# Patient Record
Sex: FEMALE | Race: WHITE | NOT HISPANIC OR LATINO | Employment: OTHER | URBAN - METROPOLITAN AREA
[De-identification: names, ages, dates, MRNs, and addresses within clinical notes are randomized per-mention and may not be internally consistent; named-entity substitution may affect disease eponyms.]

---

## 2021-04-09 ENCOUNTER — HOSPITAL ENCOUNTER (INPATIENT)
Facility: HOSPITAL | Age: 76
LOS: 5 days | Discharge: HOME HEALTH | DRG: 521 | End: 2021-04-14
Attending: EMERGENCY MEDICINE | Admitting: FAMILY MEDICINE
Payer: MEDICARE

## 2021-04-09 ENCOUNTER — ANESTHESIA EVENT (OUTPATIENT)
Dept: PERIOP | Facility: HOSPITAL | Age: 76
DRG: 521 | End: 2021-04-09
Payer: MEDICARE

## 2021-04-09 ENCOUNTER — APPOINTMENT (EMERGENCY)
Dept: RADIOLOGY | Facility: HOSPITAL | Age: 76
DRG: 521 | End: 2021-04-09
Payer: MEDICARE

## 2021-04-09 ENCOUNTER — ANESTHESIA (OUTPATIENT)
Dept: PERIOP | Facility: HOSPITAL | Age: 76
DRG: 521 | End: 2021-04-09
Payer: MEDICARE

## 2021-04-09 ENCOUNTER — APPOINTMENT (OUTPATIENT)
Dept: RADIOLOGY | Facility: HOSPITAL | Age: 76
DRG: 521 | End: 2021-04-09
Payer: MEDICARE

## 2021-04-09 DIAGNOSIS — U07.1 COVID-19: ICD-10-CM

## 2021-04-09 DIAGNOSIS — S51.019A SKIN TEAR OF ELBOW WITHOUT COMPLICATION: ICD-10-CM

## 2021-04-09 DIAGNOSIS — Z72.0 TOBACCO ABUSE: ICD-10-CM

## 2021-04-09 DIAGNOSIS — U07.1 COVID-19 VIRUS INFECTION: ICD-10-CM

## 2021-04-09 DIAGNOSIS — S72.009A FEMORAL NECK FRACTURE (HCC): Primary | ICD-10-CM
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
## 2021-04-09 PROBLEM — S72.002A CLOSED DISPLACED FRACTURE OF LEFT FEMORAL NECK (HCC): Status: ACTIVE | Noted: 2021-04-09

## 2021-04-09 PROBLEM — R26.2 AMBULATORY DYSFUNCTION: Status: ACTIVE | Noted: 2021-04-09

## 2021-04-09 PROBLEM — E87.1 HYPONATREMIA: Status: ACTIVE | Noted: 2021-04-09

## 2021-04-09 LAB
ABO GROUP BLD: NORMAL
ABO GROUP BLD: NORMAL
ANION GAP SERPL CALCULATED.3IONS-SCNC: 6 MMOL/L (ref 4–13)
APTT PPP: 32 SECONDS (ref 23–37)
ATRIAL RATE: 67 BPM
BASOPHILS # BLD AUTO: 0.02 THOUSANDS/ΜL (ref 0–0.1)
BASOPHILS NFR BLD AUTO: 1 % (ref 0–1)
BLD GP AB SCN SERPL QL: NEGATIVE
BUN SERPL-MCNC: 16 MG/DL (ref 5–25)
CALCIUM SERPL-MCNC: 8.2 MG/DL (ref 8.3–10.1)
CHLORIDE SERPL-SCNC: 98 MMOL/L (ref 100–108)
CO2 SERPL-SCNC: 29 MMOL/L (ref 21–32)
CREAT SERPL-MCNC: 0.85 MG/DL (ref 0.6–1.3)
EOSINOPHIL # BLD AUTO: 0.01 THOUSAND/ΜL (ref 0–0.61)
EOSINOPHIL NFR BLD AUTO: 0 % (ref 0–6)
ERYTHROCYTE [DISTWIDTH] IN BLOOD BY AUTOMATED COUNT: 12.7 % (ref 11.6–15.1)
FLUAV RNA RESP QL NAA+PROBE: NEGATIVE
FLUBV RNA RESP QL NAA+PROBE: NEGATIVE
GFR SERPL CREATININE-BSD FRML MDRD: 67 ML/MIN/1.73SQ M
GLUCOSE SERPL-MCNC: 108 MG/DL (ref 65–140)
HCT VFR BLD AUTO: 38.9 % (ref 34.8–46.1)
HGB BLD-MCNC: 12.8 G/DL (ref 11.5–15.4)
IMM GRANULOCYTES # BLD AUTO: 0.04 THOUSAND/UL (ref 0–0.2)
IMM GRANULOCYTES NFR BLD AUTO: 1 % (ref 0–2)
INR PPP: 1.1 (ref 0.84–1.19)
LYMPHOCYTES # BLD AUTO: 0.7 THOUSANDS/ΜL (ref 0.6–4.47)
LYMPHOCYTES NFR BLD AUTO: 16 % (ref 14–44)
MCH RBC QN AUTO: 34.3 PG (ref 26.8–34.3)
MCHC RBC AUTO-ENTMCNC: 32.9 G/DL (ref 31.4–37.4)
MCV RBC AUTO: 104 FL (ref 82–98)
MONOCYTES # BLD AUTO: 0.53 THOUSAND/ΜL (ref 0.17–1.22)
MONOCYTES NFR BLD AUTO: 12 % (ref 4–12)
NEUTROPHILS # BLD AUTO: 2.98 THOUSANDS/ΜL (ref 1.85–7.62)
NEUTS SEG NFR BLD AUTO: 70 % (ref 43–75)
NRBC BLD AUTO-RTO: 0 /100 WBCS
P AXIS: 79 DEGREES
PLATELET # BLD AUTO: 204 THOUSANDS/UL (ref 149–390)
PMV BLD AUTO: 8.9 FL (ref 8.9–12.7)
POTASSIUM SERPL-SCNC: 3.7 MMOL/L (ref 3.5–5.3)
PR INTERVAL: 132 MS
PROTHROMBIN TIME: 14.1 SECONDS (ref 11.6–14.5)
QRS AXIS: 75 DEGREES
QRSD INTERVAL: 76 MS
QT INTERVAL: 432 MS
QTC INTERVAL: 456 MS
RBC # BLD AUTO: 3.73 MILLION/UL (ref 3.81–5.12)
RH BLD: POSITIVE
RH BLD: POSITIVE
RSV RNA RESP QL NAA+PROBE: NEGATIVE
SARS-COV-2 RNA RESP QL NAA+PROBE: POSITIVE
SODIUM SERPL-SCNC: 133 MMOL/L (ref 136–145)
SPECIMEN EXPIRATION DATE: NORMAL
T WAVE AXIS: 80 DEGREES
TROPONIN I SERPL-MCNC: <0.02 NG/ML
VENTRICULAR RATE: 67 BPM
WBC # BLD AUTO: 4.28 THOUSAND/UL (ref 4.31–10.16)

## 2021-04-09 PROCEDURE — 99223 1ST HOSP IP/OBS HIGH 75: CPT | Performed by: FAMILY MEDICINE

## 2021-04-09 PROCEDURE — 85025 COMPLETE CBC W/AUTO DIFF WBC: CPT | Performed by: PHYSICIAN ASSISTANT

## 2021-04-09 PROCEDURE — 86901 BLOOD TYPING SEROLOGIC RH(D): CPT | Performed by: PHYSICIAN ASSISTANT

## 2021-04-09 PROCEDURE — 99285 EMERGENCY DEPT VISIT HI MDM: CPT | Performed by: PHYSICIAN ASSISTANT

## 2021-04-09 PROCEDURE — 85610 PROTHROMBIN TIME: CPT | Performed by: PHYSICIAN ASSISTANT

## 2021-04-09 PROCEDURE — 72125 CT NECK SPINE W/O DYE: CPT

## 2021-04-09 PROCEDURE — 96374 THER/PROPH/DIAG INJ IV PUSH: CPT

## 2021-04-09 PROCEDURE — C1776 JOINT DEVICE (IMPLANTABLE): HCPCS | Performed by: ORTHOPAEDIC SURGERY

## 2021-04-09 PROCEDURE — 73502 X-RAY EXAM HIP UNI 2-3 VIEWS: CPT

## 2021-04-09 PROCEDURE — 86900 BLOOD TYPING SEROLOGIC ABO: CPT | Performed by: PHYSICIAN ASSISTANT

## 2021-04-09 PROCEDURE — 36415 COLL VENOUS BLD VENIPUNCTURE: CPT | Performed by: PHYSICIAN ASSISTANT

## 2021-04-09 PROCEDURE — 86850 RBC ANTIBODY SCREEN: CPT | Performed by: PHYSICIAN ASSISTANT

## 2021-04-09 PROCEDURE — 1124F ACP DISCUSS-NO DSCNMKR DOCD: CPT | Performed by: INTERNAL MEDICINE

## 2021-04-09 PROCEDURE — 85730 THROMBOPLASTIN TIME PARTIAL: CPT | Performed by: PHYSICIAN ASSISTANT

## 2021-04-09 PROCEDURE — 0241U HB NFCT DS VIR RESP RNA 4 TRGT: CPT | Performed by: PHYSICIAN ASSISTANT

## 2021-04-09 PROCEDURE — 99285 EMERGENCY DEPT VISIT HI MDM: CPT

## 2021-04-09 PROCEDURE — 99024 POSTOP FOLLOW-UP VISIT: CPT | Performed by: ORTHOPAEDIC SURGERY

## 2021-04-09 PROCEDURE — 27130 TOTAL HIP ARTHROPLASTY: CPT | Performed by: ORTHOPAEDIC SURGERY

## 2021-04-09 PROCEDURE — 90715 TDAP VACCINE 7 YRS/> IM: CPT | Performed by: PHYSICIAN ASSISTANT

## 2021-04-09 PROCEDURE — 70450 CT HEAD/BRAIN W/O DYE: CPT

## 2021-04-09 PROCEDURE — 73501 X-RAY EXAM HIP UNI 1 VIEW: CPT

## 2021-04-09 PROCEDURE — 27130 TOTAL HIP ARTHROPLASTY: CPT | Performed by: PHYSICIAN ASSISTANT

## 2021-04-09 PROCEDURE — 71045 X-RAY EXAM CHEST 1 VIEW: CPT

## 2021-04-09 PROCEDURE — 99223 1ST HOSP IP/OBS HIGH 75: CPT | Performed by: ORTHOPAEDIC SURGERY

## 2021-04-09 PROCEDURE — 84484 ASSAY OF TROPONIN QUANT: CPT | Performed by: PHYSICIAN ASSISTANT

## 2021-04-09 PROCEDURE — 93005 ELECTROCARDIOGRAM TRACING: CPT

## 2021-04-09 PROCEDURE — G9197 ORDER FOR CEPH: HCPCS | Performed by: ORTHOPAEDIC SURGERY

## 2021-04-09 PROCEDURE — 0SR902A REPLACEMENT OF RIGHT HIP JOINT WITH METAL ON POLYETHYLENE SYNTHETIC SUBSTITUTE, UNCEMENTED, OPEN APPROACH: ICD-10-PCS | Performed by: FAMILY MEDICINE

## 2021-04-09 PROCEDURE — 93010 ELECTROCARDIOGRAM REPORT: CPT | Performed by: INTERNAL MEDICINE

## 2021-04-09 PROCEDURE — 73080 X-RAY EXAM OF ELBOW: CPT

## 2021-04-09 PROCEDURE — 80048 BASIC METABOLIC PNL TOTAL CA: CPT | Performed by: PHYSICIAN ASSISTANT

## 2021-04-09 PROCEDURE — 90471 IMMUNIZATION ADMIN: CPT

## 2021-04-09 DEVICE — PINNACLE HIP SOLUTIONS ALTRX POLYETHYLENE ACETABULAR LINER NEUTRAL 32MM ID 50MM OD
Type: IMPLANTABLE DEVICE | Site: HIP | Status: FUNCTIONAL
Brand: PINNACLE ALTRX

## 2021-04-09 DEVICE — ACTIS DUOFIX HIP PROSTHESIS (FEMORAL STEM 12/14 TAPER CEMENTLESS SIZE 5 STD COLLAR)  CE
Type: IMPLANTABLE DEVICE | Site: HIP | Status: FUNCTIONAL
Brand: ACTIS

## 2021-04-09 DEVICE — APEX HOLE ELIMINATOR - PS
Type: IMPLANTABLE DEVICE | Site: HIP | Status: FUNCTIONAL
Brand: APEX

## 2021-04-09 DEVICE — ARTICUL/EZE FEMORAL HEAD DIAMETER 32MM +5 12/14 TAPER
Type: IMPLANTABLE DEVICE | Site: HIP | Status: FUNCTIONAL
Brand: ARTICUL/EZE

## 2021-04-09 DEVICE — PINNACLE POROCOAT ACETABULAR SHELL SECTOR II 50MM OD
Type: IMPLANTABLE DEVICE | Site: HIP | Status: FUNCTIONAL
Brand: PINNACLE POROCOAT

## 2021-04-09 RX ORDER — NICOTINE 21 MG/24HR
1 PATCH, TRANSDERMAL 24 HOURS TRANSDERMAL DAILY
Status: DISCONTINUED | OUTPATIENT
Start: 2021-04-10 | End: 2021-04-14 | Stop reason: HOSPADM

## 2021-04-09 RX ORDER — ZINC SULFATE 50(220)MG
220 CAPSULE ORAL DAILY
Status: DISCONTINUED | OUTPATIENT
Start: 2021-04-10 | End: 2021-04-14 | Stop reason: HOSPADM

## 2021-04-09 RX ORDER — POLYETHYLENE GLYCOL 3350 17 G/17G
17 POWDER, FOR SOLUTION ORAL DAILY PRN
Status: DISCONTINUED | OUTPATIENT
Start: 2021-04-09 | End: 2021-04-14 | Stop reason: HOSPADM

## 2021-04-09 RX ORDER — GINSENG 100 MG
1 CAPSULE ORAL ONCE
Status: COMPLETED | OUTPATIENT
Start: 2021-04-09 | End: 2021-04-09

## 2021-04-09 RX ORDER — CEFAZOLIN SODIUM 2 G/50ML
2000 SOLUTION INTRAVENOUS ONCE
Status: DISCONTINUED | OUTPATIENT
Start: 2021-04-09 | End: 2021-04-09 | Stop reason: SDUPTHER

## 2021-04-09 RX ORDER — ONDANSETRON 2 MG/ML
INJECTION INTRAMUSCULAR; INTRAVENOUS AS NEEDED
Status: DISCONTINUED | OUTPATIENT
Start: 2021-04-09 | End: 2021-04-09

## 2021-04-09 RX ORDER — ONDANSETRON 2 MG/ML
4 INJECTION INTRAMUSCULAR; INTRAVENOUS EVERY 6 HOURS PRN
Status: DISCONTINUED | OUTPATIENT
Start: 2021-04-09 | End: 2021-04-14 | Stop reason: HOSPADM

## 2021-04-09 RX ORDER — GABAPENTIN 100 MG/1
200 CAPSULE ORAL 2 TIMES DAILY
Status: DISCONTINUED | OUTPATIENT
Start: 2021-04-09 | End: 2021-04-14 | Stop reason: HOSPADM

## 2021-04-09 RX ORDER — SODIUM CHLORIDE, SODIUM LACTATE, POTASSIUM CHLORIDE, CALCIUM CHLORIDE 600; 310; 30; 20 MG/100ML; MG/100ML; MG/100ML; MG/100ML
100 INJECTION, SOLUTION INTRAVENOUS CONTINUOUS
Status: DISCONTINUED | OUTPATIENT
Start: 2021-04-09 | End: 2021-04-09

## 2021-04-09 RX ORDER — EPHEDRINE SULFATE 50 MG/ML
INJECTION INTRAVENOUS AS NEEDED
Status: DISCONTINUED | OUTPATIENT
Start: 2021-04-09 | End: 2021-04-09

## 2021-04-09 RX ORDER — CEFAZOLIN SODIUM 2 G/50ML
2000 SOLUTION INTRAVENOUS ONCE
Status: COMPLETED | OUTPATIENT
Start: 2021-04-09 | End: 2021-04-09

## 2021-04-09 RX ORDER — PANTOPRAZOLE SODIUM 40 MG/1
40 TABLET, DELAYED RELEASE ORAL
Status: DISCONTINUED | OUTPATIENT
Start: 2021-04-10 | End: 2021-04-14 | Stop reason: HOSPADM

## 2021-04-09 RX ORDER — MAGNESIUM HYDROXIDE 1200 MG/15ML
LIQUID ORAL AS NEEDED
Status: DISCONTINUED | OUTPATIENT
Start: 2021-04-09 | End: 2021-04-09 | Stop reason: HOSPADM

## 2021-04-09 RX ORDER — OXYCODONE HYDROCHLORIDE 5 MG/1
2.5 TABLET ORAL EVERY 4 HOURS PRN
Status: DISCONTINUED | OUTPATIENT
Start: 2021-04-09 | End: 2021-04-14 | Stop reason: HOSPADM

## 2021-04-09 RX ORDER — MELATONIN
2000 DAILY
Status: DISCONTINUED | OUTPATIENT
Start: 2021-04-10 | End: 2021-04-14 | Stop reason: HOSPADM

## 2021-04-09 RX ORDER — GLYCOPYRROLATE 0.2 MG/ML
INJECTION INTRAMUSCULAR; INTRAVENOUS AS NEEDED
Status: DISCONTINUED | OUTPATIENT
Start: 2021-04-09 | End: 2021-04-09

## 2021-04-09 RX ORDER — MAGNESIUM HYDROXIDE/ALUMINUM HYDROXICE/SIMETHICONE 120; 1200; 1200 MG/30ML; MG/30ML; MG/30ML
30 SUSPENSION ORAL EVERY 6 HOURS PRN
Status: DISCONTINUED | OUTPATIENT
Start: 2021-04-09 | End: 2021-04-14 | Stop reason: HOSPADM

## 2021-04-09 RX ORDER — ROCURONIUM BROMIDE 10 MG/ML
INJECTION, SOLUTION INTRAVENOUS AS NEEDED
Status: DISCONTINUED | OUTPATIENT
Start: 2021-04-09 | End: 2021-04-09

## 2021-04-09 RX ORDER — SENNOSIDES 8.6 MG
1 TABLET ORAL DAILY
Status: DISCONTINUED | OUTPATIENT
Start: 2021-04-10 | End: 2021-04-12

## 2021-04-09 RX ORDER — FENTANYL CITRATE/PF 50 MCG/ML
50 SYRINGE (ML) INJECTION
Status: DISCONTINUED | OUTPATIENT
Start: 2021-04-09 | End: 2021-04-09

## 2021-04-09 RX ORDER — OXYCODONE HYDROCHLORIDE 5 MG/1
5 TABLET ORAL EVERY 4 HOURS PRN
Status: DISCONTINUED | OUTPATIENT
Start: 2021-04-09 | End: 2021-04-14 | Stop reason: HOSPADM

## 2021-04-09 RX ORDER — SODIUM CHLORIDE 9 MG/ML
75 INJECTION, SOLUTION INTRAVENOUS CONTINUOUS
Status: DISCONTINUED | OUTPATIENT
Start: 2021-04-09 | End: 2021-04-10

## 2021-04-09 RX ORDER — NEOSTIGMINE METHYLSULFATE 1 MG/ML
INJECTION INTRAVENOUS AS NEEDED
Status: DISCONTINUED | OUTPATIENT
Start: 2021-04-09 | End: 2021-04-09

## 2021-04-09 RX ORDER — CALCIUM CARBONATE 200(500)MG
1000 TABLET,CHEWABLE ORAL DAILY PRN
Status: DISCONTINUED | OUTPATIENT
Start: 2021-04-09 | End: 2021-04-14 | Stop reason: HOSPADM

## 2021-04-09 RX ORDER — DIPHENOXYLATE HYDROCHLORIDE AND ATROPINE SULFATE 2.5; .025 MG/1; MG/1
1 TABLET ORAL DAILY
COMMUNITY
End: 2021-04-14 | Stop reason: HOSPADM

## 2021-04-09 RX ORDER — ACETAMINOPHEN 325 MG/1
975 TABLET ORAL EVERY 8 HOURS SCHEDULED
Status: DISCONTINUED | OUTPATIENT
Start: 2021-04-09 | End: 2021-04-14 | Stop reason: HOSPADM

## 2021-04-09 RX ORDER — DEXAMETHASONE SODIUM PHOSPHATE 10 MG/ML
INJECTION, SOLUTION INTRAMUSCULAR; INTRAVENOUS AS NEEDED
Status: DISCONTINUED | OUTPATIENT
Start: 2021-04-09 | End: 2021-04-09

## 2021-04-09 RX ORDER — SODIUM CHLORIDE 9 MG/ML
INJECTION, SOLUTION INTRAVENOUS AS NEEDED
Status: DISCONTINUED | OUTPATIENT
Start: 2021-04-09 | End: 2021-04-09 | Stop reason: HOSPADM

## 2021-04-09 RX ORDER — MULTIVITAMIN/IRON/FOLIC ACID 18MG-0.4MG
1 TABLET ORAL DAILY
Status: DISCONTINUED | OUTPATIENT
Start: 2021-04-17 | End: 2021-04-14 | Stop reason: HOSPADM

## 2021-04-09 RX ORDER — FENTANYL CITRATE 50 UG/ML
INJECTION, SOLUTION INTRAMUSCULAR; INTRAVENOUS AS NEEDED
Status: DISCONTINUED | OUTPATIENT
Start: 2021-04-09 | End: 2021-04-09

## 2021-04-09 RX ORDER — LIDOCAINE HYDROCHLORIDE 10 MG/ML
INJECTION, SOLUTION EPIDURAL; INFILTRATION; INTRACAUDAL; PERINEURAL AS NEEDED
Status: DISCONTINUED | OUTPATIENT
Start: 2021-04-09 | End: 2021-04-09

## 2021-04-09 RX ORDER — HYDROMORPHONE HCL/PF 1 MG/ML
0.2 SYRINGE (ML) INJECTION EVERY 4 HOURS PRN
Status: DISCONTINUED | OUTPATIENT
Start: 2021-04-09 | End: 2021-04-14 | Stop reason: HOSPADM

## 2021-04-09 RX ORDER — ONDANSETRON 2 MG/ML
4 INJECTION INTRAMUSCULAR; INTRAVENOUS ONCE AS NEEDED
Status: DISCONTINUED | OUTPATIENT
Start: 2021-04-09 | End: 2021-04-12

## 2021-04-09 RX ORDER — CEFAZOLIN SODIUM 2 G/50ML
2000 SOLUTION INTRAVENOUS EVERY 8 HOURS
Status: COMPLETED | OUTPATIENT
Start: 2021-04-10 | End: 2021-04-10

## 2021-04-09 RX ORDER — SODIUM CHLORIDE, SODIUM LACTATE, POTASSIUM CHLORIDE, CALCIUM CHLORIDE 600; 310; 30; 20 MG/100ML; MG/100ML; MG/100ML; MG/100ML
INJECTION, SOLUTION INTRAVENOUS CONTINUOUS PRN
Status: DISCONTINUED | OUTPATIENT
Start: 2021-04-09 | End: 2021-04-09

## 2021-04-09 RX ORDER — ASCORBIC ACID 500 MG
1000 TABLET ORAL EVERY 12 HOURS SCHEDULED
Status: DISCONTINUED | OUTPATIENT
Start: 2021-04-09 | End: 2021-04-14 | Stop reason: HOSPADM

## 2021-04-09 RX ORDER — PROPOFOL 10 MG/ML
INJECTION, EMULSION INTRAVENOUS AS NEEDED
Status: DISCONTINUED | OUTPATIENT
Start: 2021-04-09 | End: 2021-04-09

## 2021-04-09 RX ADMIN — FENTANYL CITRATE 100 MCG: 50 INJECTION, SOLUTION INTRAMUSCULAR; INTRAVENOUS at 16:07

## 2021-04-09 RX ADMIN — EPHEDRINE SULFATE 5 MG: 50 INJECTION, SOLUTION INTRAVENOUS at 17:26

## 2021-04-09 RX ADMIN — LIDOCAINE HYDROCHLORIDE 50 MG: 10 INJECTION, SOLUTION EPIDURAL; INFILTRATION; INTRACAUDAL; PERINEURAL at 16:07

## 2021-04-09 RX ADMIN — FENTANYL CITRATE 50 MCG: 50 INJECTION, SOLUTION INTRAMUSCULAR; INTRAVENOUS at 17:14

## 2021-04-09 RX ADMIN — MORPHINE SULFATE 2 MG: 2 INJECTION, SOLUTION INTRAMUSCULAR; INTRAVENOUS at 12:47

## 2021-04-09 RX ADMIN — NEOSTIGMINE METHYLSULFATE 3 MG: 1 INJECTION INTRAVENOUS at 18:29

## 2021-04-09 RX ADMIN — PHENYLEPHRINE HYDROCHLORIDE 100 MCG: 10 INJECTION INTRAVENOUS at 17:00

## 2021-04-09 RX ADMIN — FENTANYL CITRATE 25 MCG: 50 INJECTION, SOLUTION INTRAMUSCULAR; INTRAVENOUS at 18:29

## 2021-04-09 RX ADMIN — TETANUS TOXOID, REDUCED DIPHTHERIA TOXOID AND ACELLULAR PERTUSSIS VACCINE, ADSORBED 0.5 ML: 5; 2.5; 8; 8; 2.5 SUSPENSION INTRAMUSCULAR at 11:45

## 2021-04-09 RX ADMIN — ROCURONIUM BROMIDE 50 MG: 10 INJECTION, SOLUTION INTRAVENOUS at 16:07

## 2021-04-09 RX ADMIN — GLYCOPYRROLATE 0.6 MG: 0.2 INJECTION, SOLUTION INTRAMUSCULAR; INTRAVENOUS at 18:29

## 2021-04-09 RX ADMIN — ACETAMINOPHEN 975 MG: 325 TABLET, FILM COATED ORAL at 21:18

## 2021-04-09 RX ADMIN — SODIUM CHLORIDE 75 ML/HR: 0.9 INJECTION, SOLUTION INTRAVENOUS at 20:08

## 2021-04-09 RX ADMIN — PROPOFOL 20 MG: 10 INJECTION, EMULSION INTRAVENOUS at 16:08

## 2021-04-09 RX ADMIN — SODIUM CHLORIDE, SODIUM LACTATE, POTASSIUM CHLORIDE, AND CALCIUM CHLORIDE 100 ML/HR: .6; .31; .03; .02 INJECTION, SOLUTION INTRAVENOUS at 19:30

## 2021-04-09 RX ADMIN — SODIUM CHLORIDE, SODIUM LACTATE, POTASSIUM CHLORIDE, AND CALCIUM CHLORIDE: .6; .31; .03; .02 INJECTION, SOLUTION INTRAVENOUS at 18:22

## 2021-04-09 RX ADMIN — MORPHINE SULFATE 2 MG: 2 INJECTION, SOLUTION INTRAMUSCULAR; INTRAVENOUS at 11:38

## 2021-04-09 RX ADMIN — CEFAZOLIN SODIUM 2000 MG: 2 SOLUTION INTRAVENOUS at 16:05

## 2021-04-09 RX ADMIN — PHENYLEPHRINE HYDROCHLORIDE 200 MCG: 10 INJECTION INTRAVENOUS at 16:15

## 2021-04-09 RX ADMIN — PROPOFOL 100 MG: 10 INJECTION, EMULSION INTRAVENOUS at 16:07

## 2021-04-09 RX ADMIN — ONDANSETRON 4 MG: 2 INJECTION INTRAMUSCULAR; INTRAVENOUS at 16:16

## 2021-04-09 RX ADMIN — OXYCODONE HYDROCHLORIDE AND ACETAMINOPHEN 1000 MG: 500 TABLET ORAL at 20:07

## 2021-04-09 RX ADMIN — SODIUM CHLORIDE, SODIUM LACTATE, POTASSIUM CHLORIDE, AND CALCIUM CHLORIDE: .6; .31; .03; .02 INJECTION, SOLUTION INTRAVENOUS at 16:03

## 2021-04-09 RX ADMIN — DEXAMETHASONE SODIUM PHOSPHATE 4 MG: 10 INJECTION, SOLUTION INTRAMUSCULAR; INTRAVENOUS at 16:16

## 2021-04-09 RX ADMIN — BACITRACIN 1 SMALL APPLICATION: 500 OINTMENT TOPICAL at 11:49

## 2021-04-09 RX ADMIN — PHENYLEPHRINE HYDROCHLORIDE 100 MCG: 10 INJECTION INTRAVENOUS at 16:12

## 2021-04-09 RX ADMIN — FENTANYL CITRATE 25 MCG: 50 INJECTION, SOLUTION INTRAMUSCULAR; INTRAVENOUS at 18:36

## 2021-04-09 RX ADMIN — GABAPENTIN 200 MG: 100 CAPSULE ORAL at 20:07

## 2021-04-09 NOTE — ANESTHESIA POSTPROCEDURE EVALUATION
Post-Op Assessment Note    CV Status:  Stable  Pain Score: 1    Pain management: adequate     Mental Status:  Alert and awake   Hydration Status:  Euvolemic and stable   PONV Controlled:  Controlled   Airway Patency:  Patent      Post Op Vitals Reviewed: Yes      Staff: Anesthesiologist         No complications documented      /86 (04/09/21 1910)    Temp 98 3 °F (36 8 °C) (04/09/21 1910)    Pulse 103 (04/09/21 1910)   Resp   12   SpO2 92 % (04/09/21 1910)

## 2021-04-09 NOTE — PROGRESS NOTES
Progress Note - Orthopedics   Phylicia Fay 67 y o  female MRN: [id number]  Unit/Bed#: OR [place] Encounter: [id number]    Assessment:  1) POD#0 s/p DA R PRISCILA  2) COVID postive    Plan:  Ancef 2g IV x 2 for 24 hours postop  DVT prophylaxis: Lovenox 30mg SQ Q12/SCD's/Ambulation- Lovenox dosing secondary to COVID positivity and per primary team    WBAT RLE  PT/OT- WBAT RLE  Analgesia PRN  Follow up AM labs  May discontinue Barrera when the patient is stable  Will defer this decision to the medical service  Medical management per primary team  Dressing- monitor for drainage  Discharge planning - disposition pending progress with PT postoperatively  Weight bearing: WBAT RLE    VTE Pharmacologic Prophylaxis: Enoxaparin (Lovenox)  VTE Mechanical Prophylaxis: sequential compression device    Subjective:  Patient seen examined postoperatively  Pain controlled  Events of surgery discussed with the patient the patient's family  Vitals: Blood pressure 137/83, pulse 73, temperature 98 3 °F (36 8 °C), temperature source Temporal, resp  rate 22, height 5' 1" (1 549 m), weight 45 4 kg (100 lb), SpO2 97 %  ,Body mass index is 18 89 kg/m²        Intake/Output Summary (Last 24 hours) at 4/9/2021 1837  Last data filed at 4/9/2021 4857  Gross per 24 hour   Intake 1000 ml   Output 580 ml   Net 420 ml       Invasive Devices     Peripheral Intravenous Line            Peripheral IV 04/09/21 Left Antecubital less than 1 day    Peripheral IV 04/09/21 Right Wrist less than 1 day          Drain            Urethral Catheter Latex 16 Fr  less than 1 day                Physical Exam: NAD  Ortho Exam: RLE: Dsg c/d/i, thigh soft, +LFCN SILT, +Fem nerve motor, +DF/PF/EHL, +L3-S1 SILT, DP2+, foot warm      Lab, Imaging and other studies:  Intraop fluoroscopy images:  Satisfactory placement and orientation of right total hip arthroplasty, no fracture appreciated

## 2021-04-09 NOTE — ED NOTES
Patient's O2 sat around 88-91 on RA during Morphine administration  Patient placed on 2 L O@ via NC and instructed to take deep breaths  O2 sat now 98%  Ramon SIDDIQUI made aware        Ifeoma Marcano, "[employer]  04/09/21 8528

## 2021-04-09 NOTE — ANESTHESIA PREPROCEDURE EVALUATION
Procedure:  ARTHROPLASTY HIP TOTAL ANTERIOR (Right Hip)    Relevant Problems   ANESTHESIA (within normal limits)      CARDIO (within normal limits)      ENDO (within normal limits)        Physical Exam    Airway    Mallampati score: I  TM Distance: >3 FB  Neck ROM: full     Dental   lower dentures and upper dentures,     Cardiovascular  Rhythm: regular, Rate: normal,     Pulmonary  Breath sounds clear to auscultation,     Other Findings        Anesthesia Plan  ASA Score- 3 Emergent    Anesthesia Type- general with ASA Monitors  Additional Monitors:   Airway Plan: ETT  Plan Factors-Exercise tolerance (METS): >4 METS  Chart reviewed  EKG reviewed  Existing labs reviewed  Patient summary reviewed  Patient is not a current smoker  Induction- intravenous  Postoperative Plan- Plan for postoperative opioid use  Informed Consent- Anesthetic plan and risks discussed with patient and daughter  I personally reviewed this patient with the CRNA  Discussed and agreed on the Anesthesia Plan with the CRNA  Karrie Nissen

## 2021-04-09 NOTE — CONSULTS
Consultation - Orthopedics   Mary Balderrama 67 y o  female MRN: [id number]  Unit/Bed#: ED 01 Encounter: [id number]      Assessment/Plan     Assessment:  Right hip pain - patient sustained a displaced right femoral neck fracture during her fall this morning  She is active at baseline and still working and is in good health overall despite smoking for over 50 years  Therefore, we have recommended that she undergo a direct anterior right total hip arthroplasty for fracture  She has not eaten or drink since 8-8:30 this morning  She will need clearance for the procedure from the internal medicine team   We will defer the need for cardiac clearance to them  We will plan to take her directly from the emergency department to the operating room once she is cleared and consent has been obtained for the procedure due to her COVID positive status  She should remain NPO and on strict bedrest with nonweightbearing on the right lower extremity  All preoperative labs have been completed  She and her daughter are in agreement with this plan    Plan:  - plan for direct anterior right total hip arthroplasty today with Dr Ramone Bryan  - NPO  - nonweightbearing right lower extremity  - medical clearance appreciated  - DVT prophylaxis with SCDs only  - orthopedics will continue to follow    History of Present Illness   Physician Requesting Consult: Jm Koehler DO  Reason for Consult / Principal Problem: Fall  HPI: Mary Balderrama is a 67y o  year old female who presents with right hip pain after a fall this morning  She was in her usual state of health when she fell getting in a car onto her right side  She was unable to ambulate after the fall and was brought to the emergency department where x-ray revealed a displaced right femoral neck fracture  She reports that she is normally independent with her activities of daily living and is still working  She denies any chronic medical issues or history of anesthesia    She has never had surgery before  She does not take any medications on a regular basis  She does have over a 50 year history of smoking and currently smokes half pack a day  She denies any paresthesias in the right lower extremity  She denies any chest pain, shortness of breath, dizziness, lightheadedness, nausea, or vomiting, or antecedent illness    Inpatient consult to Orthopedic Surgery  Consult performed by: Ann-Marie Rodriguez PA-C  Consult ordered by: Mackenzie Kirk PA-C          Review of Systems   Constitutional: Negative  HENT: Negative  Eyes: Negative  Respiratory: Negative  Cardiovascular: Negative  Gastrointestinal: Negative  Endocrine: Negative  Genitourinary: Negative  Musculoskeletal: Positive for arthralgias, joint swelling and myalgias  Skin: Negative  Allergic/Immunologic: Negative  Neurological: Negative  Hematological: Negative  Psychiatric/Behavioral: Negative  Historical Information   History reviewed  No pertinent past medical history  History reviewed  No pertinent surgical history  Social History   Social History     Substance and Sexual Activity   Alcohol Use Never    Frequency: Never     Social History     Substance and Sexual Activity   Drug Use Never     E-Cigarette/Vaping    E-Cigarette Use Never User      E-Cigarette/Vaping Substances     Social History     Tobacco Use   Smoking Status Current Every Day Smoker    Packs/day: 0 50    Years: 60 00    Pack years: 30 00   Smokeless Tobacco Never Used     Family History: History reviewed  No pertinent family history      Meds/Allergies   all current active meds have been reviewed, current meds:   Current Facility-Administered Medications   Medication Dose Route Frequency    bacitracin 50,000 Units in sodium chloride 0 9 % 1,000 mL irrigation bag   Irrigation Once    tranexamic Acid 1,000 mg in sodium chloride 0 9 % 50 mL IVPB   Intravenous Once    and PTA meds:   Prior to Admission Medications Prescriptions Last Dose Informant Patient Reported? Taking?   multivitamin (THERAGRAN) TABS  Self Yes Yes   Sig: Take 1 tablet by mouth daily      Facility-Administered Medications: None     No Known Allergies    Objective   Vitals: Blood pressure 137/83, pulse 73, temperature 98 3 °F (36 8 °C), temperature source Temporal, resp  rate 22, height 5' 1" (1 549 m), weight 45 4 kg (100 lb), SpO2 97 %  ,Body mass index is 18 89 kg/m²  No intake or output data in the 24 hours ending 04/09/21 1456  No intake/output data recorded  Invasive Devices     Peripheral Intravenous Line            Peripheral IV 04/09/21 Left Antecubital less than 1 day                Physical Exam  Vitals signs and nursing note reviewed  Constitutional:       Appearance: Normal appearance  HENT:      Head: Normocephalic and atraumatic  Right Ear: External ear normal       Left Ear: External ear normal       Nose: Nose normal       Mouth/Throat:      Mouth: Mucous membranes are moist    Eyes:      Extraocular Movements: Extraocular movements intact  Neck:      Musculoskeletal: Neck supple  Cardiovascular:      Rate and Rhythm: Normal rate and regular rhythm  Pulses: Normal pulses  Pulmonary:      Effort: Pulmonary effort is normal       Breath sounds: Normal breath sounds  Comments: On O2 via NC  Abdominal:      Palpations: Abdomen is soft  Musculoskeletal:         General: Deformity present  Comments: See ortho exam   Skin:     General: Skin is warm and dry  Capillary Refill: Capillary refill takes less than 2 seconds  Neurological:      Mental Status: She is alert and oriented to person, place, and time  Psychiatric:         Mood and Affect: Mood normal          Behavior: Behavior normal          Thought Content:  Thought content normal          Judgment: Judgment normal        Right Hip Exam     Tenderness   The patient is experiencing tenderness in the anterior and lateral     Other   Erythema: absent  Scars: absent  Sensation: normal  Pulse: present    Comments:  RLE shortened and externally rotated  Thigh and calf soft and nontender  Normal sensation L2-S1   Palpable pedal pulse  Strength and range of motion deferred  No abrasion, laceration, ulceration, erythema, ecchymosis, or other break in skin around the right hip  Abrasion around right elbow wrapped            Lab Results:   I have personally reviewed pertinent lab results  CBC:   Lab Results   Component Value Date    WBC 4 28 (L) 04/09/2021    HGB 12 8 04/09/2021    HCT 38 9 04/09/2021     (H) 04/09/2021     04/09/2021    MCH 34 3 04/09/2021    MCHC 32 9 04/09/2021    RDW 12 7 04/09/2021    MPV 8 9 04/09/2021    NRBC 0 04/09/2021     CMP:   Lab Results   Component Value Date    SODIUM 133 (L) 04/09/2021    CL 98 (L) 04/09/2021    CO2 29 04/09/2021    BUN 16 04/09/2021    CREATININE 0 85 04/09/2021    CALCIUM 8 2 (L) 04/09/2021    EGFR 67 04/09/2021     PT/INR:   Lab Results   Component Value Date    INR 1 10 04/09/2021     ESR: No results found for: ESR  CRP: No results found for: CRP     Imaging Studies: I have personally reviewed pertinent films in PACS   Xr Elbow 3+ Vw Right    Result Date: 4/9/2021  Narrative: RIGHT ELBOW INDICATION:   fall pain  Patient has confirmed Covid 19  COMPARISON:  None VIEWS:  XR ELBOW 3+ VW RIGHT Images: 4 FINDINGS: There is no acute fracture or dislocation  There is no joint effusion  No significant degenerative changes  No lytic or blastic osseous lesion  Soft tissues are unremarkable  Impression: No acute osseous abnormality  Workstation performed: [id number]     Xr Hip/pelv 2-3 Vws Right If Performed    Result Date: 4/9/2021  Narrative: RIGHT HIP INDICATION:   trauma pain deformity  Patient has confirmed Covid 19   COMPARISON:  None VIEWS:  XR HIP/PELV 2-3 VWS RIGHT W PELVIS IF PERFORMED Images: 3 FINDINGS: Right subcapital minimally displaced femoral neck fracture with secondary varus deformity  No significant hip degenerative changes  No lytic or blastic osseous lesion  There are atherosclerotic calcifications  Soft tissues are otherwise unremarkable  The visualized lumbar spine is unremarkable  Impression: Minimally displaced right subcapital femoral neck fracture with secondary varus deformity  Workstation performed: [id number]     Ct Head Without Contrast    Result Date: 4/9/2021  Narrative: CT BRAIN - WITHOUT CONTRAST INDICATION:   trauma  Patient has suspected COVID-19  COMPARISON:  None  TECHNIQUE:  CT examination of the brain was performed  In addition to axial images, sagittal and coronal 2D reformatted images were created and submitted for interpretation  Radiation dose length product (DLP) for this visit:  845 87 mGy-cm   This examination, like all CT scans performed in the [facility], was performed utilizing techniques to minimize radiation dose exposure, including the use of iterative  reconstruction and automated exposure control  IMAGE QUALITY:  Diagnostic  FINDINGS: PARENCHYMA: Decreased attenuation is noted in periventricular and subcortical white matter demonstrating an appearance that is statistically most likely to represent mild microangiopathic change  No CT signs of acute infarction  No intracranial mass, mass effect or midline shift  No acute parenchymal hemorrhage  VENTRICLES AND EXTRA-AXIAL SPACES:  Normal for the patient's age  VISUALIZED ORBITS AND PARANASAL SINUSES:  Unremarkable  CALVARIUM AND EXTRACRANIAL SOFT TISSUES:  Normal      Impression: No acute intracranial abnormality  Workstation performed: [id number]     Ct Spine Cervical Without Contrast    Result Date: 4/9/2021  Narrative: CT CERVICAL SPINE - WITHOUT CONTRAST INDICATION:   trauma  COMPARISON:  None  TECHNIQUE:  CT examination of the cervical spine was performed without intravenous contrast   Contiguous axial images were obtained    Sagittal and coronal reconstructions were performed  Radiation dose length product (DLP) for this visit:  210 32 mGy-cm   This examination, like all CT scans performed in the [facility], was performed utilizing techniques to minimize radiation dose exposure, including the use of iterative  reconstruction and automated exposure control  IMAGE QUALITY:  Diagnostic  FINDINGS: ALIGNMENT:  Grade 1 anterolisthesis at C4-5, likely chronic due to degenerative changes  VERTEBRAL BODIES:  No fracture  DEGENERATIVE CHANGES:  Moderate multilevel cervical degenerative changes are noted  No critical central canal stenosis  PREVERTEBRAL AND PARASPINAL SOFT TISSUES:  Unremarkable  THORACIC INLET:  Advanced pulmonary emphysema with mild biapical scarring  Impression: No cervical spine fracture or traumatic malalignment  Workstation performed: [id number]     Xr Chest 1 View    Result Date: 4/9/2021  Narrative: CHEST INDICATION:   pre op  Status post fall with right hip fracture  COMPARISON:  None EXAM PERFORMED/VIEWS:  XR CHEST 1 VIEW FINDINGS: Cardiomediastinal silhouette appears unremarkable  The lungs are clear  No pneumothorax or pleural effusion  Osseous structures appear within normal limits for patient age  Impression: No acute cardiopulmonary disease  Workstation performed: [id number]     Dr Juwan Meza and I reviewed the available pertinent images which demonstrate a displaced right femoral neck fracture  There is no fracture of the right elbow       EKG, Pathology, and Other Studies: I have personally reviewed pertinent films in PACS  VTE Prophylaxis: Sequential compression device Ann Cespedes)     Code Status: No Order  Advance Directive and Living Will:      Power of :    POLST:      Taurus Torres PA-C

## 2021-04-09 NOTE — H&P
Miguel Angel CRUZ/ Alfonso Garcia  [facility] 1945, 67 y o  female MRN: [id number]  Unit/Bed#: [facility] Encounter: [id number]  Primary Care Provider: No primary care provider on file  Date and time admitted to hospital: 4/9/2021 10:13 AM    * Closed displaced fracture of left femoral neck ([address] )  Assessment & Plan  Imaging reveals closed displaced left subcapital femoral neck fracture  S/p mechanical fall today while getting out of her car   Ortho to take to OR today for fixation   Medical clearance - EKG, CXR stable  Attending to advise  Pain control  PT OT post op  DVT ppx   Monitor H&H     COVID-19 virus infection  Assessment & Plan  Incidentally positive on screening in ER prior to OR   Patient is asymptomatic  O2 sats 89% in the ER on room air after dose of morphine in the setting of chronic tobacco abuse, will continue to monitor O2 requirements; otherwise will monitor off treatment   Multivitamin cocktail  Respiratory protocol  Check inflamamtory markers, daily cbc, cmp     Tobacco abuse  Assessment & Plan  Smoked 1/2ppd x 50 years (at least)   NRT   Encouraged cessation     VTE Prophylaxis: Enoxaparin (Lovenox)  / sequential compression device   Code Status: full code   POLST: POLST form is not discussed and not completed at this time  Discussion with family: dtr at bedside     Anticipated Length of Stay:  Patient will be admitted on an Inpatient basis with an anticipated length of stay of  > 2 midnights  Justification for Hospital Stay: hip fracture     Total Time for Visit, including Counseling / Coordination of Care: 45 minutes  Greater than 50% of this total time spent on direct patient counseling and coordination of care  Chief Complaint:   Right hip pain s/p fall today     History of Present Illness:    Leida Fields is a 67 y o  female with past medical history significant for tobacco abuse who presents with mechanical fall    Patient reported severe pain and right hip after falling while getting out of her car today  She reported dizziness prior to this and after the fall was unable to ambulate  She is quite independent at baseline, continues to work a full-time job and perform all of her own ADLs while caring for her   States she does get short of breath if she were to walk a city block however does not get short of breath while walking up steps  Denies chest pain  She denies chronic medical history but does admit to smoking half a pack a day for the last 50 or more years  She denies prior history of surgeries  No history of heart attack, stroke, DVT/PE  Currently pain in the right hip comes and goes, but she denies dizziness or lightheadedness  She was incidentally found to have COVID-19 in the ER  Could have had sick contacts at work but is unsure  Denies new cough, congestion, fevers, chills, poor appetite, generalized weakness or fatigue  She does however have a cough that daughter states has been ongoing for couple months  Patient was not yet vaccinated  Review of Systems:    Review of Systems   Constitutional: Negative for chills, fatigue and fever  HENT: Negative for congestion  Respiratory: Negative for cough and shortness of breath  Cardiovascular: Negative for chest pain  Gastrointestinal: Negative for abdominal pain  Genitourinary: Negative for dysuria  Musculoskeletal: Positive for arthralgias, back pain and gait problem  Skin: Positive for wound  Neurological: Positive for dizziness  Negative for speech difficulty and light-headedness  Psychiatric/Behavioral: Negative for confusion  Past Medical and Surgical History:     History reviewed  No pertinent past medical history  History reviewed  No pertinent surgical history  Meds/Allergies:    Prior to Admission medications    Medication Sig Start Date End Date Taking?  Authorizing Provider   multivitamin (THERAGRAN) TABS Take 1 tablet by mouth daily   Yes Historical Provider, MD     I have reviewed home medications with patient personally  Allergies: No Known Allergies    Social History:     Marital Status: Unknown   Occupation:    Patient Pre-hospital Living Situation: independent   Patient Pre-hospital Level of Mobility: none   Patient Pre-hospital Diet Restrictions: none   Substance Use History:   Social History     Substance and Sexual Activity   Alcohol Use Never    Frequency: Never     Social History     Tobacco Use   Smoking Status Current Every Day Smoker    Packs/day: 0 50    Years: 60 00    Pack years: 30 00   Smokeless Tobacco Never Used     Social History     Substance and Sexual Activity   Drug Use Never       Family History:    History reviewed  No pertinent family history  Physical Exam:     Vitals:   Blood Pressure: 137/83 (04/09/21 1432)  Pulse: 73 (04/09/21 1432)  Temperature: 98 3 °F (36 8 °C) (04/09/21 1211)  Temp Source: Temporal (04/09/21 1211)  Respirations: 22 (04/09/21 1432)  Height: 5' 1" (154 9 cm) (04/09/21 1018)  Weight - Scale: 45 4 kg (100 lb) (04/09/21 1018)  SpO2: 97 % (04/09/21 1432)    Physical Exam  Constitutional:       General: She is in acute distress  Appearance: Normal appearance  HENT:      Head: Normocephalic and atraumatic  Mouth/Throat:      Mouth: Mucous membranes are moist    Eyes:      Extraocular Movements: Extraocular movements intact  Neck:      Musculoskeletal: Normal range of motion and neck supple  Cardiovascular:      Rate and Rhythm: Normal rate and regular rhythm  Pulmonary:      Effort: Pulmonary effort is normal    Abdominal:      General: Abdomen is flat  Palpations: Abdomen is soft  Musculoskeletal:         General: Tenderness and deformity present  Comments: Right lower extremity shortened and externally rotated   Skin:     General: Skin is warm  Findings: Rash present        Comments: Right elbow skin tear   Neurological:      General: No focal deficit present  Mental Status: She is alert  Psychiatric:         Mood and Affect: Mood normal          Behavior: Behavior normal        Additional Data:     Lab Results: I have personally reviewed pertinent reports  Results from last 7 days   Lab Units 04/09/21  1137   WBC Thousand/uL 4 28*   HEMOGLOBIN g/dL 12 8   HEMATOCRIT % 38 9   PLATELETS Thousands/uL 204   NEUTROS PCT % 70   LYMPHS PCT % 16   MONOS PCT % 12   EOS PCT % 0     Results from last 7 days   Lab Units 04/09/21  1137   SODIUM mmol/L 133*   POTASSIUM mmol/L 3 7   CHLORIDE mmol/L 98*   CO2 mmol/L 29   BUN mg/dL 16   CREATININE mg/dL 0 85   ANION GAP mmol/L 6   CALCIUM mg/dL 8 2*   GLUCOSE RANDOM mg/dL 108     Results from last 7 days   Lab Units 04/09/21  1137   INR  1 10                   Imaging: I have personally reviewed pertinent reports  XR hip/pelv 2-3 vws right if performed   Final Result by Arpit Valentine MD (04/09 1324)      Minimally displaced right subcapital femoral neck fracture with secondary varus deformity  Workstation performed: [id number]         XR elbow 3+ vw RIGHT   Final Result by Arpit Valentine MD (04/09 1325)      No acute osseous abnormality  Workstation performed: [id number]         XR chest 1 view   Final Result by Jennifer Watt MD (04/09 1137)      No acute cardiopulmonary disease  Workstation performed: [id number]         CT head without contrast   Final Result by Kita Henley MD (04/09 1129)      No acute intracranial abnormality  Workstation performed: [id number]         CT spine cervical without contrast   Final Result by Kita Henley MD (04/09 1127)      No cervical spine fracture or traumatic malalignment  Workstation performed: [id number]         XR hip/pelv 2-3 vws right if performed    (Results Pending)       EKG, Pathology, and Other Studies Reviewed on Admission:   · EKG: NSR     Allscripts / Epic Records Reviewed:  Yes ** Please Note: This note has been constructed using a voice recognition system   **

## 2021-04-09 NOTE — ASSESSMENT & PLAN NOTE
Imaging reveals closed displaced left subcapital femoral neck fracture  S/p mechanical fall today while getting out of her car   Ortho to take to OR today for fixation   Medical clearance - EKG, CXR stable  Attending to advise     Pain control  PT OT post op  DVT ppx   Monitor H&H

## 2021-04-09 NOTE — PLAN OF CARE
Problem: PAIN - ADULT  Goal: Verbalizes/displays adequate comfort level or baseline comfort level  Description: Interventions:  - Encourage patient to monitor pain and request assistance  - Assess pain using appropriate pain scale  - Administer analgesics based on type and severity of pain and evaluate response  - Implement non-pharmacological measures as appropriate and evaluate response  - Consider cultural and social influences on pain and pain management  - Notify physician/advanced practitioner if interventions unsuccessful or patient reports new pain  Outcome: Progressing     Problem: INFECTION - ADULT  Goal: Absence or prevention of progression during hospitalization  Description: INTERVENTIONS:  - Assess and monitor for signs and symptoms of infection  - Monitor lab/diagnostic results  - Monitor all insertion sites, i e  indwelling lines, tubes, and drains  - Monitor endotracheal if appropriate and nasal secretions for changes in amount and color  - [place] appropriate cooling/warming therapies per order  - Administer medications as ordered  - Instruct and encourage patient and family to use good hand hygiene technique  - Identify and instruct in appropriate isolation precautions for identified infection/condition  Outcome: Progressing  Goal: Absence of fever/infection during neutropenic period  Description: INTERVENTIONS:  - Monitor WBC    Outcome: Progressing     Problem: SAFETY ADULT  Goal: Patient will remain free of falls  Description: INTERVENTIONS:  - Assess patient frequently for physical needs  -  Identify cognitive and physical deficits and behaviors that affect risk of falls    -  Lipan fall precautions as indicated by assessment   - Educate patient/family on patient safety including physical limitations  - Instruct patient to call for assistance with activity based on assessment  - Modify environment to reduce risk of injury  - Consider OT/PT consult to assist with strengthening/mobility  Outcome: Progressing  Goal: Maintain or return to baseline ADL function  Description: INTERVENTIONS:  -  Assess patient's ability to carry out ADLs; assess patient's baseline for ADL function and identify physical deficits which impact ability to perform ADLs (bathing, care of mouth/teeth, toileting, grooming, dressing, etc )  - Assess/evaluate cause of self-care deficits   - Assess range of motion  - Assess patient's mobility; develop plan if impaired  - Assess patient's need for assistive devices and provide as appropriate  - Encourage maximum independence but intervene and supervise when necessary  - Involve family in performance of ADLs  - Assess for home care needs following discharge   - Consider OT consult to assist with ADL evaluation and planning for discharge  - Provide patient education as appropriate  Outcome: Progressing  Goal: Maintain or return mobility status to optimal level  Description: INTERVENTIONS:  - Assess patient's baseline mobility status (ambulation, transfers, stairs, etc )    - Identify cognitive and physical deficits and behaviors that affect mobility  - Identify mobility aids required to assist with transfers and/or ambulation (gait belt, sit-to-stand, lift, walker, cane, etc )  - [place] fall precautions as indicated by assessment  - Record patient progress and toleration of activity level on Mobility SBAR; progress patient to next Phase/Stage  - Instruct patient to call for assistance with activity based on assessment  - Consider rehabilitation consult to assist with strengthening/weightbearing, etc   Outcome: Progressing     Problem: DISCHARGE PLANNING  Goal: Discharge to home or other facility with appropriate resources  Description: INTERVENTIONS:  - Identify barriers to discharge w/patient and caregiver  - Arrange for needed discharge resources and transportation as appropriate  - Identify discharge learning needs (meds, wound care, etc )  - Arrange for interpretive services to assist at discharge as needed  - Refer to Case Management Department for coordinating discharge planning if the patient needs post-hospital services based on physician/advanced practitioner order or complex needs related to functional status, cognitive ability, or social support system  Outcome: Progressing     Problem: Knowledge Deficit  Goal: Patient/family/caregiver demonstrates understanding of disease process, treatment plan, medications, and discharge instructions  Description: Complete learning assessment and assess knowledge base    Interventions:  - Provide teaching at level of understanding  - Provide teaching via preferred learning methods  Outcome: Progressing

## 2021-04-09 NOTE — OP NOTE
OPERATIVE REPORT  PATIENT NAME: Jossue Cuello    :  1945  MRN: [id number]  Pt Location: [facility] OR ROOM 03    SURGERY DATE: 2021    Surgeon(s) and Role:     * Dennis Interiano,  - Primary     * Luke Ryan PA-C - Assisting, no qualified resident was available an assistant was needed for patient positioning, prepping and draping, soft tissue retraction, protection of vital structures throughout the case, and to complete the case safely  Preop Diagnosis:  Closed displaced right femoral neck fracture    Post-Op Diagnosis Codes:     * closed displaced right femoral neck fracture    Procedure(s) (LRB):  ARTHROPLASTY HIP TOTAL ANTERIOR (Right)    Specimen(s):  * No specimens in log *    Estimated Blood Loss:   300 mL    Drains:  None  Urethral Catheter Latex 16 Fr  (Active)   Number of days: 0       Anesthesia Type:   General    Intravenous fluids:  1 L    Antibiotics:  Ancef 2 g    Urine output:  Barrera:  280 cc    Implants: Depuy [place] Sector 2 50mm acetabular shell, [place] Altrx 32mm/50mm polyethylene liner, apex hole eliminator, Depuy Actis size 5 standard offset, Articul/[place] metal +5 32mm head    Operative Indications:  Closed displaced right femoral neck fracture  Patient is a 50-year-old female that presents to emergency department after tripping and falling getting out of her car while going to work today  She fell onto her right hip which cause significant pain associated with inability to weightbear and walk after the fall  She was brought to the emergency department and was found to have a displaced subcapital right femoral neck fracture  She was admitted to the hospital for definitive care  She was found to be COVID positive, while being asymptomatic for this respiratory virus  Orthopedics was consulted and treatment options were discussed with the patient  After all treatment options were discussed patient was agreeable to pursue a right total hip arthroplasty    Per the patient and the patient's family at bedside the patient is rather active for her age doing a lot of work around the house, going for walks, and still being employed  There was and benefits were discussed  The patient does not have any significant medical history  Patient was cleared by the medical service for the intended procedure  Patient was deemed optimized for surgical intervention the form of a right total hip arthroplasty  This procedure was performed on 4/9/2021    Operative Findings:  Intraoperatively there was a displaced subcapital femoral neck fracture  There was diffuse grade 3 changes throughout the entire acetabulum  There was an area of focal grade 4 change superior medially in the acetabulum  Ultimately a uncemented acetabular component was implanted in approximately 45° of inclination and 25° of anteversion  This demonstrated excellent scratch fit  Polyethylene liner was locked in the place  An uncemented femoral stem was implanted in proximal femur in approximately 5 degrees of anteversion  At its final position had excellent stability rotationally and axially  The final construct demonstrated excellent stability to 50° of internal rotation, 110° of external rotation, and 50° of external rotation with the leg lowered to the floor  Patient tolerated procedure well  There were no complications  Complications:   None    Procedure and Technique:  The patient was identified and marked in the preoperative holding area, and the correct operative extremity and intended procedure was confirmed  The consents were visualized and confirmed for correct procedure and laterality  The patient was then taken back to the operating room where there were administered general anesthesia by the anesthesia staff  The patient was administered 2 g of Ancef intravenously by the anesthesia staff  The patient was then transferred to the [facility] table for the procedure   After the patient was appropriately positioned, a AP of the pelvis to confirm appropriate positioning  Fluoroscopy demonstrated the evidence of a displaced subcapital right femoral neck fracture  The right lower extremity was prepped and draped in a standard sterile fashion  After a timeout was performed and all members of the operating room team were in agreement of the correct patient, and correct intended procedure the bony landmarks were identified using marking pen  Tranexamic acid was not administered by anesthesia due to her COVID positivity  A modified Aguilar-Haro incision was delineated obliquely starting 2 cm distal and 2 cm laterally to the ASIS  Sharp dissection was carried down through the subcutaneous tissues to the investing fascia of the tensor fascia carmen muscle  Electrocautery was used to maintain hemostasis in the subcutaneous tissues  The investing fascia of the tensor fascia carmen was incised in line with the fibers in this compartment was entered bluntly and the muscle was retracted laterally  The perforating circumflex femoral vessels were carefully identified and cauterized using electrocautery and the [place]  Dissection was then carried down to the right hip capsule, and the pre-capsular fat was excised  A capsulotomy was carried out across the edge of the acetabulum superiorly down the femoral neck and into the intra-articular trochanteric line  When the capsulotomy was made there was expression of fracture hematoma and blood  Both sides of the capsulotomy were tagged with a Ethibond suture  Dissection was carried out medially around the medial calcar  It was also carried out laterally to expose the saddle  The femoral neck osteotomy carried out using an oscillating saw and finished with a osteotome  The level of the osteotomy was below the most distal extent of the subcapital femoral neck fracture  There was approximately 1 5 cm of calcar remaining above the lesser troch after the osteotomy was completed    The femoral head was extracted from the acetabulum using a corkscrew  The femoral head was sized  There were grade 3 changes diffusely in the acetabulum  There was a small area of grade 4 change in the superior medial acetabulum  The acetabulum was cleaned of debris and pulvinar, the NOAH was well-visualized, the remaining labrum was removed, and reaming began  The acetabulum was reamed starting with a size 47 reamer and carried up in 1-2 mm increments until a size 49 was reached  A size 49 acetabular trial was impacted into the acetabulum and demonstrated an appropriate fit  The appropriate abduction and anteversion of approximately 45° and 25° respectively was confirmed using fluoroscopy  The trial implant was removed, the acetabulum was touched with a size 50 Reamer, and a size 50 [place] Sector 2 cluster hole final acetabular component was impacted into place under direct visualization with fluoroscopy while utilizing the [employer] impactor  This demonstrated good scratch fit  The final 32 mm/50 mm Altrx highly cross-linked polyethylene insert was impacted into the acetabular component  The liner was locked in its position on visual and tactile inspection  Attention was then turned back to the proximal femur  The appropriate proximal femoral releases were utilized as needed to mobilize the femur, ensuring not to release the obturator externus  The soft tissue was removed from the region between the femoral neck and the greater trochanter a box osteotome was used to cut into the lateral aspect of the proximal femur  The small starter broach was then inserted into the proximal femur adding proximally 5° of anteversion while preparing the proximal femur  Broaching was carried up in sequence until a size 5 broach demonstrated excellent fit and rotational stability  Trialing was performed    A trial construct with a standard neck and a +5 32 mm femoral head showed excellent stability on 110° of external rotation, 50° of internal rotation, and 50° of external rotation with extension of the hip to the floor  Leg lengths were evaluated on fluoroscopy images and the leg lengths were equalized using the transparency overlay method  The trial components were removed from the proximal femur and the final Depuy Actis size 5 standard offset femoral component was inserted into the femur by hand and impacted into place  The trunnion was cleaned and dried and the final Articul/[place] metal +5 32 mm head was impacted upon the final femoral stem  Hip was reduced and taken through stability testing  The patient demonstrated excellent stability with 110° of external rotation, 50° of internal rotation, and 50° of external rotation and extension of the hip to the floor  There was no lateral subluxation of the hip on manual testing  Leg lengths were again evaluated using fluoroscopy and the leg lengths were equal   The wound was copiously irrigated with normal saline with bacitracin solution, the capsule was closed using a #2 Ethibond suture  The wound was again irrigated  The investing fascia of the tensor fascia carmen muscle was closed using a bidirectional barbed #2 barbed suture  The subcuticular tissues were reapproximated utilizing an undyed 2-0 Vicryl, the skin edges were reapproximated using a 3-0 bidirectional barbed Monocryl suture, and the skin edges sealed with Dermabond  After the skin adhesive had dried a silver impregnated Mepilex dressing was applied over the surgical incision  The patient was awakened from general anesthesia with sedation and transferred to PACU in stable condition       I was present for all critical portions of the procedure    Patient Disposition:  The patient was recovered in the operating room due to her COVID positive status and then transferred to the floor once recovery was completed      SIGNATURE: Apollo Mclaughlin DO  DATE: April 9, 2021  TIME: 6:22 PM

## 2021-04-09 NOTE — ED PROVIDER NOTES
History  Chief Complaint   Patient presents with    Fall     Pt reports getting out of car and reports walking to [place] when pt fell and now has hip pain to right hip  Right leg appears shortened and externallyn roatated upon arrival via ambulance  76-year-old female presents with right hip pain x1 hour  She got out of her car earlier today on the way to work, tripped and landed on her right hip  She has pain to her R outer hip with leg deformity  She is unable to ambulate or weightbear  She sustained a large skin tear over her right elbow, reports mild pain over elbow  No chest pain, dizziness, lightheadedness, shortness of breath before or after the fall  She denies hitting her head - no LOC, nausea or vomiting  No visual disturbances  No blood thinner use  She is right-hand dominant  No numbness or tingling  No back pain  No abdominal pain  She was brought in by EMS  Prior to Admission Medications   Prescriptions Last Dose Informant Patient Reported? Taking?   multivitamin (THERAGRAN) TABS  Self Yes Yes   Sig: Take 1 tablet by mouth daily      Facility-Administered Medications: None       History reviewed  No pertinent past medical history  History reviewed  No pertinent surgical history  History reviewed  No pertinent family history  I have reviewed and agree with the history as documented  E-Cigarette/Vaping    E-Cigarette Use Never User      E-Cigarette/Vaping Substances     Social History     Tobacco Use    Smoking status: Current Every Day Smoker     Packs/day: 0 50     Years: 60 00     Pack years: 30 00    Smokeless tobacco: Never Used   Substance Use Topics    Alcohol use: Never     Frequency: Never    Drug use: Never       Review of Systems   Constitutional: Negative for chills and fever  HENT: Negative for sneezing and sore throat  Respiratory: Negative for cough and shortness of breath  Cardiovascular: Negative for chest pain, palpitations and leg swelling  Gastrointestinal: Negative for abdominal pain, constipation, diarrhea, nausea and vomiting  Musculoskeletal: Positive for arthralgias, gait problem and myalgias  Negative for back pain and joint swelling  Skin: Negative for color change, pallor, rash and wound  Neurological: Negative for dizziness, syncope, weakness, light-headedness, numbness and headaches  All other systems reviewed and are negative  Physical Exam  Physical Exam  Vitals signs and nursing note reviewed  Constitutional:       General: She is not in acute distress  Appearance: Normal appearance  She is well-developed and normal weight  She is not diaphoretic  HENT:      Head: Normocephalic and atraumatic  Comments: No skull depression or bony instability  No raccoon or núñez sign     Nose: Nose normal    Eyes:      Extraocular Movements: Extraocular movements intact  Conjunctiva/sclera: Conjunctivae normal       Pupils: Pupils are equal, round, and reactive to light  Neck:      Musculoskeletal: Normal range of motion and neck supple  Cardiovascular:      Rate and Rhythm: Normal rate and regular rhythm  Pulses: Normal pulses  Heart sounds: Normal heart sounds  No murmur  No friction rub  No gallop  Pulmonary:      Effort: Pulmonary effort is normal  No respiratory distress  Breath sounds: Normal breath sounds  No stridor  No wheezing or rales  Comments: Sp02 is 96% indicating adequate oxygenation on room air  Abdominal:      General: Abdomen is flat  Bowel sounds are normal  There is no distension  Palpations: Abdomen is soft  Tenderness: There is no abdominal tenderness  There is no guarding or rebound  Comments: Abdomen soft, nontender, nondistended  No bruising or swelling   Musculoskeletal: Normal range of motion  Arms:         Legs:    Skin:     General: Skin is warm  Capillary Refill: Capillary refill takes less than 2 seconds        Coloration: Skin is not pale       Findings: No erythema or rash  Neurological:      General: No focal deficit present  Mental Status: She is alert and oriented to person, place, and time  Mental status is at baseline  GCS: GCS eye subscore is 4  GCS verbal subscore is 5  GCS motor subscore is 6  Cranial Nerves: Cranial nerves are intact  Sensory: Sensation is intact  Coordination: Coordination is intact  Comments: No signs of ataxia  Good finger to nose, heel to shin, rapid alternating movements           Vital Signs  ED Triage Vitals   Temperature Pulse Respirations Blood Pressure SpO2   04/09/21 1211 04/09/21 1018 04/09/21 1018 04/09/21 1018 04/09/21 1018   98 3 °F (36 8 °C) 67 18 131/70 96 %      Temp Source Heart Rate Source Patient Position - Orthostatic VS BP Location FiO2 (%)   04/09/21 1018 04/09/21 1018 04/09/21 1018 04/09/21 1018 --   Tympanic Monitor Lying Right arm       Pain Score       04/09/21 1018       7           Vitals:    04/09/21 1018 04/09/21 1215   BP: 131/70    Pulse: 67 72   Patient Position - Orthostatic VS: Lying          Visual Acuity      ED Medications  Medications   morphine injection 2 mg (has no administration in time range)   morphine injection 2 mg (2 mg Intravenous Given 4/9/21 1138)   tetanus-diphtheria-acellular pertussis (BOOSTRIX) IM injection 0 5 mL (0 5 mL Intramuscular Given 4/9/21 1145)   bacitracin topical ointment 1 small application (1 small application Topical Given 4/9/21 1149)       Diagnostic Studies  Results Reviewed     Procedure Component Value Units Date/Time    Troponin I [[id number]]  (Normal) Collected: 04/09/21 1137    Lab Status: Final result Specimen: Blood from Arm, Left Updated: 04/09/21 1216     Troponin I <0 02 ng/mL     Protime-INR [[id number]]  (Normal) Collected: 04/09/21 1137    Lab Status: Final result Specimen: Blood from Arm, Left Updated: 04/09/21 1211     Protime 14 1 seconds      INR 1 10    APTT [[id number]]  (Normal) Collected: 04/09/21 1137    Lab Status: Final result Specimen: Blood from Arm, Left Updated: 04/09/21 1211     PTT 32 seconds     Basic metabolic panel [[id number]]  (Abnormal) Collected: 04/09/21 1137    Lab Status: Final result Specimen: Blood from Arm, Left Updated: 04/09/21 1207     Sodium 133 mmol/L      Potassium 3 7 mmol/L      Chloride 98 mmol/L      CO2 29 mmol/L      ANION GAP 6 mmol/L      BUN 16 mg/dL      Creatinine 0 85 mg/dL      Glucose 108 mg/dL      Calcium 8 2 mg/dL      eGFR 67 ml/min/1 73sq m     Narrative:      [place] guidelines for Chronic Kidney Disease (CKD):     Stage 1 with normal or high GFR (GFR > 90 mL/min/1 73 square meters)    Stage 2 Mild CKD (GFR = 60-89 mL/min/1 73 square meters)    Stage 3A Moderate CKD (GFR = 45-59 mL/min/1 73 square meters)    Stage 3B Moderate CKD (GFR = 30-44 mL/min/1 73 square meters)    Stage 4 Severe CKD (GFR = 15-29 mL/min/1 73 square meters)    Stage 5 End Stage CKD (GFR <15 mL/min/1 73 square meters)  Note: GFR calculation is accurate only with a steady state creatinine    CBC and differential [[id number]]  (Abnormal) Collected: 04/09/21 1137    Lab Status: Final result Specimen: Blood from Arm, Left Updated: 04/09/21 1156     WBC 4 28 Thousand/uL      RBC 3 73 Million/uL      Hemoglobin 12 8 g/dL      Hematocrit 38 9 %       fL      MCH 34 3 pg      MCHC 32 9 g/dL      RDW 12 7 %      MPV 8 9 fL      Platelets 956 Thousands/uL      nRBC 0 /100 WBCs      Neutrophils Relative 70 %      Immat GRANS % 1 %      Lymphocytes Relative 16 %      Monocytes Relative 12 %      Eosinophils Relative 0 %      Basophils Relative 1 %      Neutrophils Absolute 2 98 Thousands/µL      Immature Grans Absolute 0 04 Thousand/uL      Lymphocytes Absolute 0 70 Thousands/µL      Monocytes Absolute 0 53 Thousand/µL      Eosinophils Absolute 0 01 Thousand/µL      Basophils Absolute 0 02 Thousands/µL     [facility]OVID[facility]/B[facility] [[id number]] Collected: 04/09/21 1137    Lab Status: In process Specimen: Nares from Nasopharyngeal Swab Updated: 04/09/21 1153    UA w Reflex to Microscopic w Reflex to Culture [[id number]]     Lab Status: No result Specimen: Urine                  XR chest 1 view   Final Result by Erin Brooke MD (04/09 1137)      No acute cardiopulmonary disease  Workstation performed: [id number]         CT head without contrast   Final Result by Alonso Urena MD (04/09 1129)      No acute intracranial abnormality  Workstation performed: [id number]         CT spine cervical without contrast   Final Result by Alonso Urena MD (04/09 1127)      No cervical spine fracture or traumatic malalignment  Workstation performed: [id number]         XR hip/pelv 2-3 vws right if performed    (Results Pending)   XR elbow 3+ vw RIGHT    (Results Pending)              Procedures  ECG 12 Lead Documentation Only    Date/Time: 4/9/2021 10:36 AM  Performed by: Juana Negrete PA-C  Authorized by: Juana Negrete PA-C     Indications / Diagnosis:  Pre op  Patient location:  ED  Interpretation:     Interpretation: normal    Rate:     ECG rate:  67    ECG rate assessment: normal    Rhythm:     Rhythm: sinus rhythm    Ectopy:     Ectopy: none    QRS:     QRS axis:  Normal    QRS intervals:  Normal  Conduction:     Conduction: normal    ST segments:     ST segments:  Normal  T waves:     T waves: normal               ED Course  ED Course as of Apr 09 1239   Fri Apr 09, 2021   1004 Patient last ate at 8:30am      1149 Discussed case with Dr Dawood Nunez who advised medical admit and NPO, possible OR today pending medical clearance                                SBIRT 22yo+      Most Recent Value   SBIRT (23 yo +)   In order to provide better care to our patients, we are screening all of our patients for alcohol and drug use  Would it be okay to ask you these screening questions?   No Filed at: 04/09/2021 9727 MDM  Number of Diagnoses or Management Options  Fall, initial encounter:   Femoral neck fracture [facility]):   Skin tear of elbow without complication:   Diagnosis management comments: Will admit for femoral neck fracture  Placed orthopedic consult       Amount and/or Complexity of Data Reviewed  Clinical lab tests: ordered and reviewed  Tests in the radiology section of CPT®: ordered and reviewed  Tests in the medicine section of CPT®: ordered and reviewed  Discussion of test results with the performing providers: yes  Review and summarize past medical records: yes  Discuss the patient with other providers: yes  Independent visualization of images, tracings, or specimens: yes        Disposition  Final diagnoses:   Femoral neck fracture ([facility] [address] )   Fall, initial encounter   Skin tear of elbow without complication     Time reflects when diagnosis was documented in both MDM as applicable and the Disposition within this note     Time User Action Codes Description Comment    4/9/2021 12:03 PM Snow Giorgio Add [S72 009A] Femoral neck fracture ([facility] [address] )     4/9/2021 12:03 PM Snow Giorgio Add Mal Si  DEER] Fall, initial encounter     4/9/2021 12:03 PM Snow Giorgio Add [S51 012A] ISTAP type 1 skin tear of left elbow     4/9/2021 12:03 PM Snow Giorgio Remove [S51 012A] ISTAP type 1 skin tear of left elbow     4/9/2021 12:03 PM Snow Giorgio Add [S51 019A] Skin tear of elbow without complication       ED Disposition     ED Disposition Condition Date/Time Comment    Admit Stable Fri Apr 9, 2021 12:12 PM Case was discussed with Dr Vandana Ladd and the patient's admission status was agreed to be Admission Status: observation status to the service of Dr Vandana Ladd   Follow-up Information    None         Patient's Medications   Discharge Prescriptions    No medications on file     No discharge procedures on file      PDMP Review     None          ED Provider  Electronically Signed by           Alexandria Dickens PA-C  04/09/21 6131

## 2021-04-10 PROBLEM — S72.001A CLOSED DISPLACED FRACTURE OF RIGHT FEMORAL NECK (HCC): Status: ACTIVE | Noted: 2021-04-09

## 2021-04-10 LAB
ANION GAP SERPL CALCULATED.3IONS-SCNC: 7 MMOL/L (ref 4–13)
BUN SERPL-MCNC: 15 MG/DL (ref 5–25)
CALCIUM SERPL-MCNC: 7.7 MG/DL (ref 8.3–10.1)
CHLORIDE SERPL-SCNC: 102 MMOL/L (ref 100–108)
CO2 SERPL-SCNC: 28 MMOL/L (ref 21–32)
CREAT SERPL-MCNC: 0.9 MG/DL (ref 0.6–1.3)
CRP SERPL QL: 12.8 MG/L
D DIMER PPP FEU-MCNC: 11.96 UG/ML FEU
ERYTHROCYTE [DISTWIDTH] IN BLOOD BY AUTOMATED COUNT: 12.7 % (ref 11.6–15.1)
FERRITIN SERPL-MCNC: 118 NG/ML (ref 8–388)
GFR SERPL CREATININE-BSD FRML MDRD: 62 ML/MIN/1.73SQ M
GLUCOSE SERPL-MCNC: 114 MG/DL (ref 65–140)
HCT VFR BLD AUTO: 33 % (ref 34.8–46.1)
HGB BLD-MCNC: 10.6 G/DL (ref 11.5–15.4)
MCH RBC QN AUTO: 34 PG (ref 26.8–34.3)
MCHC RBC AUTO-ENTMCNC: 32.1 G/DL (ref 31.4–37.4)
MCV RBC AUTO: 106 FL (ref 82–98)
PLATELET # BLD AUTO: 168 THOUSANDS/UL (ref 149–390)
PMV BLD AUTO: 9.3 FL (ref 8.9–12.7)
POTASSIUM SERPL-SCNC: 4.3 MMOL/L (ref 3.5–5.3)
PROCALCITONIN SERPL-MCNC: <0.05 NG/ML
RBC # BLD AUTO: 3.12 MILLION/UL (ref 3.81–5.12)
SODIUM SERPL-SCNC: 137 MMOL/L (ref 136–145)
WBC # BLD AUTO: 7.8 THOUSAND/UL (ref 4.31–10.16)

## 2021-04-10 PROCEDURE — 97163 PT EVAL HIGH COMPLEX 45 MIN: CPT

## 2021-04-10 PROCEDURE — 80048 BASIC METABOLIC PNL TOTAL CA: CPT | Performed by: PHYSICIAN ASSISTANT

## 2021-04-10 PROCEDURE — 84145 PROCALCITONIN (PCT): CPT | Performed by: PHYSICIAN ASSISTANT

## 2021-04-10 PROCEDURE — 85027 COMPLETE CBC AUTOMATED: CPT | Performed by: PHYSICIAN ASSISTANT

## 2021-04-10 PROCEDURE — 85379 FIBRIN DEGRADATION QUANT: CPT | Performed by: PHYSICIAN ASSISTANT

## 2021-04-10 PROCEDURE — 99024 POSTOP FOLLOW-UP VISIT: CPT | Performed by: PHYSICIAN ASSISTANT

## 2021-04-10 PROCEDURE — 97110 THERAPEUTIC EXERCISES: CPT

## 2021-04-10 PROCEDURE — 86140 C-REACTIVE PROTEIN: CPT | Performed by: PHYSICIAN ASSISTANT

## 2021-04-10 PROCEDURE — 82728 ASSAY OF FERRITIN: CPT | Performed by: FAMILY MEDICINE

## 2021-04-10 PROCEDURE — 99232 SBSQ HOSP IP/OBS MODERATE 35: CPT | Performed by: PHYSICIAN ASSISTANT

## 2021-04-10 RX ADMIN — OXYCODONE HYDROCHLORIDE AND ACETAMINOPHEN 1000 MG: 500 TABLET ORAL at 08:18

## 2021-04-10 RX ADMIN — SODIUM CHLORIDE 75 ML/HR: 0.9 INJECTION, SOLUTION INTRAVENOUS at 07:10

## 2021-04-10 RX ADMIN — SENNOSIDES 8.6 MG: 8.6 TABLET, FILM COATED ORAL at 08:18

## 2021-04-10 RX ADMIN — GABAPENTIN 200 MG: 100 CAPSULE ORAL at 21:30

## 2021-04-10 RX ADMIN — Medication 2000 UNITS: at 08:18

## 2021-04-10 RX ADMIN — PANTOPRAZOLE SODIUM 40 MG: 40 TABLET, DELAYED RELEASE ORAL at 05:27

## 2021-04-10 RX ADMIN — CEFAZOLIN SODIUM 2000 MG: 2 SOLUTION INTRAVENOUS at 08:18

## 2021-04-10 RX ADMIN — OXYCODONE 5 MG: 5 TABLET ORAL at 11:52

## 2021-04-10 RX ADMIN — ACETAMINOPHEN 975 MG: 325 TABLET, FILM COATED ORAL at 21:30

## 2021-04-10 RX ADMIN — ENOXAPARIN SODIUM 30 MG: 30 INJECTION SUBCUTANEOUS at 21:30

## 2021-04-10 RX ADMIN — CEFAZOLIN SODIUM 2000 MG: 2 SOLUTION INTRAVENOUS at 01:47

## 2021-04-10 RX ADMIN — OXYCODONE 5 MG: 5 TABLET ORAL at 07:20

## 2021-04-10 RX ADMIN — ACETAMINOPHEN 975 MG: 325 TABLET, FILM COATED ORAL at 14:24

## 2021-04-10 RX ADMIN — NICOTINE 1 PATCH: 14 PATCH, EXTENDED RELEASE TRANSDERMAL at 08:19

## 2021-04-10 RX ADMIN — ZINC SULFATE 220 MG (50 MG) CAPSULE 220 MG: CAPSULE at 08:18

## 2021-04-10 RX ADMIN — ENOXAPARIN SODIUM 30 MG: 30 INJECTION SUBCUTANEOUS at 08:18

## 2021-04-10 RX ADMIN — OXYCODONE HYDROCHLORIDE AND ACETAMINOPHEN 1000 MG: 500 TABLET ORAL at 21:30

## 2021-04-10 RX ADMIN — ACETAMINOPHEN 975 MG: 325 TABLET, FILM COATED ORAL at 05:27

## 2021-04-10 RX ADMIN — GABAPENTIN 200 MG: 100 CAPSULE ORAL at 08:18

## 2021-04-10 NOTE — PHYSICAL THERAPY NOTE
PT EVALUATION     04/10/21 1125   Note Type   Note type Evaluation   Pain Assessment   Pain Assessment Tool Aparicio-Baker FACES   Aparicio-Baker FACES Pain Rating 6  (Right hip area)   Home Living   Type of [address] Two level;Stairs to enter with rails  (1+2 stairs to enter)   Home Equipment   (None)   Additional Comments Patient independent with driving and working without assistive device prior to admission   Prior Function   Level of [place] Independent with ADLs and functional mobility   Lives With Spouse   ADL Assistance Independent   IADLs Independent   Falls in the last 6 months 1 to 4   Vocational Part time employment   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   Restrictions/Precautions   RLE [place] Bearing Per Order WBAT   Other Precautions Chair Alarm; Bed Alarm; Fall Risk;Pain  (Right anterior hip replacement)   General   Additional Pertinent History Chart reviewed, patient admitted with right femoral neck fracture    Now status post surgical correction on 04/09/2021 and ordered for therapy weight-bearing as tolerated on the right lower extremity   Family/Caregiver Present No   Cognition   Overall Cognitive Status WFL   Arousal/Participation Cooperative   Orientation Level Oriented X4   Following Commands Follows multistep commands with increased time or repetition   Comments Patient slow to respond at times and distracted with pain   RLE Assessment   RLE Assessment   (ROM WFL, strength 3-/5)   LLE Assessment   LLE Assessment   (ROm WFL, strength 3+/4-)   Coordination   Movements are Fluid and Coordinated 0   Bed Mobility   Supine to Sit 3  Moderate assistance   Additional items Assist x 1;Verbal cues;LE management   Transfers   Sit to Stand 3  Moderate assistance   Additional items Assist x 1;Verbal cues   Stand to Sit 3  Moderate assistance   Additional items Assist x 1;Verbal cues   Ambulation/Elevation   Gait Assistance   (Mod to max assist)   Additional items Assist x 1;Verbal cues; Tactile cues   Assistive Device Rolling walker   Distance 5 ft from bed to chair with wide stance and external rotation the right lower extremity  Patient required repetition of verbal cuing to attempt gait patterning correction   Balance   Static Sitting Fair   Dynamic Sitting Fair -   Static Standing Fair -   Dynamic Standing Poor +   Ambulatory Poor +   Activity Tolerance   Activity Tolerance Patient limited by fatigue;Patient limited by pain   Nurse Made Aware yes   Assessment   Prognosis Good   Problem List Decreased strength;Decreased range of motion;Decreased endurance; Impaired balance;Decreased mobility; Decreased coordination;Pain   Assessment Patient seen for Physical Therapy evaluation  Patient admitted with Closed displaced fracture of right femoral neck ([facility] [address] )  Comorbidities affecting patient's physical performance include: Tobacco abuse, hyponatremia, gait dysfunction, COVID-19  Personal factors affecting patient at time of initial evaluation include: lives in two story house, stairs to enter home, inability to ambulate household distances, inability to navigate community distances, inability to navigate level surfaces without external assistance, inability to perform dynamic tasks in community, limited home support, positive fall history, inability to perform current job functions, inability to perform caregiver support/tasks, inability to perform physical activity, inability to perform ADLS and inability to perform IADLS    Prior to admission, patient was independent with functional mobility without assistive device, independent with ADLS, independent with IADLS, living in a multi-level home, ambulating household distance, ambulating community distances, works part time and home with family assist   Please find objective findings from Physical Therapy assessment regarding body systems outlined above with impairments and limitations including weakness, decreased ROM, impaired balance, decreased endurance, impaired coordination, gait deviations, pain, decreased activity tolerance, decreased functional mobility tolerance, fall risk and SOB upon exertion  The Barthel Index was used as a functional outcome tool presenting with a score of 40 today indicating marked limitations of functional mobility and ADLS  Patient's clinical presentation is currently unstable/unpredictable as seen in patient's presentation of vital sign response, changing level of pain, increased fall risk, new onset of impairment of functional mobility, decreased endurance and new onset of weakness  Pt would benefit from continued Physical Therapy treatment to address deficits as defined above and maximize level of functional mobility  As demonstrated by objective findings, the assigned level of complexity for this evaluation is high  The patient's AM-[facility] Basic Mobility Inpatient Short Form Raw Score is 11, Standardized Score is 30 25  A standardized score less than 42 9 suggests the patient may benefit from discharge to post-acute rehabilitation services  Please also refer to the recommendation of the Physical Therapist for safe discharge planning  Goals   Patient Goals To get stronger and less pain   STG Expiration Date 04/17/21   Short Term Goal #1 Transfers and gait with roller walker with supervision of 1   Short Term Goal #2 Gait endurance to 80 ft, strength of right lower extremity 3 /3+ all to meet patient goal improved strength   LTG Expiration Date 04/24/21   Long Term Goal #1 Transfers and gait with roller walker independently   Long Term Goal #2 Gait endurance to functional household distances   Plan   Treatment/Interventions ADL retraining;Functional transfer training;LE strengthening/ROM; Elevations; Therapeutic exercise; Endurance training;Patient/family training;Bed mobility; Equipment eval/education;Gait training; Compensatory technique education   PT Frequency Once a day   Recommendation   PT Discharge Recommendation Post-Acute Rehabilitation Services   AM-PAC Basic Mobility Inpatient   Turning in Bed Without Bedrails 2   Lying on Back to Sitting on Edge of Flat Bed 2   Moving Bed to Chair 2   Standing Up From Chair 2   Walk in Room 2   Climb 3-5 Stairs 1   Basic Mobility Inpatient Raw Score 11   Basic Mobility Standardized Score 30 25   Barthel Index   Feeding 10   Bathing 0   Grooming Score 0   Dressing Score 5   Bladder Score 5   Bowels Score 10   Toilet Use Score 5   Transfers (Bed/Chair) Score 5   Mobility (Level Surface) Score 0   Stairs Score 0   Barthel Index Score 40   Licensure   [place] License Number  Cat Tate PT [id number]     PT TREATMENT  Time In:1110  Time Out:1125  Total Time: 15min      S:  Patient with 7/10 pain R hip area with transfers and gait  O:  -gait with roller walker with mod assist of 10 ft with chair follow up constant verbal cuing to narrow base of support and keep right lower extremity in neutral rotational position  -exercise completed bilateral lower extremity sitting on bed edge without lumbar support: LAQs, ankle pumps, hip flexion all 5 reps R LE with AAROM at times  A:  Patient will benefit from continued PT with progression as tolerated  P:  Short-term rehab    Gissell Cho, PT

## 2021-04-10 NOTE — PROGRESS NOTES
Callum 44  Progress Note Tank Baptiste 1945, 67 y o  female MRN: [id number]  Unit/Bed#: [address] [place] [address] Encounter: [id number]  Primary Care Provider: No primary care provider on file  Date and time admitted to hospital: 4/9/2021 10:13 AM    * Closed displaced fracture of right femoral neck 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, will continue to monitor O2 requirements; otherwise will monitor off treatment   Multivitamin cocktail  Respiratory protocol  Check inflamamtory markers, daily cbc, cmp     Tobacco abuse  Assessment & Plan  Smoked 1/2ppd x 50 years (at least)   NRT   Encouraged cessation     VTE Pharmacologic Prophylaxis:   Pharmacologic: Enoxaparin (Lovenox)  Mechanical VTE Prophylaxis in Place: Yes    Patient Centered Rounds: I have performed bedside rounds with nursing staff today  Discussions with Specialists or Other Care Team Provider: nursing     Education and Discussions with Family / Patient: patient, called dtr     Time Spent for Care: 30 minutes  More than 50% of total time spent on counseling and coordination of care as described above  Current Length of Stay: 1 day(s)     Current Patient Status: Inpatient   Certification Statement: The patient will continue to require additional inpatient hospital stay due to pending improvement in pain control, monitoring o2 status, pending pt ot evals     Discharge Plan: pending clinical course     Code Status: Level 1 - Full Code    Subjective:   Pt seen and examined at bedside  Denies cough or sob   Pain controlled  Able to to pull herself up in bed and eat breakfast      Objective:     Vitals:   Temp (24hrs), Av 3 °F (36 8 °C), Min:98 3 °F (36 8 °C), Max:98 3 °F (36 8 °C)    Temp:  [98 3 °F (36 8 °C)] 98 3 °F (36 8 °C)  HR:  [] 68  Resp:  [18-27] 20  BP: (101-146)/() 101/56  SpO2:  [90 %-100 %] 100 %  Body mass index is 18 65 kg/m²  Input and Output Summary (last 24 hours): Intake/Output Summary (Last 24 hours) at 4/10/2021 1148  Last data filed at 4/10/2021 0710  Gross per 24 hour   Intake 2200 ml   Output 580 ml   Net 1620 ml       Physical Exam:     Physical Exam  Constitutional:       Appearance: Normal appearance  HENT:      Head: Normocephalic and atraumatic  Mouth/Throat:      Mouth: Mucous membranes are moist    Eyes:      Extraocular Movements: Extraocular movements intact  Neck:      Musculoskeletal: Normal range of motion and neck supple  Cardiovascular:      Rate and Rhythm: Normal rate and regular rhythm  Pulmonary:      Effort: Pulmonary effort is normal       Breath sounds: Normal breath sounds  No wheezing or rhonchi  Abdominal:      General: Abdomen is flat  Palpations: Abdomen is soft  Musculoskeletal: Normal range of motion  General: No swelling  Skin:     General: Skin is warm and dry  Findings: Rash (telangectatic rash on b/l dorsum of feet ) present  Comments: Right hip dressing c/d/i   Neurological:      General: No focal deficit present  Mental Status: She is alert and oriented to person, place, and time     Psychiatric:         Mood and Affect: Mood normal        Additional Data:     Labs:    Results from last 7 days   Lab Units 04/10/21  0513 04/09/21  1137   WBC Thousand/uL 7 80 4 28*   HEMOGLOBIN g/dL 10 6* 12 8   HEMATOCRIT % 33 0* 38 9   PLATELETS Thousands/uL 168 204   NEUTROS PCT %  --  70   LYMPHS PCT %  --  16   MONOS PCT %  --  12   EOS PCT %  --  0     Results from last 7 days   Lab Units 04/10/21  0513 SODIUM mmol/L 137   POTASSIUM mmol/L 4 3   CHLORIDE mmol/L 102   CO2 mmol/L 28   BUN mg/dL 15   CREATININE mg/dL 0 90   ANION GAP mmol/L 7   CALCIUM mg/dL 7 7*   GLUCOSE RANDOM mg/dL 114     Results from last 7 days   Lab Units 04/09/21  1137   INR  1 10                       * I Have Reviewed All Lab Data Listed Above  * Additional Pertinent Lab Tests Reviewed:  Steven 65 Admission Reviewed    Imaging:    Imaging Reports Reviewed Today Include: all  Imaging Personally Reviewed by Myself Includes:  none    Recent Cultures (last 7 days):           Last 24 Hours Medication List:   Current Facility-Administered Medications   Medication Dose Route Frequency Provider Last Rate    acetaminophen  975 mg Oral Q8H [facility] Fadi Paolmino PA-C      aluminum-magnesium hydroxide-simethicone  30 mL Oral Q6H PRN Ryan De La Torre PA-C      ascorbic acid  1,000 mg Oral Q12H [facility] [place], [place]      calcium carbonate  1,000 mg Oral Daily PRN Ryan De La Torre PA-C      cholecalciferol  2,000 Units Oral Daily Ryan De La Torre PA-C      enoxaparin  30 mg Subcutaneous Q12H [facility] [place]      gabapentin  200 mg Oral BID Ryanliseth De La Torre [place]      HYDROmorphone  0 2 mg Intravenous Q4H PRN Ryan De La Torre PA-C      zinc sulfate  220 mg Oral Daily Ryan De La Torre PA-C      Followed by   Yves Pritchard ON 4/17/2021] multivitamin-minerals  1 tablet Oral Daily Ryan De La Torre PA-C      nicotine  1 patch Transdermal Daily Ryan De La Torre PA-C      ondansetron  4 mg Intravenous Once PRN Dana Torres MD      ondansetron  4 mg Intravenous Q6H PRN Ryan De La Torre PA-C      oxyCODONE  2 5 mg Oral Q4H PRN Ryan De La Torre PA-C      oxyCODONE  5 mg Oral Q4H PRN Ryan De La Torre PA-C      pantoprazole  40 mg Oral Early Morning Ryan De La Torre PA-C      polyethylene glycol  17 g Oral Daily PRN Ryan De La Torre PA-C      senna  1 tablet Oral Daily Ryan De La Torre PA-C          Today, Patient Was Seen By: Minerva Chacko PA-C    ** Please Note: Dictation voice to text software may have been used in the creation of this document   **

## 2021-04-10 NOTE — ASSESSMENT & PLAN NOTE
Imaging reveals closed displaced right subcapital femoral neck fracture  Presented after mechanical fall while getting out of her car   Appreciate ortho, POD#1 Left PRISCILA 4/9  Pain control  PT/OT today   DVT ppx   Monitor H&H

## 2021-04-10 NOTE — UTILIZATION REVIEW
Initial Clinical Review    Admission: Date/Time/Statement:   Admission Orders (From admission, onward)     Ordered        04/09/21 1612  Inpatient Admission  Once         04/09/21 1212  Place in Observation  Once,   Status:  Canceled                   Orders Placed This Encounter   Procedures    Inpatient Admission     Standing Status:   Standing     Number of Occurrences:   1     Order Specific Question:   Level of Care     Answer:   Med Surg [16]     Order Specific Question:   Estimated length of stay     Answer:   More than 2 Midnights     Order Specific Question:   Certification     Answer:   I certify that inpatient services are medically necessary for this patient for a duration of greater than two midnights  See H&P and MD Progress Notes for additional information about the patient's course of treatment  ED Arrival Information     Expected Arrival Acuity Means of Arrival Escorted By Service Admission Type    - 4[facility]2021 09:[facility]    Arrival Complaint    fall        Chief Complaint   Patient presents with    Fall     Pt reports getting out of car and reports walking to [place] when pt fell and now has hip pain to right hip  Right leg appears shortened and externallyn roatated upon arrival via ambulance  Assessment/Plan:   40-year-old female presents with right hip pain x1 hour  She got out of her car earlier today on the way to work, tripped and landed on her right hip  She has pain to her R outer hip with leg deformity  She is unable to ambulate or weightbear  She sustained a large skin tear over her right elbow, reports mild pain over elbow  No chest pain, dizziness, lightheadedness, shortness of breath before or after the fall  She denies hitting her head - no LOC, nausea or vomiting  No visual disturbances  No blood thinner use  She is right-hand dominant  No numbness or tingling  No back pain  No abdominal pain   She was brought in by EMS   1  Right femoral neck fracture - s/p mechanical fall  Plan is for patient to go to the OR today  EKG with no ischemic changes  No need for further cardiac testing  Patient is low risk for orthopedic procedure  Check lab work in a m  2  COVID-19 infection - incidentally found on screening  Check inflammatory markers    If Begins to require oxygen, will treat with remdesivir and Decadron    To OR for:  ARTHROPLASTY HIP TOTAL ANTERIOR (Right)  Implants: Depuy [place] Sector 2 50mm acetabular shell, [place] Altrx 32mm/50mm polyethylene liner, apex hole eliminator, Depuy Actis size 5 standard offset, Articul/[place] metal +5 32mm head      ED Triage Vitals   Temperature Pulse Respirations Blood Pressure SpO2   04/09/21 1211 04/09/21 1018 04/09/21 1018 04/09/21 1018 04/09/21 1018   98 3 °F (36 8 °C) 67 18 131/70 96 %      Temp Source Heart Rate Source Patient Position - Orthostatic VS BP Location FiO2 (%)   04/09/21 1018 04/09/21 1018 04/09/21 1018 04/09/21 1018 --   Tympanic Monitor Lying Right arm       Pain Score       04/09/21 1018       7          Wt Readings from Last 1 Encounters:   04/10/21 44 8 kg (98 lb 11 2 oz)     Additional Vital Signs:   Date/Time  Temp  Pulse  Resp  BP  MAP (mmHg)  SpO2  Calculated FIO2 (%) - Nasal Cannula  Nasal Cannula O2 Flow Rate (L/min)  O2 Device  Patient Position - Orthostatic VS   04/10/21 07:18:30  --  68  --  101/56  71  100 %  --  --  --  --   04/09/21 2358  --  61  20  114/70  85  98 %  --  --  --  --   04/09/21 2336  --  --  --  --  --  --  28  2 L/min  --  --   04/09/21 20:14:19  --  80  18  101/59  73  90 %  32  3 L/min  Nasal cannula  Lying   04/09/21 19:10:39  98 3 °F (36 8 °C)  103  18  143/86  105  92 %  24  1 L/min  Simple mask  Lying   04/09/21 1432  --  73  22  137/83  103  97 %  --  --  None (Room air)  Lying   04/09/21 1400  --  86  24Abnormal   --  --  97 %  24  1 L/min  Nasal cannula       Pertinent Labs/Diagnostic Test Results:   Results from last 7 days Lab Units 04/09/21  1137   SARS-COV-2  Positive*     Results from last 7 days   Lab Units 04/10/21  0513 04/09/21  1137   WBC Thousand/uL 7 80 4 28*   HEMOGLOBIN g/dL 10 6* 12 8   HEMATOCRIT % 33 0* 38 9   PLATELETS Thousands/uL 168 204   NEUTROS ABS Thousands/µL  --  2 98     Results from last 7 days   Lab Units 04/10/21  0513 04/09/21  1137   SODIUM mmol/L 137 133*   POTASSIUM mmol/L 4 3 3 7   CHLORIDE mmol/L 102 98*   CO2 mmol/L 28 29   ANION GAP mmol/L 7 6   BUN mg/dL 15 16   CREATININE mg/dL 0 90 0 85   EGFR ml/min/1 73sq m 62 67   CALCIUM mg/dL 7 7* 8 2*     Results from last 7 days   Lab Units 04/10/21  0513 04/09/21  1137   GLUCOSE RANDOM mg/dL 114 108     Results from last 7 days   Lab Units 04/09/21  1137   TROPONIN I ng/mL <0 02     Results from last 7 days   Lab Units 04/10/21  0513   D-DIMER QUANTITATIVE ug/ml FEU 11 96*     Results from last 7 days   Lab Units 04/09/21  1137   PROTIME seconds 14 1   INR  1 10   PTT seconds 32     Results from last 7 days   Lab Units 04/10/21  0513   CRP mg/L 12 8*     Results from last 7 days   Lab Units 04/09/21  1137   INFLUENZA A PCR  Negative   INFLUENZA B PCR  Negative   RSV PCR  Negative     4/9  Ct head=  No acute intracranial abnormality  Ct cervical spine=  No cervical spine fracture or traumatic malalignment  Cxr=  No acute cardiopulmonary disease  R elbow xray=  No acute osseous abnormality    R hip/pelvis xray=  Minimally displaced right subcapital femoral neck fracture with secondary varus deformity    ED Treatment:   Medication Administration from 04/09/2021 0958 to 04/09/2021 1609       Date/Time Order Dose Route Action     04/09/2021 1138 morphine injection 2 mg 2 mg Intravenous Given     04/09/2021 1145 tetanus-diphtheria-acellular pertussis (BOOSTRIX) IM injection 0 5 mL 0 5 mL Intramuscular Given     04/09/2021 1149 bacitracin topical ointment 1 small application 1 small application Topical Given     04/09/2021 1247 morphine injection 2 mg 2 mg Intravenous Given     04/09/2021 1605 ceFAZolin (ANCEF) IVPB (premix in dextrose) 2,000 mg 50 mL 2,000 mg Intravenous Given        Admitting Diagnosis: Femoral neck fracture ([facility] [address] ) [S72 009A]  Right hip pain [M25 551]  Fall, initial encounter [W19  XXXA]  Skin tear of elbow without complication [E16 729Z]  COVID-19 [U07 1]  Age/Sex: 67 y o  female  Admission Orders:  Pt/ot eval & tx  Consult ortho  Scd/foot pumps  O2 to keep sats>90%  Contact & airborne isolation  Cont pulse ox    Scheduled Medications:  acetaminophen, 975 mg, Oral, Q8H [address]  ascorbic acid, 1,000 mg, Oral, Q12H THERON  cholecalciferol, 2,000 Units, Oral, Daily  enoxaparin, 30 mg, Subcutaneous, Q12H THERON  gabapentin, 200 mg, Oral, BID  zinc sulfate, 220 mg, Oral, Daily    Followed by  Leland Preciado ON 4/17/2021] multivitamin-minerals, 1 tablet, Oral, Daily  nicotine, 1 patch, Transdermal, Daily  pantoprazole, 40 mg, Oral, Early Morning  senna, 1 tablet, Oral, Daily    Continuous IV Infusions:  sodium chloride, 75 mL/hr, Intravenous, Continuous    PRN Meds:  aluminum-magnesium hydroxide-simethicone, 30 mL, Oral, Q6H PRN  calcium carbonate, 1,000 mg, Oral, Daily PRN  HYDROmorphone, 0 2 mg, Intravenous, Q4H PRN  ondansetron, 4 mg, Intravenous, Once PRN  ondansetron, 4 mg, Intravenous, Q6H PRN  oxyCODONE, 2 5 mg, Oral, Q4H PRN  oxyCODONE, 5 mg, Oral, Q4H PRN  polyethylene glycol, 17 g, Oral, Daily PRN    Network Utilization Review Department  ATTENTION: Please call with any questions or concerns to [phone] and carefully listen to the prompts so that you are directed to the right person  All voicemails are confidential   Candia Siemens all requests for admission clinical reviews, approved or denied determinations and any other requests to dedicated fax number below belonging to the campus where the patient is receiving treatment   List of dedicated fax numbers for the Facilities:  FACILITY NAME [address] DENIALS (Administrative/Medical Necessity) [phone] [address] (Maternity/NICU/Pediatrics) [address] [facility],[address]Th Floor [facility] 40 [address] [facility] Leonora Toussaint [address] (  Keara Melgar "Angie" 103) [address] [place] [address] [address] [place] [address] [phone]

## 2021-04-10 NOTE — PROGRESS NOTES
Progress Note - Orthopedics   Christiano Conklin 67 y o  female MRN: [id number]  Unit/Bed#: [address] [place] [address] Encounter: [id number]    Assessment:  1) POD#1 s/p Direct Anterior Right PRISCILA for fracture  2) COVID postive - management per medical team    Plan:  Ancef 2g IV x 2 for 24 hours postop - completed  DVT prophylaxis: Lovenox 30mg SQ Q12/SCD's/Ambulation- Lovenox dosing secondary to COVID positivity and per primary team    PT/OT- WBAT RLE  Analgesia PRN  Follow up AM labs - H/H/BUN/Cr stable  May discontinue Barrera when the patient is stable  Will defer this decision to the medical service  Medical management per primary team  Dressing- monitor for drainage, Mepilex may remain in place 7 days  Discharge planning - disposition pending progress with PT postoperatively  Weight bearing: WBAT RLE    VTE Pharmacologic Prophylaxis: Enoxaparin (Lovenox)  VTE Mechanical Prophylaxis: sequential compression device    Subjective:  Patient seen and examined this afternoon  Pain controlled  No overnight events  Able to work with physical therapy  Denies chest pain, shortness of breath, dizziness, lightheadedness, or paresthesias in the right lower extremity    Vitals: Blood pressure 101/56, pulse 68, temperature 98 3 °F (36 8 °C), temperature source Oral, resp  rate 20, height 5' 1" (1 549 m), weight 44 8 kg (98 lb 11 2 oz), SpO2 100 %  ,Body mass index is 18 65 kg/m²        Intake/Output Summary (Last 24 hours) at 4/10/2021 1313  Last data filed at 4/10/2021 0710  Gross per 24 hour   Intake 2200 ml   Output 580 ml   Net 1620 ml       Invasive Devices     Peripheral Intravenous Line            Peripheral IV 04/09/21 Right Wrist less than 1 day                Physical Exam: NAD, A&Ox3, resting comfortably in bed  Ortho Exam: RLE:  Right anterior hip Dsg c/d/i, thigh soft, +LFCN SILT, +Fem nerve motor, +DF/PF/EHL, +L3-S1 SILT, DP2+, foot warm      Lab, Imaging and other studies:    Lab Results   Component Value Date    WBC 7 80 04/10/2021    HGB 10 6 (L) 04/10/2021    HCT 33 0 (L) 04/10/2021     (H) 04/10/2021     04/10/2021     Lab Results   Component Value Date    SODIUM 137 04/10/2021    K 4 3 04/10/2021     04/10/2021    CO2 28 04/10/2021    AGAP 7 04/10/2021    BUN 15 04/10/2021    CREATININE 0 90 04/10/2021    GLUC 114 04/10/2021    CALCIUM 7 7 (L) 04/10/2021    EGFR 62 04/10/2021     Keon Sanders PA-C

## 2021-04-10 NOTE — ASSESSMENT & PLAN NOTE
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

## 2021-04-10 NOTE — PLAN OF CARE
Problem: PAIN - ADULT  Goal: Verbalizes/displays adequate comfort level or baseline comfort level  Description: Interventions:  - Encourage patient to monitor pain and request assistance  - Assess pain using appropriate pain scale  - Administer analgesics based on type and severity of pain and evaluate response  - Implement non-pharmacological measures as appropriate and evaluate response  - Consider cultural and social influences on pain and pain management  - Notify physician/advanced practitioner if interventions unsuccessful or patient reports new pain  Outcome: Progressing     Problem: INFECTION - ADULT  Goal: Absence or prevention of progression during hospitalization  Description: INTERVENTIONS:  - Assess and monitor for signs and symptoms of infection  - Monitor lab/diagnostic results  - Monitor all insertion sites, i e  indwelling lines, tubes, and drains  - Monitor endotracheal if appropriate and nasal secretions for changes in amount and color  - [place] appropriate cooling/warming therapies per order  - Administer medications as ordered  - Instruct and encourage patient and family to use good hand hygiene technique  - Identify and instruct in appropriate isolation precautions for identified infection/condition  Outcome: Progressing  Goal: Absence of fever/infection during neutropenic period  Description: INTERVENTIONS:  - Monitor WBC    Outcome: Progressing     Problem: SAFETY ADULT  Goal: Patient will remain free of falls  Description: INTERVENTIONS:  - Assess patient frequently for physical needs  -  Identify cognitive and physical deficits and behaviors that affect risk of falls    -  [place] fall precautions as indicated by assessment   - Educate patient/family on patient safety including physical limitations  - Instruct patient to call for assistance with activity based on assessment  - Modify environment to reduce risk of injury  - Consider OT/PT consult to assist with strengthening/mobility  Outcome: Progressing  Goal: Maintain or return to baseline ADL function  Description: INTERVENTIONS:  -  Assess patient's ability to carry out ADLs; assess patient's baseline for ADL function and identify physical deficits which impact ability to perform ADLs (bathing, care of mouth/teeth, toileting, grooming, dressing, etc )  - Assess/evaluate cause of self-care deficits   - Assess range of motion  - Assess patient's mobility; develop plan if impaired  - Assess patient's need for assistive devices and provide as appropriate  - Encourage maximum independence but intervene and supervise when necessary  - Involve family in performance of ADLs  - Assess for home care needs following discharge   - Consider OT consult to assist with ADL evaluation and planning for discharge  - Provide patient education as appropriate  Outcome: Progressing  Goal: Maintain or return mobility status to optimal level  Description: INTERVENTIONS:  - Assess patient's baseline mobility status (ambulation, transfers, stairs, etc )    - Identify cognitive and physical deficits and behaviors that affect mobility  - Identify mobility aids required to assist with transfers and/or ambulation (gait belt, sit-to-stand, lift, walker, cane, etc )  - [place] fall precautions as indicated by assessment  - Record patient progress and toleration of activity level on Mobility SBAR; progress patient to next Phase/Stage  - Instruct patient to call for assistance with activity based on assessment  - Consider rehabilitation consult to assist with strengthening/weightbearing, etc   Outcome: Progressing     Problem: DISCHARGE PLANNING  Goal: Discharge to home or other facility with appropriate resources  Description: INTERVENTIONS:  - Identify barriers to discharge w/patient and caregiver  - Arrange for needed discharge resources and transportation as appropriate  - Identify discharge learning needs (meds, wound care, etc )  - Arrange for interpretive services to assist at discharge as needed  - Refer to Case Management Department for coordinating discharge planning if the patient needs post-hospital services based on physician/advanced practitioner order or complex needs related to functional status, cognitive ability, or social support system  Outcome: Progressing     Problem: Knowledge Deficit  Goal: Patient/family/caregiver demonstrates understanding of disease process, treatment plan, medications, and discharge instructions  Description: Complete learning assessment and assess knowledge base  Interventions:  - Provide teaching at level of understanding  - Provide teaching via preferred learning methods  Outcome: Progressing     Problem: Nutrition/Hydration-ADULT  Goal: Nutrient/Hydration intake appropriate for improving, restoring or maintaining nutritional needs  Description: Monitor and assess patient's nutrition/hydration status for malnutrition  Collaborate with interdisciplinary team and initiate plan and interventions as ordered  Monitor patient's weight and dietary intake as ordered or per policy  Utilize nutrition screening tool and intervene as necessary  Determine patient's food preferences and provide high-protein, high-caloric foods as appropriate       INTERVENTIONS:  - Monitor oral intake, urinary output, labs, and treatment plans  - Assess nutrition and hydration status and recommend course of action  - Evaluate amount of meals eaten  - Assist patient with eating if necessary   - Allow adequate time for meals  - Recommend/ encourage appropriate diets, oral nutritional supplements, and vitamin/mineral supplements  - Order, calculate, and assess calorie counts as needed  - Recommend, monitor, and adjust tube feedings and TPN/PPN based on assessed needs  - Assess need for intravenous fluids  - Provide specific nutrition/hydration education as appropriate  - Include patient/family/caregiver in decisions related to nutrition  Outcome: Progressing     Problem: Prexisting or High Potential for Compromised Skin Integrity  Goal: Skin integrity is maintained or improved  Description: INTERVENTIONS:  - Identify patients at risk for skin breakdown  - Assess and monitor skin integrity  - Assess and monitor nutrition and hydration status  - Monitor labs   - Assess for incontinence   - Turn and reposition patient  - Assist with mobility/ambulation  - Relieve pressure over bony prominences  - Avoid friction and shearing  - Provide appropriate hygiene as needed including keeping skin clean and dry  - Evaluate need for skin moisturizer/barrier cream  - Collaborate with interdisciplinary team   - Patient/family teaching  - Consider wound care consult   Outcome: Progressing     Problem: Potential for Falls  Goal: Patient will remain free of falls  Description: INTERVENTIONS:  - Assess patient frequently for physical needs  -  Identify cognitive and physical deficits and behaviors that affect risk of falls    -  [place] fall precautions as indicated by assessment   - Educate patient/family on patient safety including physical limitations  - Instruct patient to call for assistance with activity based on assessment  - Modify environment to reduce risk of injury  - Consider OT/PT consult to assist with strengthening/mobility  Outcome: Progressing

## 2021-04-11 PROBLEM — R00.0 TACHYCARDIA: Status: ACTIVE | Noted: 2021-04-11

## 2021-04-11 LAB
ANION GAP SERPL CALCULATED.3IONS-SCNC: 6 MMOL/L (ref 4–13)
BUN SERPL-MCNC: 13 MG/DL (ref 5–25)
CALCIUM SERPL-MCNC: 7.4 MG/DL (ref 8.3–10.1)
CHLORIDE SERPL-SCNC: 103 MMOL/L (ref 100–108)
CO2 SERPL-SCNC: 28 MMOL/L (ref 21–32)
CREAT SERPL-MCNC: 0.71 MG/DL (ref 0.6–1.3)
CRP SERPL QL: 64.2 MG/L
D DIMER PPP FEU-MCNC: 2.29 UG/ML FEU
ERYTHROCYTE [DISTWIDTH] IN BLOOD BY AUTOMATED COUNT: 13 % (ref 11.6–15.1)
GFR SERPL CREATININE-BSD FRML MDRD: 83 ML/MIN/1.73SQ M
GLUCOSE SERPL-MCNC: 105 MG/DL (ref 65–140)
HCT VFR BLD AUTO: 28.9 % (ref 34.8–46.1)
HGB BLD-MCNC: 9.2 G/DL (ref 11.5–15.4)
MCH RBC QN AUTO: 33.7 PG (ref 26.8–34.3)
MCHC RBC AUTO-ENTMCNC: 31.8 G/DL (ref 31.4–37.4)
MCV RBC AUTO: 106 FL (ref 82–98)
PLATELET # BLD AUTO: 127 THOUSANDS/UL (ref 149–390)
PMV BLD AUTO: 9.2 FL (ref 8.9–12.7)
POTASSIUM SERPL-SCNC: 3.8 MMOL/L (ref 3.5–5.3)
PROCALCITONIN SERPL-MCNC: 0.13 NG/ML
RBC # BLD AUTO: 2.73 MILLION/UL (ref 3.81–5.12)
SODIUM SERPL-SCNC: 137 MMOL/L (ref 136–145)
WBC # BLD AUTO: 7.47 THOUSAND/UL (ref 4.31–10.16)

## 2021-04-11 PROCEDURE — 99232 SBSQ HOSP IP/OBS MODERATE 35: CPT | Performed by: PHYSICIAN ASSISTANT

## 2021-04-11 PROCEDURE — 85379 FIBRIN DEGRADATION QUANT: CPT | Performed by: PHYSICIAN ASSISTANT

## 2021-04-11 PROCEDURE — 84145 PROCALCITONIN (PCT): CPT | Performed by: PHYSICIAN ASSISTANT

## 2021-04-11 PROCEDURE — 86140 C-REACTIVE PROTEIN: CPT | Performed by: PHYSICIAN ASSISTANT

## 2021-04-11 PROCEDURE — 97110 THERAPEUTIC EXERCISES: CPT

## 2021-04-11 PROCEDURE — 97167 OT EVAL HIGH COMPLEX 60 MIN: CPT

## 2021-04-11 PROCEDURE — 99024 POSTOP FOLLOW-UP VISIT: CPT | Performed by: ORTHOPAEDIC SURGERY

## 2021-04-11 PROCEDURE — 97116 GAIT TRAINING THERAPY: CPT

## 2021-04-11 PROCEDURE — 80048 BASIC METABOLIC PNL TOTAL CA: CPT | Performed by: PHYSICIAN ASSISTANT

## 2021-04-11 PROCEDURE — 93005 ELECTROCARDIOGRAM TRACING: CPT

## 2021-04-11 PROCEDURE — 85027 COMPLETE CBC AUTOMATED: CPT | Performed by: PHYSICIAN ASSISTANT

## 2021-04-11 RX ORDER — METHOCARBAMOL 500 MG/1
250 TABLET, FILM COATED ORAL EVERY 8 HOURS PRN
Status: DISCONTINUED | OUTPATIENT
Start: 2021-04-11 | End: 2021-04-14 | Stop reason: HOSPADM

## 2021-04-11 RX ADMIN — OXYCODONE HYDROCHLORIDE AND ACETAMINOPHEN 1000 MG: 500 TABLET ORAL at 09:52

## 2021-04-11 RX ADMIN — ACETAMINOPHEN 975 MG: 325 TABLET, FILM COATED ORAL at 22:29

## 2021-04-11 RX ADMIN — ENOXAPARIN SODIUM 30 MG: 30 INJECTION SUBCUTANEOUS at 09:52

## 2021-04-11 RX ADMIN — GABAPENTIN 200 MG: 100 CAPSULE ORAL at 09:52

## 2021-04-11 RX ADMIN — ACETAMINOPHEN 975 MG: 325 TABLET, FILM COATED ORAL at 14:03

## 2021-04-11 RX ADMIN — PANTOPRAZOLE SODIUM 40 MG: 40 TABLET, DELAYED RELEASE ORAL at 05:33

## 2021-04-11 RX ADMIN — OXYCODONE 5 MG: 5 TABLET ORAL at 10:54

## 2021-04-11 RX ADMIN — OXYCODONE HYDROCHLORIDE AND ACETAMINOPHEN 1000 MG: 500 TABLET ORAL at 22:28

## 2021-04-11 RX ADMIN — GABAPENTIN 200 MG: 100 CAPSULE ORAL at 22:29

## 2021-04-11 RX ADMIN — ENOXAPARIN SODIUM 30 MG: 30 INJECTION SUBCUTANEOUS at 22:28

## 2021-04-11 RX ADMIN — SENNOSIDES 8.6 MG: 8.6 TABLET, FILM COATED ORAL at 09:52

## 2021-04-11 RX ADMIN — ACETAMINOPHEN 975 MG: 325 TABLET, FILM COATED ORAL at 05:33

## 2021-04-11 RX ADMIN — NICOTINE 1 PATCH: 14 PATCH, EXTENDED RELEASE TRANSDERMAL at 09:52

## 2021-04-11 RX ADMIN — Medication 2000 UNITS: at 09:52

## 2021-04-11 RX ADMIN — ZINC SULFATE 220 MG (50 MG) CAPSULE 220 MG: CAPSULE at 09:52

## 2021-04-11 NOTE — PROGRESS NOTES
Progress Note - Orthopedics   Rodri Found 67 y o  female MRN: [id number]  Unit/Bed#: [address] [place] [address] Encounter: [id number]    Assessment:  1) POD#2 s/p Direct Anterior Right PRISCILA for fracture  2) COVID postive - management per medical team    Plan:  Perioperative antibiotics completed  DVT prophylaxis: Lovenox 30mg SQ Q12/SCD's/Ambulation- Lovenox dosing secondary to COVID positivity and per primary team    PT/OT- WBAT RLE  Analgesia PRN  Follow up AM labs - H/H/BUN/Cr stable; there is bruising, but no evidence of hematoma at the surgical site  Barrera DC - monitor for continued proper void  Medical management per primary team  Dressing- monitor for drainage, Mepilex may remain in place 7 days  Discharge planning - disposition pending progress with PT postoperatively and medical clearance; likely will need short-term rehab stay    Weight bearing: WBAT RLE    VTE Pharmacologic Prophylaxis: Enoxaparin (Lovenox)  VTE Mechanical Prophylaxis: sequential compression device    Subjective:  Patient seen and examined this afternoon  Pain controlled at rest but significant discomfort and right hip with moving  No overnight events  Denies chest pain, shortness of breath, dizziness, lightheadedness, or paresthesias in the right lower extremity    Vitals: Blood pressure 113/60, pulse (!) 111, temperature 97 7 °F (36 5 °C), resp  rate 16, height 5' 1" (1 549 m), weight 45 2 kg (99 lb 10 4 oz), SpO2 99 %  ,Body mass index is 18 83 kg/m²        Intake/Output Summary (Last 24 hours) at 4/11/2021 1147  Last data filed at 4/11/2021 0235  Gross per 24 hour   Intake --   Output 800 ml   Net -800 ml       Invasive Devices     Peripheral Intravenous Line            Peripheral IV 04/09/21 Right Wrist 1 day                Physical Exam: NAD, A&Ox3, resting comfortably in chair  Ortho Exam: RLE:  Right anterior hip Dsg c/d/i, thigh soft, +LFCN SILT, +Fem nerve motor, +DF/PF/EHL, +L3-S1 SILT, DP2+, foot warm      Lab, Imaging and other studies:    Lab Results   Component Value Date    WBC 7 47 04/11/2021    HGB 9 2 (L) 04/11/2021    HCT 28 9 (L) 04/11/2021     (H) 04/11/2021     (L) 04/11/2021     Lab Results   Component Value Date    SODIUM 137 04/11/2021    K 3 8 04/11/2021     04/11/2021    CO2 28 04/11/2021    AGAP 6 04/11/2021    BUN 13 04/11/2021    CREATININE 0 71 04/11/2021    GLUC 105 04/11/2021    CALCIUM 7 4 (L) 04/11/2021    EGFR 83 04/11/2021     Kelsi Singh PA-C

## 2021-04-11 NOTE — PROGRESS NOTES
Callum 44  Progress Note Judge Root 1945, 67 y o  female MRN: [id number]  Unit/Bed#: [address] [place] [address] Encounter: [id number]  Primary Care Provider: No primary care provider on file  Date and time admitted to hospital: 4/9/2021 10:13 AM    * Closed displaced fracture of right femoral neck [facility])  Assessment & Plan  Imaging reveals closed displaced right subcapital femoral neck fracture  Presented after mechanical fall while getting out of her car   Appreciate ortho, POD#2 Left PRISCILA 4/9  Pain control  PT/OT rec post acute rehab   DVT ppx   Monitor H&H, hgb 12 --> 9    COVID-19 virus infection  Assessment & Plan  Incidentally positive on screening in ER prior to OR   Patient is asymptomatic   Supplemental O2 for comfort; 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, will continue to monitor O2 requirements; otherwise will monitor off treatment   Multivitamin cocktail  Respiratory protocol  Check inflamamtory markers, daily cbc, cmp   Low grade fever noted     Tachycardia  Assessment & Plan  Tachycardic this AM with HR in low 100s   Patient asx; sounds irregularly irregular on cardiac auscultation   Check EKG   Consider cards consult and tele if shows A fib     Tobacco abuse  Assessment & Plan  Smoked 1/2ppd x 50 years (at least)   NRT   Encouraged cessation     VTE Pharmacologic Prophylaxis:   Pharmacologic: Enoxaparin (Lovenox)  Mechanical VTE Prophylaxis in Place: Yes    Patient Centered Rounds: I have performed bedside rounds with nursing staff today  Discussions with Specialists or Other Care Team Provider: nursing     Education and Discussions with Family / Patient: patient, called daughter Lisandra Cota     Time Spent for Care: 30 minutes  More than 50% of total time spent on counseling and coordination of care as described above      Current Length of Stay: 2 day(s)    Current Patient Status: Inpatient   Certification Statement: The patient will continue to require additional inpatient hospital stay due to pending improvement in pain, safe d/c planning to rehab, covid pos    Discharge Plan: pending clinical course     Code Status: Level 1 - Full Code    Subjective:   Pt seen and examined  Does not remember if she got out of bed at all yesterday  No sob, cp, palpitations, n/v/d, dizziness or LH  Objective:     Vitals:   Temp (24hrs), Av 1 °F (37 3 °C), Min:97 7 °F (36 5 °C), Max:100 1 °F (37 8 °C)    Temp:  [97 7 °F (36 5 °C)-100 1 °F (37 8 °C)] 97 7 °F (36 5 °C)  HR:  [] 111  Resp:  [16-19] 16  BP: (109-117)/(60-70) 113/60  SpO2:  [97 %-99 %] 99 %  Body mass index is 18 83 kg/m²  Input and Output Summary (last 24 hours): Intake/Output Summary (Last 24 hours) at 2021 1428  Last data filed at 2021 0235  Gross per 24 hour   Intake --   Output 800 ml   Net -800 ml       Physical Exam:     Physical Exam  Constitutional:       Appearance: Normal appearance  HENT:      Head: Normocephalic and atraumatic  Mouth/Throat:      Mouth: Mucous membranes are moist    Eyes:      Extraocular Movements: Extraocular movements intact  Neck:      Musculoskeletal: Normal range of motion and neck supple  Cardiovascular:      Rate and Rhythm: Tachycardia present  Rhythm irregular  Pulmonary:      Effort: Pulmonary effort is normal       Breath sounds: Normal breath sounds  Abdominal:      General: Abdomen is flat  Palpations: Abdomen is soft  Musculoskeletal: Normal range of motion  General: No swelling  Skin:     General: Skin is warm and dry  Neurological:      General: No focal deficit present  Mental Status: She is alert and oriented to person, place, and time        Comments: Forgetful at times but oriented x 3    Psychiatric:         Mood and Affect: Mood normal          Behavior: Behavior normal        Additional Data:     Labs:    Results from last 7 days   Lab Units 21  0528  21  1137   WBC Thousand/uL 7 47   < > 4 28* HEMOGLOBIN g/dL 9 2*   < > 12 8   HEMATOCRIT % 28 9*   < > 38 9   PLATELETS Thousands/uL 127*   < > 204   NEUTROS PCT %  --   --  70   LYMPHS PCT %  --   --  16   MONOS PCT %  --   --  12   EOS PCT %  --   --  0    < > = values in this interval not displayed  Results from last 7 days   Lab Units 04/11/21  0528   SODIUM mmol/L 137   POTASSIUM mmol/L 3 8   CHLORIDE mmol/L 103   CO2 mmol/L 28   BUN mg/dL 13   CREATININE mg/dL 0 71   ANION GAP mmol/L 6   CALCIUM mg/dL 7 4*   GLUCOSE RANDOM mg/dL 105     Results from last 7 days   Lab Units 04/09/21  1137   INR  1 10             Results from last 7 days   Lab Units 04/10/21  0513   PROCALCITONIN ng/ml <0 05     * I Have Reviewed All Lab Data Listed Above  * Additional Pertinent Lab Tests Reviewed:  Steven 65 Admission Reviewed    Imaging:    Imaging Reports Reviewed Today Include: all  Imaging Personally Reviewed by Myself Includes:  none    Recent Cultures (last 7 days):           Last 24 Hours Medication List:   Current Facility-Administered Medications   Medication Dose Route Frequency Provider Last Rate    acetaminophen  975 mg Oral Q8H [address] Fadi Palomino PA-C      aluminum-magnesium hydroxide-simethicone  30 mL Oral Q6H PRN Panchito Barrera PA-C      ascorbic acid  1,000 mg Oral Q12H [address] [place], [place]      calcium carbonate  1,000 mg Oral Daily PRN Panchito Barrera PA-C      cholecalciferol  2,000 Units Oral Daily Panchito Barrera PA-C      enoxaparin  30 mg Subcutaneous Q12H [address] [place], [place]      gabapentin  200 mg Oral BID [place], [place]      HYDROmorphone  0 2 mg Intravenous Q4H PRN Panchito Barrera PA-C      methocarbamol  250 mg Oral Q8H PRN Jasmin Lundberg PA-C      zinc sulfate  220 mg Oral Daily Panchito Barrera PA-C      Followed by   Samantha King ON 4/17/2021] multivitamin-minerals  1 tablet Oral Daily Panchito Barrera PA-C      nicotine  1 patch Transdermal Daily Panchito Barrera PA-C      ondansetron  4 mg Intravenous Once PRN Olamide Bentley MD      ondansetron  4 mg Intravenous Q6H PRN Luke Ryan PA-C      oxyCODONE  2 5 mg Oral Q4H PRN Luke SHANTHI Ryan      oxyCODONE  5 mg Oral Q4H PRN Luke SHANTHI Ryan      pantoprazole  40 mg Oral Early Morning Luke Ryan PA-C      polyethylene glycol  17 g Oral Daily PRN Luke Ryan PA-C      senna  1 tablet Oral Daily Luke Ryan PA-C       Today, Patient Was Seen By: Randa Salazar PA-C    ** Please Note: Dictation voice to text software may have been used in the creation of this document   **

## 2021-04-11 NOTE — ASSESSMENT & PLAN NOTE
Tachycardic this AM with HR in low 100s   Patient asx; sounds irregularly irregular on cardiac auscultation   Check EKG   Consider cards consult and tele if shows A fib

## 2021-04-11 NOTE — ASSESSMENT & PLAN NOTE
Imaging reveals closed displaced right subcapital femoral neck fracture  Presented after mechanical fall while getting out of her car   Appreciate ortho, POD#2 Left PRISCILA 4/9  Pain control  PT/OT rec post acute rehab   DVT ppx   Monitor H&H, hgb 12 --> 9

## 2021-04-11 NOTE — OCCUPATIONAL THERAPY NOTE
OT EVALUATION     04/11/21 1425   OT Last Visit   OT Visit Date 04/11/21   Note Type   Note type Evaluation   Restrictions/Precautions   RLE Weight Bearing Per Order WBAT   Other Precautions Pain; Fall Risk;O2;Contact/isolation; Airborne/isolation; Chair Alarm; Bed Alarm   Pain Assessment   Pain Assessment Tool 0-10   Pain Score 6   Pain Location/Orientation Orientation: Right;Location: Hip   Home Living   Type of [address] Two level;Stairs to enter with rails   Home Equipment   (none)   Additional Comments pt works and drives   Prior Function   Level of [place] Independent with ADLs and functional mobility   Lives With [place] Help From Family   ADL Assistance Independent   IADLs Independent   Falls in the last 6 months 1 to 4   Vocational Part time employment   Subjective   Subjective "I must have lost my balance "   ADL   Where Assessed Chair   Eating Assistance 5  Supervision/Setup   Grooming Assistance 5  Supervision/Setup   UB Dressing Assistance 4  Minimal Assistance   LB Dressing Assistance 2  Maximal Assistance   Toileting Assistance  4  Minimal Assistance   Bed Mobility   Rolling R 3  Moderate assistance   Supine to Sit 3  Moderate assistance   Sit to Supine 3  Moderate assistance   Transfers   Sit to Stand 3  Moderate assistance   Stand to Sit 3  Moderate assistance   Stand pivot 3  Moderate assistance   Toilet transfer 3  Moderate assistance   Additional items Commode   Functional Mobility   Functional Mobility 3  Moderate assistance   Additional Comments several steps in room   Additional items Rolling walker   Balance   Static Sitting Fair +   Dynamic Sitting Fair   Static Standing Fair   Dynamic Standing Fair -   Activity Tolerance   Activity Tolerance Patient limited by fatigue;Patient limited by pain   Nurse Made Aware Winifred Jansen; present and offering pain medication   RUE Assessment   RUE Assessment WFL   LUE Assessment   LUE Assessment WFL   Cognition   Overall Cognitive Status WFL   Arousal/Participation Alert; Cooperative   Attention Within functional limits   Orientation Level Oriented to person;Oriented to place;Oriented to situation   Memory Within functional limits   Following Commands Follows one step commands without difficulty   Assessment   Limitation Decreased ADL status; Decreased high-level ADLs; Decreased self-care trans;Decreased endurance   Prognosis Good   Assessment Patient evaluated by Occupational Therapy  Patient admitted with Closed displaced fracture of right femoral neck ([facility] [address] )  The patients occupational profile, medical and therapy history includes a expanded review of medical and/or therapy records and additional review of physical, cognitive, or psychosocial history related to current functional performance  Comorbidities affecting functional mobility and ADLS include: CZGYH-02 and falls, smoker  Prior to admission, patient was independent with functional mobility without assistive device, independent with ADLS, independent with IADLS, living with spouse in a 2 level home with 2 steps to enter, ambulating community distances and works part time  The evaluation identifies the following performance deficits: impaired balance, decreased endurance, increased fall risk, new onset of impairment of functional mobility, decreased ADLS, decreased IADLS, pain, decreased activity tolerance, SOB upon exertion and decreased strength, that result in activity limitations and/or participation restrictions  This evaluation requires clinical decision making of high complexity, because the patient presents with comorbidites that affect occupational performance and required significant modification of tasks or assistance with consideration of multiple treatment options  The Barthel Index was used as a functional outcome tool presenting with a score of 50, indicating marked limitations of functional mobility and ADLS    The patient's raw score on the AM-PAC Daily Activity inpatient short form is 16, standardized score is 35 96, less than 39 4  Patients at this level are likely to benefit from DC to post-acute rehabilitation services  Please refer to the recommendation of the Occupational Therapist for safe DC planning  Patient will benefit from skilled Occupational Therapy services to address above deficits and facilitate a safe return to prior level of function  Goals   Patient Goals "go home"   STG Time Frame   (1-7 days)   Short Term Goal #1 Goals established to promote patient goal of "go home":  Patient will increase standing tolerance to 5 minutes during ADL task to decrease assistance level and decrease fall risk; Patient will increase bed mobility to min assist in preparation for ADLS and transfers; Patient will increase functional mobility to and from bathroom with rolling walker with min assist to increase performance with ADLS and to use a toilet; Patient will tolerate 8 minutes of UE ROM/strengthening to increase general activity tolerance and performance in ADLS/IADLS; Patient will improve functional activity tolerance to 12 minutes of sustained functional tasks to increase participation in basic self-care and decrease assistance level;     LTG Time Frame   (8-14 days)   Long Term Goal #1 Patient will increase standing tolerance to 10 minutes during ADL task to decrease assistance level and decrease fall risk; Patient will increase bed mobility to supervision in preparation for ADLS and transfers; Patient will increase functional mobility to and from bathroom with rolling walker with supervision to increase performance with ADLS and to use a toilet; Patient will tolerate 12 minutes of UE ROM/strengthening to increase general activity tolerance and performance in ADLS/IADLS; Patient will improve functional activity tolerance to 16 minutes of sustained functional tasks to increase participation in basic self-care and decrease assistance level;     Functional Transfer Goals   Pt Will Perform All Functional Transfers   (STG: MIN assist LTG: supervision )   ADL Goals   Pt Will Perform UE Dressing   (STG: supervision LTG: independent)   Pt Will Perform LE Dressing   (STG: MOD assist LTG: MIN assist; trial LE AE)   Pt Will Perform Toileting   (STG: supervision LTG: independent)   Plan   Treatment Interventions Activityengagement; Energy conservation; Compensatory technique education;Equipment evaluation/education;Patient/family training;ADL retraining;Functional transfer training;UE strengthening/ROM; Endurance training   Goal Expiration Date 04/25/21   OT Frequency 3-5x/wk   Recommendation   OT Discharge Recommendation Post-Acute Rehabilitation Services   Equipment Recommended Bedside commode; Hip Kit ($);Shower/Tub chair with back ($)   AM-PAC Daily Activity Inpatient   Lower Body Dressing 2   Bathing 2   Toileting 3   Upper Body Dressing 3   Grooming 3   Eating 3   Daily Activity Raw Score 16   Daily Activity Standardized Score (Calc for Raw Score >=11) 35 96   AM-PAC Applied Cognition Inpatient   Following a Speech/Presentation 4   Understanding Ordinary Conversation 4   Taking Medications 4   Remembering Where Things Are Placed or Put Away 4   Remembering List of 4-5 Errands 4   Taking Care of Complicated Tasks 4   Applied Cognition Raw Score 24   Applied Cognition Standardized Score 62 21   Barthel Index   Feeding 10   Bathing 0   Grooming Score 5   Dressing Score 5   Bladder Score 10   Bowels Score 10   Toilet Use Score 5   Transfers (Bed/Chair) Score 5   Mobility (Level Surface) Score 0   Stairs Score 0   Barthel Index Score 48   Licensure   [place] License Number  [id number] [facility] # [id number]

## 2021-04-11 NOTE — PHYSICAL THERAPY NOTE
PT TREATMENT     04/11/21 1505   PT Last Visit   PT Visit Date 04/11/21   Note Type   Note Type Treatment   Pain Assessment   Pain Assessment Tool 0-10   Pain Score 6   Pain Location/Orientation Orientation: Right;Location: Hip   Restrictions/Precautions   RLE Weight Bearing Per Order WBAT   Other Precautions Contact/isolation; Airborne/isolation; Chair Alarm; Bed Alarm; Fall Risk;O2;Pain   General   Chart Reviewed Yes   Family/Caregiver Present No   Cognition   Overall Cognitive Status WFL   Arousal/Participation Cooperative   Attention Within functional limits   Following Commands Follows one step commands with increased time or repetition   Bed Mobility   Additional Comments presents in chair   Transfers   Sit to Stand 3  Moderate assistance   Additional items Assist x 1;Verbal cues   Stand to Sit 3  Moderate assistance   Additional items Assist x 1;Verbal cues;Armrests   Ambulation/Elevation   Gait pattern Decreased R stance; Short stride;Narrow RAJ   Gait Assistance 3  Moderate assist   Additional items Assist x 1;Verbal cues   Assistive Device Rolling walker   Distance 5 feet and 10 feet   Balance   Ambulatory Fair -   Activity Tolerance   Activity Tolerance Patient limited by fatigue;Patient limited by pain   Nurse Made Aware yes: Malinda   Exercises   Hip Flexion Sitting;10 reps;AAROM; Right;AROM; Left   Knee AROM Long Arc Quad Sitting;10 reps;Bilateral   Ankle Pumps Sitting;20 reps;Bilateral   Balance training  with RW for wgt shifting, mini marches using UEs to assist   Assessment   Prognosis Good   Problem List Decreased strength;Decreased endurance; Impaired balance;Decreased mobility; Impaired judgement;Decreased safety awareness;Decreased skin integrity;Pain   Assessment Pt making improvements with gait today  Able to walk a greater distance with use of UEs   Still needs cues to take wider steps , walks with very narrow RAJ    Will benefit from continued PT   Goals   Patient Goals get stronger, go home Plan   Treatment/Interventions Functional transfer training;LE strengthening/ROM; Therapeutic exercise; Endurance training;Patient/family training;Equipment eval/education; Bed mobility;Gait training;Spoke to nursing;OT   Progress Progressing toward goals   PT Frequency Once a day   Recommendation   PT Discharge Recommendation Post-Acute Rehabilitation Services   AM-PAC Basic Mobility Inpatient   Turning in Bed Without Bedrails 2   Lying on Back to Sitting on Edge of Flat Bed 2   Moving Bed to Chair 2   Standing Up From Chair 2   Walk in Room 3   Climb 3-5 Stairs 1   Basic Mobility Inpatient Raw Score 12   Basic Mobility Standardized Score 38 94   Licensure   [place] License Number  Leti Levy   [id number]

## 2021-04-11 NOTE — ASSESSMENT & PLAN NOTE
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

## 2021-04-12 ENCOUNTER — APPOINTMENT (INPATIENT)
Dept: RADIOLOGY | Facility: HOSPITAL | Age: 76
DRG: 521 | End: 2021-04-12
Payer: MEDICARE

## 2021-04-12 LAB
ANION GAP SERPL CALCULATED.3IONS-SCNC: 5 MMOL/L (ref 4–13)
ATRIAL RATE: 98 BPM
BUN SERPL-MCNC: 14 MG/DL (ref 5–25)
CALCIUM SERPL-MCNC: 7.6 MG/DL (ref 8.3–10.1)
CHLORIDE SERPL-SCNC: 103 MMOL/L (ref 100–108)
CK MB SERPL-MCNC: 1.8 NG/ML (ref 0–5)
CK MB SERPL-MCNC: <1 % (ref 0–2.5)
CK SERPL-CCNC: 497 U/L (ref 26–192)
CO2 SERPL-SCNC: 31 MMOL/L (ref 21–32)
CREAT SERPL-MCNC: 0.61 MG/DL (ref 0.6–1.3)
CRP SERPL QL: 106.6 MG/L
ERYTHROCYTE [DISTWIDTH] IN BLOOD BY AUTOMATED COUNT: 13 % (ref 11.6–15.1)
FERRITIN SERPL-MCNC: 138 NG/ML (ref 8–388)
FOLATE SERPL-MCNC: 18.1 NG/ML (ref 3.1–17.5)
GFR SERPL CREATININE-BSD FRML MDRD: 88 ML/MIN/1.73SQ M
GLUCOSE SERPL-MCNC: 99 MG/DL (ref 65–140)
HCT VFR BLD AUTO: 28.1 % (ref 34.8–46.1)
HGB BLD-MCNC: 9.1 G/DL (ref 11.5–15.4)
IRON SATN MFR SERPL: 5 %
IRON SERPL-MCNC: 11 UG/DL (ref 50–170)
MAGNESIUM SERPL-MCNC: 2.2 MG/DL (ref 1.6–2.6)
MCH RBC QN AUTO: 34.2 PG (ref 26.8–34.3)
MCHC RBC AUTO-ENTMCNC: 32.4 G/DL (ref 31.4–37.4)
MCV RBC AUTO: 106 FL (ref 82–98)
NT-PROBNP SERPL-MCNC: 381 PG/ML
P AXIS: 69 DEGREES
PLATELET # BLD AUTO: 137 THOUSANDS/UL (ref 149–390)
PMV BLD AUTO: 9.9 FL (ref 8.9–12.7)
POTASSIUM SERPL-SCNC: 4 MMOL/L (ref 3.5–5.3)
PR INTERVAL: 142 MS
QRS AXIS: 57 DEGREES
QRSD INTERVAL: 82 MS
QT INTERVAL: 340 MS
QTC INTERVAL: 434 MS
RBC # BLD AUTO: 2.66 MILLION/UL (ref 3.81–5.12)
SODIUM SERPL-SCNC: 139 MMOL/L (ref 136–145)
T WAVE AXIS: 75 DEGREES
TIBC SERPL-MCNC: 238 UG/DL (ref 250–450)
VENTRICULAR RATE: 98 BPM
VIT B12 SERPL-MCNC: 938 PG/ML (ref 100–900)
WBC # BLD AUTO: 6.5 THOUSAND/UL (ref 4.31–10.16)

## 2021-04-12 PROCEDURE — 82553 CREATINE MB FRACTION: CPT | Performed by: NURSE PRACTITIONER

## 2021-04-12 PROCEDURE — 82746 ASSAY OF FOLIC ACID SERUM: CPT | Performed by: PHYSICIAN ASSISTANT

## 2021-04-12 PROCEDURE — 85027 COMPLETE CBC AUTOMATED: CPT | Performed by: PHYSICIAN ASSISTANT

## 2021-04-12 PROCEDURE — 83550 IRON BINDING TEST: CPT | Performed by: PHYSICIAN ASSISTANT

## 2021-04-12 PROCEDURE — XW033E5 INTRODUCTION OF REMDESIVIR ANTI-INFECTIVE INTO PERIPHERAL VEIN, PERCUTANEOUS APPROACH, NEW TECHNOLOGY GROUP 5: ICD-10-PCS | Performed by: FAMILY MEDICINE

## 2021-04-12 PROCEDURE — 82607 VITAMIN B-12: CPT | Performed by: PHYSICIAN ASSISTANT

## 2021-04-12 PROCEDURE — 71045 X-RAY EXAM CHEST 1 VIEW: CPT

## 2021-04-12 PROCEDURE — 82728 ASSAY OF FERRITIN: CPT | Performed by: PHYSICIAN ASSISTANT

## 2021-04-12 PROCEDURE — 82550 ASSAY OF CK (CPK): CPT | Performed by: NURSE PRACTITIONER

## 2021-04-12 PROCEDURE — 83880 ASSAY OF NATRIURETIC PEPTIDE: CPT | Performed by: NURSE PRACTITIONER

## 2021-04-12 PROCEDURE — 86140 C-REACTIVE PROTEIN: CPT | Performed by: NURSE PRACTITIONER

## 2021-04-12 PROCEDURE — 93010 ELECTROCARDIOGRAM REPORT: CPT | Performed by: INTERNAL MEDICINE

## 2021-04-12 PROCEDURE — 99024 POSTOP FOLLOW-UP VISIT: CPT | Performed by: PHYSICIAN ASSISTANT

## 2021-04-12 PROCEDURE — 83540 ASSAY OF IRON: CPT | Performed by: PHYSICIAN ASSISTANT

## 2021-04-12 PROCEDURE — 97110 THERAPEUTIC EXERCISES: CPT

## 2021-04-12 PROCEDURE — 97116 GAIT TRAINING THERAPY: CPT

## 2021-04-12 PROCEDURE — 83735 ASSAY OF MAGNESIUM: CPT | Performed by: NURSE PRACTITIONER

## 2021-04-12 PROCEDURE — 99232 SBSQ HOSP IP/OBS MODERATE 35: CPT | Performed by: NURSE PRACTITIONER

## 2021-04-12 PROCEDURE — 80048 BASIC METABOLIC PNL TOTAL CA: CPT | Performed by: PHYSICIAN ASSISTANT

## 2021-04-12 RX ORDER — SENNOSIDES 8.6 MG
1 TABLET ORAL
Status: COMPLETED | OUTPATIENT
Start: 2021-04-12 | End: 2021-04-12

## 2021-04-12 RX ORDER — DOCUSATE SODIUM 100 MG/1
100 CAPSULE, LIQUID FILLED ORAL 2 TIMES DAILY
Status: DISCONTINUED | OUTPATIENT
Start: 2021-04-12 | End: 2021-04-14 | Stop reason: HOSPADM

## 2021-04-12 RX ORDER — SENNOSIDES 8.6 MG
2 TABLET ORAL
Status: DISCONTINUED | OUTPATIENT
Start: 2021-04-12 | End: 2021-04-12 | Stop reason: SDUPTHER

## 2021-04-12 RX ORDER — DEXAMETHASONE SODIUM PHOSPHATE 4 MG/ML
6 INJECTION, SOLUTION INTRA-ARTICULAR; INTRALESIONAL; INTRAMUSCULAR; INTRAVENOUS; SOFT TISSUE DAILY
Status: DISCONTINUED | OUTPATIENT
Start: 2021-04-12 | End: 2021-04-14

## 2021-04-12 RX ADMIN — GABAPENTIN 200 MG: 100 CAPSULE ORAL at 10:32

## 2021-04-12 RX ADMIN — DOCUSATE SODIUM 100 MG: 100 CAPSULE, LIQUID FILLED ORAL at 18:21

## 2021-04-12 RX ADMIN — ZINC SULFATE 220 MG (50 MG) CAPSULE 220 MG: CAPSULE at 10:38

## 2021-04-12 RX ADMIN — ACETAMINOPHEN 975 MG: 325 TABLET, FILM COATED ORAL at 15:37

## 2021-04-12 RX ADMIN — NICOTINE 1 PATCH: 14 PATCH, EXTENDED RELEASE TRANSDERMAL at 10:37

## 2021-04-12 RX ADMIN — OXYCODONE HYDROCHLORIDE AND ACETAMINOPHEN 1000 MG: 500 TABLET ORAL at 22:09

## 2021-04-12 RX ADMIN — ACETAMINOPHEN 975 MG: 325 TABLET, FILM COATED ORAL at 06:07

## 2021-04-12 RX ADMIN — Medication 2000 UNITS: at 10:31

## 2021-04-12 RX ADMIN — DEXAMETHASONE SODIUM PHOSPHATE 6 MG: 4 INJECTION, SOLUTION INTRAMUSCULAR; INTRAVENOUS at 18:20

## 2021-04-12 RX ADMIN — SENNOSIDES 8.6 MG: 8.6 TABLET, FILM COATED ORAL at 10:38

## 2021-04-12 RX ADMIN — ACETAMINOPHEN 975 MG: 325 TABLET, FILM COATED ORAL at 22:09

## 2021-04-12 RX ADMIN — REMDESIVIR 200 MG: 100 INJECTION, POWDER, LYOPHILIZED, FOR SOLUTION INTRAVENOUS at 22:14

## 2021-04-12 RX ADMIN — OXYCODONE HYDROCHLORIDE AND ACETAMINOPHEN 1000 MG: 500 TABLET ORAL at 10:31

## 2021-04-12 RX ADMIN — ENOXAPARIN SODIUM 30 MG: 30 INJECTION SUBCUTANEOUS at 10:31

## 2021-04-12 RX ADMIN — ENOXAPARIN SODIUM 30 MG: 30 INJECTION SUBCUTANEOUS at 22:09

## 2021-04-12 RX ADMIN — GABAPENTIN 200 MG: 100 CAPSULE ORAL at 22:10

## 2021-04-12 RX ADMIN — SENNOSIDES 8.6 MG: 8.6 TABLET, FILM COATED ORAL at 22:18

## 2021-04-12 RX ADMIN — PANTOPRAZOLE SODIUM 40 MG: 40 TABLET, DELAYED RELEASE ORAL at 06:07

## 2021-04-12 NOTE — CASE MANAGEMENT
Los 3 days  Patient is not a bundle or a 30 day readmission  Patient is +COVID    SW spoke with patient over the phone to discuss discharge planning  Patient preferred SW speak with daughter Kayce Nye ([phone])  SW spoke with daughter Kayce Nye to discuss discharge plans and complete CM open  Patient lives with her spouse in a two level home with 3 steps to enter  No DME PTA  Independent with ADL's  Able to drive  Patient works full time at an [employer] in [place]  Daughter states patient does not have a hx of inpatient rehab or home care  Preferred pharmacy is [facility] in [place] and has prescription insurance  Patient does not have a PCP- daughter states that patient never needed medical attention until now  SW will work on arranging a PCP for patient  Denies hx of MH or DA issues  Daughter states that both patient and patient spouse as well as daughter all have COVID  Daughter has been staying with her father while patient is here at the hospital  SW discussed recommendation for STR  Daughter declines and wishes for patient to return home with home pt/ot/nursing  SW provided choice however daughter did not have a preference  SW will make referrals and see who will come to the home with +COVID  Will follow

## 2021-04-12 NOTE — PROGRESS NOTES
Progress Note - Orthopedics   Laila Neves 67 y o  female MRN: [id number]  Unit/Bed#: [address] [place] [address] Encounter: [id number]    Assessment:  1) POD#3 s/p Direct Anterior Right PRISCILA for fracture  2) COVID postive - management per medical team    Plan:  Perioperative antibiotics completed  DVT prophylaxis: Lovenox 30mg SQ Q12/SCD's/Ambulation- Lovenox dosing secondary to COVID positivity and per primary team  PT/OT- WBAT RLE  Analgesia PRN  Follow up AM labs - H/H/BUN/Cr stable; there is bruising, but no evidence of hematoma at the surgical site  [place] DC - monitor for continued proper void  Medical management per primary team  Dressing- monitor for drainage, Mepilex may remain in place 7 days  Discharge planning - disposition pending progress with PT postoperatively and medical clearance; likely will need short-term rehab stay    Weight bearing: WBAT RLE    VTE Pharmacologic Prophylaxis: Enoxaparin (Lovenox)  VTE Mechanical Prophylaxis: sequential compression device    Subjective:  Patient seen and examined this afternoon  Pain controlled  Successfully worked with physical therapy today  Has been able to void  No overnight events  Denies chest pain, shortness of breath, dizziness, lightheadedness, or paresthesias in the right lower extremity    Vitals: Blood pressure 119/74, pulse 105, temperature (!) 97 °F (36 1 °C), resp  rate 18, height 5' 1" (1 549 m), weight 44 8 kg (98 lb 12 3 oz), SpO2 97 %  ,Body mass index is 18 66 kg/m²        Intake/Output Summary (Last 24 hours) at 4/12/2021 1248  Last data filed at 4/12/2021 5928  Gross per 24 hour   Intake --   Output 200 ml   Net -200 ml       Invasive Devices     Peripheral Intravenous Line            Peripheral IV 04/09/21 Right Wrist 2 days                Physical Exam: NAD, A&Ox3, resting comfortably in chair  Ortho Exam: RLE:  Right anterior hip Dsg c/d/i, thigh soft, +LFCN SILT, +Fem nerve motor, +DF/PF/EHL, +L3-S1 SILT, DP2+, foot warm      Lab, Imaging and other studies:    Lab Results   Component Value Date    WBC 6 50 04/12/2021    HGB 9 1 (L) 04/12/2021    HCT 28 1 (L) 04/12/2021     (H) 04/12/2021     (L) 04/12/2021     Lab Results   Component Value Date    SODIUM 139 04/12/2021    K 4 0 04/12/2021     04/12/2021    CO2 31 04/12/2021    AGAP 5 04/12/2021    BUN 14 04/12/2021    CREATININE 0 61 04/12/2021    GLUC 99 04/12/2021    CALCIUM 7 6 (L) 04/12/2021    EGFR 88 04/12/2021     Miroslava Thomas PA-C

## 2021-04-12 NOTE — ASSESSMENT & PLAN NOTE
Imaging reveals closed displaced right subcapital femoral neck fracture  Presented after mechanical fall while getting out of her car   Appreciate ortho, POD# 3 Left PRISCILA 4/9  Pain control  PT/OT rec post acute rehab   Weight-bearing as tolerated  DVT ppx   Monitor H&H, hgb 12 --> 9's

## 2021-04-12 NOTE — ASSESSMENT & PLAN NOTE
Incidentally positive on screening in ER prior to OR   Patient denies cough, SOB currently  Patient is on O2 3L post op, satting high 90s  Cardiac markers:  Troponin normal   Check CK, proBNP  Trend inflammatory markers:  CRP trending up, D-dimer trending down, ferritin normal   Procalcitonin x2 negative  Continue vitamin-C, vitamin-D, zinc, followed by MVI  Start dexamethasone and remdesivir per protocol  Check chest x-ray stat  Incentive spirometer  Wean O2 as tolerated  CBC, CMP in a tex [facility]       Results from last 7 days   Lab Units 04/09/21  1137   SARS-COV-2  Positive*     Results from last 7 days   Lab Units 04/12/21  0632 04/11/21  0528 04/10/21  0513   CRP mg/L 106 6* 64 2* 12 8*   FERRITIN ng/mL 138  --  118   D-DIMER QUANTITATIVE ug/ml FEU  --  2 29* 11 96*      Results from last 7 days   Lab Units 04/11/21  0528 04/10/21  0513   PROCALCITONIN ng/ml 0 13 <0 05

## 2021-04-12 NOTE — ASSESSMENT & PLAN NOTE
Tachycardic HR in low 100s since yesterday    Patient asx  NSR on admission  Telemetry  Optimize electrolytes

## 2021-04-12 NOTE — PHYSICAL THERAPY NOTE
PT TREATMENT     04/12/21 1110   PT Last Visit   PT Visit Date 04/12/21   Note Type   Note Type Treatment   Pain Assessment   Pain Assessment Tool Aparicio-Baker FACES   Aparicio-Baker FACES Pain Rating 4   Pain Location/Orientation Orientation: Right;Location: Hip;Location: Leg   Restrictions/Precautions   RLE Weight Bearing Per Order WBAT   Other Precautions Fall Risk;Bed Alarm; Chair Alarm;Pain; Airborne/isolation;Contact/isolation   General   Chart Reviewed Yes   Subjective   Subjective It's not too bad   Bed Mobility   Additional Comments Patient received sitting out of bed on the commode; patient was trying to urinate but was unable  Transfers   Sit to Stand 4  Minimal assistance   Additional items Assist x 1;Verbal cues   Stand to Sit 4  Minimal assistance   Additional items Assist x 1;Verbal cues   Ambulation/Elevation   Gait pattern Decreased R stance; Short stride; Step to   Gait Assistance 4  Minimal assist   Additional items Assist x 1;Verbal cues; Tactile cues   Assistive Device Rolling walker   Distance 20 ft with change in direction in patient's room; patient sat out of bed in the chair with all needs in reach and a chair alarm   Balance   Static Sitting Fair +   Static Standing Fair   Dynamic Standing Fair -   Ambulatory Fair -   Activity Tolerance   Activity Tolerance Patient limited by fatigue;Patient limited by pain  (Mild SOB)   Exercises   Hip Abduction 10 reps;Right;Sitting   Knee AROM Long Arc Quad 10 reps;Right;Sitting   Ankle Pumps 10 reps;Right;Sitting   Assessment   Assessment Patient is making progress with transfers, gait with a roller walker, gait endurance, balance, and overall mobility  Patient will continue to benefit from skilled physical therapy services to increase her active range of motion, strength, mobility, and gait with a roller walker  The patient's AM-PAC Basic Mobility Inpatient Short Form Raw Score is 14, Standardized Score is 35 55   A standardized score less than 42 9 suggests the patient may benefit from discharge to post-acute rehabilitation services  Please also refer to the recommendation of the Physical Therapist for safe discharge planning  Plan   Treatment/Interventions ADL retraining;Functional transfer training;LE strengthening/ROM; Therapeutic exercise; Endurance training;Patient/family training;Equipment eval/education; Bed mobility;Gait training; Compensatory technique education   PT Frequency Once a day   Recommendation   PT Discharge Recommendation [phone] Basic Mobility Inpatient   Turning in Bed Without Bedrails 2   Lying on Back to Sitting on Edge of Flat Bed 2   Moving Bed to Chair 3   Standing Up From Chair 3   Walk in Room 3   Climb 3-5 Stairs 1   Basic Mobility Inpatient Raw Score 14   Basic Mobility Standardized Score 89 11   Licensure   [place] License Number  [facility] [id number]

## 2021-04-13 LAB
ALBUMIN SERPL BCP-MCNC: 2.3 G/DL (ref 3.5–5)
ALP SERPL-CCNC: 42 U/L (ref 46–116)
ALT SERPL W P-5'-P-CCNC: 20 U/L (ref 12–78)
ANION GAP SERPL CALCULATED.3IONS-SCNC: 6 MMOL/L (ref 4–13)
AST SERPL W P-5'-P-CCNC: 34 U/L (ref 5–45)
BILIRUB SERPL-MCNC: 0.28 MG/DL (ref 0.2–1)
BUN SERPL-MCNC: 19 MG/DL (ref 5–25)
CALCIUM ALBUM COR SERPL-MCNC: 9.2 MG/DL (ref 8.3–10.1)
CALCIUM SERPL-MCNC: 7.8 MG/DL (ref 8.3–10.1)
CHLORIDE SERPL-SCNC: 103 MMOL/L (ref 100–108)
CO2 SERPL-SCNC: 28 MMOL/L (ref 21–32)
CREAT SERPL-MCNC: 0.66 MG/DL (ref 0.6–1.3)
CRP SERPL QL: 106.5 MG/L
D DIMER PPP FEU-MCNC: 1.35 UG/ML FEU
ERYTHROCYTE [DISTWIDTH] IN BLOOD BY AUTOMATED COUNT: 12.8 % (ref 11.6–15.1)
FERRITIN SERPL-MCNC: 141 NG/ML (ref 8–388)
GFR SERPL CREATININE-BSD FRML MDRD: 86 ML/MIN/1.73SQ M
GLUCOSE SERPL-MCNC: 157 MG/DL (ref 65–140)
HCT VFR BLD AUTO: 27.6 % (ref 34.8–46.1)
HGB BLD-MCNC: 8.6 G/DL (ref 11.5–15.4)
MCH RBC QN AUTO: 33 PG (ref 26.8–34.3)
MCHC RBC AUTO-ENTMCNC: 31.2 G/DL (ref 31.4–37.4)
MCV RBC AUTO: 106 FL (ref 82–98)
PLATELET # BLD AUTO: 166 THOUSANDS/UL (ref 149–390)
PMV BLD AUTO: 9.8 FL (ref 8.9–12.7)
POTASSIUM SERPL-SCNC: 4.3 MMOL/L (ref 3.5–5.3)
PROT SERPL-MCNC: 5.8 G/DL (ref 6.4–8.2)
RBC # BLD AUTO: 2.61 MILLION/UL (ref 3.81–5.12)
SODIUM SERPL-SCNC: 137 MMOL/L (ref 136–145)
WBC # BLD AUTO: 8.44 THOUSAND/UL (ref 4.31–10.16)

## 2021-04-13 PROCEDURE — 93005 ELECTROCARDIOGRAM TRACING: CPT

## 2021-04-13 PROCEDURE — 80053 COMPREHEN METABOLIC PANEL: CPT | Performed by: NURSE PRACTITIONER

## 2021-04-13 PROCEDURE — 82728 ASSAY OF FERRITIN: CPT | Performed by: NURSE PRACTITIONER

## 2021-04-13 PROCEDURE — 99232 SBSQ HOSP IP/OBS MODERATE 35: CPT | Performed by: NURSE PRACTITIONER

## 2021-04-13 PROCEDURE — 97116 GAIT TRAINING THERAPY: CPT

## 2021-04-13 PROCEDURE — 97110 THERAPEUTIC EXERCISES: CPT

## 2021-04-13 PROCEDURE — 86140 C-REACTIVE PROTEIN: CPT | Performed by: NURSE PRACTITIONER

## 2021-04-13 PROCEDURE — 85027 COMPLETE CBC AUTOMATED: CPT | Performed by: NURSE PRACTITIONER

## 2021-04-13 PROCEDURE — 85379 FIBRIN DEGRADATION QUANT: CPT | Performed by: NURSE PRACTITIONER

## 2021-04-13 RX ADMIN — DOCUSATE SODIUM 100 MG: 100 CAPSULE, LIQUID FILLED ORAL at 18:40

## 2021-04-13 RX ADMIN — DOCUSATE SODIUM 100 MG: 100 CAPSULE, LIQUID FILLED ORAL at 10:00

## 2021-04-13 RX ADMIN — NICOTINE 1 PATCH: 14 PATCH, EXTENDED RELEASE TRANSDERMAL at 10:01

## 2021-04-13 RX ADMIN — DEXAMETHASONE SODIUM PHOSPHATE 6 MG: 4 INJECTION, SOLUTION INTRAMUSCULAR; INTRAVENOUS at 18:40

## 2021-04-13 RX ADMIN — ENOXAPARIN SODIUM 30 MG: 30 INJECTION SUBCUTANEOUS at 21:47

## 2021-04-13 RX ADMIN — Medication 2000 UNITS: at 10:00

## 2021-04-13 RX ADMIN — OXYCODONE HYDROCHLORIDE AND ACETAMINOPHEN 1000 MG: 500 TABLET ORAL at 10:01

## 2021-04-13 RX ADMIN — GABAPENTIN 200 MG: 100 CAPSULE ORAL at 21:48

## 2021-04-13 RX ADMIN — GABAPENTIN 200 MG: 100 CAPSULE ORAL at 10:00

## 2021-04-13 RX ADMIN — ACETAMINOPHEN 975 MG: 325 TABLET, FILM COATED ORAL at 05:48

## 2021-04-13 RX ADMIN — REMDESIVIR 100 MG: 100 INJECTION, POWDER, LYOPHILIZED, FOR SOLUTION INTRAVENOUS at 22:05

## 2021-04-13 RX ADMIN — ACETAMINOPHEN 975 MG: 325 TABLET, FILM COATED ORAL at 21:48

## 2021-04-13 RX ADMIN — PANTOPRAZOLE SODIUM 40 MG: 40 TABLET, DELAYED RELEASE ORAL at 05:50

## 2021-04-13 RX ADMIN — OXYCODONE HYDROCHLORIDE AND ACETAMINOPHEN 1000 MG: 500 TABLET ORAL at 21:48

## 2021-04-13 RX ADMIN — ACETAMINOPHEN 975 MG: 325 TABLET, FILM COATED ORAL at 15:48

## 2021-04-13 RX ADMIN — ENOXAPARIN SODIUM 30 MG: 30 INJECTION SUBCUTANEOUS at 10:00

## 2021-04-13 RX ADMIN — ZINC SULFATE 220 MG (50 MG) CAPSULE 220 MG: CAPSULE at 10:00

## 2021-04-13 NOTE — UTILIZATION REVIEW
Continued Stay Review    Date:   4/13/21                        Current Patient Class: inpatient    Current Level of Care:  acute    Assessment/Plan:   POD #3 Left PRISCILA  4/9 for closed displaced fracture of right femoral neck  wbat pt ot will need post acute rehab  COVID 29 found on screening for OR  Continue trend inflammatory markers continue vit c , d , zinc   Continue on IV Dexamethasone and IV Remdesivir per Covid protocol , cbc cmp    Pertinent Labs/Diagnostic Results:   4/13 CXR Nothing to suggest pneumonia  Mild septal thickening which could be due to minimal interstitial edema or minimal fibrosis     Results from last 7 days   Lab Units 04/09/21  1137   SARS-COV-2  Positive*     Results from last 7 days   Lab Units 04/13/21  0559 04/12/21  9629 04/11/21  0528 04/10/21  0513 04/09/21  1137   WBC Thousand/uL 8 44 6 50 7 47 7 80 4 28*   HEMOGLOBIN g/dL 8 6* 9 1* 9 2* 10 6* 12 8   HEMATOCRIT % 27 6* 28 1* 28 9* 33 0* 38 9   PLATELETS Thousands/uL 166 137* 127* 168 204   NEUTROS ABS Thousands/µL  --   --   --   --  2 98     Results from last 7 days   Lab Units 04/13/21  0559 04/12/21  0632 04/11/21  0528 04/10/21  0513 04/09/21  1137   SODIUM mmol/L 137 139 137 137 133*   POTASSIUM mmol/L 4 3 4 0 3 8 4 3 3 7   CHLORIDE mmol/L 103 103 103 102 98*   CO2 mmol/L 28 31 28 28 29   ANION GAP mmol/L 6 5 6 7 6   BUN mg/dL 19 14 13 15 16   CREATININE mg/dL 0 66 0 61 0 71 0 90 0 85   EGFR ml/min/1 73sq m 86 88 83 62 67   CALCIUM mg/dL 7 8* 7 6* 7 4* 7 7* 8 2*   MAGNESIUM mg/dL  --  2 2  --   --   --      Results from last 7 days   Lab Units 04/13/21  0559   AST U/L 34   ALT U/L 20   ALK PHOS U/L 42*   TOTAL PROTEIN g/dL 5 8*   ALBUMIN g/dL 2 3*   TOTAL BILIRUBIN mg/dL 0 28     Results from last 7 days   Lab Units 04/13/21  0559 04/12/21  0632 04/11/21  0528 04/10/21  0513 04/09/21  1137   GLUCOSE RANDOM mg/dL 157* 99 105 114 108     Results from last 7 days   Lab Units 04/12/21  0632   CK TOTAL U/L 497*   CK MB INDEX % <1 0   CK MB ng/mL 1 8     Results from last 7 days   Lab Units 04/09/21  1137   TROPONIN I ng/mL <0 02     Results from last 7 days   Lab Units 04/13/21  0559 04/11/21  0528 04/10/21  0513   D-DIMER QUANTITATIVE ug/ml FEU 1 35* 2 29* 11 96*     Results from last 7 days   Lab Units 04/09/21  1137   PROTIME seconds 14 1   INR  1 10   PTT seconds 32     Results from last 7 days   Lab Units 04/11/21  0528 04/10/21  0513   PROCALCITONIN ng/ml 0 13 <0 05     Results from last 7 days   Lab Units 04/12/21  0632   NT-PRO BNP pg/mL 381     Results from last 7 days   Lab Units 04/13/21  0559 04/12/21  0632 04/10/21  0513   FERRITIN ng/mL 141 138 118     Results from last 7 days   Lab Units 04/13/21  0559 04/12/21  0632 04/11/21  0528 04/10/21  0513   CRP mg/L 106 5* 106 6* 64 2* 12 8*     Results from last 7 days   Lab Units 04/09/21  1137   INFLUENZA A PCR  Negative   INFLUENZA B PCR  Negative   RSV PCR  Negative     Vital Signs:   /13/21 15:16:23  97 3 °F (36 3 °C)Abnormal   59  --  130/64  86  100 %  --  --  --   04/13/21 15:15:36  97 3 °F (36 3 °C)Abnormal   87  18  --  --  100 %  --  --  --   04/13/21 10:25:49  98 5 °F (36 9 °C)  --  --  115/69  84               Telemetry  Contact airborne isolation  Continuous pulse ox  Is   Daily weight I/o  D dimer ferritin  c reactive protein  ua cs  Medications:   Scheduled Medications:  acetaminophen, 975 mg, Oral, Q8H THERON  ascorbic acid, 1,000 mg, Oral, Q12H THERON  cholecalciferol, 2,000 Units, Oral, Daily  dexamethasone, 6 mg, Intravenous, Daily  docusate sodium, 100 mg, Oral, BID  enoxaparin, 30 mg, Subcutaneous, Q12H THERON  gabapentin, 200 mg, Oral, BID  zinc sulfate, 220 mg, Oral, Daily    Followed by  Kt Shaver ON 4/17/2021] multivitamin-minerals, 1 tablet, Oral, Daily  nicotine, 1 patch, Transdermal, Daily  pantoprazole, 40 mg, Oral, Early Morning  remdesivir, 100 mg, Intravenous, Q24H      Continuous IV Infusions:     PRN Meds:  aluminum-magnesium hydroxide-simethicone, 30 mL, Oral, Q6H PRN  calcium carbonate, 1,000 mg, Oral, Daily PRN  HYDROmorphone, 0 2 mg, Intravenous, Q4H PRN  methocarbamol, 250 mg, Oral, Q8H PRN  ondansetron, 4 mg, Intravenous, Q6H PRN  oxyCODONE, 2 5 mg, Oral, Q4H PRN  oxyCODONE, 5 mg, Oral, Q4H PRN  polyethylene glycol, 17 g, Oral, Daily PRN        Discharge Plan: d    Network Utilization Review Department  ATTENTION: Please call with any questions or concerns to [phone] and carefully listen to the prompts so that you are directed to the right person  All voicemails are confidential   Isabel Wallace all requests for admission clinical reviews, approved or denied determinations and any other requests to dedicated fax number below belonging to the campus where the patient is receiving treatment   List of dedicated fax numbers for the Facilities:  [address] DENIALS (Administrative/Medical Necessity) [phone]   [address] (Maternity/NICU/Pediatrics) [phone]   [address] Haley Toussaint 5033 (  Keara Melgar "Angie" 103) [id number] [place] [address] [address] [facility]) [address] [place] [address] [phone]

## 2021-04-13 NOTE — PHYSICAL THERAPY NOTE
PT TREATMENT     04/13/21 0915   PT Last Visit   PT Visit Date 04/13/21   Note Type   Note Type Treatment   Pain Assessment   Pain Assessment Tool Aparicio-Baker FACES   Aparicio-Baker FACES Pain Rating 2   Pain Location/Orientation Orientation: Right;Location: Hip;Location: Leg   Restrictions/Precautions   RLE Weight Bearing Per Order WBAT   Other Precautions Fall Risk;Pain;Bed Alarm; Chair Alarm;O2; Airborne/isolation;Contact/isolation   General   Chart Reviewed Yes   Subjective   Subjective "The pain is not bad at all"   Bed Mobility   Supine to Sit 4  Minimal assistance   Additional items Assist x 1;Verbal cues   Additional Comments Patient reports minimal dizziness upon for sitting at the edge of the bed but cleared after 2 minutes   Transfers   Sit to Stand 4  Minimal assistance   Additional items Assist x 1;Verbal cues   Stand to Sit 4  Minimal assistance   Additional items Assist x 1;Verbal cues   Ambulation/Elevation   Gait pattern Decreased R stance; Antalgic; Step to   Gait Assistance 4  Minimal assist   Additional items Assist x 1;Verbal cues; Tactile cues   Assistive Device Rolling walker   Distance 25 ft with change in direction; patient sat out of bed in chair with all needs in reach, a chair alarm, and breakfast set up for patient  Balance   Static Sitting Good   Static Standing Fair   Dynamic Standing Fair -   Ambulatory Fair -   Activity Tolerance   Activity Tolerance Patient limited by fatigue;Patient limited by pain   Exercises   Quad Sets 10 reps;Right;Supine   Heelslides AAROM;10 reps;Right;Supine   Hip Abduction AAROM;10 reps;Right;Supine   Knee AROM Short Arc Quad AAROM;10 reps;Right;Supine   Ankle Pumps 10 reps;Right;Supine   Assessment   Assessment Patient is noted with good tolerance to right lower extremity exercises  Patient is making steady progress with bed mobility, transfers, and ambulation with a roller walker       The patient's AM-PAC Basic Mobility Inpatient Short Form Raw Score is 17, Standardized Score is 39 67  A standardized score less than 42 9 suggests the patient may benefit from discharge to post-acute rehabilitation services  Please also refer to the recommendation of the Physical Therapist for safe discharge planning  Plan   Treatment/Interventions ADL retraining;Functional transfer training;LE strengthening/ROM; Therapeutic exercise; Endurance training;Patient/family training;Equipment eval/education; Bed mobility;Gait training; Compensatory technique education   PT Frequency Once a day   Recommendation   PT Discharge Recommendation Post acute rehabilitation services   AM-PAC Basic Mobility Inpatient   Turning in Bed Without Bedrails 3   Lying on Back to Sitting on Edge of Flat Bed 3   Moving Bed to Chair 3   Standing Up From Chair 3   Walk in Room 3   Climb 3-5 Stairs 2   Basic Mobility Inpatient Raw Score 17   Basic Mobility Standardized Score 64 46   Licensure   [place] License Number  [facility] [id number]

## 2021-04-14 VITALS
TEMPERATURE: 98.2 F | HEIGHT: 61 IN | SYSTOLIC BLOOD PRESSURE: 129 MMHG | HEART RATE: 93 BPM | OXYGEN SATURATION: 96 % | WEIGHT: 98.77 LBS | DIASTOLIC BLOOD PRESSURE: 82 MMHG | BODY MASS INDEX: 18.65 KG/M2 | RESPIRATION RATE: 16 BRPM

## 2021-04-14 LAB
ALBUMIN SERPL BCP-MCNC: 2.2 G/DL (ref 3.5–5)
ALP SERPL-CCNC: 44 U/L (ref 46–116)
ALT SERPL W P-5'-P-CCNC: 23 U/L (ref 12–78)
ANION GAP SERPL CALCULATED.3IONS-SCNC: 6 MMOL/L (ref 4–13)
AST SERPL W P-5'-P-CCNC: 31 U/L (ref 5–45)
ATRIAL RATE: 92 BPM
BILIRUB SERPL-MCNC: 0.4 MG/DL (ref 0.2–1)
BUN SERPL-MCNC: 22 MG/DL (ref 5–25)
CALCIUM ALBUM COR SERPL-MCNC: 9.1 MG/DL (ref 8.3–10.1)
CALCIUM SERPL-MCNC: 7.7 MG/DL (ref 8.3–10.1)
CHLORIDE SERPL-SCNC: 104 MMOL/L (ref 100–108)
CO2 SERPL-SCNC: 29 MMOL/L (ref 21–32)
CREAT SERPL-MCNC: 0.5 MG/DL (ref 0.6–1.3)
CRP SERPL QL: 52.3 MG/L
D DIMER PPP FEU-MCNC: 1.14 UG/ML FEU
DME PARACHUTE DELIVERY DATE REQUESTED: NORMAL
DME PARACHUTE ITEM DESCRIPTION: NORMAL
DME PARACHUTE ORDER STATUS: NORMAL
DME PARACHUTE SUPPLIER NAME: NORMAL
DME PARACHUTE SUPPLIER PHONE: NORMAL
ERYTHROCYTE [DISTWIDTH] IN BLOOD BY AUTOMATED COUNT: 12.9 % (ref 11.6–15.1)
FERRITIN SERPL-MCNC: 163 NG/ML (ref 8–388)
GFR SERPL CREATININE-BSD FRML MDRD: 94 ML/MIN/1.73SQ M
GLUCOSE SERPL-MCNC: 125 MG/DL (ref 65–140)
HCT VFR BLD AUTO: 26.3 % (ref 34.8–46.1)
HGB BLD-MCNC: 8.6 G/DL (ref 11.5–15.4)
MAGNESIUM SERPL-MCNC: 2.1 MG/DL (ref 1.6–2.6)
MCH RBC QN AUTO: 34.1 PG (ref 26.8–34.3)
MCHC RBC AUTO-ENTMCNC: 32.7 G/DL (ref 31.4–37.4)
MCV RBC AUTO: 104 FL (ref 82–98)
P AXIS: 78 DEGREES
PLATELET # BLD AUTO: 225 THOUSANDS/UL (ref 149–390)
PMV BLD AUTO: 9.2 FL (ref 8.9–12.7)
POTASSIUM SERPL-SCNC: 4 MMOL/L (ref 3.5–5.3)
PR INTERVAL: 146 MS
PROT SERPL-MCNC: 5.7 G/DL (ref 6.4–8.2)
QRS AXIS: 64 DEGREES
QRSD INTERVAL: 82 MS
QT INTERVAL: 376 MS
QTC INTERVAL: 464 MS
RBC # BLD AUTO: 2.52 MILLION/UL (ref 3.81–5.12)
SODIUM SERPL-SCNC: 139 MMOL/L (ref 136–145)
T WAVE AXIS: 83 DEGREES
VENTRICULAR RATE: 92 BPM
WBC # BLD AUTO: 11.91 THOUSAND/UL (ref 4.31–10.16)

## 2021-04-14 PROCEDURE — 86140 C-REACTIVE PROTEIN: CPT | Performed by: NURSE PRACTITIONER

## 2021-04-14 PROCEDURE — 93010 ELECTROCARDIOGRAM REPORT: CPT | Performed by: INTERNAL MEDICINE

## 2021-04-14 PROCEDURE — 99239 HOSP IP/OBS DSCHRG MGMT >30: CPT | Performed by: NURSE PRACTITIONER

## 2021-04-14 PROCEDURE — 80053 COMPREHEN METABOLIC PANEL: CPT | Performed by: NURSE PRACTITIONER

## 2021-04-14 PROCEDURE — 82728 ASSAY OF FERRITIN: CPT | Performed by: NURSE PRACTITIONER

## 2021-04-14 PROCEDURE — 85379 FIBRIN DEGRADATION QUANT: CPT | Performed by: NURSE PRACTITIONER

## 2021-04-14 PROCEDURE — 97530 THERAPEUTIC ACTIVITIES: CPT

## 2021-04-14 PROCEDURE — 83735 ASSAY OF MAGNESIUM: CPT | Performed by: NURSE PRACTITIONER

## 2021-04-14 PROCEDURE — 97116 GAIT TRAINING THERAPY: CPT

## 2021-04-14 PROCEDURE — 85027 COMPLETE CBC AUTOMATED: CPT | Performed by: NURSE PRACTITIONER

## 2021-04-14 RX ORDER — OXYCODONE HYDROCHLORIDE 5 MG/1
TABLET ORAL
Qty: 8 TABLET | Refills: 0 | Status: SHIPPED | OUTPATIENT
Start: 2021-04-14 | End: 2021-07-01 | Stop reason: ALTCHOICE

## 2021-04-14 RX ORDER — MELATONIN
2000 DAILY
Qty: 60 TABLET | Refills: 0 | Status: SHIPPED | OUTPATIENT
Start: 2021-04-15 | End: 2022-07-05

## 2021-04-14 RX ORDER — DEXAMETHASONE SODIUM PHOSPHATE 4 MG/ML
6 INJECTION, SOLUTION INTRA-ARTICULAR; INTRALESIONAL; INTRAMUSCULAR; INTRAVENOUS; SOFT TISSUE ONCE
Status: COMPLETED | OUTPATIENT
Start: 2021-04-14 | End: 2021-04-14

## 2021-04-14 RX ORDER — DOCUSATE SODIUM 100 MG/1
100 CAPSULE, LIQUID FILLED ORAL 2 TIMES DAILY
Qty: 10 CAPSULE | Refills: 0
Start: 2021-04-14 | End: 2021-05-21

## 2021-04-14 RX ORDER — FAMOTIDINE 20 MG/1
20 TABLET, FILM COATED ORAL DAILY
Status: DISCONTINUED | OUTPATIENT
Start: 2021-04-15 | End: 2021-04-14 | Stop reason: HOSPADM

## 2021-04-14 RX ORDER — FAMOTIDINE 20 MG/1
20 TABLET, FILM COATED ORAL DAILY
Qty: 10 TABLET | Refills: 0 | Status: SHIPPED | OUTPATIENT
Start: 2021-04-15 | End: 2022-04-01

## 2021-04-14 RX ORDER — ACETAMINOPHEN 325 MG/1
975 TABLET ORAL EVERY 8 HOURS SCHEDULED
Qty: 27 TABLET | Refills: 0
Start: 2021-04-14 | End: 2021-04-17

## 2021-04-14 RX ORDER — GABAPENTIN 100 MG/1
200 CAPSULE ORAL 2 TIMES DAILY
Qty: 28 CAPSULE | Refills: 0 | Status: SHIPPED | OUTPATIENT
Start: 2021-04-14 | End: 2022-04-01

## 2021-04-14 RX ORDER — NICOTINE 21 MG/24HR
1 PATCH, TRANSDERMAL 24 HOURS TRANSDERMAL DAILY
Qty: 28 PATCH | Refills: 0 | Status: SHIPPED | OUTPATIENT
Start: 2021-04-15 | End: 2021-07-01

## 2021-04-14 RX ORDER — ZINC SULFATE 50(220)MG
220 CAPSULE ORAL DAILY
Qty: 2 CAPSULE | Refills: 0 | Status: SHIPPED | OUTPATIENT
Start: 2021-04-15 | End: 2021-07-01 | Stop reason: ALTCHOICE

## 2021-04-14 RX ORDER — PREDNISONE 10 MG/1
TABLET ORAL
Qty: 20 TABLET | Refills: 0 | Status: SHIPPED | OUTPATIENT
Start: 2021-04-15 | End: 2021-04-23

## 2021-04-14 RX ADMIN — Medication 2000 UNITS: at 10:08

## 2021-04-14 RX ADMIN — ACETAMINOPHEN 975 MG: 325 TABLET, FILM COATED ORAL at 07:04

## 2021-04-14 RX ADMIN — ENOXAPARIN SODIUM 30 MG: 30 INJECTION SUBCUTANEOUS at 10:09

## 2021-04-14 RX ADMIN — DOCUSATE SODIUM 100 MG: 100 CAPSULE, LIQUID FILLED ORAL at 10:08

## 2021-04-14 RX ADMIN — NICOTINE 1 PATCH: 14 PATCH, EXTENDED RELEASE TRANSDERMAL at 10:07

## 2021-04-14 RX ADMIN — ZINC SULFATE 220 MG (50 MG) CAPSULE 220 MG: CAPSULE at 10:09

## 2021-04-14 RX ADMIN — ENOXAPARIN SODIUM 30 MG: 30 INJECTION SUBCUTANEOUS at 17:19

## 2021-04-14 RX ADMIN — OXYCODONE HYDROCHLORIDE AND ACETAMINOPHEN 1000 MG: 500 TABLET ORAL at 10:08

## 2021-04-14 RX ADMIN — PANTOPRAZOLE SODIUM 40 MG: 40 TABLET, DELAYED RELEASE ORAL at 07:04

## 2021-04-14 RX ADMIN — ACETAMINOPHEN 975 MG: 325 TABLET, FILM COATED ORAL at 15:50

## 2021-04-14 RX ADMIN — DEXAMETHASONE SODIUM PHOSPHATE 6 MG: 4 INJECTION, SOLUTION INTRAMUSCULAR; INTRAVENOUS at 17:19

## 2021-04-14 RX ADMIN — GABAPENTIN 200 MG: 100 CAPSULE ORAL at 10:07

## 2021-04-14 NOTE — ASSESSMENT & PLAN NOTE
Imaging reveals closed displaced right subcapital femoral neck fracture  Presented after mechanical fall while getting out of her car   Appreciate ortho, POD# 4 Left PRISCILA 4/9  Pain control  PT/OT rec post acute rehab  Family declined  Wants to go home with VNA     Weight-bearing as tolerated  DVT ppx   Monitor H&H, hgb 12 --> 9's->8 6 today

## 2021-04-14 NOTE — PROGRESS NOTES
Callum 44  Progress Note Ana Christina 1945, 67 y o  female MRN: [id number]  Unit/Bed#: [address] [place] [address] Encounter: [id number]  Primary Care Provider: No primary care provider on file  Date and time admitted to hospital: 4/9/2021 10:13 AM    * Closed displaced fracture of right femoral neck [facility])  Assessment & Plan  Imaging reveals closed displaced right subcapital femoral neck fracture  Presented after mechanical fall while getting out of her car   Appreciate ortho, POD# 4 Left PRISCILA 4/9  Pain control  PT/OT rec post acute rehab  Family declined  Wants to go home with VNA  Weight-bearing as tolerated  DVT ppx   Monitor H&H, hgb 12 --> 9's->8 6 today    COVID-19 virus infection  Assessment & Plan  Incidentally positive on screening in ER prior to OR   Patient denies cough, SOB currently  Patient is on O2 3L post op, weaned to room air today,satting mid 90's  CXR yesterday showed - Nothing to suggest pneumonia  Mild septal thickening which could be due to minimal interstitial edema or minimal fibrosis  Cardiac markers:  Troponin normal   , proBNP normal   Trend inflammatory markers:  CRP trending up,stable today, D-dimer trending down, ferritin normal   Procalcitonin x2 negative  Continue vitamin-C, vitamin-D, zinc, followed by MVI  Started dexamethasone and remdesivir per protocol  Incentive spirometer   CBC, CMP in a tex  Logan Sebastiany Results from last 7 days   Lab Units 04/09/21  1137   SARS-COV-2  Positive*     Results from last 7 days   Lab Units 04/13/21  0559 04/12/21  0632 04/11/21  0528 04/10/21  0513   CRP mg/L 106 5* 106 6* 64 2* 12 8*   FERRITIN ng/mL 141 138  --  118   D-DIMER QUANTITATIVE ug/ml FEU 1 35*  --  2 29* 11 96*      Results from last 7 days   Lab Units 04/11/21  0528 04/10/21  0513   PROCALCITONIN ng/ml 0 13 <0 05          Tachycardia  Assessment & Plan  Tachycardic HR in low 100s for past 2 days    Patient asx  NSR on admission  EKG showed SR with SA  Telemetry  Optimize electrolytes    Tobacco abuse  Assessment & Plan  Smoked 1/2ppd x 50 years (at least)   NRT   Encouraged cessation       VTE Pharmacologic Prophylaxis:   Pharmacologic: Enoxaparin (Lovenox)  Mechanical VTE Prophylaxis in Place: Yes    Patient Centered Rounds: I have performed bedside rounds with nursing staff today  Discussions with Specialists or Other Care Team Provider: yes    Education and Discussions with Family / Patient: yes    Time Spent for Care: 20 minutes  More than 50% of total time spent on counseling and coordination of care as described above  Current Length of Stay: 4 day(s)    Current Patient Status: Inpatient   Certification Statement: The patient will continue to require additional inpatient hospital stay due to COVID 19    Discharge Plan: Home with VNA once medically cleared  Code Status: Level 1 - Full Code      Subjective:   Denies SOB, cough, sore throat, runny nose, fever, chills  Denies chest pain, headache, dizziness, nausea vomiting  Reports appetite is okay  Objective:     Vitals:   Temp (24hrs), Av 6 °F (36 4 °C), Min:97 °F (36 1 °C), Max:98 5 °F (36 9 °C)    Temp:  [97 °F (36 1 °C)-98 5 °F (36 9 °C)] 97 °F (36 1 °C)  HR:  [59-93] 93  Resp:  [16-18] 18  BP: (115-130)/(64-69) 117/66  SpO2:  [96 %-100 %] 96 %  Body mass index is 18 66 kg/m²  Input and Output Summary (last 24 hours): Intake/Output Summary (Last 24 hours) at 2021  Last data filed at 2021 0600  Gross per 24 hour   Intake --   Output 250 ml   Net -250 ml       Physical Exam:     Physical Exam  Vitals signs and nursing note reviewed  Constitutional:       Appearance: She is well-developed  HENT:      Head: Normocephalic and atraumatic  Neck:      Musculoskeletal: Neck supple  Thyroid: No thyromegaly  Vascular: No JVD  Trachea: No tracheal deviation  Cardiovascular:      Rate and Rhythm: Tachycardia present  Rhythm irregular        Heart sounds: Normal heart sounds  Comments: HR  on bedside monitor  Pulmonary:      Effort: Pulmonary effort is normal  No respiratory distress  Breath sounds: No wheezing or rales  Comments: Breath sounds clear diminished bilateral, on room air, respirations easy  Abdominal:      General: Bowel sounds are normal  There is no distension  Palpations: Abdomen is soft  Tenderness: There is no abdominal tenderness  There is no guarding  Musculoskeletal:         General: Deformity present  Right lower leg: No edema  Left lower leg: No edema  Comments: Right hip dressing intact  Slight edema at surgical site  Skin:     General: Skin is warm and dry  Neurological:      General: No focal deficit present  Mental Status: She is alert and oriented to person, place, and time  Psychiatric:         Mood and Affect: Mood normal          Judgment: Judgment normal          Additional Data:     Labs:    Results from last 7 days   Lab Units 04/13/21  0559  04/09/21  1137   WBC Thousand/uL 8 44   < > 4 28*   HEMOGLOBIN g/dL 8 6*   < > 12 8   HEMATOCRIT % 27 6*   < > 38 9   PLATELETS Thousands/uL 166   < > 204   NEUTROS PCT %  --   --  70   LYMPHS PCT %  --   --  16   MONOS PCT %  --   --  12   EOS PCT %  --   --  0    < > = values in this interval not displayed  Results from last 7 days   Lab Units 04/13/21  0559   POTASSIUM mmol/L 4 3   CHLORIDE mmol/L 103   CO2 mmol/L 28   BUN mg/dL 19   CREATININE mg/dL 0 66   CALCIUM mg/dL 7 8*   ALK PHOS U/L 42*   ALT U/L 20   AST U/L 34     Results from last 7 days   Lab Units 04/09/21  1137   INR  1 10       * I Have Reviewed All Lab Data Listed Above  * Additional Pertinent Lab Tests Reviewed:  Steven 65 Admission Reviewed    Imaging:    Imaging Reports Reviewed Today Include:  Chest x-ray  Imaging Personally Reviewed by Myself Includes:  Chest X ray    Recent Cultures (last 7 days):           Last 24 Hours Medication List:   Current Facility-Administered Medications   Medication Dose Route Frequency Provider Last Rate    acetaminophen  975 mg Oral Q8H [address] Fadi CrumrenSHANTHI eisenberg      aluminum-magnesium hydroxide-simethicone  30 mL Oral Q6H PRN [facility], PA-NANCY      ascorbic acid  1,000 mg Oral Q12H [address] [place], [place]      calcium carbonate  1,000 mg Oral Daily PRN [facility], PA-NANCY      cholecalciferol  2,000 Units Oral Daily [facility], PA-NANCY      dexamethasone  6 mg Intravenous Daily BOBBY Samaniego      docusate sodium  100 mg Oral BID BOBBY Samaniego      enoxaparin  30 mg Subcutaneous Q12H [address] [place], [place]      gabapentin  200 mg Oral BID [facility], PA-NANCY      HYDROmorphone  0 2 mg Intravenous Q4H PRN [facility], PA-NANCY      methocarbamol  250 mg Oral Q8H PRN Laxmiermmerlin Lundberg, SHANTHI      zinc sulfate  220 mg Oral Daily [facility], SHANTHI      Followed by   [place] 4/17/2021] multivitamin-minerals  1 tablet Oral Daily Chad Guitar, PA-NANCY      nicotine  1 patch Transdermal Daily Chad itar, PA-C      ondansetron  4 mg Intravenous Q6H PRN Chad Guitar, PA-C      oxyCODONE  2 5 mg Oral Q4H PRN Chad Guitar, PA-C      oxyCODONE  5 mg Oral Q4H PRN Chad Guitar, PA-C      pantoprazole  40 mg Oral Early Morning Chad Guitar, PA-C      polyethylene glycol  17 g Oral Daily PRN Chad Guitar, PA-C      remdesivir  100 mg Intravenous Q24H BOBBY Lopes          Today, Patient Was Seen By: BOBBY Lopes    ** Please Note: Dragon 360 Dictation voice to text software may have been used in the creation of this document   **

## 2021-04-14 NOTE — ASSESSMENT & PLAN NOTE
Incidentally positive on screening in ER prior to OR   Patient denies cough, SOB currently  Patient is on O2 3L post op, weaned to room air today,satting mid 90's  CXR yesterday showed - Nothing to suggest pneumonia  Mild septal thickening which could be due to minimal interstitial edema or minimal fibrosis  Cardiac markers:  Troponin normal   , proBNP normal   Trend inflammatory markers:  CRP trending up,stable today, D-dimer trending down, ferritin normal   Procalcitonin x2 negative  Continue vitamin-C, vitamin-D, zinc, followed by MVI  Started dexamethasone and remdesivir per protocol  Incentive spirometer   CBC, CMP in a tex [place]       Results from last 7 days   Lab Units 04/09/21  1137   SARS-COV-2  Positive*     Results from last 7 days   Lab Units 04/13/21  0559 04/12/21  0632 04/11/21  0528 04/10/21  0513   CRP mg/L 106 5* 106 6* 64 2* 12 8*   FERRITIN ng/mL 141 138  --  118   D-DIMER QUANTITATIVE ug/ml FEU 1 35*  --  2 29* 11 96*      Results from last 7 days   Lab Units 04/11/21  0528 04/10/21  0513   PROCALCITONIN ng/ml 0 13 <0 05

## 2021-04-14 NOTE — ASSESSMENT & PLAN NOTE
Tachycardic HR in low 100s in past few days  Patient asx  NSR on admission  EKG showed SR with SA  Telemetry showed sinus arrhythmia,80-90's  Optimize electrolytes  No treatment required

## 2021-04-14 NOTE — NURSING NOTE
Patient and patients daughter and son stated understanding of discharge instructions and medications  Patient left [id number] [facility] in a wheelchair accompanied by her nurse

## 2021-04-14 NOTE — CASE MANAGEMENT
Referral sent to [facility] for a rolling walker  SW provided RN with walker to deliver to bedside as patient is +COVID

## 2021-04-14 NOTE — PLAN OF CARE
Problem: PAIN - ADULT  Goal: Verbalizes/displays adequate comfort level or baseline comfort level  Description: Interventions:  - Encourage patient to monitor pain and request assistance  - Assess pain using appropriate pain scale  - Administer analgesics based on type and severity of pain and evaluate response  - Implement non-pharmacological measures as appropriate and evaluate response  - Consider cultural and social influences on pain and pain management  - Notify physician/advanced practitioner if interventions unsuccessful or patient reports new pain  Outcome: Progressing     Problem: INFECTION - ADULT  Goal: Absence or prevention of progression during hospitalization  Description: INTERVENTIONS:  - Assess and monitor for signs and symptoms of infection  - Monitor lab/diagnostic results  - Monitor all insertion sites, i e  indwelling lines, tubes, and drains  - Monitor endotracheal if appropriate and nasal secretions for changes in amount and color  - [place] appropriate cooling/warming therapies per order  - Administer medications as ordered  - Instruct and encourage patient and family to use good hand hygiene technique  - Identify and instruct in appropriate isolation precautions for identified infection/condition  Outcome: Progressing  Goal: Absence of fever/infection during neutropenic period  Description: INTERVENTIONS:  - Monitor WBC    Outcome: Progressing     Problem: SAFETY ADULT  Goal: Patient will remain free of falls  Description: INTERVENTIONS:  - Assess patient frequently for physical needs  -  Identify cognitive and physical deficits and behaviors that affect risk of falls    -  [place] fall precautions as indicated by assessment   - Educate patient/family on patient safety including physical limitations  - Instruct patient to call for assistance with activity based on assessment  - Modify environment to reduce risk of injury  - Consider OT/PT consult to assist with strengthening/mobility  Outcome: Progressing  Goal: Maintain or return to baseline ADL function  Description: INTERVENTIONS:  -  Assess patient's ability to carry out ADLs; assess patient's baseline for ADL function and identify physical deficits which impact ability to perform ADLs (bathing, care of mouth/teeth, toileting, grooming, dressing, etc )  - Assess/evaluate cause of self-care deficits   - Assess range of motion  - Assess patient's mobility; develop plan if impaired  - Assess patient's need for assistive devices and provide as appropriate  - Encourage maximum independence but intervene and supervise when necessary  - Involve family in performance of ADLs  - Assess for home care needs following discharge   - Consider OT consult to assist with ADL evaluation and planning for discharge  - Provide patient education as appropriate  Outcome: Progressing  Goal: Maintain or return mobility status to optimal level  Description: INTERVENTIONS:  - Assess patient's baseline mobility status (ambulation, transfers, stairs, etc )    - Identify cognitive and physical deficits and behaviors that affect mobility  - Identify mobility aids required to assist with transfers and/or ambulation (gait belt, sit-to-stand, lift, walker, cane, etc )  - [place] fall precautions as indicated by assessment  - Record patient progress and toleration of activity level on Mobility SBAR; progress patient to next Phase/Stage  - Instruct patient to call for assistance with activity based on assessment  - Consider rehabilitation consult to assist with strengthening/weightbearing, etc   Outcome: Progressing     Problem: DISCHARGE PLANNING  Goal: Discharge to home or other facility with appropriate resources  Description: INTERVENTIONS:  - Identify barriers to discharge w/patient and caregiver  - Arrange for needed discharge resources and transportation as appropriate  - Identify discharge learning needs (meds, wound care, etc )  - Arrange for interpretive services to assist at discharge as needed  - Refer to Case Management Department for coordinating discharge planning if the patient needs post-hospital services based on physician/advanced practitioner order or complex needs related to functional status, cognitive ability, or social support system  Outcome: Progressing     Problem: Knowledge Deficit  Goal: Patient/family/caregiver demonstrates understanding of disease process, treatment plan, medications, and discharge instructions  Description: Complete learning assessment and assess knowledge base  Interventions:  - Provide teaching at level of understanding  - Provide teaching via preferred learning methods  Outcome: Progressing     Problem: Nutrition/Hydration-ADULT  Goal: Nutrient/Hydration intake appropriate for improving, restoring or maintaining nutritional needs  Description: Monitor and assess patient's nutrition/hydration status for malnutrition  Collaborate with interdisciplinary team and initiate plan and interventions as ordered  Monitor patient's weight and dietary intake as ordered or per policy  Utilize nutrition screening tool and intervene as necessary  Determine patient's food preferences and provide high-protein, high-caloric foods as appropriate       INTERVENTIONS:  - Monitor oral intake, urinary output, labs, and treatment plans  - Assess nutrition and hydration status and recommend course of action  - Evaluate amount of meals eaten  - Assist patient with eating if necessary   - Allow adequate time for meals  - Recommend/ encourage appropriate diets, oral nutritional supplements, and vitamin/mineral supplements  - Order, calculate, and assess calorie counts as needed  - Recommend, monitor, and adjust tube feedings and TPN/PPN based on assessed needs  - Assess need for intravenous fluids  - Provide specific nutrition/hydration education as appropriate  - Include patient/family/caregiver in decisions related to nutrition  Outcome: Progressing     Problem: Prexisting or High Potential for Compromised Skin Integrity  Goal: Skin integrity is maintained or improved  Description: INTERVENTIONS:  - Identify patients at risk for skin breakdown  - Assess and monitor skin integrity  - Assess and monitor nutrition and hydration status  - Monitor labs   - Assess for incontinence   - Turn and reposition patient  - Assist with mobility/ambulation  - Relieve pressure over bony prominences  - Avoid friction and shearing  - Provide appropriate hygiene as needed including keeping skin clean and dry  - Evaluate need for skin moisturizer/barrier cream  - Collaborate with interdisciplinary team   - Patient/family teaching  - Consider wound care consult   Outcome: Progressing     Problem: Potential for Falls  Goal: Patient will remain free of falls  Description: INTERVENTIONS:  - Assess patient frequently for physical needs  -  Identify cognitive and physical deficits and behaviors that affect risk of falls    -  [place] fall precautions as indicated by assessment   - Educate patient/family on patient safety including physical limitations  - Instruct patient to call for assistance with activity based on assessment  - Modify environment to reduce risk of injury  - Consider OT/PT consult to assist with strengthening/mobility  Outcome: Progressing

## 2021-04-14 NOTE — ASSESSMENT & PLAN NOTE
Imaging reveals closed displaced right subcapital femoral neck fracture  Presented after mechanical fall while getting out of her car   Appreciate ortho, POD# 5 Left PRISCILA 4/9  Pain control  PT/OT rec post acute rehab  Family declined  Wants to go home with VNA  Weight-bearing as tolerated  DVT ppx with Lovenox 40mg subcu daily for 6 weeks total postop  Patient will require 37 doses on discharge  Mepilex dressing can stay in place for 7 days,then DSD daily  Monitor H&H, hgb 12 --> 9's->8 6->8 6 today  Discharge patient home today with VNA  Follow-up orthopedic surgery in 2 weeks    Repeat CBC, BMP, Mag in 1 week

## 2021-04-14 NOTE — DISCHARGE INSTR - AVS FIRST PAGE
Right hip dressing may remain in place for 7 days till 4/16,then cover with DSD daily  Weight-bearing as tolerated right lower extremity  Continue Lovenox injection 40 mg subcutaneously daily x 37 doses  Start prednisone taper 40mg p o  Daily for 2 days,then 30 mg p o  Daily for 2 days, then 20 mg p o  Daily for 2 days, then 10 mg p o  Daily for 2 days  Take with breakfast   No concentrated sweets while on steroids  Take Pepcid to protect your stomach while on steroids  Continue incentive spirometer at home  Resume multivitamin after 2 more days of vitamin-C, zinc     Follow-up orthopedic surgery in 2 weeks post op on 4/23  Follow-up PCP in 3 days to follow up on COVID-19 infection

## 2021-04-14 NOTE — PHYSICAL THERAPY NOTE
PT TREATMENT     04/14/21 1100   PT Last Visit   PT Visit Date 04/14/21   Note Type   Note Type Treatment   Pain Assessment   Pain Assessment Tool Pain Assessment not indicated - pt denies pain   Restrictions/Precautions   RLE Weight Bearing Per Order WBAT   Other Precautions Fall Risk;Bed Alarm; Chair Alarm   General   Chart Reviewed Yes   Subjective   Subjective "I really do not have any pain"   Transfers   Sit to Stand   (S/I)   Additional items Verbal cues   Stand to Sit   (S/I)   Additional items Verbal cues   Toilet transfer   (S/I to commode;pt is I w hygiene)   Ambulation/Elevation   Gait pattern Narrow RAJ   Gait Assistance   (S/I)   Additional items Assist x 1;Verbal cues; Tactile cues   Assistive Device Rolling walker   Distance 30 ft x 2 with multiple change in direction in patient's room; emphasis on step through gait with continual motion  Balance   Static Sitting Good   Static Standing Fair +   Dynamic Standing Fair   Ambulatory   (F-/F)   Activity Tolerance   Activity Tolerance Patient limited by fatigue   Assessment   Assessment Patient is making steady progress with transfers, ambulation with a roller walker, gait endurance, and ADLs  Patient is safe for discharge home with family, home PT, and continued ambulation with a roller walker which is a change from her original plan of STR  Family and CM aware  The patient's AM-PAC Basic Mobility Inpatient Short Form Raw Score is 18, Standardized Score is 41 05  A standardized score less than 42 9 suggests the patient may benefit from discharge to post-acute rehabilitation services  Please also refer to the recommendation of the Physical Therapist for safe discharge planning  Despite ampac score, patient is safe for discharge home with family, home PT, and continued ambulation with a roller walker  Plan   Treatment/Interventions ADL retraining;Functional transfer training;LE strengthening/ROM; Elevations; Therapeutic exercise; Endurance training;Patient/family training;Equipment eval/education; Bed mobility;Gait training; Compensatory technique education   PT Frequency Once a day   Recommendation   PT Discharge Recommendation Home with home health rehabilitation   Additional Comments Patient stood at sink to wash/dry her hands and face and to brush her teeth  Additional Comments 2 This PT spoke with patient's daughter Efrain Ahumada via telephone to instruct patient's daughter how to have the patient safely navigate her stairs when she gets home  Patient's daughter with good verbal understanding of how to navigate the stairs to assist her mom when she is home     AM-PAC Basic Mobility Inpatient   Turning in Bed Without Bedrails 3   Lying on Back to Sitting on Edge of Flat Bed 3   Moving Bed to Chair 3   Standing Up From Chair 3   Walk in Room 3   Climb 3-5 Stairs 3   Basic Mobility Inpatient Raw Score 18   Basic Mobility Standardized Score 12 42   Licensure   [place] License Number  Hernandez Flores PT [id number]

## 2021-04-14 NOTE — ASSESSMENT & PLAN NOTE
Incidentally positive on screening in ER prior to OR   Patient denies cough, SOB currently  Patient is on O2 3L post op, weaned to room air today,satting mid 90's  CXR 4/12 showed - Nothing to suggest pneumonia  Mild septal thickening which could be due to minimal interstitial edema or minimal fibrosis  Cardiac markers:  Troponin normal   , proBNP normal   Trend inflammatory markers:  CRP trending down, D-dimer trending down, ferritin normal   Procalcitonin x2 negative  Continue vitamin-C, vitamin-D, zinc, followed by MVI  Started dexamethasone and remdesivir per protocol  Incentive spirometer  Patient denies SOB, cough, runny nose, sore throat  Satting well on room air  CRP and D-dimer trending down  Discussed with attending, marquita to LUÍS patient home today with prednisone taper  Continue 2 more days of vitamin-C, zinc, followed by MVI  Patient does not require anticoagulation on discharge for COVID-19 per protocol  Will place patient on Lovenox 40 mg subcu daily for 6 weeks total for DVT prophylaxis as in # 1 per discussion with Orthopedic surgery  Follow-up PCP in 3 days         Results from last 7 days   Lab Units 04/09/21  1137   SARS-COV-2  Positive*     Results from last 7 days   Lab Units 04/14/21  0702 04/14/21  0701 04/13/21  0559 04/12/21  9602 04/11/21  0528 04/10/21  0513   CRP mg/L  --  52 3* 106 5* 106 6* 64 2* 12 8*   FERRITIN ng/mL 163  --  141 138  --  118   D-DIMER QUANTITATIVE ug/ml FEU  --  1 14* 1 35*  --  2 29* 11 96*      Results from last 7 days   Lab Units 04/11/21  0528 04/10/21  0513   PROCALCITONIN ng/ml 0 13 <0 05

## 2021-04-14 NOTE — ASSESSMENT & PLAN NOTE
Tachycardic HR in low 100s for past 2 days    Patient asx  NSR on admission  EKG showed SR with SA  Telemetry  Optimize electrolytes

## 2021-04-14 NOTE — DISCHARGE SUMMARY
Callum 44  Discharge- Laila Neves 1945, 67 y o  female MRN: [id number]  Unit/Bed#: [address] [place] [address] Encounter: [id number]  Primary Care Provider: No primary care provider on file  Date and time admitted to hospital: 4/9/2021 10:13 AM    * Closed displaced fracture of right femoral neck [facility])  Assessment & Plan  Imaging reveals closed displaced right subcapital femoral neck fracture  Presented after mechanical fall while getting out of her car   Appreciate ortho, POD# 5 Left PRISCILA 4/9  Pain control  PT/OT rec post acute rehab  Family declined  Wants to go home with VNA  Weight-bearing as tolerated  DVT ppx with Lovenox 40mg subcu daily for 6 weeks total postop  Patient will require 37 doses on discharge  Mepilex dressing can stay in place for 7 days,then DSD daily  Monitor H&H, hgb 12 --> 9's->8 6->8 6 today  Discharge patient home today with VNA  Follow-up orthopedic surgery in 2 weeks  Repeat CBC, BMP, Mag in 1 week        COVID-19 virus infection  Assessment & Plan  Incidentally positive on screening in ER prior to OR   Patient denies cough, SOB currently  Patient is on O2 3L post op, weaned to room air today,satting mid 90's  CXR 4/12 showed - Nothing to suggest pneumonia  Mild septal thickening which could be due to minimal interstitial edema or minimal fibrosis  Cardiac markers:  Troponin normal   , proBNP normal   Trend inflammatory markers:  CRP trending down, D-dimer trending down, ferritin normal   Procalcitonin x2 negative  Continue vitamin-C, vitamin-D, zinc, followed by MVI  Started dexamethasone and remdesivir per protocol  Incentive spirometer  Patient denies SOB, cough, runny nose, sore throat  Satting well on room air  CRP and D-dimer trending down  Discussed with attending, marquita to LUÍS patient home today with prednisone taper  Continue 2 more days of vitamin-C, zinc, followed by MVI      Patient does not require anticoagulation on discharge for COVID-19 per protocol  Will place patient on Lovenox 40 mg subcu daily for 6 weeks total for DVT prophylaxis as in # 1 per discussion with Orthopedic surgery  Follow-up PCP in 3 days  Results from last 7 days   Lab Units 04/09/21  1137   SARS-COV-2  Positive*     Results from last 7 days   Lab Units 04/14/21  0702 04/14/21  0701 04/13/21  0559 04/12/21  5902 04/11/21  0528 04/10/21  0513   CRP mg/L  --  52 3* 106 5* 106 6* 64 2* 12 8*   FERRITIN ng/mL 163  --  141 138  --  118   D-DIMER QUANTITATIVE ug/ml FEU  --  1 14* 1 35*  --  2 29* 11 96*      Results from last 7 days   Lab Units 04/11/21  0528 04/10/21  0513   PROCALCITONIN ng/ml 0 13 <0 05          Tachycardia  Assessment & Plan  Tachycardic HR in low 100s in past few days  Patient asx  NSR on admission  EKG showed SR with SA  Telemetry showed sinus arrhythmia,80-90's  Optimize electrolytes  No treatment required  Tobacco abuse  Assessment & Plan  Smoked 1/2ppd x 50 years (at least)   NRT   Encouraged cessation       Discharging Physician / Practitioner: Yulisa Taylor, [address]  PCP: No primary care provider on file  Admission Date: 4/9/2021  Discharge Date: 04/14/21    Reason for Admission: Fall (Pt reports getting out of car and reports walking to biulding when pt fell and now has hip pain to right hip    Right leg appears shortened and externallyn roatated upon arrival via ambulance  )        Resolved Problems  Date Reviewed: 4/14/2021    None          Consultations During Hospital Stay:  IP CONSULT TO ORTHOPEDIC SURGERY    Procedures Performed:     · Right anterior total hip arthroplasty on 4/9    Significant Findings / Test Results:     · As below  Results from last 7 days   Lab Units 04/14/21  0701 04/13/21  0559 04/12/21  0632   WBC Thousand/uL 11 91* 8 44 6 50   HEMOGLOBIN g/dL 8 6* 8 6* 9 1*   PLATELETS Thousands/uL 225 166 137*     Results from last 7 days   Lab Units 04/14/21  0701 04/13/21  0559 04/12/21  0632   SODIUM mmol/L 139 137 139 POTASSIUM mmol/L 4 0 4 3 4 0   CHLORIDE mmol/L 104 103 103   CO2 mmol/L 29 28 31   BUN mg/dL 22 19 14   CREATININE mg/dL 0 50* 0 66 0 61   CALCIUM mg/dL 7 7* 7 8* 7 6*   TOTAL BILIRUBIN mg/dL 0 40 0 28  --    ALK PHOS U/L 44* 42*  --    ALT U/L 23 20  --    AST U/L 31 34  --      Results from last 7 days   Lab Units 04/09/21  1137   INR  1 10     Results from last 7 days   Lab Units 04/09/21  1137   TROPONIN I ng/mL <0 02     No results found for: HGBA1C      Results from last 7 days   Lab Units 04/11/21  0528 04/10/21  0513   PROCALCITONIN ng/ml 0 13 <0 05     Blood Culture: No results found for: BLOODCX  Urine Culture: No results found for: URINECX  Sputum Culture: No components found for: SPUTUMCX  Wound Culture: No results found for: WOUNDCULT     XR chest portable   Final Result by Sergio Boone MD (04/13 3537)      Nothing to suggest pneumonia  Mild septal thickening which could be due to minimal interstitial edema or minimal fibrosis  Workstation performed: [id number]         XR hip/pelv 1 vw right if performed   Final Result by Orly Calhoun MD (04/10 1041)      Fluoroscopic guidance provided for procedure guidance  Please refer to the separate procedure notes for additional details  Workstation performed: [id number]         XR hip/pelv 2-3 vws right if performed   Final Result by Mary Ellen Morel MD (04/09 1324)      Minimally displaced right subcapital femoral neck fracture with secondary varus deformity  Workstation performed: [id number]         XR elbow 3+ vw RIGHT   Final Result by Mary Ellen Morel MD (04/09 1325)      No acute osseous abnormality  Workstation performed: [id number]         XR chest 1 view   Final Result by Sergio Boone MD (04/09 1137)      No acute cardiopulmonary disease                    Workstation performed: [id number]         CT head without contrast   Final Result by Balwinder Lemus MD (04/09 1129)      No acute intracranial abnormality  Workstation performed: [id number]         CT spine cervical without contrast   Final Result by Austyn Morales MD (04/09 1127)      No cervical spine fracture or traumatic malalignment  Workstation performed: [id number]                Incidental Findings:   · None     Test Results Pending at Discharge (will require follow up): · None     Outpatient Tests Requested:  · BMP, CBC 1 week    Complications:  None    Reason for Admission:   Chief Complaint   Patient presents with    Fall     Pt reports getting out of car and reports walking to biulding when pt fell and now has hip pain to right hip  Right leg appears shortened and externallyn roatated upon arrival via ambulance  Hospital Course:     Per HPI: Annette Johnson is a 67 y o  female patient with a PMH of unremarkable who originally presented to the hospital on 4/9/2021 due to right femur neck fracture secondary to  fall  Patient underwent left anterior PRISCILA on 4/9  Preop screening showed positive COVID-19 infection  Patient was asymptomatic on admission  Patient required O2 3L postop  CRP trended up postop  D-dimer trended down  Decision was made to start patient on IV dexamethasone and remdesivir  CRP improved significantly after initiation of above treatment  Patient was weaned to room air yesterday  Chest x-ray showed no evidence of pneumonia  Procalcitonin x 2 negative  Patient was placed on tele for mild tachycardia, showed sinus arrhythmia, around 80 to 90s today  DC patient home today after discussion with attending  Recommend continue prednisone taper on discharge  Patient does not require anticoagulation on discharge per COVID protocol  Continue Lovenox, changed to 40 mg subcu daily for 6 weeks total postop per discussion with Orthopedic surgery  Follow-up orthopedic surgery in 2 weeks postop    Patient's daughter in-law states she knows how to give Lovenox injection at home  Spoke to patient's family on the phone, all questions answered  Hospital Course:    Please see above list of diagnoses and related plan for additional information  Condition at Discharge: good       Discharge Day Visit / Exam:     Subjective:  Patient denies right hip pain  Denies SOB, cough, runny nose, sore throat, fever, chills  Denies chest pain, headache, dizziness  Ready to go home  Vitals: Blood Pressure: 129/82 (04/14/21 0754)  Pulse: 93 (04/14/21 0754)  Temperature: 98 2 °F (36 8 °C) (04/14/21 0754)  Temp Source: Oral (04/13/21 1025)  Respirations: 16 (04/14/21 0754)  Height: 5' 1" (154 9 cm) (04/11/21 1437)  Weight - Scale: 44 8 kg (98 lb 12 3 oz) (04/12/21 0600)  SpO2: 96 % (04/14/21 0754)  Exam:   Physical Exam  Vitals signs and nursing note reviewed  Constitutional:       Appearance: She is well-developed  HENT:      Head: Normocephalic and atraumatic  Neck:      Musculoskeletal: Neck supple  Thyroid: No thyromegaly  Vascular: No JVD  Trachea: No tracheal deviation  Cardiovascular:      Rate and Rhythm: Normal rate  Rhythm irregular  Heart sounds: Normal heart sounds  Pulmonary:      Effort: Pulmonary effort is normal  No respiratory distress  Breath sounds: Normal breath sounds  No wheezing or rales  Abdominal:      General: Bowel sounds are normal  There is no distension  Palpations: Abdomen is soft  Tenderness: There is no abdominal tenderness  There is no guarding  Musculoskeletal:         General: Swelling present  No deformity  Right lower leg: No edema  Left lower leg: No edema  Comments: Right thigh swelling,dressing intact  Skin:     General: Skin is warm and dry  Neurological:      General: No focal deficit present  Mental Status: She is alert and oriented to person, place, and time     Psychiatric:         Mood and Affect: Mood normal          Judgment: Judgment normal  Discharge instructions/Information to patient and family:   See after visit summary for information provided to patient and family  Provisions for Follow-Up Care:  See after visit summary for information related to follow-up care and any pertinent home health orders  Disposition:     Home with VNA Services (Reminder: Complete face to face encounter)    Planned Readmission: no     Discharge Statement:  I spent 45 minutes discharging the patient  This time was spent on the day of discharge  I had direct contact with the patient on the day of discharge  Greater than 50% of the total time was spent examining patient, answering all patient questions, arranging and discussing plan of care with patient as well as directly providing post-discharge instructions  Additional time then spent on discharge activities  Discharge Medications:  See after visit summary for reconciled discharge medications provided to patient and family        ** Please Note: This note has been constructed using a voice recognition system **

## 2021-04-15 ENCOUNTER — DOCUMENTATION (OUTPATIENT)
Dept: INPATIENT UNIT | Facility: HOSPITAL | Age: 76
End: 2021-04-15

## 2021-04-15 NOTE — PLAN OF CARE

## 2021-04-15 NOTE — CASE MANAGEMENT
AMBROSIO received a phone call from family stating that the [facility] was unable to come out until 4/21 for a PT  AMBROSIO sent a referral for [facility] and spoke with [facility]  Nursing/PT/OT has been arranged [facility] tomorrow 4/16  AMBROSIO called son Lauro Amaya and made aware

## 2021-04-16 ENCOUNTER — PATIENT OUTREACH (OUTPATIENT)
Dept: CASE MANAGEMENT | Facility: HOSPITAL | Age: 76
End: 2021-04-16

## 2021-04-16 NOTE — PROGRESS NOTES
Inbasket notification received for patient enrolled in Medicare MJ replacement of LE Bundle; discharged home with [facility]  Chart review completed  Outside records through [facility] requested and refreshed  Patient hospitalized 4/9/2021 - 4/14/2021at [address] for Closed displaced fracture of right femoral neck  Patient presented after mechanical fall trying to get out of her car  Incidentally, patient tested positive for COVID from ED screening  Patient right hip surgically repaired 4/9  Patient refused STR recommendation and discharged home with VNA  Patient also without PCP as she reports not needing one because she is not sick  Patient with no significant past medical history  Patient is currently a smoker; 0 5 Pack / day x 50 years  See problem list for complete list of medical diagnosis  Review of IP CM note indicates patient lives with spouse in 2 story home  Patient reportedly independent with ADLs without use of DME and drove self prior to admission  RW delivered bedside ([facility]) prior to discharge  Future Appointments:  4/23   Ortho    Call placed to [facility] to confirm start of care  Patient opening today with VNA  CM contacted patient and spoke with son Lyntete Jorge to introduce self, explain the role of the OP CM and purpose of this phone call is to assess patient's general wellbeing or for any assistance needed with follow-up care  BPCI-A program explained  Son consented to future outreaches  Son informed CM that visiting nurse and therapist visited earlier today and therapy will come Mon Wed Fri and the visiting nurse once weekly on Mondays  Son denies that patient has any shortness of breath, extreme pain in the incision site or drainage  CM encouraged patient to report to doctor S&S of infection:  Fever, redness, swelling, bleeding, or drainage from incision site  Son verbalized understanding       Pan brewer CM that he lives less than a mile from patient and his sister lives about 10 or 15 minutes away  He or his sister Jhon Chu will provide transportation for patient until able to drive again  He states that they have dinner together every Tuesday and were surprised by the covid positive test result  Furthermore he informed CM that the entire family he, his sister, his dad, tested positive for COVID-19 after learning about patience positive test       Son acknowledges appointment next week with ortho and denies difficulty with medical cost, prescription coverage, transportation food heating or housing  He reports patient is compliant with admisinistering her lovenox injections and is taking all prescribed medications  He does report there are about 13 steps, with bilateral handrail, to the upstairs where the full bath is but informed CM patient has a bed set up downstairs with a bedside commode  He reports that there is always someone in front of patient and back of patient when she goes up and down the steps  Patient does not meet complex care management criteria, but agrees to periodic check ins for the duration of her bundle episode  Call back number provided with encouragement for son or patient to reach as needed  This CM will continue to monitor through chart review, Carlos Alberto Andrew and RICK

## 2021-04-17 ENCOUNTER — TELEPHONE (OUTPATIENT)
Dept: OTHER | Facility: OTHER | Age: 76
End: 2021-04-17

## 2021-04-17 NOTE — TELEPHONE ENCOUNTER
Henrietta Bernstein called from [place] visiting nurse to say pt had right hip replacement  And family id concerned about bruising and pt is confused

## 2021-04-19 NOTE — TELEPHONE ENCOUNTER
Lvm for Nancy Cortes RN to see if there were an specific questions or concerns that we could assist with

## 2021-04-23 ENCOUNTER — OFFICE VISIT (OUTPATIENT)
Dept: OBGYN CLINIC | Facility: CLINIC | Age: 76
End: 2021-04-23

## 2021-04-23 ENCOUNTER — TELEPHONE (OUTPATIENT)
Dept: OBGYN CLINIC | Facility: HOSPITAL | Age: 76
End: 2021-04-23

## 2021-04-23 ENCOUNTER — APPOINTMENT (OUTPATIENT)
Dept: RADIOLOGY | Facility: CLINIC | Age: 76
End: 2021-04-23
Payer: MEDICARE

## 2021-04-23 VITALS
HEIGHT: 61 IN | BODY MASS INDEX: 18.92 KG/M2 | DIASTOLIC BLOOD PRESSURE: 70 MMHG | WEIGHT: 100.2 LBS | SYSTOLIC BLOOD PRESSURE: 150 MMHG | HEART RATE: 100 BPM

## 2021-04-23 DIAGNOSIS — Z96.641 S/P HIP REPLACEMENT, RIGHT: Primary | ICD-10-CM

## 2021-04-23 DIAGNOSIS — Z96.641 S/P HIP REPLACEMENT, RIGHT: ICD-10-CM

## 2021-04-23 PROCEDURE — 99024 POSTOP FOLLOW-UP VISIT: CPT | Performed by: ORTHOPAEDIC SURGERY

## 2021-04-23 PROCEDURE — 73502 X-RAY EXAM HIP UNI 2-3 VIEWS: CPT

## 2021-04-23 NOTE — PROGRESS NOTES
Assessment/Plan:  1  S/P hip replacement, right  XR hip/pelv 2-3 vws right if performed     Scribe Attestation    I,:  Suad Gongora am acting as a scribe while in the presence of the attending physician :       I,:  Zena Childers, DO personally performed the services described in this documentation    as scribed in my presence :         Heidi Neri is a pleasant 67year old who presents to the office today 2 weeks s/p right anterior total hip arthroplasty  She is here with her son and daughter today  Overall she is doing well postoperatively  Her incision is healing with no signs of erythema, warmth or infection  At this time she may get the incision wet but should refrain from soaking or scrubbing the incision  At this time, a script for outpatient physical therapy was provided to the patient  She should continue the Lovenox as directed  I discussed with the patient that she should stop smoking to help with the healing process  I will see her back in 4 weeks for a clinical re-evaluation  She understood and had no further questions  Subjective: 2 weeks s/p right anterior total hip arthroplasty    Patient ID: Bright Brumfield is a 67 y o  female who presents to the office today 2 weeks s/p right anterior total hip arthroplasty  She is here with her son and daughter today  She reports that she is doing well  She does experience some discomfort and stiffness at times  She has been ambulating with a walker  She has been compliant with home physical therapy  She has been taking the Lovenox as directed  She states that she is currently smoking  Review of Systems   Constitutional: Positive for activity change  Negative for chills and fever  HENT: Negative for congestion and sneezing  Eyes: Negative for visual disturbance  Respiratory: Negative for cough and wheezing  Gastrointestinal: Negative for nausea and vomiting  Endocrine: Negative for cold intolerance and heat intolerance     Genitourinary: Negative  Musculoskeletal: Positive for gait problem  Negative for arthralgias, joint swelling and myalgias  Skin: Negative  Allergic/Immunologic: Negative  Neurological: Negative for weakness and numbness  Hematological: Negative  Psychiatric/Behavioral: Negative for behavioral problems  The patient is not nervous/anxious  History reviewed  No pertinent past medical history  Past Surgical History:   Procedure Laterality Date    ARTHROPLASTY HIP TOTAL ANTERIOR Right 4/9/2021    Procedure: ARTHROPLASTY HIP TOTAL ANTERIOR;  Surgeon: Maddie Cunningham DO;  Location: [address] [place] [address];  Service: Orthopedics       History reviewed  No pertinent family history      Social History     Occupational History    Not on file   Tobacco Use    Smoking status: Current Every Day Smoker     Packs/day: 0 50     Years: 60 00     Pack years: 30 00    Smokeless tobacco: Never Used   Substance and Sexual Activity    Alcohol use: Never     Frequency: Never    Drug use: Never    Sexual activity: Not on file         Current Outpatient Medications:     ascorbic acid (VITAMIN C) 1000 MG tablet, Take 1 tablet (1,000 mg total) by mouth every 12 (twelve) hours for 4 doses, Disp: 4 tablet, Rfl: 0    cholecalciferol (VITAMIN D3) 1,000 units tablet, Take 2 tablets (2,000 Units total) by mouth daily, Disp: 60 tablet, Rfl: 0    docusate sodium (COLACE) 100 mg capsule, Take 1 capsule (100 mg total) by mouth 2 (two) times a day, Disp: 10 capsule, Rfl: 0    enoxaparin (LOVENOX) 40 mg/0 4 mL, Inject 0 4 mL (40 mg total) under the skin daily, Disp: 14 8 mL, Rfl: 0    famotidine (PEPCID) 20 mg tablet, Take 1 tablet (20 mg total) by mouth daily for 10 doses, Disp: 10 tablet, Rfl: 0    gabapentin (NEURONTIN) 100 mg capsule, Take 2 capsules (200 mg total) by mouth 2 (two) times a day for 7 days, Disp: 28 capsule, Rfl: 0    nicotine (NICODERM CQ) 14 mg/24hr TD 24 hr patch, Place 1 patch on the skin daily, Disp: 28 patch, Rfl: 0    oxyCODONE (ROXICODONE) 5 mg immediate release tablet, Take half a tablet every 4 hours as needed for moderate to severe pain , Disp: 8 tablet, Rfl: 0    predniSONE 10 mg tablet, Take 4 tablets (40 mg total) by mouth daily for 2 days, THEN 3 tablets (30 mg total) daily for 2 days, THEN 2 tablets (20 mg total) daily for 2 days, THEN 1 tablet (10 mg total) daily for 2 days  , Disp: 20 tablet, Rfl: 0    zinc sulfate (ZINCATE) 220 mg capsule, Take 1 capsule (220 mg total) by mouth daily for 2 doses, Disp: 2 capsule, Rfl: 0    No Known Allergies    Objective:  Vitals:    04/23/21 1511   BP: 150/70   Pulse: 100       Body mass index is 18 93 kg/m²  Right Hip Exam     Tenderness   The patient is experiencing no tenderness  Range of Motion   Abduction: 40   Adduction: 20   Extension: 0   Flexion: 100   External rotation: 20   Internal rotation: 20     Other   Erythema: absent  Scars: present (Healing lateral incision)  Sensation: normal  Pulse: present    Comments:  No signs of erythema, warmth or infection  Pelvic obliquity matches leg length discrepancy  Ecchymosis and edema of right lower extremity            Physical Exam  Vitals signs reviewed  Constitutional:       Appearance: Normal appearance  She is well-developed  HENT:      Head: Normocephalic and atraumatic  Eyes:      General:         Right eye: No discharge  Left eye: No discharge  Extraocular Movements: Extraocular movements intact  Conjunctiva/sclera: Conjunctivae normal    Neck:      Musculoskeletal: Normal range of motion and neck supple  Cardiovascular:      Rate and Rhythm: Normal rate  Pulmonary:      Effort: Pulmonary effort is normal  No respiratory distress  Skin:     General: Skin is warm and dry  Neurological:      General: No focal deficit present  Mental Status: She is alert and oriented to person, place, and time     Psychiatric:         Mood and Affect: Mood normal          Behavior: Behavior normal          I have personally reviewed pertinent films in PACS  X-rays taken today of her right hip demonstrates well-positioned well-aligned hip prosthesis  No signs of associated hardware failure  No fractures appreciated

## 2021-04-23 NOTE — TELEPHONE ENCOUNTER
Patient sees Lionel Baum    Patient's daughter called and wanted to know if it was ok for the patient to be by herself ?     CB - [phone]  [facility] phone number

## 2021-04-26 ENCOUNTER — TELEPHONE (OUTPATIENT)
Dept: OBGYN CLINIC | Facility: HOSPITAL | Age: 76
End: 2021-04-26

## 2021-04-26 NOTE — TELEPHONE ENCOUNTER
Patient sees Antonio Amaya    Patient's daughter called, she was inquiring about patients lab work    If possible she would like to have it faxed over to her PCP  Fax# [phone]

## 2021-04-28 NOTE — TELEPHONE ENCOUNTER
We did not order these labs, so please fax them to her PCP for further management  You can tell her daughter that I did look them over and there were no significantly alarming findings despite some abnormal lab values   Thanks

## 2021-04-28 NOTE — TELEPHONE ENCOUNTER
Dr Josh Enciso    Patient daughter in law called for patient's bloodwork results        # [phone]

## 2021-04-29 NOTE — TELEPHONE ENCOUNTER
I LMOM for Daughter - In -Law, I just advised we did not order the labs she would have to contact Maryellen's PCP  Since it is not her daughter I did not want to leave the rest of the message  Advised to call back with any questions

## 2021-04-30 ENCOUNTER — PATIENT OUTREACH (OUTPATIENT)
Dept: CASE MANAGEMENT | Facility: HOSPITAL | Age: 76
End: 2021-04-30

## 2021-04-30 ENCOUNTER — TELEPHONE (OUTPATIENT)
Dept: OBGYN CLINIC | Facility: CLINIC | Age: 76
End: 2021-04-30

## 2021-04-30 NOTE — PROGRESS NOTES
Chart review completed  Outside records through [facility] requested and refreshed  Patient scheduled 3 times weekly for therapy and seemingly compliant with attending appointments  Review of 2 week post ortho notes indicates patient is doing well postoperatively with no signs of infection at surgical incision site  Patient directed to continue Lovenox as prescribed     This CM will continue to monitor electronically via chart review, outreach and [facility]

## 2021-04-30 NOTE — TELEPHONE ENCOUNTER
Valentine dropped off temporary state disability form for patient  This was completed and Valentine advised that is completed and available for  at the

## 2021-05-03 ENCOUNTER — EVALUATION (OUTPATIENT)
Dept: PHYSICAL THERAPY | Facility: CLINIC | Age: 76
End: 2021-05-03
Payer: MEDICARE

## 2021-05-03 DIAGNOSIS — Z96.641 S/P HIP REPLACEMENT, RIGHT: Primary | ICD-10-CM

## 2021-05-03 PROCEDURE — 97161 PT EVAL LOW COMPLEX 20 MIN: CPT | Performed by: PHYSICAL THERAPIST

## 2021-05-03 NOTE — PROGRESS NOTES
PT Evaluation     Today's date: 5/3/2021  Patient name: Roni Neumann  : 1945  MRN: [id number]  Referring provider: Neyda Martinez DO  Dx:   Encounter Diagnosis     ICD-10-CM    1  S/P hip replacement, right  G02 [id number] Ambulatory referral to Physical Therapy                  Assessment  Assessment details: Roni Neumann is a 67 y o  female who presents to physical therapy with pain, decreased LE range of motion, decreased LE strength, decreased activity tolerance, poor balance, swelling  and poor body mechanics  Patient's clinical presentation is consistent with their referring diagnosis of S/P hip replacement, right  (primary encounter diagnosis)  The pt presents with functional limitations of ADLs, recreational activities, ambulation and stair negotiation  Pt would benefit from physical therapy services to address these limitations and maximize function  Pt was instructed and educated on home exercise program today and demonstrates understanding  Impairments: abnormal gait, abnormal muscle firing, abnormal muscle tone, abnormal or restricted ROM, abnormal movement, activity intolerance, impaired balance, impaired physical strength, lacks appropriate home exercise program, pain with function, weight-bearing intolerance, poor posture  and poor body mechanics  Understanding of Dx/Px/POC: good   Prognosis: good    Goals  Short term goals  (3 weeks)  1  Patient will have no pain right hip  2   Patient will have full range of motion right hip  3  Patient will report a 50% improvement with activities of daily living     Long term goals - (6 weeks)  1  Patient will be independent with home exercise program  2  Patient will have no gait deviations ambulating on level surfaces  3   Patient will report 80 % improvement with activity of daily living function  4   Patient will negotiate stairs up and down pain free with use of one railing         Plan  Patient would benefit from: PT eval and skilled physical therapy  Planned modality interventions: thermotherapy: hydrocollator packs and cryotherapy  Planned therapy interventions: ADL training, balance/weight bearing training, joint mobilization, manual therapy, massage, neuromuscular re-education, patient education, postural training, strengthening, stretching, functional ROM exercises, therapeutic activities, therapeutic exercise, gait training and home exercise program  Frequency: 2x week  Duration in visits: 16  Duration in weeks: 8  Treatment plan discussed with: patient and family        Subjective Evaluation    History of Present Illness  Date of onset: 2021  Date of surgery: 2021  Mechanism of injury: She reports she got out of her car and she fell  She went to [facility] ER via ambulance  She had an x-ray which indicated right hip fracture  She had right PRISCILA that night  She stayed in the hospital 5 days  She received home PT until this past week  She has now been referred to PT  Pain  Current pain ratin  At best pain ratin  At worst pain rating: 10  Quality: discomfort and dull ache  Relieving factors: rest  Aggravating factors: walking, stair climbing and standing    Social Support  Steps to enter house: yes (3 steps)  Stairs in house: yes (one flight)     Employment status: working ()  Exercise history: House work, Volunteer at [employer]    Patient Goals  Patient goals for therapy: independence with ADLs/IADLs and return to sport/leisure activities  Patient's goals regarding treatment: Walk without a walker  Objective     Observations     Additional Observation Details  Incision site right hip is healing nicely with no sign of drainage  Palpation     Right   Hypertonic in the adductor longus, piriformis, proximal semimembranosus, proximal semitendinosus and rectus femoris  Tenderness of the adductor longus, piriformis, proximal semimembranosus, proximal semitendinosus and rectus femoris       Passive Range of Motion   Left Hip   Flexion: 116 degrees   Abduction: 38 degrees     Right Hip   Flexion: 98 degrees   Abduction: 27 degrees     Strength/Myotome Testing     Left Hip   Planes of Motion   Flexion: 5  Extension: 5  Abduction: 5  Adduction: 5    Right Hip   Planes of Motion   Flexion: 4  Extension: 4  Abduction: 4-  Adduction: 4    Ambulation   Weight-Bearing Status   Assistive device used: four-wheeled walker    Observational Gait   Gait: antalgic       Flowsheet Rows      Most Recent Value   PT/OT G-Codes   Current Score  49   Projected Score  75             Precautions: Tachycardia,  Right PRISCILA  Specialty Daily Treatment Diary     Manuals 5/3/21       Visit # 1       Stretch HS Quad        Hip PROM                Warm-up        NuStep        Neuro Re-Ed        Glute set        Wt shift        HR                        Ther Ex        Mini squat        Side step        Knee flex        Knee ext        SLR        Heel slides                        Ther Activity                        Gait Training        W/ cane                Modalities        CP

## 2021-05-05 ENCOUNTER — OFFICE VISIT (OUTPATIENT)
Dept: PHYSICAL THERAPY | Facility: CLINIC | Age: 76
End: 2021-05-05
Payer: MEDICARE

## 2021-05-05 DIAGNOSIS — Z96.641 S/P HIP REPLACEMENT, RIGHT: Primary | ICD-10-CM

## 2021-05-05 PROCEDURE — 97140 MANUAL THERAPY 1/> REGIONS: CPT | Performed by: PHYSICAL THERAPIST

## 2021-05-05 PROCEDURE — 97110 THERAPEUTIC EXERCISES: CPT | Performed by: PHYSICAL THERAPIST

## 2021-05-05 NOTE — PROGRESS NOTES
Daily Note     Today's date: 2021  Patient name: Neda Black  : 1945  MRN: [id number]  Referring provider: Philippe Ledesma DO  Dx:   Encounter Diagnosis     ICD-10-CM    1  S/P hip replacement, right  [id number] [id number]                   Subjective:  No pain        Objective: See treatment diary below      Assessment: Tolerated treatment well  Patient would benefit from continued PT      Plan: Continue per plan of care  Progress treatment as tolerated    Add gait training with cane     Precautions: Tachycardia,  Right PRISCILA  Specialty Daily Treatment Diary     Manuals 5/3/21 5/5/21      Visit # 1 2      Stretch HS Quad  5'      Hip PROM  5'              Warm-up        NuStep  10'      Neuro Re-Ed        Glute set  20      Wt shift  20      HR  20                      Ther Ex        Mini squat  20      Side step  4 x 5 ft      Knee flex        Knee ext  20      SLR  20  W/ AA      Heel slides  20                      Ther Activity                        Gait Training        W/ cane                Modalities        CP  deferred

## 2021-05-07 ENCOUNTER — OFFICE VISIT (OUTPATIENT)
Dept: PHYSICAL THERAPY | Facility: CLINIC | Age: 76
End: 2021-05-07
Payer: MEDICARE

## 2021-05-07 DIAGNOSIS — Z96.641 S/P HIP REPLACEMENT, RIGHT: Primary | ICD-10-CM

## 2021-05-07 PROCEDURE — 97140 MANUAL THERAPY 1/> REGIONS: CPT | Performed by: PHYSICAL THERAPIST

## 2021-05-07 PROCEDURE — 97110 THERAPEUTIC EXERCISES: CPT | Performed by: PHYSICAL THERAPIST

## 2021-05-07 NOTE — PROGRESS NOTES
Daily Note     Today's date: 2021  Patient name: Rodri Found  : 1945  MRN: [id number]  Referring provider: Clark Burciaga DO  Dx:   Encounter Diagnosis     ICD-10-CM    1  S/P hip replacement, right  [id number]                   Subjective:  No pain        Objective: See treatment diary below      Assessment: Tolerated treatment well  Patient would benefit from continued PT  She is walking well with a rolling walker  She is ready for a trial of cane walking  She agreed to bring in her cane from home next session  Plan: Continue per plan of care  Progress treatment as tolerated    Add gait training with cane     Precautions: Tachycardia,  Right PRISCILA  Specialty Daily Treatment Diary     Manuals 5/3/21 5/5/21 5/7/21     Visit # 1 2 3     Stretch HS Quad  5' 5'     Hip PROM  5' 5'             Warm-up        NuStep  10' 10'     Neuro Re-Ed        Glute set  20 20     Wt shift  20 20     HR  20 20                     Ther Ex        Mini squat  20 20     Side step  4 x 5 ft 4 x 5     Knee flex        Knee ext  20 20     SLR  20  W/ AA 20 no AA     Heel slides  20 20                     Ther Activity                        Gait Training        W/ cane                Modalities        CP  deferred 5 min

## 2021-05-10 ENCOUNTER — OFFICE VISIT (OUTPATIENT)
Dept: PHYSICAL THERAPY | Facility: CLINIC | Age: 76
End: 2021-05-10
Payer: MEDICARE

## 2021-05-10 DIAGNOSIS — Z96.641 S/P HIP REPLACEMENT, RIGHT: Primary | ICD-10-CM

## 2021-05-10 PROCEDURE — 97140 MANUAL THERAPY 1/> REGIONS: CPT | Performed by: PHYSICAL THERAPIST

## 2021-05-10 PROCEDURE — 97110 THERAPEUTIC EXERCISES: CPT | Performed by: PHYSICAL THERAPIST

## 2021-05-10 NOTE — PROGRESS NOTES
Daily Note     Today's date: 5/10/2021  Patient name: Irma Barahona  : 1945  MRN: [id number]  Referring provider: Gwen Barraza DO  Dx:   Encounter Diagnosis     ICD-10-CM    1  S/P hip replacement, right  S53 [id number]                   Subjective:  No pain        Objective: See treatment diary below      Assessment: Tolerated treatment well  Patient would benefit from continued PT  She brought in a quad cane today  Taught patient correct use of a cane using a single point cane this session  She needed frequent verbal cues about correctly coordinating when cane should make ground contact  Plan: Continue per plan of care  Progress treatment as tolerated         Precautions: Tachycardia,  Right PRISCILA  Specialty Daily Treatment Diary     Manuals 5/3/21 5/5/21 5/7/21 5/10/21    Visit # 1 2 3 4    Stretch HS Quad  5' 5' 5'    Hip PROM  5' 5' 5'            Warm-up        NuStep  10' 10' 10'    Neuro Re-Ed        Glute set  20 20 20    Wt shift  20 20 20    HR  20 20 20                    Ther Ex        Mini squat  20 20 20    Side step  4 x 5 ft 4 x 5 10 x 5'    Knee flex        Knee ext  20 20 20    SLR  20  W/ AA 20 no AA 20  4 x 5 AA    Heel slides  20 20 20                    Ther Activity                        Gait Training        W/ cane    6 x 25 ft            Modalities        CP  deferred 5 min 5 min

## 2021-05-12 ENCOUNTER — OFFICE VISIT (OUTPATIENT)
Dept: PHYSICAL THERAPY | Facility: CLINIC | Age: 76
End: 2021-05-12
Payer: MEDICARE

## 2021-05-12 DIAGNOSIS — Z96.641 S/P HIP REPLACEMENT, RIGHT: Primary | ICD-10-CM

## 2021-05-12 PROCEDURE — 97110 THERAPEUTIC EXERCISES: CPT | Performed by: PHYSICAL THERAPIST

## 2021-05-12 PROCEDURE — 97140 MANUAL THERAPY 1/> REGIONS: CPT | Performed by: PHYSICAL THERAPIST

## 2021-05-12 NOTE — PROGRESS NOTES
Daily Note     Today's date: 2021  Patient name: Yaquelin Chapa  : 1945  MRN: [id number]  Referring provider: Rosaline Amaya DO  Dx:   Encounter Diagnosis     ICD-10-CM    1  S/P hip replacement, right  [id number] [id number]                   Subjective:  No pain        Objective: See treatment diary below      Assessment: Tolerated treatment well  Patient would benefit from continued PT  She brought in standard cane and used it during the session today  She needed regular reminders to walk looking straight ahead  She used the cane well this session  She agreed to use rolling walker for outdoor and long distance ambulation but to use it for short indoor distance  Plan: Continue per plan of care  Progress treatment as tolerated         Precautions: Tachycardia,  Right PRISCILA  Specialty Daily Treatment Diary     Manuals 5/3/21 5/5/21 5/7/21 5/10/21 5/12/21   Visit # 1 2 3 4 5   Stretch HS Quad  5' 5' 5' 6'   Hip PROM  5' 5' 5' 4'           Warm-up        NuStep  10' 10' 10' 10'   Neuro Re-Ed        Glute set  20 20 20 20   Wt shift  20 20 20 20   HR  20 20 20 20                   Ther Ex        Mini squat  20 20 20 20   Side step  4 x 5 ft 4 x 5 10 x 5' 10 x 5'   Knee flex        Knee ext  20 20 20 20   SLR  20  W/ AA 20 no AA 20  4 x 5 AA 20   AA   Heel slides  20 20 20 30   Leg press     20   45#           Ther Activity                        Gait Training        W/ cane    6 x 25 ft 4 x 50 ft           Modalities        CP  deferred 5 min 5 min 5 min

## 2021-05-14 ENCOUNTER — OFFICE VISIT (OUTPATIENT)
Dept: PHYSICAL THERAPY | Facility: CLINIC | Age: 76
End: 2021-05-14
Payer: MEDICARE

## 2021-05-14 DIAGNOSIS — Z96.641 S/P HIP REPLACEMENT, RIGHT: Primary | ICD-10-CM

## 2021-05-14 PROCEDURE — 97110 THERAPEUTIC EXERCISES: CPT | Performed by: PHYSICAL THERAPIST

## 2021-05-14 PROCEDURE — 97140 MANUAL THERAPY 1/> REGIONS: CPT | Performed by: PHYSICAL THERAPIST

## 2021-05-14 NOTE — PROGRESS NOTES
Daily Note     Today's date: 2021  Patient name: Roni Neumann  : 1945  MRN: [id number]  Referring provider: Neyda Martinez DO  Dx:   Encounter Diagnosis     ICD-10-CM    1  S/P hip replacement, right  [id number]                   Subjective:  No pain Today right hip      Objective: See treatment diary below      Assessment: Tolerated treatment well  Patient would benefit from continued PT  Performed low reps SLR independently with breaks in between      Plan: Continue per plan of care  Progress treatment as tolerated         Precautions: Tachycardia,  Right PRISCILA  Specialty Daily Treatment Diary     Manuals 21       Visit # 6       Stretch HS Quad 6'       Hip PROM 2'       MRE leg extension 2'       Warm-up        NuStep 51       Neuro Re-Ed        Glute set 20       Wt shift 20       HR 20                       Ther Ex        Mini squat 20       Side step 10' x  5       Knee flex        Knee ext 20       SLR 10 x 2       Heel slides 30       Leg press 20   45#               Ther Activity                        Gait Training        W/ cane 4 x 50 ft               Modalities        CP 5'

## 2021-05-17 ENCOUNTER — OFFICE VISIT (OUTPATIENT)
Dept: PHYSICAL THERAPY | Facility: CLINIC | Age: 76
End: 2021-05-17
Payer: MEDICARE

## 2021-05-17 DIAGNOSIS — Z96.641 S/P HIP REPLACEMENT, RIGHT: Primary | ICD-10-CM

## 2021-05-17 PROCEDURE — 97110 THERAPEUTIC EXERCISES: CPT | Performed by: PHYSICAL THERAPIST

## 2021-05-17 PROCEDURE — 97140 MANUAL THERAPY 1/> REGIONS: CPT | Performed by: PHYSICAL THERAPIST

## 2021-05-17 NOTE — PROGRESS NOTES
Daily Note     Today's date: 2021  Patient name: Rodri Found  : 1945  MRN: [id number]  Referring provider: Clark Burciaga DO  Dx:   Encounter Diagnosis     ICD-10-CM    1  S/P hip replacement, right  M17 [id number]                   Subjective:  No pain today right hip      Objective: See treatment diary below      Assessment: Tolerated treatment well  Patient would benefit from continued PT  Some unsteadiness with turns while gait training with the standard cane  She agreed to continue to use the rolling walker for longer distances and outside  Plan: Continue per plan of care  Progress treatment as tolerated         Precautions: Tachycardia,  Right PRISCILA  Specialty Daily Treatment Diary     Manuals 21      Visit # 6 7      Stretch HS Quad 6' 6'      Hip PROM 2' 2'      MRE leg extension 2' 2'      Warm-up        NuStep 51 10'      Neuro Re-Ed        Glute set 20 20      Wt shift 20 20      HR 20 20                      Ther Ex        Mini squat 20 30      Side step 10' x  5 5 x 15 ft      Knee flex        Knee ext 20 20 ea      SLR 10 x 2 10 x 2 AA      Heel slides 30 30      Leg press 20   45# 20   45#              Ther Activity                        Gait Training        W/ cane 4 x 50 ft 4 x 60 ft              Modalities        CP 5' 5'

## 2021-05-19 ENCOUNTER — OFFICE VISIT (OUTPATIENT)
Dept: PHYSICAL THERAPY | Facility: CLINIC | Age: 76
End: 2021-05-19
Payer: MEDICARE

## 2021-05-19 DIAGNOSIS — Z96.641 S/P HIP REPLACEMENT, RIGHT: Primary | ICD-10-CM

## 2021-05-19 PROCEDURE — 97110 THERAPEUTIC EXERCISES: CPT

## 2021-05-19 NOTE — PROGRESS NOTES
Daily Note     Today's date: 2021  Patient name: Bright Burmfield  : 1945  MRN: [id number]  Referring provider: Geovany Chavira DO  Dx:   Encounter Diagnosis     ICD-10-CM    1  S/P hip replacement, right  [id number]        Start Time: 1492  Stop Time: 1400  Total time in clinic (min): 45 minutes    Subjective: Patient reports difficulties with bending over to grab objects off the floor  Objective: See treatment diary below      Assessment: Tolerated treatment well  Minimal sway with turning during gait training today  R quad lag present during SLR  Continue to strengthen R quadriceps to maximize function and normalize gait  Patient would benefit from continued PT to normalize gait with least restrictive device for independence at home and in the community  Plan: Continue per plan of care        Precautions: Tachycardia,  Right PRISCILA  Specialty Daily Treatment Diary     Manuals 21     Visit # 6 7 8     Stretch HS Quad 6' 6'      Hip PROM 2' 2'      MRE leg extension 2' 2'      Warm-up        NuStep 51 10' 10'     Neuro Re-Ed        Glute set 20 20 30     Wt shift 20 20 20     HR 20 20 20     Clamshells   20 YTB     Hip add squeeze   20             Ther Ex        Mini squat 20 30 30     Side step 10' x  5 5 x 15 ft 5x15 ft       Knee flex   20      Knee ext 20 20 ea 20 ea     SLR 10 x 2 10 x 2 AA 2x5 AA     Heel slides 30 30 30     Leg press 20   45# 20   45# 20   45#             Ther Activity                        Gait Training        W/ cane 4 x 50 ft 4 x 60 ft 4 x 60 ft             Modalities        CP 5' 5' 5'

## 2021-05-21 ENCOUNTER — OFFICE VISIT (OUTPATIENT)
Dept: OBGYN CLINIC | Facility: CLINIC | Age: 76
End: 2021-05-21

## 2021-05-21 VITALS
HEART RATE: 100 BPM | WEIGHT: 93.2 LBS | SYSTOLIC BLOOD PRESSURE: 150 MMHG | BODY MASS INDEX: 17.59 KG/M2 | HEIGHT: 61 IN | DIASTOLIC BLOOD PRESSURE: 80 MMHG

## 2021-05-21 DIAGNOSIS — Z47.1 AFTERCARE FOLLOWING RIGHT HIP JOINT REPLACEMENT SURGERY: ICD-10-CM

## 2021-05-21 DIAGNOSIS — Z96.641 STATUS POST TOTAL REPLACEMENT OF RIGHT HIP: ICD-10-CM

## 2021-05-21 DIAGNOSIS — S72.001A CLOSED FRACTURE OF NECK OF RIGHT FEMUR, INITIAL ENCOUNTER (HCC): Primary | ICD-10-CM

## 2021-05-21 DIAGNOSIS — Z96.641 AFTERCARE FOLLOWING RIGHT HIP JOINT REPLACEMENT SURGERY: ICD-10-CM

## 2021-05-21 PROCEDURE — 99024 POSTOP FOLLOW-UP VISIT: CPT | Performed by: ORTHOPAEDIC SURGERY

## 2021-05-21 RX ORDER — ACETAMINOPHEN 325 MG/1
650 TABLET ORAL
COMMUNITY
Start: 2021-04-16 | End: 2021-07-01 | Stop reason: ALTCHOICE

## 2021-05-21 NOTE — PROGRESS NOTES
Assessment/Plan:  1  Closed fracture of neck of right femur, initial encounter ([facility] [address] )     2  Status post total replacement of right hip     3  Aftercare following right hip joint replacement surgery        Ruben Spear is a pleasant 48-year-old female presenting today 6 weeks after undergoing a direct anterior right total hip arthroplasty for femoral neck fracture  She is doing well in her recovery at this point  She has completed her Lovenox for DVT prophylaxis and will no longer require anticoagulation  She has been doing well with physical therapy in terms of regaining function  We encouraged her to continue with therapy and her home exercises  We also discussed that it would be beneficial for her to stop smoking well her hip continues to heal   Her incision did heal nicely and she no longer has any restrictions  We will plan to see her back in 6 weeks for re-evaluation with an x-ray on arrival of her right hip  She and her children expressed understanding and all of their questions were addressed today    Subjective: 6 weeks post-op right hip    Patient ID: Eddye Tacos is a 67 y o  female  Ruben Spear is a pleasant 48-year-old female presenting today for follow-up 6 weeks after the aforementioned hip surgery for fracture  She has been working with physical therapy 3 times a week  She feels that she has made gains in her overall comfort and function and has been trying to transition to ambulating with a cane  She does have some soreness in the groin after increased activity or with bending forward  She denies any paresthesias down the right lower extremity  She denies any new injuries or symptoms  She has completed her Lovenox and does admit that she is still smoking    Review of Systems   Constitutional: Positive for activity change  HENT: Negative  Eyes: Negative  Respiratory: Negative  Cardiovascular: Negative  Gastrointestinal: Negative  Endocrine: Negative  Genitourinary: Negative  Musculoskeletal: Positive for arthralgias, gait problem, joint swelling and myalgias  Skin: Negative  Allergic/Immunologic: Negative  Hematological: Negative  Psychiatric/Behavioral: Negative  History reviewed  No pertinent past medical history  Past Surgical History:   Procedure Laterality Date    ARTHROPLASTY HIP TOTAL ANTERIOR Right 4/9/2021    Procedure: ARTHROPLASTY HIP TOTAL ANTERIOR;  Surgeon: Sveta Montgomery DO;  Location: [address] [place] [address];  Service: Orthopedics       History reviewed  No pertinent family history      Social History     Occupational History    Not on file   Tobacco Use    Smoking status: Current Every Day Smoker     Packs/day: 0 50     Years: 60 00     Pack years: 30 00     Types: Cigarettes    Smokeless tobacco: Never Used   Substance and Sexual Activity    Alcohol use: Never     Frequency: Never    Drug use: Never    Sexual activity: Not Currently     Partners: Male         Current Outpatient Medications:     acetaminophen (Tylenol) 325 mg tablet, Take 650 mg by mouth, Disp: , Rfl:     ascorbic acid (VITAMIN C) 1000 MG tablet, Take 1,000 mg by mouth, Disp: , Rfl:     Cholecalciferol 250 MCG (66212 UT) CAPS, Take 10,000 Units by mouth 2 (two) times a day, Disp: , Rfl:     ascorbic acid (VITAMIN C) 1000 MG tablet, Take 1 tablet (1,000 mg total) by mouth every 12 (twelve) hours for 4 doses, Disp: 4 tablet, Rfl: 0    cholecalciferol (VITAMIN D3) 1,000 units tablet, Take 2 tablets (2,000 Units total) by mouth daily, Disp: 60 tablet, Rfl: 0    famotidine (PEPCID) 20 mg tablet, Take 1 tablet (20 mg total) by mouth daily for 10 doses, Disp: 10 tablet, Rfl: 0    gabapentin (NEURONTIN) 100 mg capsule, Take 2 capsules (200 mg total) by mouth 2 (two) times a day for 7 days, Disp: 28 capsule, Rfl: 0    nicotine (NICODERM CQ) 14 mg/24hr TD 24 hr patch, Place 1 patch on the skin daily, Disp: 28 patch, Rfl: 0    oxyCODONE (ROXICODONE) 5 mg immediate release tablet, Take half a tablet every 4 hours as needed for moderate to severe pain , Disp: 8 tablet, Rfl: 0    zinc sulfate (ZINCATE) 220 mg capsule, Take 1 capsule (220 mg total) by mouth daily for 2 doses, Disp: 2 capsule, Rfl: 0    No Known Allergies    Objective:  Vitals:    05/21/21 1447   BP: 150/80   Pulse: 100       Body mass index is 17 61 kg/m²  Right Hip Exam     Tenderness   The patient is experiencing no tenderness  Range of Motion   Abduction:  40 normal   Adduction:  20 normal   Extension:  0 normal   Flexion:  100 normal   External rotation:  40 normal   Internal rotation:  20 normal     Muscle Strength   Abduction: 5/5   Adduction: 5/5   Flexion: 4/5     Tests   VENITA: negative  Rolf: negative    Other   Erythema: absent  Scars: present  Sensation: normal  Pulse: present    Comments:   Anterior incision well healed  Pelvic obliquity causing leg length discrepancy  Ecchymosis and edema of right lower extremity resolved   thigh and calf soft and nontender   grossly distally neurovascularly unchanged   ambulates slowly with the use of a walker            Physical Exam  Vitals signs and nursing note reviewed  Constitutional:       Appearance: She is well-developed  Comments: Body mass index is 17 61 kg/m²  HENT:      Head: Normocephalic and atraumatic  Right Ear: External ear normal       Left Ear: External ear normal    Neck:      Musculoskeletal: Normal range of motion  Cardiovascular:      Rate and Rhythm: Normal rate  Pulmonary:      Effort: Pulmonary effort is normal    Abdominal:      Palpations: Abdomen is soft  Musculoskeletal:      Comments: See ortho exam   Skin:     General: Skin is warm and dry  Neurological:      Mental Status: She is alert and oriented to person, place, and time  Psychiatric:         Behavior: Behavior normal          Thought Content:  Thought content normal          Judgment: Judgment normal

## 2021-05-24 ENCOUNTER — OFFICE VISIT (OUTPATIENT)
Dept: PHYSICAL THERAPY | Facility: CLINIC | Age: 76
End: 2021-05-24
Payer: MEDICARE

## 2021-05-24 DIAGNOSIS — Z96.641 S/P HIP REPLACEMENT, RIGHT: Primary | ICD-10-CM

## 2021-05-24 PROCEDURE — 97140 MANUAL THERAPY 1/> REGIONS: CPT | Performed by: PHYSICAL THERAPIST

## 2021-05-24 PROCEDURE — 97110 THERAPEUTIC EXERCISES: CPT | Performed by: PHYSICAL THERAPIST

## 2021-05-24 NOTE — PROGRESS NOTES
Daily Note     Today's date: 2021  Patient name: Irma Barahona  : 1945  MRN: [id number]  Referring provider: Gwen Barraza DO  Dx:   Encounter Diagnosis     ICD-10-CM    1  S/P hip replacement, right  E09 [id number]                   Subjective: Patient's daughter reports she had moments of not using an assistive device in the house this weekend  Objective: See treatment diary below      Assessment: Tolerated treatment well  Minimal sway with turning during gait training today  She agreed to continue to use rolling walker for outside distances  Plan: Continue per plan of care    Add proprio training on foam and separately w/ eyes closed     Precautions: Tachycardia,  Right PRISCILA  Specialty Daily Treatment Diary     Manuals 21    Visit # 6 7 8 9    Stretch HS Quad 6' 6'  4'    Hip PROM 2' 2'  4'    MRE leg extension 2' 2'  2'    Warm-up        NuStep 51 10' 10' 10'    Neuro Re-Ed        Glute set 20 20 30 30    Wt shift 20 20 20 --    HR 20 20 20 20    Clamshells   20 YTB 20   RTB    Hip add squeeze   20     SLS EC        SLS foam        Ther Ex        Mini squat 20 30 30 30    Side step 10' x  5 5 x 15 ft 5x15 ft   5 x 15 ft    Knee flex   20  20    Knee ext 20 20 ea 20 ea 20   2#    SLR 10 x 2 10 x 2 AA 2x5 AA 3 x 5  AA    Heel slides 30 30 30 30    Leg press 20   45# 20   45# 20   45# 20  55#            Ther Activity                        Gait Training        W/ cane 4 x 50 ft 4 x 60 ft 4 x 60 ft During entire treatment            Modalities        CP 5' 5' 5' --

## 2021-05-26 ENCOUNTER — OFFICE VISIT (OUTPATIENT)
Dept: PHYSICAL THERAPY | Facility: CLINIC | Age: 76
End: 2021-05-26
Payer: MEDICARE

## 2021-05-26 DIAGNOSIS — Z96.641 S/P HIP REPLACEMENT, RIGHT: Primary | ICD-10-CM

## 2021-05-26 PROCEDURE — 97110 THERAPEUTIC EXERCISES: CPT | Performed by: PHYSICAL THERAPIST

## 2021-05-26 PROCEDURE — 97140 MANUAL THERAPY 1/> REGIONS: CPT | Performed by: PHYSICAL THERAPIST

## 2021-05-26 NOTE — PROGRESS NOTES
Daily Note     Today's date: 2021  Patient name: Gia Negrete  : 1945  MRN: [id number]  Referring provider: Shane Toro DO  Dx:   Encounter Diagnosis     ICD-10-CM    1  S/P hip replacement, right  [id number]                   Subjective: She has no complaints      Objective: See treatment diary below      Assessment: Tolerated treatment well  She used cane while walking into visit today and demonstrated good technique using that device  Plan: Continue per plan of care    Add proprio training on foam and separately w/ eyes closed     Precautions: Tachycardia,  Right PRISCILA  Specialty Daily Treatment Diary     Manuals 21   Visit # 6 7 8 9 10   Stretch HS Quad 6' 6'  4' 4'   Hip PROM 2' 2'  4' 4'   MRE leg extension 2' 2'  2' 2'   Warm-up        NuStep 46 10' 10' 10' 10'   Neuro Re-Ed        Glute set 20 20 30 30 30   Wt shift 20 20 20 --    HR 20 20 20 20 20   Clamshells   20 YTB 20   RTB 20   RTB   Hip add squeeze   20     SLS EC        SLS      20   3"   Ther Ex        Mini squat 20 30 30 30 30   Side step 10' x  5 5 x 15 ft 5x15 ft   5 x 15 ft 5 x 15 ft   Knee flex   20  20 20   Knee ext 20 20 ea 20 ea 20   2# 20   2#   SLR 10 x 2 10 x 2 AA 2x5 AA 3 x 5  AA 3 x 5  AA on last set   Heel slides 30 30 30 30 30   Leg press 20   45# 20   45# 20   45# 20  55# 30    55#           Ther Activity                        Gait Training        W/ cane 4 x 50 ft 4 x 60 ft 4 x 60 ft During entire treatment            Modalities        CP 5' 5' 5' -- 5'

## 2021-05-27 ENCOUNTER — PATIENT OUTREACH (OUTPATIENT)
Dept: CASE MANAGEMENT | Facility: HOSPITAL | Age: 76
End: 2021-05-27

## 2021-05-27 NOTE — PROGRESS NOTES
Chart review completed  Outside records through [facility] requested and refreshed  Patient compliant with attending scheduled therapy  Devi Felix presented 5/21 for her 6 week post appointment with ortho  Provider indicates incision healed - no longer any restrictions  Provider educated patient about tobacco cessation  Spoke with patient who offers no complaints  States she goes to therapy three times a week  Gets around the house with a cane  Will use walker for longer distances  States this is her first operation she has ever had  Devi Felix did inform CM that orthopedist informed her of there being a couple inches different between her hips and if needed she may have to put a lift in her shoe  She does acknowledge that it is too soon to know definitely if that is needed and will wait for further follow up from orthopedic when she sees the doctor on July 2nd  Devi Felix is grateful for the outreach of this CM and is agreeable to future outreaches       This CM will continue to monitor electronically via chart review, outreach and [facility]

## 2021-05-28 ENCOUNTER — OFFICE VISIT (OUTPATIENT)
Dept: PHYSICAL THERAPY | Facility: CLINIC | Age: 76
End: 2021-05-28
Payer: MEDICARE

## 2021-05-28 DIAGNOSIS — Z96.641 S/P HIP REPLACEMENT, RIGHT: Primary | ICD-10-CM

## 2021-05-28 PROCEDURE — 97140 MANUAL THERAPY 1/> REGIONS: CPT

## 2021-05-28 PROCEDURE — 97110 THERAPEUTIC EXERCISES: CPT

## 2021-05-28 NOTE — PROGRESS NOTES
Daily Note     Today's date: 2021  Patient name: Brandi Duran  : 1945  MRN: [id number]  Referring provider: Gerardo Dias DO  Dx:   Encounter Diagnosis     ICD-10-CM    1  S/P hip replacement, right  E91 334                   Subjective: Pt reports feeling "pretty good"  Objective: See treatment diary below      Assessment: Tolerated treatment well  Patient demonstrated fatigue post treatment, exhibited good technique with therapeutic exercises and would benefit from continued PT      Plan: Continue per plan of care        Precautions: Tachycardia,  Right PRISCILA  Specialty Daily Treatment Diary     Manuals 21   Visit # 6 7 8 9 10 11   Stretch HS Quad 6' 6'  4' 4' 4   Hip PROM 2' 2'  4' 4' 4   MRE leg extension 2' 2'  2' 2' 2   Warm-up         NuStep 46 10' 10' 10' 10' 10    Neuro Re-Ed         Glute set 20 20 30 30 30 30    Wt shift 20 20 20 --     HR 20 20 20 20 20 20    Clamshells   20 YTB 20   RTB 20   RTB 20 RTb   Hip add squeeze   20      SLS EC         SLS      20   3" 20 3'   Ther Ex         Mini squat 20 30 30 30 30 30   Side step 10' x  5 5 x 15 ft 5x15 ft   5 x 15 ft 5 x 15 ft 5 x 15    Knee flex   20  20 20 20   Knee ext 20 20 ea 20 ea 20   2# 20   2# 20 2#   SLR 10 x 2 10 x 2 AA 2x5 AA 3 x 5  AA 3 x 5  AA on last set 3 x 5 AA on last set    Heel slides 30 30 30 30 30 30    Leg press 20   45# 20   45# 20   45# 20  55# 30    55# 30 55#             Ther Activity                           Gait Training         W/ cane 4 x 50 ft 4 x 60 ft 4 x 60 ft During entire treatment              Modalities         CP 5' 5' 5' -- 5' 5

## 2021-06-02 ENCOUNTER — OFFICE VISIT (OUTPATIENT)
Dept: PHYSICAL THERAPY | Facility: CLINIC | Age: 76
End: 2021-06-02
Payer: MEDICARE

## 2021-06-02 DIAGNOSIS — Z96.641 S/P HIP REPLACEMENT, RIGHT: Primary | ICD-10-CM

## 2021-06-02 PROCEDURE — 97140 MANUAL THERAPY 1/> REGIONS: CPT | Performed by: PHYSICAL THERAPIST

## 2021-06-02 PROCEDURE — 97110 THERAPEUTIC EXERCISES: CPT | Performed by: PHYSICAL THERAPIST

## 2021-06-02 NOTE — PROGRESS NOTES
Daily Note     Today's date: 2021  Patient name: Keegan Bryan  : 1945  MRN: [id number]  Referring provider: Dann Woodson DO  Dx:   Encounter Diagnosis     ICD-10-CM    1  S/P hip replacement, right  Q18 [id number]                   Subjective:   She notes having difficulty still lifting right leg up such as to don pants  Objective: See treatment diary below      Assessment: Tolerated treatment well  Added standing unilateral hip flexion to improve AROM and address her functional complaints  Plan: Continue per plan of care        Precautions: Tachycardia,  Right PRISCILA  Specialty Daily Treatment Diary     Manuals 21   Visit # 8 9 10 11 12   Stretch HS Quad  4' 4' 4 4'   Hip PROM  4' 4' 4 4'   MRE leg extension  2' 2' 2 2'   Warm-up        NuStep 10' 10' 10' 10  10'   Neuro Re-Ed        Glute set 30 30 30 30     Wt shift 20 --      HR 20 20 20 20  20   Clamshells 20 YTB 20   RTB 20   RTB 20 RTb    Hip add squeeze 20       SLS EC        SLS    20   3" 20 3' 20   3"   Ther Ex        Mini squat 30 30 30 30 30   Side step 5x15 ft   5 x 15 ft 5 x 15 ft 5 x 15  5 x 15 ft   Knee flex 20  20 20 20 20   Knee ext 20 ea 20   2# 20   2# 20 2# 30   SLR 2x5 AA 3 x 5  AA 3 x 5  AA on last set 3 x 5 AA on last set  2 x 10 AA   Heel slides 30 30 30 30  30   Leg press 20   45# 20  55# 30    55# 30 55#  30   55#   Uni march standing     20   Ther Activity        Bridges     20           Gait Training        W/ cane 4 x 60 ft During entire treatment              Modalities        CP 5' -- 5' 5 5'

## 2021-06-04 ENCOUNTER — OFFICE VISIT (OUTPATIENT)
Dept: PHYSICAL THERAPY | Facility: CLINIC | Age: 76
End: 2021-06-04
Payer: MEDICARE

## 2021-06-04 DIAGNOSIS — Z96.641 S/P HIP REPLACEMENT, RIGHT: Primary | ICD-10-CM

## 2021-06-04 PROCEDURE — 97140 MANUAL THERAPY 1/> REGIONS: CPT

## 2021-06-04 PROCEDURE — 97110 THERAPEUTIC EXERCISES: CPT

## 2021-06-04 NOTE — PROGRESS NOTES
Daily Note     Today's date: 2021  Patient name: Patrizia Oviedo  : 1945  MRN: [id number]  Referring provider: Viktor Wolff DO  Dx:   Encounter Diagnosis     ICD-10-CM    1  S/P hip replacement, right  Z96 641                   Subjective: Pt reports feeling well and denies any pain  Objective: See treatment diary below      Assessment: Tolerated treatment well  Patient demonstrated fatigue post treatment, exhibited good technique with therapeutic exercises and would benefit from continued PT      Plan: Continue per plan of care        Precautions: Tachycardia,  Right PRISCILA  Specialty Daily Treatment Diary     Manuals 21   Visit # 13 9 10 11 12   Stretch HS Quad 4 4' 4' 4 4'   Hip PROM 4 4' 4' 4 4'   MRE leg extension 2 2' 2' 2 2'   Warm-up        NuStep 10' 10' 10' 10  10'   Neuro Re-Ed        Glute set  30 30 30     Wt shift  --      HR 20 20 20 20  20   Clamshells 20 RTB 20   RTB 20   RTB 20 RTb    Hip add squeeze 20       SLS EC        SLS    20   3" 20 3' 20   3"   Ther Ex        Mini squat 30 30 30 30 30   Side step 5x15 ft   5 x 15 ft 5 x 15 ft 5 x 15  5 x 15 ft   Knee flex 20  20 20 20 20   Knee ext 30 20   2# 20   2# 20 2# 30   SLR 3x5 AA 3 x 5  AA 3 x 5  AA on last set 3 x 5 AA on last set  2 x 10 AA   Heel slides 30 30 30 30  30   Leg press 30   55# 20  55# 30    55# 30 55#  30   55#   Uni march standing     20   Ther Activity        Bridges 20     20           Gait Training        W/ cane  During entire treatment              Modalities        CP 5' -- 5' 5 5'

## 2021-06-07 ENCOUNTER — OFFICE VISIT (OUTPATIENT)
Dept: PHYSICAL THERAPY | Facility: CLINIC | Age: 76
End: 2021-06-07
Payer: MEDICARE

## 2021-06-07 DIAGNOSIS — Z96.641 S/P HIP REPLACEMENT, RIGHT: Primary | ICD-10-CM

## 2021-06-07 PROCEDURE — 97110 THERAPEUTIC EXERCISES: CPT | Performed by: PHYSICAL THERAPIST

## 2021-06-07 PROCEDURE — 97140 MANUAL THERAPY 1/> REGIONS: CPT | Performed by: PHYSICAL THERAPIST

## 2021-06-07 NOTE — PROGRESS NOTES
PT Re-Evaluation    Today's date: 2021  Patient name: Roni Neumann  : 1945  MRN: [id number]  Referring provider: Neyda Martinez DO  Dx:   Encounter Diagnosis     ICD-10-CM    1  S/P hip replacement, right  [id number] [id number]                   Assessment  Assessment details: Roni Neumann has remaining pain, decreased LE range of motion, decreased LE strength, decreased activity tolerance, poor balance, swelling  and poor body mechanics  Patient's clinical presentation is consistent with their referring diagnosis of S/P hip replacement, right  (primary encounter diagnosis)  The pt presents with functional limitations of ADLs, recreational activities, ambulation and stair negotiation  Pt would benefit from continued physical therapy services to address these limitations and maximize function  Impairments: abnormal gait, abnormal muscle firing, abnormal muscle tone, abnormal or restricted ROM, abnormal movement, activity intolerance, impaired balance, impaired physical strength, lacks appropriate home exercise program, pain with function, weight-bearing intolerance, poor posture  and poor body mechanics  Understanding of Dx/Px/POC: good   Prognosis: good    Goals  Short term goals  (3 weeks)  1  Patient will have no pain right hip --  MET  2   Patient will have full range of motion right hip --  MET  3  Patient will report a 50% improvement with activities of daily living  --  MET    Long term goals - (6 weeks)  1  Patient will be independent with home exercise program --  PARTIALLY MET  2  Patient will have no gait deviations ambulating on level surfaces  --  PARTIALLY MET  3  Patient will report 80 % improvement with activity of daily living function  --  PARTIALLY MET  4    Patient will negotiate stairs up and down pain free with use of one railing   --  PARTIALLY MET      Plan  Patient would benefit from: skilled physical therapy  Planned modality interventions: thermotherapy: hydrocollator packs and cryotherapy  Planned therapy interventions: ADL training, balance/weight bearing training, joint mobilization, manual therapy, massage, neuromuscular re-education, patient education, postural training, strengthening, stretching, functional ROM exercises, therapeutic activities, therapeutic exercise, gait training and home exercise program  Frequency: 2x week  Duration in visits: 8  Duration in weeks: 4  Treatment plan discussed with: patient        Subjective Evaluation    History of Present Illness  Date of onset: 2021  Date of surgery: 2021  Mechanism of injury: She reports overall improvement with walking and transfers  She is comfortable using a cane the majority of the time but needs a walker at times and also tries no assistive device at times  Pain  Current pain ratin  At best pain ratin  At worst pain ratin  Quality: discomfort and dull ache  Relieving factors: rest  Aggravating factors: walking, stair climbing and standing    Social Support  Steps to enter house: yes (3 steps)  Stairs in house: yes (one flight)     Employment status: working ()  Exercise history: House work, Volunteer at [employer]    Patient Goals  Patient goals for therapy: independence with ADLs/IADLs and return to sport/leisure activities  Patient's goals regarding treatment: Walk without a walker  Objective     Observations     Additional Observation Details  Incision well healed    Palpation     Right   Hypertonic in the adductor longus, proximal semimembranosus, proximal semitendinosus and rectus femoris  Tenderness of the proximal semimembranosus, proximal semitendinosus and rectus femoris       Passive Range of Motion   Left Hip   Flexion: 116 degrees   Abduction: 38 degrees     Right Hip   Flexion: 104 degrees   Abduction: 31 degrees     Strength/Myotome Testing     Left Hip   Planes of Motion   Flexion: 5  Extension: 5  Abduction: 5  Adduction: 5    Right Hip   Planes of Motion   Flexion: 4  Extension: 4  Abduction: 4  Adduction: 4+    Ambulation   Weight-Bearing Status   Assistive device used: single point cane    Observational Gait   Gait: antalgic              Precautions: Tachycardia,  Right PRISCILA    Specialty Daily Treatment Diary      Manuals 6/4/21 6/7/21      Visit # 13 14      Stretch HS Quad 4 4'      Hip PROM 4 4'      MRE leg extension 2 2'      Warm-up          NuStep 10' 10'      Neuro Re-Ed             Glute set   30      HR 20 20      Clamshells 20 RTB 20   RTB      Hip add squeeze 20           SLS EC             SLS     20      Ther Ex          Mini squat 30 30      Side step 5x15 ft   5 x 15 ft      Knee flex 20  20      Knee ext 30 20   2#      SLR 3x5 AA 3 x 5  AA      Heel slides 30 30      Leg press 30   55# 20  55#      Uni march standing          Ther Activity          Bridges 20   20                 Gait Training             W/ cane   During entire treatment                       Modalities             CP 5' --

## 2021-06-09 ENCOUNTER — OFFICE VISIT (OUTPATIENT)
Dept: PHYSICAL THERAPY | Facility: CLINIC | Age: 76
End: 2021-06-09
Payer: MEDICARE

## 2021-06-09 DIAGNOSIS — Z96.641 S/P HIP REPLACEMENT, RIGHT: Primary | ICD-10-CM

## 2021-06-09 PROCEDURE — 97110 THERAPEUTIC EXERCISES: CPT | Performed by: PHYSICAL THERAPIST

## 2021-06-09 PROCEDURE — 97140 MANUAL THERAPY 1/> REGIONS: CPT | Performed by: PHYSICAL THERAPIST

## 2021-06-09 NOTE — PROGRESS NOTES
Daily Note     Today's date: 2021  Patient name: Brandi Duran  : 1945  MRN: [id number]  Referring provider: Gerardo Dias DO  Dx:   Encounter Diagnosis     ICD-10-CM    1  S/P hip replacement, right  J73 037                   Subjective: Pain = 3/10 today      Objective: See treatment diary below      Assessment: Tolerated treatment well  Patient would benefit from continued PT  Jack Salgado uses posterior pelvic tilt to assist with  SLR  Plan: Continue per plan of care  Progress treatment as tolerated         Precautions: Tachycardia,  Right PRISCILA    Specialty Daily Treatment Diary      Manuals 21     Visit # 13 14 15     Stretch HS Quad 4 4' 4'     Hip PROM 4 4' 4'     MRE leg extension 2 2' 2'     Warm-up          NuStep 10' 10' 10'     Neuro Re-Ed             Glute set   30 30     HR 20 20 20     Clamshells 20 RTB 20   RTB 20   RTB     Hip add squeeze 20           SLS EC             SLS     20 20     Ther Ex          Mini squat 30 30 30     Side step 5x15 ft   5 x 15 ft 5 x 20 ft     Knee flex 20  20 20     Knee ext 30 20   2# 20   2#     SLR 3x5 AA 3 x 5  AA 2 x 5     Heel slides 30 30 30     Leg press 30   55# 20  55# 20   55#     Uni march standing     20     Ther Activity          Bridges 20   20 20                Gait Training             W/ cane   During entire treatment                       Modalities             CP 5' -- 5'

## 2021-06-11 ENCOUNTER — OFFICE VISIT (OUTPATIENT)
Dept: PHYSICAL THERAPY | Facility: CLINIC | Age: 76
End: 2021-06-11
Payer: MEDICARE

## 2021-06-11 DIAGNOSIS — Z96.641 S/P HIP REPLACEMENT, RIGHT: Primary | ICD-10-CM

## 2021-06-11 PROCEDURE — 97140 MANUAL THERAPY 1/> REGIONS: CPT

## 2021-06-11 PROCEDURE — 97110 THERAPEUTIC EXERCISES: CPT

## 2021-06-11 NOTE — PROGRESS NOTES
Daily Note     Today's date: 2021  Patient name: Get Turner  : 1945  MRN: [id number]  Referring provider: Rosaline Dunham DO  Dx:   Encounter Diagnosis     ICD-10-CM    1  S/P hip replacement, right  Z96 641                   Subjective: Pt reports pan level 2/10 this morning  Complains of some stiffness  Objective: See treatment diary below      Assessment: Tolerated treatment well  Patient demonstrated fatigue post treatment, exhibited good technique with therapeutic exercises and would benefit from continued PT      Plan: Continue per plan of care        Precautions: Tachycardia,  Right PRISCILA    Specialty Daily Treatment Diary      Manuals 21    Visit # 13 14 15 16    Stretch HS Quad 4 4' 4' 5'      Hip PROM 4 4' 4' 4'    MRE leg extension 2 2' 2' 2'     Warm-up          NuStep 10' 10' 10' 10'     Neuro Re-Ed             Glute set   30 30 30     HR 20 20 20 20     Clamshells 20 RTB 20   RTB 20   RTB 20 RTB     Hip add squeeze 20           SLS EC             SLS     20 20 20     Ther Ex          Mini squat 30 30 30 30     Side step 5x15 ft   5 x 15 ft 5 x 20 ft 5 x 20 ft     Knee flex 20  20 20 20     Knee ext 30 20   2# 20   2# 20     SLR 3x5 AA 3 x 5  AA 2 x 5 2 x 5    Heel slides 30 30 30 30    Leg press 30   55# 20  55# 20   55# 20 55#    Uni march standing     20 20     Ther Activity          Bridges 20   20 20 20               Gait Training             W/ cane   During entire treatment                       Modalities             CP 5' -- 5' 5'

## 2021-06-14 ENCOUNTER — OFFICE VISIT (OUTPATIENT)
Dept: PHYSICAL THERAPY | Facility: CLINIC | Age: 76
End: 2021-06-14
Payer: MEDICARE

## 2021-06-14 DIAGNOSIS — Z96.641 S/P HIP REPLACEMENT, RIGHT: Primary | ICD-10-CM

## 2021-06-14 PROCEDURE — 97110 THERAPEUTIC EXERCISES: CPT

## 2021-06-14 PROCEDURE — 97140 MANUAL THERAPY 1/> REGIONS: CPT

## 2021-06-14 PROCEDURE — 97112 NEUROMUSCULAR REEDUCATION: CPT

## 2021-06-14 NOTE — PROGRESS NOTES
Daily Note     Today's date: 2021  Patient name: Junior Gomez  : 1945  MRN: [id number]  Referring provider: Marc Adame DO  Dx:   Encounter Diagnosis     ICD-10-CM    1  S/P hip replacement, right  Z96 641        Start Time: 1310  Stop Time: 1400  Total time in clinic (min): 50 minutes    Subjective: Patient denies pain of her R hip today  She reports slight soreness when she is performing activities  She is starting to ambulate around the house without an AD at this time  Objective: See treatment diary below      Assessment: Tolerated treatment well  Patient demonstrates continued weakness of hip abductors and hip extensors during gait training and stair navigation  Provided home exercise program to improve L hip flexor and abductor strength  Shoe lift provided to patient, as she demonstrates significant Trendelenburg gait pattern and has documented LLD  Gilma Marian Patient would benefit from continued PT to maximize function for independence in community  Plan: Continue per plan of care        Precautions: Tachycardia,  Right PRISCILA    Specialty Daily Treatment Diary      Manuals 21   Visit # 13 14 15 16 17   Stretch HS Quad 4 4' 4' 5'   5'   Hip PROM 4 4' 4' 4'    MRE leg extension 2 2' 2' 2'     Warm-up          NuStep 10' 10' 10' 10'  10'   Neuro Re-Ed             Glute set   30 30 30     HR 20 20 20 20  20   Clamshells 20 RTB 20   RTB 20   RTB 20 RTB  20 RTB    Hip add squeeze 20        20   SLS EC             SLS     20 20 20     Ther Ex          Mini squat 30 30 30 30  30   Side step 5x15 ft   5 x 15 ft 5 x 20 ft 5 x 20 ft  5 x 20 ft    Knee flex 20  20 20 20  20   Knee ext 30 20   2# 20   2# 20  20 1 5#   SLR 3x5 AA 3 x 5  AA 2 x 5 2 x 5 2x5   Heel slides 30 30 30 30 30   Leg press 30   55# 20  55# 20   55# 20 55#    Uni march standing     20 20  20   Ther Activity          Bridges 20   20 20 20 20              Gait Training             W/ cane   During entire treatment      with shoe lift 5'                 Modalities             CP 5' -- 5' 5'

## 2021-06-16 ENCOUNTER — APPOINTMENT (OUTPATIENT)
Dept: PHYSICAL THERAPY | Facility: CLINIC | Age: 76
End: 2021-06-16
Payer: MEDICARE

## 2021-06-17 ENCOUNTER — PATIENT OUTREACH (OUTPATIENT)
Dept: CASE MANAGEMENT | Facility: HOSPITAL | Age: 76
End: 2021-06-17

## 2021-06-17 NOTE — PROGRESS NOTES
Chart review completed  Outside records through [facility] requested and refreshed  Patient is seemingly compliant with attending scheduled appointments including Outpatient therapy  Therapy provider indicates in last note 6/14 patient beginning to ambulate around home without AD  Patient also provided shoe lift at this appointment   This CM will continue to monitor electronically via chart review, outreach and [facility]

## 2021-06-18 ENCOUNTER — OFFICE VISIT (OUTPATIENT)
Dept: PHYSICAL THERAPY | Facility: CLINIC | Age: 76
End: 2021-06-18
Payer: MEDICARE

## 2021-06-18 DIAGNOSIS — Z96.641 S/P HIP REPLACEMENT, RIGHT: Primary | ICD-10-CM

## 2021-06-18 PROCEDURE — 97112 NEUROMUSCULAR REEDUCATION: CPT | Performed by: PHYSICAL THERAPIST

## 2021-06-18 PROCEDURE — 97110 THERAPEUTIC EXERCISES: CPT | Performed by: PHYSICAL THERAPIST

## 2021-06-18 NOTE — PROGRESS NOTES
Daily Note     Today's date: 2021  Patient name: Alba Pelayo  : 1945  MRN: [id number]  Referring provider: Michael Cedillo DO  Dx:   Encounter Diagnosis     ICD-10-CM    1  S/P hip replacement, right  [id number]          Subjective: Patient denies pain in her R hip today  She reports slight soreness when she is performing activities  She is starting to ambulate around the house without an AD at this time  Pt states she is wearing heel lift in L shoe and reports significant improvement in gait with heel lift  Objective: See treatment diary below      Assessment: Tolerated treatment well  Patient demonstrates continued weakness of hip abductors and hip extensors during gait training and stair navigation  Shoe lift provided to patient last visit, as she was demonstrating significant Trendelenburg gait pattern and has documented LLD  Reviewed HEP with pt with good understanding noted by pt and pt's dtr in law  Patient would benefit from continued PT to maximize function for independence in community  Plan: Continue per plan of care  Continue as per primary PT       Precautions: Tachycardia,  Right PRISCILA    Specialty Daily Treatment Diary      Manuals 21   Visit # 18 14 15 16 17   Stretch HS Quad 4 4' 4' 5'   5'   Hip PROM 4 4' 4' 4'    MRE leg extension  2' 2' 2'     Warm-up          NuStep 10' 10' 10' 10'  10'   Neuro Re-Ed             Glute set  30 30 30 30     HR 20 20 20 20  20   Clamshells x20 RTB 20   RTB 20   RTB 20 RTB  20 RTB    Hip add squeeze 20        20   SLS EC             SLS   x20  20 20 20     Ther Ex          Mini squat 30 30 30 30  30   Side step 5x15 ft   5 x 15 ft 5 x 20 ft 5 x 20 ft  5 x 20 ft    Knee flex 20  20 20 20  20   Knee ext 30 20   2# 20   2# 20  20 1 5#   SLR 2x10 3 x 5  AA 2 x 5 2 x 5 2x5   Heel slides 30 30 30 30 30   Leg press 30   55# 20  55# 20   55# 20 55#    Uni march standing  x20  x20 20 20  20   Ther Activity     Harsha 20   20 20 20 20              Gait Training             W/ cane  gait with heel lift L shoe During entire treatment      with shoe lift 5'                 Modalities             CP 5' -- 5' 5'

## 2021-06-21 ENCOUNTER — OFFICE VISIT (OUTPATIENT)
Dept: PHYSICAL THERAPY | Facility: CLINIC | Age: 76
End: 2021-06-21
Payer: MEDICARE

## 2021-06-21 DIAGNOSIS — Z96.641 S/P HIP REPLACEMENT, RIGHT: Primary | ICD-10-CM

## 2021-06-21 PROCEDURE — 97112 NEUROMUSCULAR REEDUCATION: CPT

## 2021-06-21 PROCEDURE — 97110 THERAPEUTIC EXERCISES: CPT

## 2021-06-21 NOTE — PROGRESS NOTES
Daily Note     Today's date: 2021  Patient name: Gia Negrete  : 1945  MRN: [id number]  Referring provider: Shane Toro DO  Dx:   Encounter Diagnosis     ICD-10-CM    1  S/P hip replacement, right  [id number] 641        Start Time: 8028  Stop Time: 1400  Total time in clinic (min): 45 minutes    Subjective: Patient denies pain in R hip today  She states that she is having difficulties with donning/doffing her shoes and socks  She is able to do the dishes and complete simple household chores with no difficulties  Objective: See treatment diary below      Assessment: Tolerated treatment well  Gait training without AD performed today, with left trunk sway demonstrated  Slight LOB with ambulation  Ongoing weakness of R hip abductors demonstrated when performing seated and supine exercises, as has R knee valgus  Encouraged to ice and elevate R LE tonight, as she demonstrates swelling of R ankle  Verbalized understanding  Patient would benefit from continued PT to maximize function for independence in community  Plan: Progress note during next visit        Precautions: Tachycardia,  Right PRISCILA    Specialty Daily Treatment Diary      Manuals 21   Visit # 18 14 15 16 17 18   Stretch HS Quad 4 4' 4' 5'   5' 5'   Hip PROM 4 4' 4' 4'  4'   MRE leg extension  2' 2' 2'      Warm-up           NuStep 10' 10' 10' 10'  10'    Neuro Re-Ed              Glute set  30 30 30 30      HR 20 20 20 20  20 20   Clamshells x20 RTB 20   RTB 20   RTB 20 RTB  20 RTB  20 RTB   Hip add squeeze 20        20 20   SLS EC              SLS   x20  20 20 20      Ther Ex           Mini squat 30 30 30 30  30 30   Side step 5x15 ft   5 x 15 ft 5 x 20 ft 5 x 20 ft  5 x 20 ft  5 x 20 ft    Knee flex 20  20 20 20  20 20   Knee ext 30 20   2# 20   2# 20  20 1 5# 20 2#   SLR 2x10 3 x 5  AA 2 x 5 2 x 5 2x5 2x8   Heel slides 30 30 30 30 30 30   Leg press 30   55# 20  55# 20   55# 20 55#  20 55#   Uni march standing  x20  x20 20 20  20 20   Ther Activity           Bridges 20   20 20 20 20 20               Gait Training              W/ cane  gait with heel lift L shoe During entire treatment      with shoe lift 5'                   Modalities              CP 5' -- 5' 5'

## 2021-06-22 ENCOUNTER — IMMUNIZATIONS (OUTPATIENT)
Dept: FAMILY MEDICINE CLINIC | Facility: HOSPITAL | Age: 76
End: 2021-06-22

## 2021-06-22 DIAGNOSIS — Z23 ENCOUNTER FOR IMMUNIZATION: Primary | ICD-10-CM

## 2021-06-22 PROCEDURE — 91301 SARS-COV-2 / COVID-19 MRNA VACCINE (MODERNA) 100 MCG: CPT

## 2021-06-22 PROCEDURE — 0011A SARS-COV-2 / COVID-19 MRNA VACCINE (MODERNA) 100 MCG: CPT

## 2021-06-23 ENCOUNTER — OFFICE VISIT (OUTPATIENT)
Dept: PHYSICAL THERAPY | Facility: CLINIC | Age: 76
End: 2021-06-23
Payer: MEDICARE

## 2021-06-23 DIAGNOSIS — Z96.641 S/P HIP REPLACEMENT, RIGHT: Primary | ICD-10-CM

## 2021-06-23 PROCEDURE — 97110 THERAPEUTIC EXERCISES: CPT

## 2021-06-23 PROCEDURE — 97112 NEUROMUSCULAR REEDUCATION: CPT

## 2021-06-23 NOTE — PROGRESS NOTES
Daily Note     Today's date: 2021  Patient name: Concepcion Kohli  : 1945  MRN: [id number]  Referring provider: Lazara Najera DO  Dx:   Encounter Diagnosis     ICD-10-CM    1  S/P hip replacement, right  [id number] 641        Start Time: 5072  Stop Time: 1315  Total time in clinic (min): 55 minutes    Subjective: Patient denies pain in R hip today  She reports ease with walking since the lift was placed in her shoe  She feels more steady on her feet  Objective: See treatment diary below      Assessment: Tolerated treatment well  Patient demonstrates slight Trendelenburg gait pattern without AD today  Continued R gluteus medius weakness present in open chain position  No pain noted post tx  Patient would benefit from continued PT to maximize function for independence in community and at home  Plan: Continue per plan of care           Precautions: Tachycardia,  Right PRISCILA    Specialty Daily Treatment Diary      Manuals 21   Visit # 18 14 15 16 17 18 19   Stretch HS Quad 4 4' 4' 5'   5' 5'    Hip PROM 4 4' 4' 4'  4'    MRE leg extension  2' 2' 2'       Warm-up            NuStep 10' 10' 10' 10'  10' 10' 10'   Neuro Re-Ed               Glute set  30 30 30 30       HR 20 20 20 20  20 20 20   Clamshells x20 RTB 20   RTB 20   RTB 20 RTB  20 RTB  20 RTB 20 RTB   Hip add squeeze 20        20 20 20   SLS EC               SLS   x20  20 20 20       Ther Ex            Mini squat 30 30 30 30  30 30 30   Side step 5x15 ft   5 x 15 ft 5 x 20 ft 5 x 20 ft  5 x 20 ft  5 x 20 ft  5x20ft RTB   Knee flex 20  20 20 20  20 20    Knee ext 30 20   2# 20   2# 20  20 1 5# 20 2# 20 2#   SLR 2x10 3 x 5  AA 2 x 5 2 x 5 2x5 2x8 2x8   Heel slides 30 30 30 30 30 30 30   Leg press 30   55# 20  55# 20   55# 20 55#  20 55# 20 65#   Uni march standing  x20  x20 20 20  20 20 20   Ther Activity            Bridges 20   20 20 20 20 20                 Gait Training               W/ cane  gait with heel lift L shoe During entire treatment      with shoe lift 5'                     Modalities               CP 5' -- 5' 5'

## 2021-06-25 ENCOUNTER — OFFICE VISIT (OUTPATIENT)
Dept: PHYSICAL THERAPY | Facility: CLINIC | Age: 76
End: 2021-06-25
Payer: MEDICARE

## 2021-06-25 DIAGNOSIS — Z96.641 S/P HIP REPLACEMENT, RIGHT: Primary | ICD-10-CM

## 2021-06-25 PROCEDURE — 97110 THERAPEUTIC EXERCISES: CPT

## 2021-06-25 PROCEDURE — 97112 NEUROMUSCULAR REEDUCATION: CPT

## 2021-06-25 NOTE — PROGRESS NOTES
Daily Note     Today's date: 2021  Patient name: Annette Johnson  : 1945  MRN: [id number]  Referring provider: Denny Lew DO  Dx:   Encounter Diagnosis     ICD-10-CM    1  S/P hip replacement, right  Z96 641        Start Time: 1000  Stop Time: 1100  Total time in clinic (min): 60 minutes    Subjective: Patient denies pain in her R hip today  She states that she feels more confident to do the laundry and daily tasks independently  Objective: See treatment diary below      Assessment: Tolerated treatment well  Progressing well  Provided resistance for side stepping for improvement of R hip abductor strength  Patient would benefit from continued PT to normalize gait and maximize strength for independence in community  Plan: Continue per plan of care        Precautions: Tachycardia,  Right PRISCILA    Specialty Daily Treatment Diary      Manuals 21   Visit # 18 19 20 21   Stretch HS Quad 4 5'  3'   Hip PROM 4 4'  4'   MRE leg extension       Warm-up        NuStep 10' 10' 10' 10'   Neuro Re-Ed        Glute set  30      HR 20 20 20 20   Clamshells x20 RTB 20 RTB 20 RTB 20 RTB   Hip add squeeze 20 20 20 20   SLS EC        SLS   x20      Ther Ex        Mini squat 30 30 30 30   Side step 5x15 ft   5 x 20 ft  5x20ft RTB 5x20ft RTB   Knee flex 20  20     Knee ext 30 20 2# 20 2# 20 2#   SLR 2x10 2x8 2x8 2x8   Heel slides 30 30 30    Leg press 30   55# 20 55# 20 65# 20 65#    Uni march standing  x20 20 20 Hurdles 6x10' L2   Ther Activity        Bridges 20  20  20            Gait Training        W/ cane  gait with heel lift L shoe   gait with heel lift L shoe            Modalities        CP 5'

## 2021-06-28 ENCOUNTER — OFFICE VISIT (OUTPATIENT)
Dept: PHYSICAL THERAPY | Facility: CLINIC | Age: 76
End: 2021-06-28
Payer: MEDICARE

## 2021-06-28 DIAGNOSIS — Z96.641 S/P HIP REPLACEMENT, RIGHT: Primary | ICD-10-CM

## 2021-06-28 PROCEDURE — 97140 MANUAL THERAPY 1/> REGIONS: CPT | Performed by: PHYSICAL THERAPIST

## 2021-06-28 PROCEDURE — 97110 THERAPEUTIC EXERCISES: CPT | Performed by: PHYSICAL THERAPIST

## 2021-06-28 NOTE — PROGRESS NOTES
Daily Note     Today's date: 2021  Patient name: Brandi Duran  : 1945  MRN: [id number]  Referring provider: Gerardo Dias DO  Dx:   No diagnosis found  Subjective: Patient denies pain in her R hip today  She states that she feels more confident to do the laundry and daily tasks independently  Objective: See treatment diary below      Assessment: Tolerated treatment well  and is progressing well  Patient required cueing for squats as she was favoring the left side>right  Resistance was added on the Nustep and pt reported that it felt fine  Lunges were added with support, and pt reported they felt fine as well  Patient is feeling well overall and denies feeling weak or off balance at home  Pt is meeting with Dr Jesus Alberto Lord this Thursday to discuss possible PT discharge next week  Plan: Continue per plan of care        Precautions: Tachycardia,  Right PRISCILA    Specialty Daily Treatment Diary      Manuals 21   Visit # 20 21 22   Stretch HS Quad  3' 3'   Hip PROM  4' 4'   Warm-up      NuStep 10' 10' 5' L3   Neuro Re-Ed      Glute set   HEP DC   HR 20 20 30x   Clamshells 20 RTB 20 RTB 20x RTB   Hip add squeeze 20 20 30x    SLS EC      SLS    20x   Ther Ex      Mini squat 30 30 30   Side step 5x20ft RTB 5x20ft RTB 5x20ft on foam   Knee flex      Knee ext 20 2# 20 2# 20x  2#   SLR 2x8 2x8 2x10   Heel slides 30  30x   Leg press 20 65# 20 65#  20x  65#   Uni march standing 20 Hurdles 6x10' L2           Gait Training      W/ cane  gait with heel lift L shoe           Modalities      CP   deferred

## 2021-06-29 ENCOUNTER — PATIENT OUTREACH (OUTPATIENT)
Dept: CASE MANAGEMENT | Facility: HOSPITAL | Age: 76
End: 2021-06-29

## 2021-06-29 NOTE — PROGRESS NOTES
Chart review completed  Outside records through [facility] requested and refreshed  Patient received 1st COVID vaccine 6/22  Future appointments:   6/30/2021 10:15 AM Jillian Graham, PT Physical Therapy at [address] 7/1/2021 1:15 PM  Tomasa Zavaleta, [address] [facility] Iam Sims with patient today who offers no complaints except she jokes about juggling her appointments along with her 's appointments  She confirmed the above appointment stating she also has an appointment with Dr Glen Aquino, cardiologist on July 1st  She reports that she and her  each will receive their second COVID vaccine at [facility] on July 20th  CM unable to verify cardiology appointment within epic an offered to call cardiology office and patient agreed  CM spoke with staff member Flores Lucas from cardiology office who also was unable to locate appointment for patient informing CM that patient is not established at practice  Flores Lucas pulled up provider schedule and informed CM that patients  is scheduled with cardiology on July 1st at 11:20 AM    CM informed staff member that must be what patient was referring to and thanked staff member for her assistance  CM called patient back to inform her that it is her  that has the appointment on July 1st with cardiology and patient immediately apologized stating that is correct and it is not an appointment for her  Patient expressed gratitude for all of CM's assistance  Medicare Bundle episode ended  BPCI form updated, care coordination note removed and bundle episode resolved  CM removed self from care team and sent Inbasket message to [facility] regarding bundle closure  CM informed patient that medicare bundle program will soon close and this will be the final outreach of this CM  Patient encouraged to call CM beyond closure date of 7/12 as needed for assistance    Patient noted call back number of CM and verbalized understanding  This CM will continue to monitor via chart review throughout bundle episode      This CM will continue to monitor electronically via chart review, outreach and [facility]

## 2021-06-30 ENCOUNTER — APPOINTMENT (OUTPATIENT)
Dept: PHYSICAL THERAPY | Facility: CLINIC | Age: 76
End: 2021-06-30
Payer: MEDICARE

## 2021-07-01 ENCOUNTER — TELEPHONE (OUTPATIENT)
Dept: OBGYN CLINIC | Facility: HOSPITAL | Age: 76
End: 2021-07-01

## 2021-07-01 ENCOUNTER — APPOINTMENT (OUTPATIENT)
Dept: RADIOLOGY | Facility: CLINIC | Age: 76
End: 2021-07-01
Payer: MEDICARE

## 2021-07-01 ENCOUNTER — OFFICE VISIT (OUTPATIENT)
Dept: OBGYN CLINIC | Facility: CLINIC | Age: 76
End: 2021-07-01

## 2021-07-01 VITALS
DIASTOLIC BLOOD PRESSURE: 90 MMHG | HEIGHT: 61 IN | BODY MASS INDEX: 16.95 KG/M2 | SYSTOLIC BLOOD PRESSURE: 170 MMHG | HEART RATE: 62 BPM | WEIGHT: 89.8 LBS

## 2021-07-01 DIAGNOSIS — S72.001A CLOSED FRACTURE OF NECK OF RIGHT FEMUR, INITIAL ENCOUNTER (HCC): ICD-10-CM

## 2021-07-01 DIAGNOSIS — Z96.641 STATUS POST TOTAL REPLACEMENT OF RIGHT HIP: Primary | ICD-10-CM

## 2021-07-01 DIAGNOSIS — Z96.641 STATUS POST TOTAL REPLACEMENT OF RIGHT HIP: ICD-10-CM

## 2021-07-01 PROCEDURE — 99024 POSTOP FOLLOW-UP VISIT: CPT | Performed by: ORTHOPAEDIC SURGERY

## 2021-07-01 PROCEDURE — 73502 X-RAY EXAM HIP UNI 2-3 VIEWS: CPT

## 2021-07-01 RX ORDER — ASCORBATE CALCIUM 500 MG
500 TABLET ORAL DAILY
COMMUNITY

## 2021-07-01 NOTE — TELEPHONE ENCOUNTER
Patient sees Dr Janice Martines    Patient called requesting clarification  Patient saw on her after visit summary she is to refrain from taking ascorbic acid 1000 mg  Patient is confused as she states she's been taking 500mg, not 100mg  She wants to know if she should still stop taking this      Call back # [phone]

## 2021-07-01 NOTE — PROGRESS NOTES
Assessment/Plan:  1  Status post total replacement of right hip  XR hip/pelv 2-3 vws right if performed   2  Closed fracture of neck of right femur, initial encounter ([facility] )         Zach Rosales is a pleasant 74-year-old female presenting today for follow-up 3 months after undergoing a direct anterior right total hip arthroplasty for fracture  The prosthesis remains stable on imaging and exam   Her incision has healed very nicely  She does have good range of motion of the right hip and reasonable strength  We again feel like her leg length discrepancy is due to pelvic obliquity and lumbar spine pathology  She may  Continue to use the shoe lift provided by the physical therapy team and discontinue as she continues to progress  She may transition to a home exercise program when she in therapy feel that she is able to  She understands the need for antibiotic prophylaxis before any dental or GI procedures and will notify our office of any upcoming appointments  We will plan to see her back in 9 months for the anniversary of her surgery with x-rays on arrival of her right hip  She is welcome to call with any questions or concerns in the interim  All questions were addressed today    Subjective: 3 months s/p DA right PRISCILA for fracture    Patient ID: Lida Reams is a 67 y o  female  Zach Rosales is a pleasant 74-year-old female presenting today for follow-up 3 months after undergoing a direct anterior right total hip arthroplasty for fracture  She has continued working with physical therapy over the past 6 weeks and has made gains in her overall comfort and function  She has been provided with shoe lifts in her left lower extremity due to the feeling of leg length discrepancy  This has helped normalize her gait  She does not have any activity related groin pain  She denies any paresthesias    Review of Systems   Constitutional: Negative  HENT: Negative  Eyes: Negative  Respiratory: Negative  Cardiovascular: Negative  Gastrointestinal: Negative  Endocrine: Negative  Genitourinary: Negative  Musculoskeletal: Positive for gait problem, joint swelling and myalgias  Skin: Negative  Allergic/Immunologic: Negative  Hematological: Negative  Psychiatric/Behavioral: Negative  History reviewed  No pertinent past medical history  Past Surgical History:   Procedure Laterality Date    ARTHROPLASTY HIP TOTAL ANTERIOR Right 4/9/2021    Procedure: ARTHROPLASTY HIP TOTAL ANTERIOR;  Surgeon: Willa Ross DO;  Location: [address] [place] [address];  Service: Orthopedics       History reviewed  No pertinent family history  Social History     Occupational History    Not on file   Tobacco Use    Smoking status: Current Every Day Smoker     Packs/day: 0 50     Years: 60 00     Pack years: 30 00     Types: Cigarettes    Smokeless tobacco: Never Used   Vaping Use    Vaping Use: Never used   Substance and Sexual Activity    Alcohol use: Never    Drug use: Never    Sexual activity: Not Currently     Partners: Male         Current Outpatient Medications:     cholecalciferol (VITAMIN D3) 1,000 units tablet, Take 2 tablets (2,000 Units total) by mouth daily, Disp: 60 tablet, Rfl: 0    famotidine (PEPCID) 20 mg tablet, Take 1 tablet (20 mg total) by mouth daily for 10 doses, Disp: 10 tablet, Rfl: 0    gabapentin (NEURONTIN) 100 mg capsule, Take 2 capsules (200 mg total) by mouth 2 (two) times a day for 7 days, Disp: 28 capsule, Rfl: 0    No Known Allergies    Objective:  Vitals:    07/01/21 1324   BP: 170/90   Pulse: 62       Body mass index is 16 97 kg/m²  Right Hip Exam     Tenderness   The patient is experiencing no tenderness       Range of Motion   Abduction:  40 normal   Adduction:  20 normal   Extension:  0 normal   Flexion:  100 normal   External rotation:  40 normal   Internal rotation:  20 normal     Muscle Strength   Abduction: 5/5   Adduction: 5/5   Flexion: 5/5     Tests   VENITA: negative  Rolf: negative    Other   Erythema: absent  Scars: present  Sensation: normal  Pulse: present    Comments:   Anterior incision well healed  Pelvic obliquity causing leg length discrepancy   thigh and calf soft and nontender   grossly distally neurovascularly unchanged   ambulates slowly with the use of a cane            Physical Exam  Vitals and nursing note reviewed  Constitutional:       Appearance: She is well-developed  Comments: Body mass index is 16 97 kg/m²  HENT:      Head: Normocephalic and atraumatic  Right Ear: External ear normal       Left Ear: External ear normal    Cardiovascular:      Rate and Rhythm: Normal rate  Pulmonary:      Effort: Pulmonary effort is normal    Abdominal:      Palpations: Abdomen is soft  Musculoskeletal:      Cervical back: Normal range of motion  Comments: See ortho exam   Skin:     General: Skin is warm and dry  Neurological:      Mental Status: She is alert and oriented to person, place, and time  Mental status is at baseline  Psychiatric:         Mood and Affect: Mood normal          Behavior: Behavior normal          Thought Content: Thought content normal          Judgment: Judgment normal          I have personally reviewed pertinent films in PACS of the x-rays taken today of her pelvis and right hip compared them to previous postoperative films  The total hip prosthesis remains well aligned well fixed with no signs of loosening, periprosthetic fracture, or other complication  She does have some heterotopic ossification around the femoral neck    There is a pelvic tilt as well as significant lumbar spine pathology visualized

## 2021-07-13 ENCOUNTER — PATIENT OUTREACH (OUTPATIENT)
Dept: FAMILY MEDICINE CLINIC | Facility: CLINIC | Age: 76
End: 2021-07-13

## 2021-07-20 ENCOUNTER — IMMUNIZATIONS (OUTPATIENT)
Dept: FAMILY MEDICINE CLINIC | Facility: HOSPITAL | Age: 76
End: 2021-07-20

## 2021-07-20 DIAGNOSIS — Z23 ENCOUNTER FOR IMMUNIZATION: Primary | ICD-10-CM

## 2021-07-20 PROCEDURE — 0012A SARS-COV-2 / COVID-19 MRNA VACCINE (MODERNA) 100 MCG: CPT

## 2021-07-20 PROCEDURE — 91301 SARS-COV-2 / COVID-19 MRNA VACCINE (MODERNA) 100 MCG: CPT

## 2021-08-03 ENCOUNTER — APPOINTMENT (OUTPATIENT)
Dept: LAB | Facility: CLINIC | Age: 76
End: 2021-08-03
Payer: MEDICARE

## 2021-08-03 ENCOUNTER — HOSPITAL ENCOUNTER (OUTPATIENT)
Dept: RADIOLOGY | Facility: HOSPITAL | Age: 76
Discharge: HOME | End: 2021-08-03
Payer: MEDICARE

## 2021-08-03 DIAGNOSIS — F17.200 SMOKER: ICD-10-CM

## 2021-08-03 PROCEDURE — 71046 X-RAY EXAM CHEST 2 VIEWS: CPT

## 2022-01-04 ENCOUNTER — IMMUNIZATIONS (OUTPATIENT)
Dept: FAMILY MEDICINE CLINIC | Facility: HOSPITAL | Age: 77
End: 2022-01-04

## 2022-01-04 DIAGNOSIS — Z23 ENCOUNTER FOR IMMUNIZATION: Primary | ICD-10-CM

## 2022-01-04 PROCEDURE — 0064A COVID-19 MODERNA VACC 0.25 ML BOOSTER: CPT

## 2022-01-04 PROCEDURE — 91306 COVID-19 MODERNA VACC 0.25 ML BOOSTER: CPT

## 2022-03-08 ENCOUNTER — CONSULT (OUTPATIENT)
Dept: CARDIOLOGY CLINIC | Facility: CLINIC | Age: 77
End: 2022-03-08
Payer: MEDICARE

## 2022-03-08 VITALS
WEIGHT: 92.5 LBS | TEMPERATURE: 97.1 F | HEART RATE: 70 BPM | SYSTOLIC BLOOD PRESSURE: 140 MMHG | HEIGHT: 61 IN | OXYGEN SATURATION: 97 % | DIASTOLIC BLOOD PRESSURE: 84 MMHG | BODY MASS INDEX: 17.47 KG/M2

## 2022-03-08 DIAGNOSIS — E78.5 DYSLIPIDEMIA: ICD-10-CM

## 2022-03-08 DIAGNOSIS — R94.31 ABNORMAL EKG: ICD-10-CM

## 2022-03-08 DIAGNOSIS — R06.02 EXERTIONAL SHORTNESS OF BREATH: ICD-10-CM

## 2022-03-08 DIAGNOSIS — U07.1 COVID-19 VIRUS INFECTION: ICD-10-CM

## 2022-03-08 DIAGNOSIS — Z72.0 TOBACCO ABUSE: ICD-10-CM

## 2022-03-08 DIAGNOSIS — R00.0 TACHYCARDIA: ICD-10-CM

## 2022-03-08 PROCEDURE — 99205 OFFICE O/P NEW HI 60 MIN: CPT | Performed by: INTERNAL MEDICINE

## 2022-03-08 PROCEDURE — 93000 ELECTROCARDIOGRAM COMPLETE: CPT | Performed by: INTERNAL MEDICINE

## 2022-03-08 RX ORDER — DIPHENOXYLATE HYDROCHLORIDE AND ATROPINE SULFATE 2.5; .025 MG/1; MG/1
1 TABLET ORAL DAILY
COMMUNITY

## 2022-03-08 RX ORDER — ALPRAZOLAM 0.25 MG/1
0.25 TABLET ORAL
COMMUNITY
Start: 2022-02-01 | End: 2022-07-05

## 2022-03-08 RX ORDER — ROSUVASTATIN CALCIUM 5 MG/1
5 TABLET, COATED ORAL EVERY OTHER DAY
Qty: 45 TABLET | Refills: 1 | Status: SHIPPED | OUTPATIENT
Start: 2022-03-08 | End: 2022-06-16 | Stop reason: SDUPTHER

## 2022-03-08 NOTE — PROGRESS NOTES
Consultation - Cardiology Office  [facility]  Altagracia Dillard Stamets 67 y o  female MRN: [id number]  : 1945  Unit/Bed#:  Encounter: [id number]      Assessment:     1  Exertional shortness of breath    2  Tachycardia    3  Tobacco abuse    4  Dyslipidemia    5  Abnormal EKG    6  COVID-19 virus infection        Discussion summary and Plan:    1  Exertional shortness of breath  Patient has chronic exertional shortness of breath which is worse with activities  Most likely etiology may be underlying lung disease she has decreased air entry but she has multiple cardiac risk factors  She core not walk on the treadmill due to her history of hip surgery and she walks with help of cane she will be scheduled for echo Doppler and Lexiscan stress test     2  Episodes of tachycardia  Most likely episode of tachycardia related to anxiety current heart rate is acceptable  3  Continues tobacco abuse  Patient has been smoker when she was a teenager  She smokes about half pack a day does not want to quit  Advised to get lung cancer screening with primary care doctor    4  Dyslipidemia with risk for cardiovascular event around 30%  Her risk for cardiovascular event is high she need to be on statin therapy  I took the liberty of starting her Crestor 5 mg every other day will check fasting lipids and electrolytes in 6-8 weeks    5  Abnormal EKG  EKG shows LVH she already scheduled for Lexiscan stress and echo Doppler    6  History of COVID-19 virus fraction patient has recovered but chronic shortness of breath may be related to other factors pain    Further plan as also of these tests become available        Patient and patient's daughter in-law was advised and educated to call our office  immediately if  patient has any new symptoms of chest pain/shortness of breath, near-syncope, syncope, light headedness sustained palpitations  or any other cardiovascular symptoms before their scheduled follow-up appointment  Office #[phone]  Thank you for your consultation  If you have any question please call me at [phone]    Counseling :  A description of the counseling  Goals and Barriers  Patient's ability to self care: Yes  Medication side effect reviewed with patient in detail and all their questions answered to their satisfaction  Primary Care Physician Requesting Consult: Spencer Verma MD    Reason for Consult / Principal Problem:  Abnormal EKG, exertional shortness of breath        HPI :     Maritza Salcido is a 67y o  year old female who was referred by primary care doctor for exertional shortness of breath and abnormal EKG  Patient who has medical history significant for tobacco abuse for almost all her life since she has teenager, dyslipidemia, history of COVID-19 infection, 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 was noted to have abnormal EKG and some exertional shortness of breath  Patient now smokes about half pack a day she started smoking long time ago  Currently she is not on any significant cardiovascular medications though her cholesterol is acceptable her risk for cardiovascular event is noted to be high  She denies any chest pain with normal activities at she walks with the help of cane  No other cardiovascular issues at this time  She came with her daughter-in-law  Her  is also our patient  History of hip replacement surgery    History of tonsillectomy        Review of Systems   Constitutional: Negative for activity change, chills, diaphoresis, fever and unexpected weight change  HENT: Negative for congestion  Eyes: Negative for discharge and redness  Respiratory: Positive for shortness of breath  Negative for cough, chest tightness and wheezing  Cardiovascular: Negative  Negative for chest pain, palpitations and leg swelling  Gastrointestinal: Negative for abdominal pain, diarrhea and nausea  Endocrine: Negative  Genitourinary: Negative for decreased urine volume and urgency  Musculoskeletal: Positive for arthralgias, back pain and gait problem  Skin: Negative for rash and wound  Allergic/Immunologic: Negative  Neurological: Negative for dizziness, seizures, syncope, weakness, light-headedness and headaches  Hematological: Negative  Psychiatric/Behavioral: Negative for agitation and confusion  The patient is nervous/anxious  Historical Information   No past medical history on file  Past Surgical History:   Procedure Laterality Date    ARTHROPLASTY HIP TOTAL ANTERIOR Right 4/9/2021    Procedure: ARTHROPLASTY HIP TOTAL ANTERIOR;  Surgeon: Ritchie Isidro DO;  Location: [facility];  Service: Orthopedics     Social History     Substance and Sexual Activity   Alcohol Use Never     Social History     Substance and Sexual Activity   Drug Use Never     Social History     Tobacco Use   Smoking Status Current Every Day Smoker    Packs/day: 0 50    Years: 60 00    Pack years: 30 00    Types: Cigarettes   Smokeless Tobacco Never Used     Family History: No family history on file      Meds/Allergies     No Known Allergies    Current Outpatient Medications:     ALPRAZolam (XANAX) 0 25 mg tablet, Take 0 25 mg by mouth, Disp: , Rfl:     Calcium Ascorbate (VITAMIN C) 500 mg tablet, Take 500 mg by mouth daily, Disp: , Rfl:     cholecalciferol (VITAMIN D3) 1,000 units tablet, Take 2 tablets (2,000 Units total) by mouth daily, Disp: 60 tablet, Rfl: 0    multivitamin (THERAGRAN) TABS, Take 1 tablet by mouth daily, Disp: , Rfl:     famotidine (PEPCID) 20 mg tablet, Take 1 tablet (20 mg total) by mouth daily for 10 doses, Disp: 10 tablet, Rfl: 0    gabapentin (NEURONTIN) 100 mg capsule, Take 2 capsules (200 mg total) by mouth 2 (two) times a day for 7 days, Disp: 28 capsule, Rfl: 0    Vitals: Blood pressure 140/84, pulse 70, temperature (!) 97 1 °F (36 2 °C), height 5' 1" (1 549 m), weight 42 kg (92 lb 8 oz), SpO2 97 %     Body mass index is 17 48 kg/m²  Wt Readings from Last 3 Encounters:   03/08/22 42 kg (92 lb 8 oz)   07/01/21 40 7 kg (89 lb 12 8 oz)   05/21/21 42 3 kg (93 lb 3 2 oz)     Vitals:    03/08/22 1054   Weight: 42 kg (92 lb 8 oz)     BP Readings from Last 3 Encounters:   03/08/22 140/84   07/01/21 170/90   05/21/21 150/80         Physical Exam    Neurologic:  Alert & oriented x 3, no new focal deficits, Not in any acute distress,   Constitutional:  Thinly built, non-toxic appearance   Neck: Normal range of motion, no tenderness,  Neck supple   Respiratory:  Bilateral decreased air entry  Cardiovascular: S1-S2 regular with a 2/6 ejection systolic murmur and S4 is present  GI:  Soft, nondistended,  nontender, no hepatosplenomegaly appreciated  Musculoskeletal:  No edema, no tenderness, no deformities  Skin:  Well hydrated, no rash   Extremities:  No edema and distal pulses are present  Psychiatric:  Speech and behavior appropriate     Diagnostic Studies Review Cardio:  None recent    EKG:  Normal sinus rhythm LVH by voltage        Imaging:  Chest X-Ray:   XR chest pa & lateral    Result Date: 8/6/2021  Impression Hyperinflation due to emphysema  Mild reticulation in the right base, likely mild fibrosis  Question right base bronchiectasis  Workstation performed: [id number]       CT-scan of the chest:     No CTA results available for this patient    Lab Review   Lab Results   Component Value Date    WBC 8 62 08/03/2021    HGB 13 3 08/03/2021    HCT 42 0 08/03/2021    MCV 99 (H) 08/03/2021    RDW 15 4 (H) 08/03/2021     08/03/2021     BMP:  Lab Results   Component Value Date    SODIUM 137 08/03/2021    K 4 4 08/03/2021     08/03/2021    CO2 30 08/03/2021    BUN 16 08/03/2021    CREATININE 0 57 (L) 08/03/2021    GLUC 125 04/14/2021    GLUF 100 (H) 08/03/2021    CALCIUM 9 0 08/03/2021    CORRECTEDCA 9 5 08/03/2021    EGFR 90 08/03/2021    MG 2 1 04/14/2021     Troponins:    LFT:  Lab Results   Component Value Date    AST 19 08/03/2021    ALT 25 08/03/2021    ALKPHOS 92 08/03/2021    TP 7 6 08/03/2021    ALB 3 4 (L) 08/03/2021      Lab Results   Component Value Date    COQ2THUHHGRY 5 410 (H) 08/03/2021     Lipid Profile:   Lab Results   Component Value Date    CHOLESTEROL 172 08/03/2021    HDL 54 08/03/2021    LDLCALC 104 (H) 08/03/2021    TRIG 71 08/03/2021     Lab Results   Component Value Date    CHOLESTEROL 172 08/03/2021     Lab Results   Component Value Date    CKTOTAL 497 (H) 04/12/2021    CKMB 1 8 04/12/2021    CKMBINDEX <1 0 04/12/2021    TROPONINI <0 02 04/09/2021     Lab Results   Component Value Date    NTBNP 381 04/12/2021      The 10-year ASCVD risk score (Claudia Yanez et al , 2013) is: 30 7%    Values used to calculate the score:      Age: 67 years      Sex: Female      Is Non- : No      Diabetic: No      Tobacco smoker: Yes      Systolic Blood Pressure: 947 mmHg      Is BP treated: No      HDL Cholesterol: 54 mg/dL      Total Cholesterol: 172 mg/dL      Dr Raul Cole MD [facility]      "This note has been constructed using a voice recognition system  Therefore there may be syntax, spelling, and/or grammatical errors   Please call if you have any questions  "

## 2022-03-23 ENCOUNTER — HOSPITAL ENCOUNTER (OUTPATIENT)
Dept: NON INVASIVE DIAGNOSTICS | Facility: HOSPITAL | Age: 77
Discharge: HOME | End: 2022-03-23
Attending: INTERNAL MEDICINE
Payer: MEDICARE

## 2022-03-23 ENCOUNTER — HOSPITAL ENCOUNTER (OUTPATIENT)
Dept: RADIOLOGY | Facility: HOSPITAL | Age: 77
Discharge: HOME | End: 2022-03-23
Attending: INTERNAL MEDICINE
Payer: MEDICARE

## 2022-03-23 VITALS
HEIGHT: 61 IN | WEIGHT: 92 LBS | DIASTOLIC BLOOD PRESSURE: 81 MMHG | SYSTOLIC BLOOD PRESSURE: 173 MMHG | BODY MASS INDEX: 17.37 KG/M2 | HEART RATE: 74 BPM

## 2022-03-23 DIAGNOSIS — U07.1 COVID-19 VIRUS INFECTION: ICD-10-CM

## 2022-03-23 DIAGNOSIS — R00.0 TACHYCARDIA: ICD-10-CM

## 2022-03-23 DIAGNOSIS — R06.02 EXERTIONAL SHORTNESS OF BREATH: ICD-10-CM

## 2022-03-23 DIAGNOSIS — E78.5 DYSLIPIDEMIA: ICD-10-CM

## 2022-03-23 DIAGNOSIS — Z72.0 TOBACCO ABUSE: ICD-10-CM

## 2022-03-23 DIAGNOSIS — R94.31 ABNORMAL EKG: ICD-10-CM

## 2022-03-23 LAB
CHEST PAIN STATEMENT: NORMAL
MAX DIASTOLIC BP: 81 MMHG
MAX HEART RATE: 115 BPM
MAX PREDICTED HEART RATE: 143 BPM
MAX. SYSTOLIC BP: 173 MMHG
PROTOCOL NAME: NORMAL
REASON FOR TERMINATION: NORMAL
TARGET HR FORMULA: NORMAL
TEST INDICATION: NORMAL
TIME IN EXERCISE PHASE: NORMAL

## 2022-03-23 PROCEDURE — 78452 HT MUSCLE IMAGE SPECT MULT: CPT | Performed by: INTERNAL MEDICINE

## 2022-03-23 PROCEDURE — A9502 TC99M TETROFOSMIN: HCPCS

## 2022-03-23 PROCEDURE — G1004 CDSM NDSC: HCPCS

## 2022-03-23 PROCEDURE — 93018 CV STRESS TEST I&R ONLY: CPT | Performed by: INTERNAL MEDICINE

## 2022-03-23 PROCEDURE — 93306 TTE W/DOPPLER COMPLETE: CPT

## 2022-03-23 PROCEDURE — 93017 CV STRESS TEST TRACING ONLY: CPT

## 2022-03-23 PROCEDURE — 78452 HT MUSCLE IMAGE SPECT MULT: CPT

## 2022-03-23 PROCEDURE — 93016 CV STRESS TEST SUPVJ ONLY: CPT | Performed by: INTERNAL MEDICINE

## 2022-03-23 RX ADMIN — REGADENOSON 0.4 MG: 0.08 INJECTION, SOLUTION INTRAVENOUS at 10:16

## 2022-03-24 LAB
AORTIC ROOT: 3.1 CM
APICAL FOUR CHAMBER EJECTION FRACTION: 37 %
E WAVE DECELERATION TIME: 243 MS
FRACTIONAL SHORTENING: 17 % (ref 28–44)
INTERVENTRICULAR SEPTUM IN DIASTOLE (PARASTERNAL SHORT AXIS VIEW): 1 CM
INTERVENTRICULAR SEPTUM: 1 CM (ref 0.45–0.85)
LAAS-AP2: 16.3 CM2
LAAS-AP4: 12.4 CM2
LEFT ATRIUM AREA SYSTOLE SINGLE PLANE A4C: 11.9 CM2
LEFT ATRIUM SIZE: 3.2 CM
LEFT INTERNAL DIMENSION IN SYSTOLE: 4 CM (ref 2.17–3.28)
LEFT VENTRICLE DIASTOLIC VOLUME (MOD BIPLANE): 96 ML (ref 55.26–124.45)
LEFT VENTRICLE SYSTOLIC VOLUME (MOD BIPLANE): 56 ML
LEFT VENTRICULAR INTERNAL DIMENSION IN DIASTOLE: 4.8 CM (ref 3.52–5.24)
LEFT VENTRICULAR POSTERIOR WALL IN END DIASTOLE: 0.9 CM (ref 0.44–0.84)
LEFT VENTRICULAR STROKE VOLUME: 34 ML
LV EF: 42 %
LVSV (TEICH): 34 ML
MAX HR PERCENT: 80 %
MAX HR: 143 BPM
MV E'TISSUE VEL-SEP: 3 CM/S
MV PEAK A VEL: 0.86 M/S
MV PEAK E VEL: 44 CM/S
MV STENOSIS PRESSURE HALF TIME: 70 MS
MV VALVE AREA P 1/2 METHOD: 3.14 CM2
NUC STRESS EJECTION FRACTION: 29 %
RATE PRESSURE PRODUCT: NORMAL
RIGHT ATRIUM AREA SYSTOLE A4C: 13 CM2
RIGHT VENTRICLE ID DIMENSION: 2.7 CM
SL CV LEFT ATRIUM LENGTH A2C: 4.8 CM
SL CV LV DIAS VOL ENDO Z SCORE: 0.36
SL CV LV EF: 35
SL CV PED ECHO LEFT VENTRICLE DIASTOLIC VOLUME (MOD BIPLANE) 2D: 106 ML
SL CV PED ECHO LEFT VENTRICLE SYSTOLIC VOLUME (MOD BIPLANE) 2D: 72 ML
SL CV REST NUCLEAR ISOTOPE DOSE: 10.98 MCI
SL CV STRESS NUCLEAR ISOTOPE DOSE: 32.6 MCI
SL CV STRESS RECOVERY BP: NORMAL MMHG
SL CV STRESS RECOVERY HR: 85 BPM
SL CV STRESS RECOVERY O2 SAT: 99 %
STRESS ANGINA INDEX: 0
STRESS BASELINE BP: NORMAL MMHG
STRESS BASELINE HR: 62 BPM
STRESS O2 SAT REST: 100 %
STRESS PEAK HR: 115 BPM
STRESS PERCENT HR: 80 %
STRESS POST ESTIMATED WORKLOAD: 1 METS
STRESS POST EXERCISE DUR MIN: 1 MIN
STRESS POST O2 SAT PEAK: 100 %
STRESS POST PEAK BP: 140 MMHG
STRESS TARGET HR: 115 BPM
STRESS/REST PERFUSION RATIO: 1.1
TR MAX PG: 47 MMHG
TR PEAK VELOCITY: 2.9 M/S
TRICUSPID VALVE PEAK REGURGITATION VELOCITY: 2.85 M/S
Z-SCORE OF INTERVENTRICULAR SEPTUM IN END DIASTOLE: 3.47
Z-SCORE OF LEFT VENTRICULAR DIMENSION IN END DIASTOLE: 1.11
Z-SCORE OF LEFT VENTRICULAR DIMENSION IN END SYSTOLE: 3.2
Z-SCORE OF LEFT VENTRICULAR POSTERIOR WALL IN END DIASTOLE: 2.59

## 2022-03-24 PROCEDURE — 93306 TTE W/DOPPLER COMPLETE: CPT | Performed by: INTERNAL MEDICINE

## 2022-03-29 ENCOUNTER — TELEPHONE (OUTPATIENT)
Dept: CARDIOLOGY CLINIC | Facility: CLINIC | Age: 77
End: 2022-03-29

## 2022-03-29 NOTE — TELEPHONE ENCOUNTER
----- Message from Pedro Centeno MD sent at 3/25/2022  3:03 PM EDT -----  Patient's echo Doppler shows her heart function is low around 35 40%  She should keep her appointment she may need further cardiac workup  If she has any chest pain or shortness of breath she should be seen early then  Patient nuclear stress test and echo are abnormal  Bartolo Close EF is decreased   She need to be seen early she may need cardiac catheterization

## 2022-03-30 ENCOUNTER — TELEPHONE (OUTPATIENT)
Dept: CARDIOLOGY CLINIC | Facility: CLINIC | Age: 77
End: 2022-03-30

## 2022-04-01 ENCOUNTER — APPOINTMENT (OUTPATIENT)
Dept: RADIOLOGY | Facility: CLINIC | Age: 77
End: 2022-04-01
Payer: MEDICARE

## 2022-04-01 ENCOUNTER — OFFICE VISIT (OUTPATIENT)
Dept: OBGYN CLINIC | Facility: CLINIC | Age: 77
End: 2022-04-01
Payer: MEDICARE

## 2022-04-01 VITALS — BODY MASS INDEX: 17.75 KG/M2 | WEIGHT: 94 LBS | HEIGHT: 61 IN

## 2022-04-01 DIAGNOSIS — Z96.641 STATUS POST TOTAL REPLACEMENT OF RIGHT HIP: ICD-10-CM

## 2022-04-01 DIAGNOSIS — Z96.641 STATUS POST TOTAL REPLACEMENT OF RIGHT HIP: Primary | ICD-10-CM

## 2022-04-01 PROCEDURE — 73502 X-RAY EXAM HIP UNI 2-3 VIEWS: CPT

## 2022-04-01 PROCEDURE — 99214 OFFICE O/P EST MOD 30 MIN: CPT | Performed by: ORTHOPAEDIC SURGERY

## 2022-04-01 NOTE — PROGRESS NOTES
Assessment/Plan:  1  Status post total replacement of right hip         68 yo F 1 year from right anterior total hip replacement, doing very well  Patient has no complaints, she is able to complete her activities of daily living  She can follow up as needed in the future if any issues arise  Subjective:   76F 1 year from right anterior total hip arthroplasty for femoral neck fracture  Patient reports she is doing well she has no pain no difficulty walking uses a cane for stability  She denies fevers chills nausea vomiting chest pain shortness of breath  She is very pleased with her range of motion  Patient ID: Lilly Camp is a 67 y o  female    Review of Systems   Constitutional: Negative for chills and fever  HENT: Negative for ear pain, sinus pain and sore throat  Eyes: Negative for pain and visual disturbance  Respiratory: Negative for cough and shortness of breath  Cardiovascular: Negative for chest pain and leg swelling  Gastrointestinal: Negative for abdominal pain, blood in stool, nausea and vomiting  Endocrine: Negative for cold intolerance and heat intolerance  Genitourinary: Negative for difficulty urinating, dysuria and hematuria  Musculoskeletal: Negative for arthralgias, joint swelling and neck pain  Skin: Negative for rash and wound  Allergic/Immunologic: Negative for environmental allergies and food allergies  Neurological: Negative for weakness, numbness and headaches  Hematological: Does not bruise/bleed easily  No past medical history on file  Past Surgical History:   Procedure Laterality Date    ARTHROPLASTY HIP TOTAL ANTERIOR Right 4/9/2021    Procedure: ARTHROPLASTY HIP TOTAL ANTERIOR;  Surgeon: Truong Kinsey DO;  Location: [address] [place] [address];  Service: Orthopedics       No family history on file      Social History     Occupational History    Not on file   Tobacco Use    Smoking status: Current Every Day Smoker     Packs/day: 0 50     Years: 60 00 Pack years: 30 00     Types: Cigarettes    Smokeless tobacco: Never Used   Vaping Use    Vaping Use: Never used   Substance and Sexual Activity    Alcohol use: Never    Drug use: Never    Sexual activity: Not Currently     Partners: Male         Current Outpatient Medications:     ALPRAZolam (XANAX) 0 25 mg tablet, Take 0 25 mg by mouth, Disp: , Rfl:     Calcium Ascorbate (VITAMIN C) 500 mg tablet, Take 500 mg by mouth daily, Disp: , Rfl:     cholecalciferol (VITAMIN D3) 1,000 units tablet, Take 2 tablets (2,000 Units total) by mouth daily, Disp: 60 tablet, Rfl: 0    famotidine (PEPCID) 20 mg tablet, Take 1 tablet (20 mg total) by mouth daily for 10 doses, Disp: 10 tablet, Rfl: 0    gabapentin (NEURONTIN) 100 mg capsule, Take 2 capsules (200 mg total) by mouth 2 (two) times a day for 7 days, Disp: 28 capsule, Rfl: 0    multivitamin (THERAGRAN) TABS, Take 1 tablet by mouth daily, Disp: , Rfl:     rosuvastatin (CRESTOR) 5 mg tablet, Take 1 tablet (5 mg total) by mouth every other day, Disp: 45 tablet, Rfl: 1    No Known Allergies    Objective: There were no vitals filed for this visit  There is no height or weight on file to calculate BMI  Right Hip Exam     Tenderness   The patient is experiencing no tenderness  Range of Motion   The patient has normal right hip ROM  Muscle Strength   The patient has normal right hip strength  Tests   VENITA: negative    Other   Erythema: absent  Scars: present (Anterior total hip scar well healed without erythema or drainage )  Sensation: normal  Pulse: present            Physical Exam  Constitutional:       Appearance: Normal appearance  HENT:      Head: Normocephalic and atraumatic  Eyes:      General: No scleral icterus  Cardiovascular:      Rate and Rhythm: Normal rate and regular rhythm  Pulses: Normal pulses  Pulmonary:      Effort: Pulmonary effort is normal  No respiratory distress     Musculoskeletal:         General: No swelling or tenderness  Skin:     General: Skin is warm and dry  Capillary Refill: Capillary refill takes less than 2 seconds  Coloration: Skin is not jaundiced  Neurological:      Mental Status: She is alert and oriented to person, place, and time  Psychiatric:         Mood and Affect: Mood normal          Behavior: Behavior normal          I have personally reviewed pertinent films in PACS  Xray right hip shows well aligned and well fixed femoral and acetabular components  No fracture or dislocation  Modest amount of heterotopic ossification  Left hip mild degenerative joint changes

## 2022-04-13 ENCOUNTER — APPOINTMENT (OUTPATIENT)
Dept: LAB | Facility: CLINIC | Age: 77
End: 2022-04-13
Payer: MEDICARE

## 2022-04-13 DIAGNOSIS — R94.31 ABNORMAL EKG: ICD-10-CM

## 2022-04-13 DIAGNOSIS — E78.5 DYSLIPIDEMIA: ICD-10-CM

## 2022-04-13 DIAGNOSIS — R00.0 TACHYCARDIA: ICD-10-CM

## 2022-04-13 DIAGNOSIS — R06.02 EXERTIONAL SHORTNESS OF BREATH: ICD-10-CM

## 2022-04-13 DIAGNOSIS — U07.1 COVID-19 VIRUS INFECTION: ICD-10-CM

## 2022-04-13 DIAGNOSIS — Z72.0 TOBACCO ABUSE: ICD-10-CM

## 2022-04-13 LAB
ALBUMIN SERPL BCP-MCNC: 3.4 G/DL (ref 3.5–5)
ALP SERPL-CCNC: 63 U/L (ref 46–116)
ALT SERPL W P-5'-P-CCNC: 35 U/L (ref 12–78)
ANION GAP SERPL CALCULATED.3IONS-SCNC: 1 MMOL/L (ref 4–13)
AST SERPL W P-5'-P-CCNC: 28 U/L (ref 5–45)
BILIRUB SERPL-MCNC: 0.35 MG/DL (ref 0.2–1)
BUN SERPL-MCNC: 25 MG/DL (ref 5–25)
CALCIUM ALBUM COR SERPL-MCNC: 9.2 MG/DL (ref 8.3–10.1)
CALCIUM SERPL-MCNC: 8.7 MG/DL (ref 8.3–10.1)
CHLORIDE SERPL-SCNC: 110 MMOL/L (ref 100–108)
CHOLEST SERPL-MCNC: 117 MG/DL
CO2 SERPL-SCNC: 29 MMOL/L (ref 21–32)
CREAT SERPL-MCNC: 0.7 MG/DL (ref 0.6–1.3)
GFR SERPL CREATININE-BSD FRML MDRD: 83 ML/MIN/1.73SQ M
GLUCOSE P FAST SERPL-MCNC: 100 MG/DL (ref 65–99)
HDLC SERPL-MCNC: 51 MG/DL
LDLC SERPL CALC-MCNC: 56 MG/DL (ref 0–100)
NONHDLC SERPL-MCNC: 66 MG/DL
POTASSIUM SERPL-SCNC: 3.9 MMOL/L (ref 3.5–5.3)
PROT SERPL-MCNC: 7.1 G/DL (ref 6.4–8.2)
SODIUM SERPL-SCNC: 140 MMOL/L (ref 136–145)
TRIGL SERPL-MCNC: 50 MG/DL

## 2022-04-13 PROCEDURE — 80061 LIPID PANEL: CPT

## 2022-04-13 PROCEDURE — 36415 COLL VENOUS BLD VENIPUNCTURE: CPT

## 2022-04-13 PROCEDURE — 80053 COMPREHEN METABOLIC PANEL: CPT

## 2022-04-14 ENCOUNTER — TELEPHONE (OUTPATIENT)
Dept: CARDIOLOGY CLINIC | Facility: CLINIC | Age: 77
End: 2022-04-14

## 2022-04-14 NOTE — TELEPHONE ENCOUNTER
----- Message from Patricia Elam MD sent at 4/13/2022  3:45 PM EDT -----  Patient blood test reviewed  They are acceptable  Will continue same medication  Patient should keep his appointment for follow-up

## 2022-04-20 ENCOUNTER — OFFICE VISIT (OUTPATIENT)
Dept: PODIATRY | Facility: CLINIC | Age: 77
End: 2022-04-20
Payer: MEDICARE

## 2022-04-20 ENCOUNTER — OFFICE VISIT (OUTPATIENT)
Dept: CARDIOLOGY CLINIC | Facility: CLINIC | Age: 77
End: 2022-04-20
Payer: MEDICARE

## 2022-04-20 VITALS
BODY MASS INDEX: 17.86 KG/M2 | WEIGHT: 94.6 LBS | HEART RATE: 70 BPM | DIASTOLIC BLOOD PRESSURE: 92 MMHG | HEIGHT: 61 IN | SYSTOLIC BLOOD PRESSURE: 148 MMHG | TEMPERATURE: 98.8 F | OXYGEN SATURATION: 99 %

## 2022-04-20 VITALS — BODY MASS INDEX: 17.75 KG/M2 | HEIGHT: 61 IN | WEIGHT: 94 LBS | RESPIRATION RATE: 17 BRPM

## 2022-04-20 DIAGNOSIS — M79.671 PAIN IN BOTH FEET: Primary | ICD-10-CM

## 2022-04-20 DIAGNOSIS — R94.39 ABNORMAL NUCLEAR STRESS TEST: ICD-10-CM

## 2022-04-20 DIAGNOSIS — R00.0 TACHYCARDIA: ICD-10-CM

## 2022-04-20 DIAGNOSIS — B35.1 ONYCHOMYCOSIS: ICD-10-CM

## 2022-04-20 DIAGNOSIS — M79.672 PAIN IN BOTH FEET: Primary | ICD-10-CM

## 2022-04-20 DIAGNOSIS — I42.9 CARDIOMYOPATHY, UNSPECIFIED TYPE (HCC): ICD-10-CM

## 2022-04-20 DIAGNOSIS — Z72.0 TOBACCO ABUSE: ICD-10-CM

## 2022-04-20 DIAGNOSIS — R06.02 EXERTIONAL SHORTNESS OF BREATH: ICD-10-CM

## 2022-04-20 DIAGNOSIS — L03.039 PARONYCHIA OF TOENAIL, UNSPECIFIED LATERALITY: ICD-10-CM

## 2022-04-20 DIAGNOSIS — E78.5 DYSLIPIDEMIA: ICD-10-CM

## 2022-04-20 DIAGNOSIS — L85.3 XEROSIS OF SKIN: ICD-10-CM

## 2022-04-20 DIAGNOSIS — I70.209 PERIPHERAL ARTERIOSCLEROSIS (HCC): ICD-10-CM

## 2022-04-20 PROCEDURE — 99215 OFFICE O/P EST HI 40 MIN: CPT | Performed by: INTERNAL MEDICINE

## 2022-04-20 PROCEDURE — 99202 OFFICE O/P NEW SF 15 MIN: CPT | Performed by: PODIATRIST

## 2022-04-20 RX ORDER — SPIRONOLACTONE 25 MG/1
25 TABLET ORAL EVERY OTHER DAY
Qty: 15 TABLET | Refills: 1 | Status: SHIPPED | OUTPATIENT
Start: 2022-04-20 | End: 2022-06-16 | Stop reason: SDUPTHER

## 2022-04-20 RX ORDER — ASPIRIN 81 MG/1
324 TABLET, CHEWABLE ORAL ONCE
OUTPATIENT
Start: 2022-04-20 | End: 2022-04-20

## 2022-04-20 RX ORDER — CARVEDILOL 3.12 MG/1
3.12 TABLET ORAL 2 TIMES DAILY WITH MEALS
Qty: 60 TABLET | Refills: 1 | Status: SHIPPED | OUTPATIENT
Start: 2022-04-20 | End: 2022-06-16 | Stop reason: SDUPTHER

## 2022-04-20 RX ORDER — LISINOPRIL 2.5 MG/1
2.5 TABLET ORAL DAILY
Qty: 30 TABLET | Refills: 1 | Status: SHIPPED | OUTPATIENT
Start: 2022-04-20 | End: 2022-06-16 | Stop reason: SDUPTHER

## 2022-04-20 RX ORDER — SODIUM CHLORIDE 9 MG/ML
100 INJECTION, SOLUTION INTRAVENOUS CONTINUOUS
OUTPATIENT
Start: 2022-04-20

## 2022-04-20 NOTE — PROGRESS NOTES
Assessment/Plan:  Pain upon ambulation secondary to peripheral artery disease  Mycosis of nail hallux nail demonstrate a paronychia bilateral   Xerosis of skin  Plan  Foot exam performed  Patient educated on condition  All nails debrided without pain or complication  Patient will moisturize daily  Diagnoses and all orders for this visit:    Pain in both feet    Peripheral arteriosclerosis (HCC)    Onychomycosis    Paronychia of toenail, unspecified laterality    Xerosis of skin          Subjective:  Patient has pain in her feet and toes with ambulation  She has pain when she wears shoes  No history of trauma  No Known Allergies      Current Outpatient Medications:     ALPRAZolam (XANAX) 0 25 mg tablet, Take 0 25 mg by mouth, Disp: , Rfl:     Calcium Ascorbate (VITAMIN C) 500 mg tablet, Take 500 mg by mouth daily, Disp: , Rfl:     carvedilol (COREG) 3 125 mg tablet, Take 1 tablet (3 125 mg total) by mouth 2 (two) times a day with meals, Disp: 60 tablet, Rfl: 1    cholecalciferol (VITAMIN D3) 1,000 units tablet, Take 2 tablets (2,000 Units total) by mouth daily, Disp: 60 tablet, Rfl: 0    lisinopril (ZESTRIL) 2 5 mg tablet, Take 1 tablet (2 5 mg total) by mouth daily, Disp: 30 tablet, Rfl: 1    multivitamin (THERAGRAN) TABS, Take 1 tablet by mouth daily, Disp: , Rfl:     rosuvastatin (CRESTOR) 5 mg tablet, Take 1 tablet (5 mg total) by mouth every other day, Disp: 45 tablet, Rfl: 1    spironolactone (ALDACTONE) 25 mg tablet, Take 1 tablet (25 mg total) by mouth every other day, Disp: 15 tablet, Rfl: 1    Patient Active Problem List   Diagnosis    COVID-19 virus infection    Closed displaced fracture of right femoral neck (HCC)    Tobacco abuse    Tachycardia          Patient ID: Anita Castaneda is a 67 y o  female  HPI    The following portions of the patient's history were reviewed and updated as appropriate:     family history is not on file        reports that she has been smoking cigarettes  She has a 30 00 pack-year smoking history  She has never used smokeless tobacco  She reports that she does not drink alcohol and does not use drugs  Vitals:    04/20/22 1451   Resp: 17       Review of Systems      Objective:  Patient's shoes and socks removed  Foot Exam    General  General Appearance: appears stated age and healthy   Orientation: alert and oriented to person, place, and time   Affect: appropriate       Right Foot/Ankle     Inspection and Palpation  Tenderness: metatarsals   Swelling: dorsum   Arch: pes planus  Claw Toes: fifth toe  Skin Exam: dry skin;     Neurovascular  Dorsalis pedis: 1+  Posterior tibial: 1+  Superficial peroneal nerve sensation: diminished  Deep peroneal nerve sensation: diminished      Left Foot/Ankle      Inspection and Palpation  Tenderness: metatarsals   Swelling: dorsum   Arch: pes planus  Claw toes: fifth toe  Skin Exam: callus and dry skin;     Neurovascular  Dorsalis pedis: 1+  Posterior tibial: 1+  Superficial peroneal nerve sensation: diminished  Deep peroneal nerve sensation: diminished        Physical Exam  Vitals and nursing note reviewed  Constitutional:       Appearance: Normal appearance  Cardiovascular:      Rate and Rhythm: Normal rate and regular rhythm  Pulses:           Dorsalis pedis pulses are 1+ on the right side and 1+ on the left side  Posterior tibial pulses are 1+ on the right side and 1+ on the left side  Comments: Q, 9 findings bilateral   Negative digital hair  Positive abnormal cooling  Positive significant telangiectasia development foot and ankle bilateral  Feet:      Right foot:      Skin integrity: Dry skin present  Left foot:      Skin integrity: Callus and dry skin present  Skin:     Capillary Refill: Capillary refill takes 2 to 3 seconds  Comments: Patient demonstrates xerosis of skin  All nails are mycotic  They demonstrate distal lysis  There is malodor    Hallux bilateral has mild paronychia without evidence of cellulitis   Neurological:      Mental Status: She is alert  Psychiatric:         Mood and Affect: Mood normal          Behavior: Behavior normal          Thought Content:  Thought content normal          Judgment: Judgment normal

## 2022-04-20 NOTE — PROGRESS NOTES
Progress note - Cardiology Office  [facility]  Segun Bowen Stamets 67 y o  female MRN: [id number]  : 1945  Unit/Bed#:  Encounter: [id number]      Assessment:     1  Exertional shortness of breath    2  Cardiomyopathy, unspecified type ([facility] [address] )    3  Tachycardia    4  Dyslipidemia    5  Abnormal nuclear stress test    6  Tobacco abuse        Discussion summary and Plan:    1  Exertional shortness of breath  Etiology of her exertional shortness breath may be multifactorial   She has been a smoker for her life but recent cardiac workup shown she had cardiomyopathy with EF now 35-40% by echo and 30% by nuclear stress test with mild abnormality  She will be scheduled for cardiac catheterization and will start on heart heart failure medications    2  Cardiomyopathy  She is diagnosed to have cardiomyopathy of unclear etiology  She has multiple risk factors  She will be scheduled for cardiac catheterization  Will start on heart failure medication initial dose will be very loaded Coreg 3 125 twice a day along with lisinopril 2 5 and Aldactone 12 5 mg every other day  Will check electrolytes in 2 weeks  3  Continues tobacco abuse  Patient has been smoker when she was a teenager  She smokes about half pack a day does not want to quit  Advised to get lung cancer screening with primary care doctor    4  Dyslipidemia with high risk for cardiovascular event around 30%  She was started on Crestor 5 mg during her last visit  5  Abnormal echo and nuclear stress test   Discussed with patient and patient's family  Scheduled for cardiac catheterization    6  History of COVID-19 virus fraction patient has recovered but chronic shortness of breath may be related to other factors pain    Plan  Start on heart failure medication with Coreg and lisinopril to low-dose and Aldactone every other day  Will check electrolytes  Scheduled for cardiac catheterization        Patient and patient's daughter in-law was advised and educated to call our office  immediately if  patient has any new symptoms of chest pain/shortness of breath, near-syncope, syncope, light headedness sustained palpitations  or any other cardiovascular symptoms before their scheduled follow-up appointment  Office #[phone]  Thank you for your consultation  If you have any question please call me at [phone]    Counseling :  A description of the counseling  Goals and Barriers  Patient's ability to self care: Yes  Medication side effect reviewed with patient in detail and all their questions answered to their satisfaction  Primary Care Physician : Mallika Yancey MD      HPI :     Alexa De Souza is a 67y o  year old female who was referred by primary care doctor for exertional shortness of breath and abnormal EKG  Patient who has medical history significant for tobacco abuse for almost all her life since she has teenager, dyslipidemia, history of COVID-19 infection, 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 was noted to have abnormal EKG and some exertional shortness of breath  Patient now smokes about half pack a day she started smoking long time ago  Currently she is not on any significant cardiovascular medications though her cholesterol is acceptable her risk for cardiovascular event is noted to be high  She denies any chest pain with normal activities at she walks with the help of cane  No other cardiovascular issues at this time  She came with her daughter-in-law  Her  is also our patient  History of hip replacement surgery    History of tonsillectomy    04/20/2022  Above reviewed  Patient came for follow-up  She was initially seen for exertional shortness of breath    Patient who has medical history significant for tobacco abuse for almost all her life since she has teenager, dyslipidemia, history of DJD with hip replacement surgery, history of COVID-19 infection who was having exertional shortness of breath  Her cardiac workup shows she has cardiomyopathy with EF 35-40% with severe global hypokinesis  Her nuclear stress test was read as markedly abnormal with reducing EF  Cannot rule out small infarction versus ischemia in apical area  Patient came with her daughter  She has noted occasionally her legs swell up but not all the time  No swelling is present today  She still have exertional shortness of breath that she always attributed to her smoking  She is using her pressure dressing  No other issues at this time  Review of Systems   Constitutional: Negative for activity change, chills, diaphoresis, fever and unexpected weight change  HENT: Negative for congestion  Eyes: Negative for discharge and redness  Respiratory: Positive for shortness of breath  Negative for cough, chest tightness and wheezing  Chronic exertion   Cardiovascular: Negative  Negative for chest pain, palpitations and leg swelling  Gastrointestinal: Negative for abdominal pain, diarrhea and nausea  Endocrine: Negative  Genitourinary: Negative for decreased urine volume and urgency  Musculoskeletal: Positive for arthralgias, back pain and gait problem  Skin: Negative for rash and wound  Allergic/Immunologic: Negative  Neurological: Negative for dizziness, seizures, syncope, weakness, light-headedness and headaches  Hematological: Negative  Psychiatric/Behavioral: Negative for agitation and confusion  The patient is nervous/anxious  Historical Information   No past medical history on file    Past Surgical History:   Procedure Laterality Date    ARTHROPLASTY HIP TOTAL ANTERIOR Right 4/9/2021    Procedure: ARTHROPLASTY HIP TOTAL ANTERIOR;  Surgeon: Irma Moya DO;  Location: [facility];  Service: Orthopedics     Social History     Substance and Sexual Activity   Alcohol Use Never     Social History     Substance and Sexual Activity   Drug Use Never     Social History     Tobacco Use   Smoking Status Current Every Day Smoker    Packs/day: 0 50    Years: 60 00    Pack years: 30 00    Types: Cigarettes   Smokeless Tobacco Never Used     Family History: No family history on file  Meds/Allergies     No Known Allergies    Current Outpatient Medications:     ALPRAZolam (XANAX) 0 25 mg tablet, Take 0 25 mg by mouth, Disp: , Rfl:     Calcium Ascorbate (VITAMIN C) 500 mg tablet, Take 500 mg by mouth daily, Disp: , Rfl:     cholecalciferol (VITAMIN D3) 1,000 units tablet, Take 2 tablets (2,000 Units total) by mouth daily, Disp: 60 tablet, Rfl: 0    multivitamin (THERAGRAN) TABS, Take 1 tablet by mouth daily, Disp: , Rfl:     rosuvastatin (CRESTOR) 5 mg tablet, Take 1 tablet (5 mg total) by mouth every other day, Disp: 45 tablet, Rfl: 1    carvedilol (COREG) 3 125 mg tablet, Take 1 tablet (3 125 mg total) by mouth 2 (two) times a day with meals, Disp: 60 tablet, Rfl: 1    lisinopril (ZESTRIL) 2 5 mg tablet, Take 1 tablet (2 5 mg total) by mouth daily, Disp: 30 tablet, Rfl: 1    spironolactone (ALDACTONE) 25 mg tablet, Take 1 tablet (25 mg total) by mouth every other day, Disp: 15 tablet, Rfl: 1    Vitals: Blood pressure 148/92, pulse 70, temperature 98 8 °F (37 1 °C), height 5' 1" (1 549 m), weight 42 9 kg (94 lb 9 6 oz), SpO2 99 %  Body mass index is 17 87 kg/m²  Wt Readings from Last 3 Encounters:   04/20/22 42 9 kg (94 lb 9 6 oz)   04/01/22 42 6 kg (94 lb)   03/23/22 41 7 kg (92 lb)     Vitals:    04/20/22 1135   Weight: 42 9 kg (94 lb 9 6 oz)     BP Readings from Last 3 Encounters:   04/20/22 148/92   03/23/22 (!) 173/81   03/08/22 140/84         Physical Exam  Constitutional:       General: She is not in acute distress  Appearance: She is well-developed  She is not diaphoretic  Neck:      Thyroid: No thyromegaly  Vascular: No JVD  Trachea: No tracheal deviation  Cardiovascular:      Rate and Rhythm: Normal rate and regular rhythm  Heart sounds: S1 normal and S2 normal  Heart sounds not distant  Murmur heard  Systolic (ejection) murmur is present with a grade of 2/6  No friction rub  No gallop  No S3 or S4 sounds  Pulmonary:      Effort: Pulmonary effort is normal  No respiratory distress  Breath sounds: Normal breath sounds  No wheezing or rales  Chest:      Chest wall: No tenderness  Abdominal:      General: Bowel sounds are normal  There is no distension  Palpations: Abdomen is soft  Tenderness: There is no abdominal tenderness  Musculoskeletal:         General: No deformity  Cervical back: Neck supple  Skin:     General: Skin is warm and dry  Coloration: Skin is not pale  Findings: No rash  Neurological:      Mental Status: She is alert and oriented to person, place, and time  Psychiatric:         Behavior: Behavior normal          Judgment: Judgment normal            Diagnostic Studies Review Cardio:    Echo Doppler done on 03/23/2022 shows patient has EF 35-40%  Global hypokinesis mild MR mild TR  Echodensity in right atrium most likely eustachian valve  Nuclear stress test done in March 2022 shows EF around 30%, fix partially reversible perfusion defect in apical area as  EKG:  Normal sinus rhythm LVH by voltage        Imaging:  Chest X-Ray:   XR chest pa & lateral    Result Date: 8/6/2021  Impression Hyperinflation due to emphysema  Mild reticulation in the right base, likely mild fibrosis  Question right base bronchiectasis  Workstation performed: [id number]       CT-scan of the chest:     No CTA results available for this patient    Lab Review   Lab Results   Component Value Date    WBC 8 62 08/03/2021    HGB 13 3 08/03/2021    HCT 42 0 08/03/2021    MCV 99 (H) 08/03/2021    RDW 15 4 (H) 08/03/2021     08/03/2021     BMP:  Lab Results   Component Value Date    SODIUM 140 04/13/2022    K 3 9 04/13/2022     (H) 04/13/2022    CO2 29 04/13/2022    BUN 25 04/13/2022 CREATININE 0 70 04/13/2022    GLUC 125 04/14/2021    GLUF 100 (H) 04/13/2022    CALCIUM 8 7 04/13/2022    CORRECTEDCA 9 2 04/13/2022    EGFR 83 04/13/2022    MG 2 1 04/14/2021     Troponins:    LFT:  Lab Results   Component Value Date    AST 28 04/13/2022    ALT 35 04/13/2022    ALKPHOS 63 04/13/2022    TP 7 1 04/13/2022    ALB 3 4 (L) 04/13/2022      Lab Results   Component Value Date    EOO2IGWURRMN 5 410 (H) 08/03/2021     Lipid Profile:   Lab Results   Component Value Date    CHOLESTEROL 117 04/13/2022    HDL 51 04/13/2022    LDLCALC 56 04/13/2022    TRIG 50 04/13/2022     Lab Results   Component Value Date    CHOLESTEROL 117 04/13/2022    CHOLESTEROL 172 08/03/2021     Lab Results   Component Value Date    RFXTDXE 041 (H) 04/12/2021    CKMB 1 8 04/12/2021    CKMBINDEX <1 0 04/12/2021    TROPONINI <0 02 04/09/2021     Lab Results   Component Value Date    NTBNP 381 04/12/2021      The ASCVD Risk score (Anjel Campoverde et al , 2013) failed to calculate for the following reasons: The valid total cholesterol range is 130 to 320 mg/dL      Dr Layne Hashimoto, MD [facility]      "This note has been constructed using a voice recognition system  Therefore there may be syntax, spelling, and/or grammatical errors   Please call if you have any questions  "

## 2022-04-29 ENCOUNTER — APPOINTMENT (OUTPATIENT)
Dept: LAB | Facility: CLINIC | Age: 77
End: 2022-04-29
Payer: MEDICARE

## 2022-04-29 DIAGNOSIS — I42.9 CARDIOMYOPATHY, UNSPECIFIED TYPE (HCC): ICD-10-CM

## 2022-04-29 DIAGNOSIS — R94.39 ABNORMAL NUCLEAR STRESS TEST: ICD-10-CM

## 2022-04-29 DIAGNOSIS — R06.02 EXERTIONAL SHORTNESS OF BREATH: ICD-10-CM

## 2022-04-29 DIAGNOSIS — R00.0 TACHYCARDIA: ICD-10-CM

## 2022-04-29 DIAGNOSIS — E78.5 DYSLIPIDEMIA: ICD-10-CM

## 2022-04-29 DIAGNOSIS — Z72.0 TOBACCO ABUSE: ICD-10-CM

## 2022-04-29 LAB
ANION GAP SERPL CALCULATED.3IONS-SCNC: 5 MMOL/L (ref 4–13)
BUN SERPL-MCNC: 24 MG/DL (ref 5–25)
CALCIUM SERPL-MCNC: 8.7 MG/DL (ref 8.3–10.1)
CHLORIDE SERPL-SCNC: 107 MMOL/L (ref 100–108)
CO2 SERPL-SCNC: 27 MMOL/L (ref 21–32)
CREAT SERPL-MCNC: 0.75 MG/DL (ref 0.6–1.3)
ERYTHROCYTE [DISTWIDTH] IN BLOOD BY AUTOMATED COUNT: 13.2 % (ref 11.6–15.1)
GFR SERPL CREATININE-BSD FRML MDRD: 77 ML/MIN/1.73SQ M
GLUCOSE P FAST SERPL-MCNC: 104 MG/DL (ref 65–99)
HCT VFR BLD AUTO: 40.3 % (ref 34.8–46.1)
HGB BLD-MCNC: 13.3 G/DL (ref 11.5–15.4)
INR PPP: 1 (ref 0.84–1.19)
MCH RBC QN AUTO: 33.9 PG (ref 26.8–34.3)
MCHC RBC AUTO-ENTMCNC: 33 G/DL (ref 31.4–37.4)
MCV RBC AUTO: 103 FL (ref 82–98)
PLATELET # BLD AUTO: 301 THOUSANDS/UL (ref 149–390)
PMV BLD AUTO: 8.9 FL (ref 8.9–12.7)
POTASSIUM SERPL-SCNC: 4.1 MMOL/L (ref 3.5–5.3)
PROTHROMBIN TIME: 12.8 SECONDS (ref 11.6–14.5)
RBC # BLD AUTO: 3.92 MILLION/UL (ref 3.81–5.12)
SODIUM SERPL-SCNC: 139 MMOL/L (ref 136–145)
WBC # BLD AUTO: 9.6 THOUSAND/UL (ref 4.31–10.16)

## 2022-04-29 PROCEDURE — 36415 COLL VENOUS BLD VENIPUNCTURE: CPT

## 2022-04-29 PROCEDURE — 85610 PROTHROMBIN TIME: CPT

## 2022-04-29 PROCEDURE — 80048 BASIC METABOLIC PNL TOTAL CA: CPT

## 2022-04-29 PROCEDURE — 85027 COMPLETE CBC AUTOMATED: CPT

## 2022-05-27 ENCOUNTER — HOSPITAL ENCOUNTER (OUTPATIENT)
Facility: HOSPITAL | Age: 77
Setting detail: OUTPATIENT SURGERY
Discharge: HOME | End: 2022-05-27
Attending: INTERNAL MEDICINE | Admitting: INTERNAL MEDICINE
Payer: MEDICARE

## 2022-05-27 VITALS
HEART RATE: 53 BPM | SYSTOLIC BLOOD PRESSURE: 163 MMHG | RESPIRATION RATE: 20 BRPM | OXYGEN SATURATION: 98 % | DIASTOLIC BLOOD PRESSURE: 73 MMHG | TEMPERATURE: 98.2 F

## 2022-05-27 DIAGNOSIS — Z72.0 TOBACCO ABUSE: ICD-10-CM

## 2022-05-27 DIAGNOSIS — R94.39 ABNORMAL NUCLEAR STRESS TEST: ICD-10-CM

## 2022-05-27 DIAGNOSIS — E78.5 DYSLIPIDEMIA: ICD-10-CM

## 2022-05-27 DIAGNOSIS — R00.0 TACHYCARDIA: ICD-10-CM

## 2022-05-27 DIAGNOSIS — R06.02 EXERTIONAL SHORTNESS OF BREATH: ICD-10-CM

## 2022-05-27 DIAGNOSIS — I42.9 CARDIOMYOPATHY, UNSPECIFIED TYPE (HCC): ICD-10-CM

## 2022-05-27 LAB — CATH EF QUANTITATIVE: 40 %

## 2022-05-27 PROCEDURE — C1894 INTRO/SHEATH, NON-LASER: HCPCS | Performed by: INTERNAL MEDICINE

## 2022-05-27 PROCEDURE — 99152 MOD SED SAME PHYS/QHP 5/>YRS: CPT | Performed by: INTERNAL MEDICINE

## 2022-05-27 PROCEDURE — 93458 L HRT ARTERY/VENTRICLE ANGIO: CPT | Performed by: INTERNAL MEDICINE

## 2022-05-27 PROCEDURE — C1769 GUIDE WIRE: HCPCS | Performed by: INTERNAL MEDICINE

## 2022-05-27 PROCEDURE — 99153 MOD SED SAME PHYS/QHP EA: CPT | Performed by: INTERNAL MEDICINE

## 2022-05-27 RX ORDER — HEPARIN SODIUM 1000 [USP'U]/ML
INJECTION, SOLUTION INTRAVENOUS; SUBCUTANEOUS AS NEEDED
Status: DISCONTINUED | OUTPATIENT
Start: 2022-05-27 | End: 2022-05-27 | Stop reason: HOSPADM

## 2022-05-27 RX ORDER — LIDOCAINE WITH 8.4% SOD BICARB 0.9%(10ML)
SYRINGE (ML) INJECTION AS NEEDED
Status: DISCONTINUED | OUTPATIENT
Start: 2022-05-27 | End: 2022-05-27 | Stop reason: HOSPADM

## 2022-05-27 RX ORDER — MIDAZOLAM HYDROCHLORIDE 2 MG/2ML
INJECTION, SOLUTION INTRAMUSCULAR; INTRAVENOUS AS NEEDED
Status: DISCONTINUED | OUTPATIENT
Start: 2022-05-27 | End: 2022-05-27 | Stop reason: HOSPADM

## 2022-05-27 RX ORDER — NITROGLYCERIN 20 MG/100ML
INJECTION INTRAVENOUS AS NEEDED
Status: DISCONTINUED | OUTPATIENT
Start: 2022-05-27 | End: 2022-05-27 | Stop reason: HOSPADM

## 2022-05-27 RX ORDER — VERAPAMIL HCL 2.5 MG/ML
AMPUL (ML) INTRAVENOUS AS NEEDED
Status: DISCONTINUED | OUTPATIENT
Start: 2022-05-27 | End: 2022-05-27 | Stop reason: HOSPADM

## 2022-05-27 RX ORDER — FENTANYL CITRATE 50 UG/ML
INJECTION, SOLUTION INTRAMUSCULAR; INTRAVENOUS AS NEEDED
Status: DISCONTINUED | OUTPATIENT
Start: 2022-05-27 | End: 2022-05-27 | Stop reason: HOSPADM

## 2022-05-27 RX ORDER — SODIUM CHLORIDE 9 MG/ML
50 INJECTION, SOLUTION INTRAVENOUS CONTINUOUS
Status: DISCONTINUED | OUTPATIENT
Start: 2022-05-27 | End: 2022-05-27 | Stop reason: HOSPADM

## 2022-05-27 NOTE — DISCHARGE INSTRUCTIONS
Cardiac Catheterization     WHAT YOU NEED TO KNOW:   A cardiac catheterization is a procedure to look at your heart and its blood vessels  Healthcare providers can measure oxygen levels and pressures in your heart  They can also fix problems with your valves, blood vessels, or the walls of your heart  You may need this procedure if you have chest pain, heart disease, or your heart is not working properly  DISCHARGE INSTRUCTIONS:     No driving for 24 hours, because you were sedated  Sedation: Avoid making any legal/major decisions today, as the sedation may inhibit your decision making  Also avoid alcohol today, as the sedation may heighten the effects of the alcohol  Apply firm, steady pressure directly over the wound if bleeding occurs  A small amount of bleeding from your wound is possible  Apply pressure with a clean gauze or towel for 5 to 10 minutes  Call 911 if bleeding becomes heavy or does not stop  Do not attempt to drive yourself to the hospital     Bathing: You will be able to shower the day after your procedure  Remove your bandage before you shower  Carefully wash the wound with soap and water  Pat the area dry and apply a clean band-aid  Do not take baths or go in hot tubs or pools for about one week  Care for your wound as directed: Keep your dressing clean and dry  Monitor your wound everyday for signs of infection such as redness, swelling, or pus  Mild bruising is normal and expected  Do not put powders, lotions, or creams on your wound for about one week  Do not lift anything heavier than 5 pounds for about 5 days  Heavy lifting can put stress on your wound and cause bleeding  If an artery in your wrist was used, do not push or pull with the arm that was used for the procedure - pretend like your wrist is broken  Do not do vigorous activity for at least 48 hours  Vigorous activity may cause bleeding from your wound  Rest and do quiet activities   Short walks to the bathroom and around the house are okay  Ask your healthcare provider when you can return to your normal activities  Drink liquids to flush the contrast liquid from your body and prevent blood clots  Ask how much liquid to drink each day and which liquids are best for you  Call 911 for any of the following: You have any of the following signs of a heart attack:    Squeezing, pressure, or pain in your chest that lasts longer than 5 minutes or returns   Discomfort or pain in your back, neck, jaw, stomach, or arm    Trouble breathing   Nausea or vomiting   Lightheadedness or a sudden cold sweat, especially with chest pain or trouble breathing     You have any of the following signs of a stroke:    Numbness or drooping on one side of your face    Weakness in an arm or leg   Confusion or difficulty speaking   Dizziness, a severe headache, or vision loss     You feel lightheaded, short of breath, and have chest pain  You cough up blood  You have trouble breathing  You cannot stop the bleeding from your wound even after you hold firm pressure for 10 minutes  Seek care immediately if:   Blood soaks through your bandage  Your stitches come apart  Your arm or leg feels numb, cool, or looks pale  Your wound gets swollen quickly  Contact your healthcare provider if:   You have a fever or chills  Your wound is red, swollen, or draining pus  Your wound looks more bruised or there is new bruising on the side of your leg or arm  You have nausea or are vomiting  Your skin is itchy, swollen, or you have a rash  You have questions or concerns about your condition or care

## 2022-05-27 NOTE — QUICK NOTE
Update on H&P    My office note from 04/20/2022 reviewed  Patient has history of a done shortness of breath, cardiomyopathy, mildly abnormal stress test, history of chronic tobacco abuse who has outpatient cardiac workup done and found to have cardiomyopathy and abnormal stress test underwent noninvasive cardiac workup earlier  Due to her abnormal stress test and cardiomyopathy she was scheduled for cardiac catheterization  All risks benefit alternate complication discussed with the she wishes to proceed and consent was obtained  Risk of heart attack stroke even death but not limited to it discussed with her and her family  She agree with the plan

## 2022-05-27 NOTE — Clinical Note
The site was marked  Prepped: right groin and right radial  Prepped with: ChloraPrep  The site was clipped  The patient was draped

## 2022-05-27 NOTE — PERIOPERATIVE NURSING NOTE
Received from [facility], right radial band intact, air released every 15 min  , no bleeding, no pain , right fingers pink, good circ  Taking fluids, comfortable, denies any chest pain or other discomfort

## 2022-06-16 ENCOUNTER — OFFICE VISIT (OUTPATIENT)
Dept: CARDIOLOGY CLINIC | Facility: CLINIC | Age: 77
End: 2022-06-16
Payer: MEDICARE

## 2022-06-16 VITALS
BODY MASS INDEX: 17.18 KG/M2 | OXYGEN SATURATION: 97 % | SYSTOLIC BLOOD PRESSURE: 130 MMHG | DIASTOLIC BLOOD PRESSURE: 80 MMHG | WEIGHT: 91 LBS | HEART RATE: 81 BPM | TEMPERATURE: 97 F | HEIGHT: 61 IN

## 2022-06-16 DIAGNOSIS — R00.0 TACHYCARDIA: ICD-10-CM

## 2022-06-16 DIAGNOSIS — I25.10 CORONARY ARTERY DISEASE INVOLVING NATIVE CORONARY ARTERY OF NATIVE HEART WITHOUT ANGINA PECTORIS: ICD-10-CM

## 2022-06-16 DIAGNOSIS — Z72.0 TOBACCO ABUSE: ICD-10-CM

## 2022-06-16 DIAGNOSIS — R06.02 EXERTIONAL SHORTNESS OF BREATH: ICD-10-CM

## 2022-06-16 DIAGNOSIS — I42.9 CARDIOMYOPATHY, UNSPECIFIED TYPE (HCC): ICD-10-CM

## 2022-06-16 DIAGNOSIS — E78.5 DYSLIPIDEMIA: ICD-10-CM

## 2022-06-16 DIAGNOSIS — I50.42 CHRONIC COMBINED SYSTOLIC AND DIASTOLIC HEART FAILURE, NYHA CLASS 2 (HCC): ICD-10-CM

## 2022-06-16 PROCEDURE — 99214 OFFICE O/P EST MOD 30 MIN: CPT | Performed by: INTERNAL MEDICINE

## 2022-06-16 RX ORDER — CARVEDILOL 3.12 MG/1
3.12 TABLET ORAL 2 TIMES DAILY WITH MEALS
Qty: 180 TABLET | Refills: 1 | Status: SHIPPED | OUTPATIENT
Start: 2022-06-16

## 2022-06-16 RX ORDER — ROSUVASTATIN CALCIUM 5 MG/1
5 TABLET, COATED ORAL EVERY OTHER DAY
Qty: 45 TABLET | Refills: 1 | Status: SHIPPED | OUTPATIENT
Start: 2022-06-16

## 2022-06-16 RX ORDER — SPIRONOLACTONE 25 MG/1
25 TABLET ORAL EVERY OTHER DAY
Qty: 45 TABLET | Refills: 1 | Status: SHIPPED | OUTPATIENT
Start: 2022-06-16

## 2022-06-16 RX ORDER — LISINOPRIL 2.5 MG/1
2.5 TABLET ORAL DAILY
Qty: 90 TABLET | Refills: 1 | Status: SHIPPED | OUTPATIENT
Start: 2022-06-16

## 2022-06-16 NOTE — PROGRESS NOTES
Progress note - Cardiology Office   [facility]  Frances Stringer Stamets 67 y o  female MRN: [id number]  : 1945  Unit/Bed#:  Encounter: [id number]      Assessment:     1  Exertional shortness of breath    2  Cardiomyopathy, unspecified type ([facility] )    3  Chronic combined systolic and diastolic heart failure, NYHA class 2 ([facility] )    4  Coronary artery disease involving native coronary artery of native heart without angina pectoris    5  Tachycardia    6  Dyslipidemia    7  Tobacco abuse        Discussion summary and Plan:    1  Exertional shortness of breath  Etiology of her exertional shortness of breath appears to be multifactorial   She has been a smoker for long period of time  She had a cardiomyopathy but she has to be to be euvolemic EF seems to have improved 40-45%  Cardiac catheterization shows nonobstructive disease  Will continue medical Rx     2  Cardiomyopathy  She is diagnosed to have cardiomyopathy of unclear etiology  She has multiple risk factors  She will be scheduled for cardiac catheterization  Will start on heart failure medication initial dose will be very loaded Coreg 3 125 twice a day along with lisinopril 2 5 and Aldactone 12 5 mg every other day  Blood test done in 2022 acceptable    3  Continues tobacco abuse  Patient has been smoker when she was a teenager  She smokes about half pack a day does not want to quit  Advised to get lung cancer screening with primary care doctor  4  Dyslipidemia with high risk for cardiovascular event around 30%  Crestor 5 mg    5  Coronary artery disease  She is found to have coronary artery disease which is moderate calcified arteries EF 40 45%  Catheterization finding reviewed with them catheterization was done in May 2022    6  History of COVID-19 virus fraction patient has recovered but chronic shortness of breath may be related to other factors pain    Continue current Rx    Prescriptions renewed      Patient and patient's daughter in-law was advised and educated to call our office  immediately if  patient has any new symptoms of chest pain/shortness of breath, near-syncope, syncope, light headedness sustained palpitations  or any other cardiovascular symptoms before their scheduled follow-up appointment  Office #[phone]  Thank you for your consultation  If you have any question please call me at [phone]    Counseling :  A description of the counseling  Goals and Barriers  Patient's ability to self care: Yes  Medication side effect reviewed with patient in detail and all their questions answered to their satisfaction  Primary Care Physician : India Leo MD      HPI :     Miranda Gonzalez is a 67y o  year old female who was referred by primary care doctor for exertional shortness of breath and abnormal EKG  Patient who has medical history significant for tobacco abuse for almost all her life since she has teenager, dyslipidemia, history of COVID-19 infection, 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 was noted to have abnormal EKG and some exertional shortness of breath  Patient now smokes about half pack a day she started smoking long time ago  Currently she is not on any significant cardiovascular medications though her cholesterol is acceptable her risk for cardiovascular event is noted to be high  She denies any chest pain with normal activities at she walks with the help of cane  No other cardiovascular issues at this time  She came with her daughter-in-law  Her  is also our patient  History of hip replacement surgery    History of tonsillectomy    04/20/2022  Above reviewed  Patient came for follow-up  She was initially seen for exertional shortness of breath    Patient who has medical history significant for tobacco abuse for almost all her life since she has teenager, dyslipidemia, history of DJD with hip replacement surgery, history of COVID-19 infection who was having exertional shortness of breath  Her cardiac workup shows she has cardiomyopathy with EF 35-40% with severe global hypokinesis  Her nuclear stress test was read as markedly abnormal with reducing EF  Cannot rule out small infarction versus ischemia in apical area  Patient came with her daughter  She has noted occasionally her legs swell up but not all the time  No swelling is present today  She still have exertional shortness of breath that she always attributed to her smoking  She is using her pressure dressing  No other issues at this time  06/16/2022  Above reviewed  Patient came for follow-up  She was initially seen for shortness of breath  She is not having that much shortness of breath  She does have history of tobacco abuse and she still smokes about half pack a day, dyslipidemia, hip replacement surgery who was noted to have cardiomyopathy with EF around 35 40%  She underwent cardiac catheterization which shows nonobstructive disease and her EF seems to have improved 45%  Currently she is taking Aldactone every other day along with lisinopril 2 5 daily, Crestor 5 mg and Coreg 3 125 twice a day  Heart rate and blood pressure seems to have improved  She is breathing is better she denies any chest pain she finds it hard to quit smoking and not likely to quit  No other cardiovascular issues at this time  Review of Systems   Constitutional: Negative for activity change, chills, diaphoresis, fever and unexpected weight change  HENT: Negative for congestion  Eyes: Negative for discharge and redness  Respiratory: Positive for shortness of breath  Negative for cough, chest tightness and wheezing  Chronic exertional has improved   Cardiovascular: Negative  Negative for chest pain, palpitations and leg swelling  Gastrointestinal: Negative for abdominal pain, diarrhea and nausea  Endocrine: Negative      Genitourinary: Negative for decreased urine volume and urgency  Musculoskeletal: Positive for arthralgias, back pain and gait problem  Skin: Negative for rash and wound  Allergic/Immunologic: Negative  Neurological: Negative for dizziness, seizures, syncope, weakness, light-headedness and headaches  Hematological: Negative  Psychiatric/Behavioral: Negative for agitation and confusion  The patient is nervous/anxious  Historical Information   No past medical history on file  Past Surgical History:   Procedure Laterality Date    ARTHROPLASTY HIP TOTAL ANTERIOR Right 4/9/2021    Procedure: ARTHROPLASTY HIP TOTAL ANTERIOR;  Surgeon: Inderjit Chandra DO;  Location: [address] [place] [address];  Service: Orthopedics    CARDIAC CATHETERIZATION Left 5/27/2022    Procedure: Cardiac catheterization;  Surgeon: Laurent Cardona MD;  Location: [place] [address] CATH LAB; Service: Cardiology     Social History     Substance and Sexual Activity   Alcohol Use Never     Social History     Substance and Sexual Activity   Drug Use Never     Social History     Tobacco Use   Smoking Status Current Every Day Smoker    Packs/day: 0 50    Years: 60 00    Pack years: 30 00    Types: Cigarettes   Smokeless Tobacco Never Used     Family History: No family history on file      Meds/Allergies     No Known Allergies    Current Outpatient Medications:     ALPRAZolam (XANAX) 0 25 mg tablet, Take 0 25 mg by mouth, Disp: , Rfl:     Calcium Ascorbate (VITAMIN C) 500 mg tablet, Take 500 mg by mouth daily, Disp: , Rfl:     carvedilol (COREG) 3 125 mg tablet, Take 1 tablet (3 125 mg total) by mouth 2 (two) times a day with meals, Disp: 180 tablet, Rfl: 1    cholecalciferol (VITAMIN D3) 1,000 units tablet, Take 2 tablets (2,000 Units total) by mouth daily, Disp: 60 tablet, Rfl: 0    lisinopril (ZESTRIL) 2 5 mg tablet, Take 1 tablet (2 5 mg total) by mouth daily, Disp: 90 tablet, Rfl: 1    multivitamin (THERAGRAN) TABS, Take 1 tablet by mouth daily, Disp: , Rfl:     rosuvastatin (CRESTOR) 5 mg tablet, Take 1 tablet (5 mg total) by mouth every other day, Disp: 45 tablet, Rfl: 1    spironolactone (ALDACTONE) 25 mg tablet, Take 1 tablet (25 mg total) by mouth every other day, Disp: 45 tablet, Rfl: 1    Vitals: Blood pressure 130/80, pulse 81, temperature (!) 97 °F (36 1 °C), height 5' 1" (1 549 m), weight 41 3 kg (91 lb), SpO2 97 %  ?  Body mass index is 17 19 kg/m²  Wt Readings from Last 3 Encounters:   06/16/22 41 3 kg (91 lb)   04/20/22 42 6 kg (94 lb)   04/20/22 42 9 kg (94 lb 9 6 oz)     Vitals:    06/16/22 1457   Weight: 41 3 kg (91 lb)     BP Readings from Last 3 Encounters:   06/16/22 130/80   05/27/22 163/73   04/20/22 148/92         Physical Exam  Constitutional:       General: She is not in acute distress  Appearance: She is well-developed  She is not diaphoretic  Neck:      Thyroid: No thyromegaly  Vascular: No JVD  Trachea: No tracheal deviation  Cardiovascular:      Rate and Rhythm: Normal rate and regular rhythm  Heart sounds: S1 normal and S2 normal  Heart sounds not distant  Murmur heard  Systolic (ejection) murmur is present with a grade of 2/6  No friction rub  No gallop  No S3 or S4 sounds  Pulmonary:      Effort: Pulmonary effort is normal  No respiratory distress  Breath sounds: Normal breath sounds  No wheezing or rales  Chest:      Chest wall: No tenderness  Abdominal:      General: Bowel sounds are normal  There is no distension  Palpations: Abdomen is soft  Tenderness: There is no abdominal tenderness  Musculoskeletal:         General: No deformity  Cervical back: Neck supple  Skin:     General: Skin is warm and dry  Coloration: Skin is not pale  Findings: No rash  Neurological:      Mental Status: She is alert and oriented to person, place, and time     Psychiatric:         Behavior: Behavior normal          Judgment: Judgment normal            Diagnostic Studies Review Cardio:    Echo Doppler done on 03/23/2022 shows patient has EF 35-40%  Global hypokinesis mild MR mild TR  Echodensity in right atrium most likely eustachian valve  Nuclear stress test done in March 2022 shows EF around 30%, fix partially reversible perfusion defect in apical area as  Catheterization done May 2022 shows her EF is 40-45%, nonobstructive mid LAD 50% stenosis, proximal LAD 40%, mid RCA 50% and ostial to proximal RCA has around 30%  She had a calcified arteries with mild-to-moderate nonobstructive disease medical therapy was recommended not likely to cause her cardiomyopathy  EKG:  Normal sinus rhythm LVH by voltage        Imaging:  Chest X-Ray:   XR chest pa & lateral    Result Date: 8/6/2021  Impression Hyperinflation due to emphysema  Mild reticulation in the right base, likely mild fibrosis  Question right base bronchiectasis  Workstation performed: [id number]       CT-scan of the chest:     No CTA results available for this patient    Lab Review   Lab Results   Component Value Date    WBC 9 60 04/29/2022    HGB 13 3 04/29/2022    HCT 40 3 04/29/2022     (H) 04/29/2022    RDW 13 2 04/29/2022     04/29/2022     BMP:  Lab Results   Component Value Date    SODIUM 139 04/29/2022    K 4 1 04/29/2022     04/29/2022    CO2 27 04/29/2022    BUN 24 04/29/2022    CREATININE 0 75 04/29/2022    GLUC 125 04/14/2021    GLUF 104 (H) 04/29/2022    CALCIUM 8 7 04/29/2022    CORRECTEDCA 9 2 04/13/2022    EGFR 77 04/29/2022    MG 2 1 04/14/2021     Troponins:    LFT:  Lab Results   Component Value Date    AST 28 04/13/2022    ALT 35 04/13/2022    ALKPHOS 63 04/13/2022    TP 7 1 04/13/2022    ALB 3 4 (L) 04/13/2022      Lab Results   Component Value Date    JEZ0HRKDAMBW 5 410 (H) 08/03/2021     Lipid Profile:   Lab Results   Component Value Date    CHOLESTEROL 117 04/13/2022    HDL 51 04/13/2022    LDLCALC 56 04/13/2022    TRIG 50 04/13/2022     Lab Results   Component Value Date    CHOLESTEROL 117 04/13/2022 CHOLESTEROL 172 08/03/2021     Lab Results   Component Value Date    CKTOTAL 497 (H) 04/12/2021    CKMB 1 8 04/12/2021    CKMBINDEX <1 0 04/12/2021    TROPONINI <0 02 04/09/2021     Lab Results   Component Value Date    NTBNP 381 04/12/2021      The ASCVD Risk score (Richie Almanzar et al , 2013) failed to calculate for the following reasons: The valid total cholesterol range is 130 to 320 mg/dL      Dr Tomasz Matthew MD [facility]      "This note has been constructed using a voice recognition system  Therefore there may be syntax, spelling, and/or grammatical errors   Please call if you have any questions  "

## 2022-07-04 PROBLEM — U07.1 COVID-19 VIRUS INFECTION: Status: RESOLVED | Noted: 2021-04-09 | Resolved: 2022-07-04

## 2022-07-05 ENCOUNTER — OFFICE VISIT (OUTPATIENT)
Dept: FAMILY MEDICINE CLINIC | Facility: CLINIC | Age: 77
End: 2022-07-05
Payer: MEDICARE

## 2022-07-05 VITALS
TEMPERATURE: 97.3 F | DIASTOLIC BLOOD PRESSURE: 60 MMHG | HEIGHT: 61 IN | RESPIRATION RATE: 16 BRPM | BODY MASS INDEX: 17.71 KG/M2 | OXYGEN SATURATION: 97 % | HEART RATE: 76 BPM | SYSTOLIC BLOOD PRESSURE: 122 MMHG | WEIGHT: 93.8 LBS

## 2022-07-05 DIAGNOSIS — M81.0 AGE RELATED OSTEOPOROSIS, UNSPECIFIED PATHOLOGICAL FRACTURE PRESENCE: ICD-10-CM

## 2022-07-05 DIAGNOSIS — E03.9 HYPOTHYROIDISM, UNSPECIFIED TYPE: ICD-10-CM

## 2022-07-05 DIAGNOSIS — I25.10 CORONARY ARTERY DISEASE INVOLVING NATIVE CORONARY ARTERY OF NATIVE HEART WITHOUT ANGINA PECTORIS: ICD-10-CM

## 2022-07-05 DIAGNOSIS — R41.3 MEMORY DIFFICULTY: ICD-10-CM

## 2022-07-05 DIAGNOSIS — Z96.641 S/P HIP REPLACEMENT, RIGHT: ICD-10-CM

## 2022-07-05 DIAGNOSIS — R79.89 ELEVATED TSH: ICD-10-CM

## 2022-07-05 DIAGNOSIS — Z72.0 TOBACCO ABUSE: ICD-10-CM

## 2022-07-05 DIAGNOSIS — E64.0 SEQUELAE OF PROTEIN-CALORIE MALNUTRITION (HCC): ICD-10-CM

## 2022-07-05 DIAGNOSIS — I50.1 HEART FAILURE, LEFT, WITH LVEF 41-49% (HCC): ICD-10-CM

## 2022-07-05 DIAGNOSIS — Z87.891 PERSONAL HISTORY OF NICOTINE DEPENDENCE: ICD-10-CM

## 2022-07-05 DIAGNOSIS — Z00.00 MEDICARE ANNUAL WELLNESS VISIT, INITIAL: Primary | ICD-10-CM

## 2022-07-05 PROBLEM — R00.0 TACHYCARDIA: Status: RESOLVED | Noted: 2021-04-11 | Resolved: 2022-07-05

## 2022-07-05 PROBLEM — S72.001A CLOSED DISPLACED FRACTURE OF RIGHT FEMORAL NECK (HCC): Status: RESOLVED | Noted: 2021-04-09 | Resolved: 2022-07-05

## 2022-07-05 PROCEDURE — G0438 PPPS, INITIAL VISIT: HCPCS | Performed by: FAMILY MEDICINE

## 2022-07-05 PROCEDURE — 99204 OFFICE O/P NEW MOD 45 MIN: CPT | Performed by: FAMILY MEDICINE

## 2022-07-05 PROCEDURE — 1123F ACP DISCUSS/DSCN MKR DOCD: CPT | Performed by: FAMILY MEDICINE

## 2022-07-05 RX ORDER — DENOSUMAB 60 MG/ML
60 INJECTION SUBCUTANEOUS ONCE
Qty: 1 ML | Refills: 0
Start: 2022-07-05 | End: 2022-07-07 | Stop reason: SDUPTHER

## 2022-07-05 NOTE — PROGRESS NOTES
Assessment/Plan:    No problem-specific Assessment & Plan notes found for this encounter  tob use discussed, LDCT ordered    Osteoporosis, just started prolia Jan 2022, offer dexa after 1yr of tx, cont Ca and D  Can give prolia at home, call for rx    Memory difficulty, age vs MCI vs dementia vs mood disorder  Agreeable to neuropsych eval  Neuro exam normal in office today    CAD noted  By cardiac cath  On medical tx  meds from Dr Brock James    Wt loss with low albumin  Encourage boost or ensure  Increase protein and exercise  Recheck in 1m    TSH elevated  R/o subclinical hypothyroidism  Check free T4 to decide on treatment    Statin for risk reduction  Last LFTs and lipids noted  Refills from cardiology    Diuretics and low dose ACEi  No hx of chf  For LVEF  F/u cardiology    Dc and avoid bzd per fda/ags recommendations, family and pt aware     Diagnoses and all orders for this visit:    Medicare annual wellness visit, initial    Tobacco abuse  -     CT lung screening program; Future    Age related osteoporosis, unspecified pathological fracture presence  -     DXA bone density spine hip and pelvis; Future  -     denosumab (Prolia) 60 mg/mL; Inject 1 mL (60 mg total) under the skin once for 1 dose Every 6 months    Memory difficulty  -     Ambulatory Referral to Neuropsychology; Future    Elevated TSH  -     T4, free; Future  -     TSH, 3rd generation; Future    Personal history of nicotine dependence   -     CT lung screening program; Future    Sequelae of protein-calorie malnutrition (HCC)   -     T4, free; Future    Hypothyroidism, unspecified type   -     TSH, 3rd generation; Future    BMI less than 19,adult    Heart failure, left, with LVEF 41-49% (HCC)    S/P hip replacement, right    Coronary artery disease involving native coronary artery of native heart without angina pectoris        Return in about 1 month (around 8/5/2022) for Recheck      Subjective:      Patient ID: Keri Mcdowell is a 67 y o  female  Chief Complaint   Patient presents with   [address], [place]         HPI  tsh elevated  No current tx  Elevated x 2 occasions  No free t4 noted    Has CAD  On meds from cardio  No PCI needed  Dr Golden Chavez  EF40-45%    35-50% stenosis    No diet followed    No hx of CHF    Last pcp in [facility]    Has been losing per family  7# loss in past 15months  Does eat  Small usually    No n/v/c/d/abd pain    No meds from doctor in [address] except xanax    Feels overwhelmed  Does get forgetful  Needs help from family sometimes  Family states she does not read as much    Hip fx after fall April 2021    Still smokes  1/2ppd  Started age 12    prolia tx in progress per pt  Started Jan 2022    Last dexa last year as noted 11/26/21    The following portions of the patient's history were reviewed and updated as appropriate: allergies, current medications, past family history, past medical history, past social history, past surgical history and problem list     Review of Systems   Constitutional: Positive for unexpected weight change  Negative for chills, fatigue and fever  HENT: Negative for trouble swallowing  Respiratory: Negative for shortness of breath  Cardiovascular: Negative for chest pain and leg swelling  Gastrointestinal: Negative for abdominal pain  Genitourinary: Negative for difficulty urinating  Musculoskeletal: Positive for arthralgias and gait problem  Cane sometimes   Skin: Negative for color change           Current Outpatient Medications   Medication Sig Dispense Refill    Calcium Ascorbate (VITAMIN C) 500 mg tablet Take 500 mg by mouth daily      carvedilol (COREG) 3 125 mg tablet Take 1 tablet (3 125 mg total) by mouth 2 (two) times a day with meals 180 tablet 1    cholecalciferol (VITAMIN D3) 1,000 units tablet Take 2 tablets (2,000 Units total) by mouth daily 60 tablet 0    denosumab (Prolia) 60 mg/mL Inject 1 mL (60 mg total) under the skin once for 1 dose Every 6 months 1 mL 0    lisinopril (ZESTRIL) 2 5 mg tablet Take 1 tablet (2 5 mg total) by mouth daily 90 tablet 1    multivitamin (THERAGRAN) TABS Take 1 tablet by mouth daily      rosuvastatin (CRESTOR) 5 mg tablet Take 1 tablet (5 mg total) by mouth every other day 45 tablet 1    spironolactone (ALDACTONE) 25 mg tablet Take 1 tablet (25 mg total) by mouth every other day 45 tablet 1     No current facility-administered medications for this visit  Objective:    /60   Pulse 76   Temp (!) 97 3 °F (36 3 °C)   Resp 16   Ht 5' 1" (1 549 m)   Wt 42 5 kg (93 lb 12 8 oz)   SpO2 97%   BMI 17 72 kg/m²        Physical Exam  Vitals and nursing note reviewed  Constitutional:       General: She is not in acute distress  Appearance: She is well-developed  She is not ill-appearing or toxic-appearing  HENT:      Head: Normocephalic  Right Ear: Tympanic membrane, ear canal and external ear normal  There is no impacted cerumen  Left Ear: Tympanic membrane, ear canal and external ear normal  There is no impacted cerumen  Nose: No congestion  Mouth/Throat:      Pharynx: No oropharyngeal exudate  Eyes:      General: No scleral icterus  Right eye: No discharge  Left eye: No discharge  Conjunctiva/sclera: Conjunctivae normal    Cardiovascular:      Rate and Rhythm: Normal rate and regular rhythm  Heart sounds: No murmur heard  Pulmonary:      Effort: Pulmonary effort is normal  No respiratory distress  Breath sounds: No wheezing or rales  Abdominal:      General: There is no distension  Palpations: Abdomen is soft  There is no mass  Tenderness: There is no abdominal tenderness  Musculoskeletal:         General: No deformity  Cervical back: Neck supple  Right lower leg: No edema  Left lower leg: No edema  Skin:     General: Skin is warm and dry  Coloration: Skin is not jaundiced or pale     Neurological:      General: No focal deficit present  Mental Status: She is alert  Cranial Nerves: No cranial nerve deficit  Deep Tendon Reflexes: Reflexes normal       Comments: B/l L4 1/4   Psychiatric:         Mood and Affect: Mood normal          Behavior: Behavior normal          Thought Content: Thought content normal        BMI Counseling: Body mass index is 17 72 kg/m²  The BMI is below normal  Patient advised to gain weight  Rationale for BMI follow-up plan is due to patient being underweight  Depression Screening and Follow-up Plan: Patient was screened for depression during today's encounter  They screened negative with a PHQ-2 score of 0               Magnus Mayorga DO

## 2022-07-06 ENCOUNTER — OFFICE VISIT (OUTPATIENT)
Dept: PODIATRY | Facility: CLINIC | Age: 77
End: 2022-07-06
Payer: MEDICARE

## 2022-07-06 VITALS — BODY MASS INDEX: 17.56 KG/M2 | RESPIRATION RATE: 17 BRPM | WEIGHT: 93 LBS | HEIGHT: 61 IN | HEART RATE: 76 BPM

## 2022-07-06 DIAGNOSIS — M79.671 PAIN IN BOTH FEET: Primary | ICD-10-CM

## 2022-07-06 DIAGNOSIS — I70.209 PERIPHERAL ARTERIOSCLEROSIS (HCC): ICD-10-CM

## 2022-07-06 DIAGNOSIS — B35.1 ONYCHOMYCOSIS: ICD-10-CM

## 2022-07-06 DIAGNOSIS — M79.672 PAIN IN BOTH FEET: Primary | ICD-10-CM

## 2022-07-06 DIAGNOSIS — B35.9 DERMATOPHYTOSIS: ICD-10-CM

## 2022-07-06 DIAGNOSIS — L03.039 PARONYCHIA OF TOENAIL, UNSPECIFIED LATERALITY: ICD-10-CM

## 2022-07-06 PROCEDURE — 99213 OFFICE O/P EST LOW 20 MIN: CPT | Performed by: PODIATRIST

## 2022-07-06 RX ORDER — KETOCONAZOLE 20 MG/G
CREAM TOPICAL DAILY
Qty: 60 G | Refills: 1 | Status: SHIPPED | OUTPATIENT
Start: 2022-07-06 | End: 2022-09-20

## 2022-07-06 NOTE — PROGRESS NOTES
Assessment and Plan:     Problem List Items Addressed This Visit        Active Problems    Tobacco abuse    Relevant Orders    CT lung screening program    Elevated TSH    Relevant Orders    T4, free    TSH, 3rd generation    Memory difficulty    Relevant Orders    Ambulatory Referral to Neuropsychology    Heart failure, left, with LVEF 41-49% ([facility])    Coronary artery disease involving native coronary artery of native heart without angina pectoris    BMI less than 19,adult    S/P hip replacement, right    Medicare annual wellness visit, initial - Primary    Sequelae of protein-calorie malnutrition ([facility] )     Relevant Orders    T4, free    Hypothyroidism    Relevant Orders    TSH, 3rd generation    Personal history of nicotine dependence     Relevant Orders    CT lung screening program    Age related osteoporosis    Relevant Medications    denosumab (Prolia) 60 mg/mL    Other Relevant Orders    DXA bone density spine hip and pelvis           Preventive health issues were discussed with patient, and age appropriate screening tests were ordered as noted in patient's After Visit Summary  Personalized health advice and appropriate referrals for health education or preventive services given if needed, as noted in patient's After Visit Summary       History of Present Illness:     Patient presents for a Medicare Wellness Visit    HPI   Patient Care Team:  Gregoria Pak MD as PCP - General (Internal Medicine)     Review of Systems:     Review of Systems     Problem List:     Patient Active Problem List   Diagnosis    Tobacco abuse    Elevated TSH    Memory difficulty    Heart failure, left, with LVEF 41-49% ([facility]    Coronary artery disease involving native coronary artery of native heart without angina pectoris    BMI less than 19,adult    S/P hip replacement, right    Medicare annual wellness visit, initial    Sequelae of protein-calorie malnutrition ([facility] )     Hypothyroidism    Personal history of nicotine dependence     Age related osteoporosis      Past Medical and Surgical History:     No past medical history on file  Past Surgical History:   Procedure Laterality Date    ARTHROPLASTY HIP TOTAL ANTERIOR Right 4/9/2021    Procedure: ARTHROPLASTY HIP TOTAL ANTERIOR;  Surgeon: Tomasa Zavaleta DO;  Location: [facility];  Service: Orthopedics    CARDIAC CATHETERIZATION Left 5/27/2022    Procedure: Cardiac catheterization;  Surgeon: Salinas Arzate MD;  Location: [place] [address] CATH LAB; Service: Cardiology      Family History:     No family history on file     Social History:     Social History     Socioeconomic History    Marital status: /Civil Union     Spouse name: None    Number of children: None    Years of education: None    Highest education level: None   Occupational History    None   Tobacco Use    Smoking status: Current Every Day Smoker     Packs/day: 0 50     Years: 60 00     Pack years: 30 00     Types: Cigarettes    Smokeless tobacco: Never Used   Vaping Use    Vaping Use: Never used   Substance and Sexual Activity    Alcohol use: Never    Drug use: Never    Sexual activity: Not Currently     Partners: Male   Other Topics Concern    None   Social History Narrative    None     Social Determinants of Health     Financial Resource Strain: Not on file   Food Insecurity: Not on file   Transportation Needs: Not on file   Physical Activity: Not on file   Stress: Not on file   Social Connections: Not on file   Intimate Partner Violence: Not on file   Housing Stability: Not on file      Medications and Allergies:     Current Outpatient Medications   Medication Sig Dispense Refill    Calcium Ascorbate (VITAMIN C) 500 mg tablet Take 500 mg by mouth daily      carvedilol (COREG) 3 125 mg tablet Take 1 tablet (3 125 mg total) by mouth 2 (two) times a day with meals 180 tablet 1    cholecalciferol (VITAMIN D3) 1,000 units tablet Take 2 tablets (2,000 Units total) by mouth daily 60 tablet 0    denosumab (Prolia) 60 mg/mL Inject 1 mL (60 mg total) under the skin once for 1 dose Every 6 months 1 mL 0    lisinopril (ZESTRIL) 2 5 mg tablet Take 1 tablet (2 5 mg total) by mouth daily 90 tablet 1    multivitamin (THERAGRAN) TABS Take 1 tablet by mouth daily      rosuvastatin (CRESTOR) 5 mg tablet Take 1 tablet (5 mg total) by mouth every other day 45 tablet 1    spironolactone (ALDACTONE) 25 mg tablet Take 1 tablet (25 mg total) by mouth every other day 45 tablet 1     No current facility-administered medications for this visit  No Known Allergies   Immunizations:     Immunization History   Administered Date(s) Administered    COVID-19 MODERNA VACC 0 25 ML IM BOOSTER 01/04/2022    COVID-19 MODERNA VACC 0 5 ML IM 06/22/2021, 07/20/2021    Tdap 04/09/2021      Health Maintenance:         Topic Date Due    Hepatitis C Screening  Never done    Lung Cancer Screening  08/13/2014         Topic Date Due    Pneumococcal Vaccine: 65+ Years (1 - PCV) Never done    COVID-19 Vaccine (4 - Booster for Moderna series) 05/04/2022    Influenza Vaccine (1) 09/01/2022      Medicare Screening Tests and Risk Assessments:     Germaine Carnes is here for her Initial Wellness visit  Last Medicare Wellness visit information reviewed, patient interviewed and updates made to the record today  Health Risk Assessment:   Patient rates overall health as very good  Patient feels that their physical health rating is slightly better  Patient is satisfied with their life  Eyesight was rated as slightly worse  Hearing was rated as same  Patient feels that their emotional and mental health rating is same  Patients states they are sometimes angry  Patient states they are sometimes unusually tired/fatigued  Pain experienced in the last 7 days has been none  Patient states that she has experienced weight loss or gain in last 6 months  Depression Screening:   PHQ-2 Score: 0      Fall Risk Screening:    In the past year, patient has experienced: history of falling in past year    Number of falls: 1  Injured during fall?: Yes    Feels unsteady when standing or walking?: No    Worried about falling?: No      Urinary Incontinence Screening:   Patient has leaked urine accidently in the last six months  Home Safety:  Patient does not have trouble with stairs inside or outside of their home  Patient has working smoke alarms and has working carbon monoxide detector  Home safety hazards include: none  Nutrition:   Current diet is Regular  Medications:   Patient is currently taking over-the-counter supplements  OTC medications include: see medication list  Patient is able to manage medications  Activities of Daily Living (ADLs)/Instrumental Activities of Daily Living (IADLs):   Walk and transfer into and out of bed and chair?: Yes  Dress and groom yourself?: Yes    Bathe or shower yourself?: Yes    Feed yourself? Yes  Do your laundry/housekeeping?: Yes  Manage your money, pay your bills and track your expenses?: Yes  Make your own meals?: Yes    Do your own shopping?: Yes    Previous Hospitalizations:   Any hospitalizations or ED visits within the last 12 months?: Yes    How many hospitalizations have you had in the last year?: 1-2    Advance Care Planning:   Living will: Yes    Durable POA for healthcare:  Yes    Advanced directive: Yes    End of Life Decisions reviewed with patient: No      Cognitive Screening:   Provider or family/friend/caregiver concerned regarding cognition?: No    PREVENTIVE SCREENINGS      Cardiovascular Screening:    General: Screening Current      Diabetes Screening:     General: Screening Current      Colorectal Cancer Screening:     General: Screening Not Indicated      Breast Cancer Screening:     General: Screening Not Indicated      Cervical Cancer Screening:    General: Screening Not Indicated      Osteoporosis Screening:    General: Screening Not Indicated and History Osteoporosis      Abdominal Aortic Aneurysm (AAA) Screening:        General: Screening Not Indicated      Lung Cancer Screening:     General: Risks and Benefits Discussed    Due for: Low Dose CT (LDCT)      Hepatitis C Screening:    General: Screening Not Indicated    Screening, Brief Intervention, and Referral to Treatment (SBIRT)    Screening  Typical number of drinks in a day: 0  Typical number of drinks in a week: 0  Interpretation: Low risk drinking behavior  Single Item Drug Screening:  How often have you used an illegal drug (including marijuana) or a prescription medication for non-medical reasons in the past year? never    Single Item Drug Screen Score: 0  Interpretation: Negative screen for possible drug use disorder    Other Counseling Topics:   Car/seat belt/driving safety and calcium and vitamin D intake and regular weightbearing exercise       No exam data present     Physical Exam:     /60   Pulse 76   Temp (!) 97 3 °F (36 3 °C)   Resp 16   Ht 5' 1" (1 549 m)   Wt 42 5 kg (93 lb 12 8 oz)   SpO2 97%   BMI 17 72 kg/m²     Physical Exam     Mackenzie Sherwood DO

## 2022-07-06 NOTE — PROGRESS NOTES
Assessment/Plan:  Pain upon ambulation secondary to peripheral artery disease  Mycosis of nail hallux nail demonstrate a paronychia bilateral   Xerosis of skin      Plan  Foot exam performed  Patient educated on condition  All nails debrided without pain or complication  Patient will moisturize daily  In addition patient be placed on topical antifungal            Diagnoses and all orders for this visit:     Pain in both feet     Peripheral arteriosclerosis (HCC)     Onychomycosis     Paronychia of toenail, unspecified laterality     Xerosis of skin            Subjective:  Patient has pain in her feet and toes with ambulation  She has pain when she wears shoes    No history of trauma  No Known Allergies        Current Outpatient Medications:     ALPRAZolam (XANAX) 0 25 mg tablet, Take 0 25 mg by mouth, Disp: , Rfl:     Calcium Ascorbate (VITAMIN C) 500 mg tablet, Take 500 mg by mouth daily, Disp: , Rfl:     carvedilol (COREG) 3 125 mg tablet, Take 1 tablet (3 125 mg total) by mouth 2 (two) times a day with meals, Disp: 60 tablet, Rfl: 1    cholecalciferol (VITAMIN D3) 1,000 units tablet, Take 2 tablets (2,000 Units total) by mouth daily, Disp: 60 tablet, Rfl: 0    lisinopril (ZESTRIL) 2 5 mg tablet, Take 1 tablet (2 5 mg total) by mouth daily, Disp: 30 tablet, Rfl: 1    multivitamin (THERAGRAN) TABS, Take 1 tablet by mouth daily, Disp: , Rfl:     rosuvastatin (CRESTOR) 5 mg tablet, Take 1 tablet (5 mg total) by mouth every other day, Disp: 45 tablet, Rfl: 1    spironolactone (ALDACTONE) 25 mg tablet, Take 1 tablet (25 mg total) by mouth every other day, Disp: 15 tablet, Rfl: 1         Patient Active Problem List   Diagnosis    COVID-19 virus infection    Closed displaced fracture of right femoral neck (HCC)    Tobacco abuse    Tachycardia             Patient ID: Abad Florez is a 67 y o  female      HPI     The following portions of the patient's history were reviewed and updated as appropriate:      family history is not on file        reports that she has been smoking cigarettes  She has a 30 00 pack-year smoking history  She has never used smokeless tobacco  She reports that she does not drink alcohol and does not use drugs          Vitals:     04/20/22 1451   Resp: 17         Review of Systems       Objective:  Patient's shoes and socks removed  Foot Exam     General  General Appearance: appears stated age and healthy   Orientation: alert and oriented to person, place, and time   Affect: appropriate         Right Foot/Ankle      Inspection and Palpation  Tenderness: metatarsals   Swelling: dorsum   Arch: pes planus  Claw Toes: fifth toe  Skin Exam: dry skin;      Neurovascular  Dorsalis pedis: 1+  Posterior tibial: 1+  Superficial peroneal nerve sensation: diminished  Deep peroneal nerve sensation: diminished        Left Foot/Ankle       Inspection and Palpation  Tenderness: metatarsals   Swelling: dorsum   Arch: pes planus  Claw toes: fifth toe  Skin Exam: callus and dry skin;      Neurovascular  Dorsalis pedis: 1+  Posterior tibial: 1+  Superficial peroneal nerve sensation: diminished  Deep peroneal nerve sensation: diminished           Physical Exam  Vitals and nursing note reviewed  Constitutional:       Appearance: Normal appearance  Cardiovascular:      Rate and Rhythm: Normal rate and regular rhythm  Pulses:           Dorsalis pedis pulses are 1+ on the right side and 1+ on the left side  Posterior tibial pulses are 1+ on the right side and 1+ on the left side  Comments: Q, 9 findings bilateral   Negative digital hair  Positive abnormal cooling  Positive significant telangiectasia development foot and ankle bilateral  Feet:      Right foot:      Skin integrity: Dry skin present  Left foot:      Skin integrity: Callus and dry skin present  Skin:     Capillary Refill: Capillary refill takes 2 to 3 seconds        Comments: Patient demonstrates xerosis of skin   All nails are mycotic  They demonstrate distal lysis  There is malodor  Hallux bilateral has mild paronychia without evidence of cellulitis   Patient also has dermatophytosis right greater than left  Neurological:      Mental Status: She is alert  Psychiatric:         Mood and Affect: Mood normal          Behavior: Behavior normal          Thought Content:  Thought content normal          Judgment: Judgment normal

## 2022-07-06 NOTE — PATIENT INSTRUCTIONS
Medicare Preventive Visit Patient Instructions  Thank you for completing your Welcome to Medicare Visit or Medicare Annual Wellness Visit today  Your next wellness visit will be due in one year (7/6/2023)  The screening/preventive services that you may require over the next 5-10 years are detailed below  Some tests may not apply to you based off risk factors and/or age  Screening tests ordered at today's visit but not completed yet may show as past due  Also, please note that scanned in results may not display below  Preventive Screenings:  Service Recommendations Previous Testing/Comments   Colorectal Cancer Screening  * Colonoscopy    * Fecal Occult Blood Test (FOBT)/Fecal Immunochemical Test (FIT)  * Fecal DNA/Cologuard Test  * Flexible Sigmoidoscopy Age: 54-65 years old   Colonoscopy: every 10 years (may be performed more frequently if at higher risk)  OR  FOBT/FIT: every 1 year  OR  Cologuard: every 3 years  OR  Sigmoidoscopy: every 5 years  Screening may be recommended earlier than age 48 if at higher risk for colorectal cancer  Also, an individualized decision between you and your healthcare provider will decide whether screening between the ages of 74-80 would be appropriate  Colonoscopy: Not on file  FOBT/FIT: Not on file  Cologuard: Not on file  Sigmoidoscopy: Not on file    Screening Not Indicated     Breast Cancer Screening Age: 36 years old  Frequency: every 1-2 years  Not required if history of left and right mastectomy Mammogram: 06/19/2017    Screening Not Indicated   Cervical Cancer Screening Between the ages of 21-29, pap smear recommended once every 3 years  Between the ages of 33-67, can perform pap smear with HPV co-testing every 5 years     Recommendations may differ for women with a history of total hysterectomy, cervical cancer, or abnormal pap smears in past  Pap Smear: Not on file    Screening Not Indicated   Hepatitis C Screening Once for adults born [facility] frequently in patients at high risk for Hepatitis C Hep C Antibody: Not on file    Screening Not Indicated   Diabetes Screening 1-2 times per year if you're at risk for diabetes or have pre-diabetes Fasting glucose: 104 mg/dL   A1C: No results in last 5 years    Screening Current   Cholesterol Screening Once every 5 years if you don't have a lipid disorder  May order more often based on risk factors  Lipid panel: 04/13/2022    Screening Current     Other Preventive Screenings Covered by Medicare:  1  Abdominal Aortic Aneurysm (AAA) Screening: covered once if your at risk  You're considered to be at risk if you have a family history of AAA  2  Lung Cancer Screening: covers low dose CT scan once per year if you meet all of the following conditions: (1) Age 50-69; (2) No signs or symptoms of lung cancer; (3) Current smoker or have quit smoking within the last 15 years; (4) You have a tobacco smoking history of at least 30 pack years (packs per day multiplied by number of years you smoked); (5) You get a written order from a healthcare provider  3  Glaucoma Screening: covered annually if you're considered high risk: (1) You have diabetes OR (2) Family history of glaucoma OR (3)  aged 48 and older OR (3)  American aged 72 and older  3  Osteoporosis Screening: covered every 2 years if you meet one of the following conditions: (1) You're estrogen deficient and at risk for osteoporosis based off medical history and other findings; (2) Have a vertebral abnormality; (3) On glucocorticoid therapy for more than 3 months; (4) Have primary hyperparathyroidism; (5) On osteoporosis medications and need to assess response to drug therapy  · Last bone density test (DXA Scan): Not on file  5  HIV Screening: covered annually if you're between the age of 12-76  Also covered annually if you are younger than 13 and older than 72 with risk factors for HIV infection   For pregnant patients, it is covered up to 3 times per pregnancy  Immunizations:  Immunization Recommendations   Influenza Vaccine Annual influenza vaccination during flu season is recommended for all persons aged >= 6 months who do not have contraindications   Pneumococcal Vaccine (Prevnar and Pneumovax)  * Prevnar = PCV13  * Pneumovax = PPSV23   Adults 25-60 years old: 1-3 doses may be recommended based on certain risk factors  Adults 72 years old: Prevnar (PCV13) vaccine recommended followed by Pneumovax (PPSV23) vaccine  If already received PPSV23 since turning 65, then PCV13 recommended at least one year after PPSV23 dose  Hepatitis B Vaccine 3 dose series if at intermediate or high risk (ex: diabetes, end stage renal disease, liver disease)   Tetanus (Td) Vaccine - COST NOT COVERED BY MEDICARE PART B Following completion of primary series, a booster dose should be given every 10 years to maintain immunity against tetanus  Td may also be given as tetanus wound prophylaxis  Tdap Vaccine - COST NOT COVERED BY MEDICARE PART B Recommended at least once for all adults  For pregnant patients, recommended with each pregnancy  Shingles Vaccine (Shingrix) - COST NOT COVERED BY MEDICARE PART B  2 shot series recommended in those aged 48 and above     Health Maintenance Due:      Topic Date Due    Hepatitis C Screening  Never done    Lung Cancer Screening  08/13/2014     Immunizations Due:      Topic Date Due    Pneumococcal Vaccine: 65+ Years (1 - PCV) Never done    COVID-19 Vaccine (4 - Booster for Moderna series) 05/04/2022    Influenza Vaccine (1) 09/01/2022     Advance Directives   What are advance directives? Advance directives are legal documents that state your wishes and plans for medical care  These plans are made ahead of time in case you lose your ability to make decisions for yourself  Advance directives can apply to any medical decision, such as the treatments you want, and if you want to donate organs     What are the types of advance directives? There are many types of advance directives, and each state has rules about how to use them  You may choose a combination of any of the following:  · Living will: This is a written record of the treatment you want  You can also choose which treatments you do not want, which to limit, and which to stop at a certain time  This includes surgery, medicine, IV fluid, and tube feedings  · Durable power of  for healthcare [facility] This is a written record that states who you want to make healthcare choices for you when you are unable to make them for yourself  This person, called a proxy, is usually a family member or a friend  You may choose more than 1 proxy  · Do not resuscitate (DNR) order:  A DNR order is used in case your heart stops beating or you stop breathing  It is a request not to have certain forms of treatment, such as CPR  A DNR order may be included in other types of advance directives  · Medical directive: This covers the care that you want if you are in a coma, near death, or unable to make decisions for yourself  You can list the treatments you want for each condition  Treatment may include pain medicine, surgery, blood transfusions, dialysis, IV or tube feedings, and a ventilator (breathing machine)  · Values history: This document has questions about your views, beliefs, and how you feel and think about life  This information can help others choose the care that you would choose  Why are advance directives important? An advance directive helps you control your care  Although spoken wishes may be used, it is better to have your wishes written down  Spoken wishes can be misunderstood, or not followed  Treatments may be given even if you do not want them  An advance directive may make it easier for your family to make difficult choices about your care  Fall Prevention    Fall prevention  includes ways to make your home and other areas safer   It also includes ways you can move more carefully to prevent a fall  Health conditions that cause changes in your blood pressure, vision, or muscle strength and coordination may increase your risk for falls  Medicines may also increase your risk for falls if they make you dizzy, weak, or sleepy  Fall prevention tips:   · Stand or sit up slowly  · Use assistive devices as directed  · Wear shoes that fit well and have soles that   · Wear a personal alarm  · Stay active  · Manage your medical conditions  Home Safety Tips:  · Add items to prevent falls in the bathroom  · Keep paths clear  · Install bright lights in your home  · Keep items you use often on shelves within reach  · Paint or place reflective tape on the edges of your stairs  Urinary Incontinence   Urinary incontinence (UI)  is when you lose control of your bladder  UI develops because your bladder cannot store or empty urine properly  The 3 most common types of UI are stress incontinence, urge incontinence, or both  Medicines:   · May be given to help strengthen your bladder control  Report any side effects of medication to your healthcare provider  Do pelvic muscle exercises often:  Your pelvic muscles help you stop urinating  Squeeze these muscles tight for 5 seconds, then relax for 5 seconds  Gradually work up to squeezing for 10 seconds  Do 3 sets of 15 repetitions a day, or as directed  This will help strengthen your pelvic muscles and improve bladder control  Train your bladder:  Go to the bathroom at set times, such as every 2 hours, even if you do not feel the urge to go  You can also try to hold your urine when you feel the urge to go  For example, hold your urine for 5 minutes when you feel the urge to go  As that becomes easier, hold your urine for 10 minutes  Self-care:   · Keep a UI record  Write down how often you leak urine and how much you leak  Make a note of what you were doing when you leaked urine    · Drink liquids as directed  You may need to limit the amount of liquid you drink to help control your urine leakage  Do not drink any liquid right before you go to bed  Limit or do not have drinks that contain caffeine or alcohol  · Prevent constipation  Eat a variety of high-fiber foods  Good examples are high-fiber cereals, beans, vegetables, and whole-grain breads  Walking is the best way to trigger your intestines to have a bowel movement  · Exercise regularly and maintain a healthy weight  Weight loss and exercise will decrease pressure on your bladder and help you control your leakage  · Use a catheter as directed  to help empty your bladder  A catheter is a tiny, plastic tube that is put into your bladder to drain your urine  · Go to behavior therapy as directed  Behavior therapy may be used to help you learn to control your urge to urinate  Cigarette Smoking and Your Health   Risks to your health if you smoke:  Nicotine and other chemicals found in tobacco damage every cell in your body  Even if you are a light smoker, you have an increased risk for cancer, heart disease, and lung disease  If you are pregnant or have diabetes, smoking increases your risk for complications  Benefits to your health if you stop smoking:   · You decrease respiratory symptoms such as coughing, wheezing, and shortness of breath  · You reduce your risk for cancers of the lung, mouth, throat, kidney, bladder, pancreas, stomach, and cervix  If you already have cancer, you increase the benefits of chemotherapy  You also reduce your risk for cancer returning or a second cancer from developing  · You reduce your risk for heart disease, blood clots, heart attack, and stroke  · You reduce your risk for lung infections, and diseases such as pneumonia, asthma, chronic bronchitis, and emphysema  · Your circulation improves  More oxygen can be delivered to your body   If you have diabetes, you lower your risk for complications, such as kidney, artery, and eye diseases  You also lower your risk for nerve damage  Nerve damage can lead to amputations, poor vision, and blindness  · You improve your body's ability to heal and to fight infections  For more information and support to stop smoking:   · [employer]  Phone: 9- 753 - [phone]  Web Address: [employer]  Underweight  Underweight is defined as having a body mass index (BMI) of less than 18 5 kg/m2   Anorexia  is a loss of appetite, decreased food intake, or both  Your appetite naturally decreases as you get older  You also get full faster than you used to  This occurs because your body needs less energy  Other body changes can also lead to a decreased appetite  Even though some appetite loss is normal, you still need to get enough calories and nutrients to keep you healthy  You can start to lose too much weight if you do not eat as much food as your body needs  Unwanted weight loss can cause health problems, or worsen health problems you already have  You can also become dehydrated if you do not drink enough liquid  How to eat healthy and get enough nutrients:   · Choose healthy foods  Eat a variety of fruits, vegetables, whole grains, low-fat dairy foods, lean meats, and other protein foods  Limit foods high in fat, sugar, and salt  Limit or avoid alcohol as directed  Work with a dietitian to help you plan your meals if you need to follow a special diet  A dietitian can also teach you how to modify foods if you have trouble chewing or swallowing  · Snack on healthy foods between meals  if you only eat a small amount during meals  Snacks provide extra healthy nutrients and calories between meals  Examples include fruit, cheese, and whole grain crackers  · Drink liquids as directed  to avoid dehydration  Drink liquids between meals if they cause you to get full too quickly during meals  Ask how much liquid to drink each day and which liquids are best for you     · Use herbs, spices, and flavor enhancers to add flavor to foods  Avoid using herbs and spice blends that also contain sodium  Ask your healthcare provider or dietitian about flavor enhancers  Flavor enhancers with ham, natural crowell, and roast beef flavors can also be sprinkled on food to add flavor  · Share meals with others as often as you can  Eating with others may help you to eat better during meal time  Ask family members, neighbors, or friends to join you for lunch  There are also senior centers where you can meet people, and share meals with them  · Ask family and friends for help  with shopping or preparing foods  Ask for a ride to the grocery store, if needed  © Copyright [employer] 2018 Information is for End User's use only and may not be sold, redistributed or otherwise used for commercial purposes   All illustrations and images included in CareNotes® are the copyrighted property of A D A M , Inc  or [address] [place] [address] [employer]

## 2022-07-07 DIAGNOSIS — M81.0 AGE RELATED OSTEOPOROSIS, UNSPECIFIED PATHOLOGICAL FRACTURE PRESENCE: ICD-10-CM

## 2022-07-07 DIAGNOSIS — R41.3 MEMORY DIFFICULTY: Primary | ICD-10-CM

## 2022-07-07 RX ORDER — DENOSUMAB 60 MG/ML
60 INJECTION SUBCUTANEOUS ONCE
Qty: 1 ML | Refills: 0 | Status: SHIPPED | OUTPATIENT
Start: 2022-07-08 | End: 2022-08-16

## 2022-07-12 ENCOUNTER — APPOINTMENT (OUTPATIENT)
Dept: LAB | Facility: CLINIC | Age: 77
End: 2022-07-12
Payer: MEDICARE

## 2022-07-12 DIAGNOSIS — E64.0 SEQUELAE OF PROTEIN-CALORIE MALNUTRITION (HCC): ICD-10-CM

## 2022-07-12 DIAGNOSIS — E03.9 HYPOTHYROIDISM, UNSPECIFIED TYPE: ICD-10-CM

## 2022-07-12 DIAGNOSIS — R79.89 ELEVATED TSH: ICD-10-CM

## 2022-07-12 LAB
T4 FREE SERPL-MCNC: 0.76 NG/DL (ref 0.76–1.46)
TSH SERPL DL<=0.05 MIU/L-ACNC: 5.93 UIU/ML (ref 0.45–4.5)

## 2022-07-12 PROCEDURE — 84443 ASSAY THYROID STIM HORMONE: CPT

## 2022-07-12 PROCEDURE — 36415 COLL VENOUS BLD VENIPUNCTURE: CPT

## 2022-07-12 PROCEDURE — 84439 ASSAY OF FREE THYROXINE: CPT

## 2022-07-14 PROBLEM — E03.8 SUBCLINICAL HYPOTHYROIDISM: Status: ACTIVE | Noted: 2022-07-05

## 2022-07-15 ENCOUNTER — TELEPHONE (OUTPATIENT)
Dept: FAMILY MEDICINE CLINIC | Facility: CLINIC | Age: 77
End: 2022-07-15

## 2022-07-15 NOTE — TELEPHONE ENCOUNTER
DR Emilee Hanna    Patient cannot afford the prolia shot  She is asking if we can call and cancel it  The pharmacy keeps calling

## 2022-07-22 ENCOUNTER — TELEPHONE (OUTPATIENT)
Dept: NEUROLOGY | Facility: CLINIC | Age: 77
End: 2022-07-22

## 2022-07-22 NOTE — TELEPHONE ENCOUNTER
Spoke with Valentine to let her know that I will have to close her mother's referral as it is not from [facility]  She is aware of the process and has an appointment set up

## 2022-08-16 ENCOUNTER — OFFICE VISIT (OUTPATIENT)
Dept: FAMILY MEDICINE CLINIC | Facility: CLINIC | Age: 77
End: 2022-08-16
Payer: MEDICARE

## 2022-08-16 VITALS
RESPIRATION RATE: 16 BRPM | HEART RATE: 67 BPM | DIASTOLIC BLOOD PRESSURE: 62 MMHG | SYSTOLIC BLOOD PRESSURE: 130 MMHG | HEIGHT: 61 IN | BODY MASS INDEX: 17.33 KG/M2 | TEMPERATURE: 98.2 F | OXYGEN SATURATION: 97 % | WEIGHT: 91.8 LBS

## 2022-08-16 DIAGNOSIS — E03.8 SUBCLINICAL HYPOTHYROIDISM: ICD-10-CM

## 2022-08-16 DIAGNOSIS — M81.0 AGE RELATED OSTEOPOROSIS, UNSPECIFIED PATHOLOGICAL FRACTURE PRESENCE: Primary | ICD-10-CM

## 2022-08-16 DIAGNOSIS — R41.3 MEMORY DIFFICULTY: ICD-10-CM

## 2022-08-16 DIAGNOSIS — Z72.0 TOBACCO ABUSE: ICD-10-CM

## 2022-08-16 DIAGNOSIS — R79.89 ELEVATED TSH: ICD-10-CM

## 2022-08-16 PROCEDURE — 99214 OFFICE O/P EST MOD 30 MIN: CPT | Performed by: FAMILY MEDICINE

## 2022-08-16 RX ORDER — DONEPEZIL HYDROCHLORIDE 5 MG/1
5 TABLET, FILM COATED ORAL
Qty: 90 TABLET | Refills: 1 | Status: SHIPPED | OUTPATIENT
Start: 2022-08-16 | End: 2022-09-20 | Stop reason: SDUPTHER

## 2022-08-16 RX ORDER — IBANDRONATE SODIUM 150 MG/1
150 TABLET, FILM COATED ORAL
Qty: 3 TABLET | Refills: 1 | Status: SHIPPED | OUTPATIENT
Start: 2022-08-16

## 2022-08-16 NOTE — PROGRESS NOTES
Assessment/Plan:    No problem-specific Assessment & Plan notes found for this encounter  Subclinical hypothyroid explained, f/u q6m    Osteoporosis, family reports prolia too $$$, wants to try boniva  Risks/benefits/use and esophagitis/ONJ risk advised  Needs to take Ca and D with it  DEXA after 1year of use    Dementia, unable to get in to see neurology  neuropsych will not see  Expectations and SE of aricept advised  Start 5mg qhs  F/u 1m for tolerability    Possible SSRI in future for anxiety vs remeron  BZD not recommended as reminded    tob use strongly discouraged     Diagnoses and all orders for this visit:    Age related osteoporosis, unspecified pathological fracture presence  -     ibandronate (BONIVA) 150 MG tablet; Take 1 tablet (150 mg total) by mouth every 30 (thirty) days    Elevated TSH    Subclinical hypothyroidism    Tobacco abuse    Memory difficulty  -     donepezil (ARICEPT) 5 mg tablet; Take 1 tablet (5 mg total) by mouth daily at bedtime        Return in about 1 month (around 9/16/2022) for Recheck  Subjective:      Patient ID: Neda Black is a 67 y o  female  Chief Complaint   Patient presents with    Follow-up     Layla Palacio MA         HPI  Last DEXA Dec 2021  Only ever been on Prolia, not covered by insurance  No teeth    On D, no Calcium    hgb good  Not on iron    Wants to try aricept    Anxiety variable, maybe every few months but not frequent  bzd risks aware    The following portions of the patient's history were reviewed and updated as appropriate: allergies, current medications, past family history, past medical history, past social history, past surgical history and problem list     Review of Systems   Gastrointestinal: Negative for abdominal pain, nausea and vomiting           Current Outpatient Medications   Medication Sig Dispense Refill    Calcium Ascorbate (VITAMIN C) 500 mg tablet Take 500 mg by mouth daily      carvedilol (COREG) 3 125 mg tablet Take 1 tablet (3 125 mg total) by mouth 2 (two) times a day with meals 180 tablet 1    cholecalciferol (VITAMIN D3) 1,000 units tablet Take 2 tablets (2,000 Units total) by mouth daily 60 tablet 0    donepezil (ARICEPT) 5 mg tablet Take 1 tablet (5 mg total) by mouth daily at bedtime 90 tablet 1    ibandronate (BONIVA) 150 MG tablet Take 1 tablet (150 mg total) by mouth every 30 (thirty) days 3 tablet 1    ketoconazole (NIZORAL) 2 % cream Apply topically daily 60 g 1    lisinopril (ZESTRIL) 2 5 mg tablet Take 1 tablet (2 5 mg total) by mouth daily 90 tablet 1    multivitamin (THERAGRAN) TABS Take 1 tablet by mouth daily      rosuvastatin (CRESTOR) 5 mg tablet Take 1 tablet (5 mg total) by mouth every other day 45 tablet 1    spironolactone (ALDACTONE) 25 mg tablet Take 1 tablet (25 mg total) by mouth every other day 45 tablet 1     No current facility-administered medications for this visit  Objective:    /62   Pulse 67   Temp 98 2 °F (36 8 °C)   Resp 16   Ht 5' 1" (1 549 m)   Wt 41 6 kg (91 lb 12 8 oz)   SpO2 97%   BMI 17 35 kg/m²        Physical Exam  Vitals and nursing note reviewed  Constitutional:       Appearance: She is well-developed  She is not ill-appearing  HENT:      Head: Normocephalic  Right Ear: Tympanic membrane normal       Left Ear: Tympanic membrane normal    Eyes:      General: No scleral icterus  Conjunctiva/sclera: Conjunctivae normal    Cardiovascular:      Rate and Rhythm: Normal rate and regular rhythm  Pulmonary:      Effort: Pulmonary effort is normal  No respiratory distress  Abdominal:      General: There is no distension  Palpations: Abdomen is soft  Tenderness: There is no abdominal tenderness  Musculoskeletal:         General: No deformity  Cervical back: Neck supple  Skin:     General: Skin is warm and dry  Coloration: Skin is not pale  Neurological:      Mental Status: She is alert     Psychiatric:         Mood and Affect: Mood normal          Behavior: Behavior normal          Thought Content:  Thought content normal                 Ashleigh Feliciano, DO

## 2022-08-16 NOTE — PATIENT INSTRUCTIONS
You should 1200-1500mg of calcium per day (included in diet)  And 0218-7579 units of vitamin D per day

## 2022-09-08 ENCOUNTER — OFFICE VISIT (OUTPATIENT)
Dept: PODIATRY | Facility: CLINIC | Age: 77
End: 2022-09-08
Payer: MEDICARE

## 2022-09-08 VITALS
RESPIRATION RATE: 17 BRPM | WEIGHT: 91 LBS | SYSTOLIC BLOOD PRESSURE: 130 MMHG | HEIGHT: 61 IN | BODY MASS INDEX: 17.18 KG/M2 | DIASTOLIC BLOOD PRESSURE: 62 MMHG

## 2022-09-08 DIAGNOSIS — M79.672 PAIN IN BOTH FEET: ICD-10-CM

## 2022-09-08 DIAGNOSIS — B35.1 ONYCHOMYCOSIS: ICD-10-CM

## 2022-09-08 DIAGNOSIS — M79.671 PAIN IN BOTH FEET: ICD-10-CM

## 2022-09-08 DIAGNOSIS — I70.209 PERIPHERAL ARTERIOSCLEROSIS (HCC): Primary | ICD-10-CM

## 2022-09-08 PROCEDURE — 11721 DEBRIDE NAIL 6 OR MORE: CPT | Performed by: PODIATRIST

## 2022-09-08 NOTE — PROGRESS NOTES
Assessment/Plan:  Pain upon ambulation secondary to peripheral artery disease   Mycosis of nail hallux nail demonstrate a paronychia bilateral   Xerosis of skin      Plan   Foot exam performed   Patient educated on condition   All nails debrided without pain or complication   Patient will moisturize daily      Patient remain on topical antifungal            Diagnoses and all orders for this visit:     Pain in both feet     Peripheral arteriosclerosis ([address] )     Onychomycosis     Paronychia of toenail, unspecified laterality     Xerosis of skin            Subjective:  Patient has pain in her feet and toes with ambulation   She has pain when she wears shoes   No history of trauma  No Known Allergies        Current Outpatient Medications:     ALPRAZolam (XANAX) 0 25 mg tablet, Take 0 25 mg by mouth, Disp: , Rfl:     Calcium Ascorbate (VITAMIN C) 500 mg tablet, Take 500 mg by mouth daily, Disp: , Rfl:     carvedilol (COREG) 3 125 mg tablet, Take 1 tablet (3 125 mg total) by mouth 2 (two) times a day with meals, Disp: 60 tablet, Rfl: 1    cholecalciferol (VITAMIN D3) 1,000 units tablet, Take 2 tablets (2,000 Units total) by mouth daily, Disp: 60 tablet, Rfl: 0    lisinopril (ZESTRIL) 2 5 mg tablet, Take 1 tablet (2 5 mg total) by mouth daily, Disp: 30 tablet, Rfl: 1    multivitamin (THERAGRAN) TABS, Take 1 tablet by mouth daily, Disp: , Rfl:     rosuvastatin (CRESTOR) 5 mg tablet, Take 1 tablet (5 mg total) by mouth every other day, Disp: 45 tablet, Rfl: 1    spironolactone (ALDACTONE) 25 mg tablet, Take 1 tablet (25 mg total) by mouth every other day, Disp: 15 tablet, Rfl: 1           Patient Active Problem List   Diagnosis    COVID-19 virus infection    Closed displaced fracture of right femoral neck (HCC)    Tobacco abuse    Tachycardia             Patient ID: Maryellen Bowman is a 67 y  o  female      HPI     The following portions of the patient's history were reviewed and updated as appropriate:      family history is not on file        reports that she has been smoking cigarettes  She has a 30 00 pack-year smoking history  She has never used smokeless tobacco  She reports that she does not drink alcohol and does not use drugs      Objective:  Patient's shoes and socks removed    Foot Exam     General  General Appearance: appears stated age and healthy   Orientation: alert and oriented to person, place, and time   Affect: appropriate         Right Foot/Ankle      Inspection and Palpation  Tenderness: metatarsals   Swelling: dorsum   Arch: pes planus  Claw Toes: fifth toe  Skin Exam: dry skin;      Neurovascular  Dorsalis pedis: 1+  Posterior tibial: 1+  Superficial peroneal nerve sensation: diminished  Deep peroneal nerve sensation: diminished        Left Foot/Ankle       Inspection and Palpation  Tenderness: metatarsals   Swelling: dorsum   Arch: pes planus  Claw toes: fifth toe  Skin Exam: callus and dry skin;      Neurovascular  Dorsalis pedis: 1+  Posterior tibial: 1+  Superficial peroneal nerve sensation: diminished  Deep peroneal nerve sensation: diminished           Physical Exam  Vitals and nursing note reviewed  Constitutional:       Appearance: Normal appearance  Cardiovascular:      Rate and Rhythm: Normal rate and regular rhythm       Pulses:           Dorsalis pedis pulses are 1+ on the right side and 1+ on the left side         Posterior tibial pulses are 1+ on the right side and 1+ on the left side       Comments: Q, 9 findings bilateral   Negative digital hair   Positive abnormal cooling   Positive significant telangiectasia development foot and ankle bilateral  Feet:      Right foot:      Skin integrity: Dry skin present       Left foot:      Skin integrity: Callus and dry skin present     Skin:     Capillary Refill: Capillary refill takes 2 to 3 seconds       Comments: Patient demonstrates xerosis of skin   All nails are mycotic   They demonstrate distal lysis  [place] is malodor   Hallux bilateral has mild paronychia without evidence of cellulitis    Patient also has dermatophytosis right greater than left  Neurological:      Mental Status: She is alert  Psychiatric:         Mood and Affect: Mood normal          Behavior[place]on [place]         Thought Content:  Thought content normal          Judgment: Judgment normal

## 2022-09-20 ENCOUNTER — OFFICE VISIT (OUTPATIENT)
Dept: FAMILY MEDICINE CLINIC | Facility: CLINIC | Age: 77
End: 2022-09-20
Payer: MEDICARE

## 2022-09-20 VITALS
HEIGHT: 61 IN | SYSTOLIC BLOOD PRESSURE: 124 MMHG | RESPIRATION RATE: 16 BRPM | TEMPERATURE: 97.3 F | DIASTOLIC BLOOD PRESSURE: 64 MMHG | HEART RATE: 75 BPM | OXYGEN SATURATION: 96 % | WEIGHT: 92.3 LBS | BODY MASS INDEX: 17.43 KG/M2

## 2022-09-20 DIAGNOSIS — Z72.0 TOBACCO ABUSE: ICD-10-CM

## 2022-09-20 DIAGNOSIS — R41.3 MEMORY DIFFICULTY: Primary | ICD-10-CM

## 2022-09-20 DIAGNOSIS — Z78.0 POSTMENOPAUSAL STATE: ICD-10-CM

## 2022-09-20 DIAGNOSIS — Z87.891 PERSONAL HISTORY OF NICOTINE DEPENDENCE: ICD-10-CM

## 2022-09-20 DIAGNOSIS — M81.0 AGE RELATED OSTEOPOROSIS, UNSPECIFIED PATHOLOGICAL FRACTURE PRESENCE: ICD-10-CM

## 2022-09-20 PROCEDURE — 99214 OFFICE O/P EST MOD 30 MIN: CPT | Performed by: FAMILY MEDICINE

## 2022-09-20 RX ORDER — DONEPEZIL HYDROCHLORIDE 10 MG/1
10 TABLET, FILM COATED ORAL
Qty: 90 TABLET | Refills: 1 | Status: SHIPPED | OUTPATIENT
Start: 2022-09-20

## 2022-09-20 NOTE — PROGRESS NOTES
Assessment/Plan:    No problem-specific Assessment & Plan notes found for this encounter  Osteoporosis, update dexa, fall cautions    Dementia, tolerating aricept 5mg, family notices improvement, increase to 10mg and then 23mg possibly in future, can call    Anxiety, some stress triggers at home, advised against BZD again, could consider lexapro in future if calls then appt after starting for reheck    tob use risks aware, has cut back about to 3/4ppd from 1ppd, LDCT    In the future, a daily safe option for anxiety may be escitalopram 5mg once a day  For now we can try a higher dose of the donepezil, at 10mg/d, with maximum dose being 23 mg      Diagnoses and all orders for this visit:    Memory difficulty  -     donepezil (ARICEPT) 10 mg tablet; Take 1 tablet (10 mg total) by mouth daily at bedtime    Postmenopausal state  -     DXA bone density spine hip and pelvis; Future    Tobacco abuse  -     CT lung screening program; Future    Age related osteoporosis, unspecified pathological fracture presence    Personal history of nicotine dependence   -     CT lung screening program; Future        Return in about 6 months (around 3/20/2023) for Recheck  Subjective:      Patient ID: Clarice Huang is a 67 y o  female  Chief Complaint   Patient presents with    Follow-up     Wilma Christianson MA         HPI  Tolerating donepezil 5mg  No major gi side effects  Family says remember somethings better  Anxiety better  Does have stress   on hospice now  May have future stress    The following portions of the patient's history were reviewed and updated as appropriate: allergies, current medications, past family history, past medical history, past social history, past surgical history and problem list     Review of Systems   Constitutional: Negative for fever  Respiratory: Negative for shortness of breath            Current Outpatient Medications   Medication Sig Dispense Refill    Calcium Ascorbate (VITAMIN C) 500 mg tablet Take 500 mg by mouth daily      carvedilol (COREG) 3 125 mg tablet Take 1 tablet (3 125 mg total) by mouth 2 (two) times a day with meals 180 tablet 1    cholecalciferol (VITAMIN D3) 1,000 units tablet Take 2 tablets (2,000 Units total) by mouth daily 60 tablet 0    donepezil (ARICEPT) 10 mg tablet Take 1 tablet (10 mg total) by mouth daily at bedtime 90 tablet 1    ibandronate (BONIVA) 150 MG tablet Take 1 tablet (150 mg total) by mouth every 30 (thirty) days 3 tablet 1    ketoconazole (NIZORAL) 2 % cream Apply topically daily 60 g 1    lisinopril (ZESTRIL) 2 5 mg tablet Take 1 tablet (2 5 mg total) by mouth daily 90 tablet 1    multivitamin (THERAGRAN) TABS Take 1 tablet by mouth daily      rosuvastatin (CRESTOR) 5 mg tablet Take 1 tablet (5 mg total) by mouth every other day 45 tablet 1    spironolactone (ALDACTONE) 25 mg tablet Take 1 tablet (25 mg total) by mouth every other day 45 tablet 1     No current facility-administered medications for this visit  Objective:    /64   Pulse 75   Temp (!) 97 3 °F (36 3 °C)   Resp 16   Ht 5' 1" (1 549 m)   Wt 41 9 kg (92 lb 4 8 oz)   SpO2 96%   BMI 17 44 kg/m²        Physical Exam  Vitals and nursing note reviewed  Constitutional:       General: She is not in acute distress  Appearance: She is well-developed  She is not ill-appearing  HENT:      Head: Normocephalic  Eyes:      General: No scleral icterus  Conjunctiva/sclera: Conjunctivae normal    Cardiovascular:      Rate and Rhythm: Normal rate and regular rhythm  Pulmonary:      Effort: Pulmonary effort is normal  No respiratory distress  Abdominal:      General: There is no distension  Palpations: Abdomen is soft  Tenderness: There is no abdominal tenderness  Musculoskeletal:         General: No deformity  Cervical back: Neck supple  Skin:     General: Skin is warm and dry  Coloration: Skin is not pale     Neurological: Mental Status: She is alert  Psychiatric:         Mood and Affect: Mood normal          Behavior: Behavior normal          Thought Content:  Thought content normal                 Suad Nielsen DO

## 2022-09-20 NOTE — PATIENT INSTRUCTIONS
In the future, a daily safe option for anxiety may be escitalopram 5mg once a day  For now we can try a higher dose of the donepezil, at 10mg/d, with maximum dose being 23 mg

## 2022-09-23 ENCOUNTER — TELEPHONE (OUTPATIENT)
Dept: ADMINISTRATIVE | Facility: HOSPITAL | Age: 77
End: 2022-09-23

## 2022-09-23 NOTE — TELEPHONE ENCOUNTER
Dr Caleb Brandt called to schedule her DXA scan  She is trying to figure out her insurance coverage  Care Everywhere states that she had a bone density scan done through [facility] 11/26/2021  Pt is not due for another dxa until 11/27/2023, did you still want Katherine Coronado to get a DXA before her 2 year eulalia? Thank you!

## 2022-10-11 PROBLEM — Z00.00 MEDICARE ANNUAL WELLNESS VISIT, INITIAL: Status: RESOLVED | Noted: 2022-07-05 | Resolved: 2022-10-11

## 2022-10-24 ENCOUNTER — APPOINTMENT (EMERGENCY)
Dept: RADIOLOGY | Facility: HOSPITAL | Age: 77
End: 2022-10-24
Payer: MEDICARE

## 2022-10-24 ENCOUNTER — HOSPITAL ENCOUNTER (INPATIENT)
Facility: HOSPITAL | Age: 77
LOS: 1 days | Discharge: HOME HEALTH | End: 2022-10-26
Attending: EMERGENCY MEDICINE | Admitting: INTERNAL MEDICINE
Payer: MEDICARE

## 2022-10-24 DIAGNOSIS — J18.9 PNEUMONIA: Primary | ICD-10-CM

## 2022-10-24 DIAGNOSIS — Z72.0 TOBACCO ABUSE: ICD-10-CM

## 2022-10-24 DIAGNOSIS — R55 SYNCOPE AND COLLAPSE: ICD-10-CM

## 2022-10-24 LAB
ALBUMIN SERPL BCP-MCNC: 2.9 G/DL (ref 3.5–5)
ALP SERPL-CCNC: 62 U/L (ref 46–116)
ALT SERPL W P-5'-P-CCNC: 31 U/L (ref 12–78)
ANION GAP SERPL CALCULATED.3IONS-SCNC: 9 MMOL/L (ref 4–13)
AST SERPL W P-5'-P-CCNC: 24 U/L (ref 5–45)
BACTERIA UR QL AUTO: ABNORMAL /HPF
BASOPHILS # BLD AUTO: 0.03 THOUSANDS/ÂΜL (ref 0–0.1)
BASOPHILS NFR BLD AUTO: 0 % (ref 0–1)
BILIRUB SERPL-MCNC: 0.48 MG/DL (ref 0.2–1)
BILIRUB UR QL STRIP: NEGATIVE
BUN SERPL-MCNC: 27 MG/DL (ref 5–25)
CALCIUM ALBUM COR SERPL-MCNC: 8.8 MG/DL (ref 8.3–10.1)
CALCIUM SERPL-MCNC: 7.9 MG/DL (ref 8.3–10.1)
CARDIAC TROPONIN I PNL SERPL HS: 5 NG/L
CHLORIDE SERPL-SCNC: 103 MMOL/L (ref 96–108)
CLARITY UR: CLEAR
CO2 SERPL-SCNC: 25 MMOL/L (ref 21–32)
COLOR UR: YELLOW
CREAT SERPL-MCNC: 0.97 MG/DL (ref 0.6–1.3)
EOSINOPHIL # BLD AUTO: 0.01 THOUSAND/ÂΜL (ref 0–0.61)
EOSINOPHIL NFR BLD AUTO: 0 % (ref 0–6)
ERYTHROCYTE [DISTWIDTH] IN BLOOD BY AUTOMATED COUNT: 13.1 % (ref 11.6–15.1)
GFR SERPL CREATININE-BSD FRML MDRD: 56 ML/MIN/1.73SQ M
GLUCOSE SERPL-MCNC: 118 MG/DL (ref 65–140)
GLUCOSE SERPL-MCNC: 143 MG/DL (ref 65–140)
GLUCOSE UR STRIP-MCNC: NEGATIVE MG/DL
HCT VFR BLD AUTO: 36 % (ref 34.8–46.1)
HGB BLD-MCNC: 11.8 G/DL (ref 11.5–15.4)
HGB UR QL STRIP.AUTO: NEGATIVE
IMM GRANULOCYTES # BLD AUTO: 0.07 THOUSAND/UL (ref 0–0.2)
IMM GRANULOCYTES NFR BLD AUTO: 1 % (ref 0–2)
KETONES UR STRIP-MCNC: NEGATIVE MG/DL
LEUKOCYTE ESTERASE UR QL STRIP: NEGATIVE
LYMPHOCYTES # BLD AUTO: 0.84 THOUSANDS/ÂΜL (ref 0.6–4.47)
LYMPHOCYTES NFR BLD AUTO: 6 % (ref 14–44)
MAGNESIUM SERPL-MCNC: 2 MG/DL (ref 1.6–2.6)
MCH RBC QN AUTO: 34.5 PG (ref 26.8–34.3)
MCHC RBC AUTO-ENTMCNC: 32.8 G/DL (ref 31.4–37.4)
MCV RBC AUTO: 105 FL (ref 82–98)
MONOCYTES # BLD AUTO: 0.85 THOUSAND/ÂΜL (ref 0.17–1.22)
MONOCYTES NFR BLD AUTO: 6 % (ref 4–12)
MUCOUS THREADS UR QL AUTO: ABNORMAL
NEUTROPHILS # BLD AUTO: 12.14 THOUSANDS/ÂΜL (ref 1.85–7.62)
NEUTS SEG NFR BLD AUTO: 87 % (ref 43–75)
NITRITE UR QL STRIP: NEGATIVE
NON-SQ EPI CELLS URNS QL MICRO: ABNORMAL /HPF
NRBC BLD AUTO-RTO: 0 /100 WBCS
PH UR STRIP.AUTO: 6 [PH]
PLATELET # BLD AUTO: 231 THOUSANDS/UL (ref 149–390)
PMV BLD AUTO: 8.3 FL (ref 8.9–12.7)
POTASSIUM SERPL-SCNC: 3.7 MMOL/L (ref 3.5–5.3)
PROT SERPL-MCNC: 6.9 G/DL (ref 6.4–8.4)
PROT UR STRIP-MCNC: ABNORMAL MG/DL
RBC # BLD AUTO: 3.42 MILLION/UL (ref 3.81–5.12)
RBC #/AREA URNS AUTO: ABNORMAL /HPF
SODIUM SERPL-SCNC: 137 MMOL/L (ref 135–147)
SP GR UR STRIP.AUTO: 1.02 (ref 1–1.03)
UROBILINOGEN UR QL STRIP.AUTO: 1 E.U./DL
WBC # BLD AUTO: 13.94 THOUSAND/UL (ref 4.31–10.16)
WBC #/AREA URNS AUTO: ABNORMAL /HPF

## 2022-10-24 PROCEDURE — 84484 ASSAY OF TROPONIN QUANT: CPT | Performed by: EMERGENCY MEDICINE

## 2022-10-24 PROCEDURE — 36415 COLL VENOUS BLD VENIPUNCTURE: CPT | Performed by: EMERGENCY MEDICINE

## 2022-10-24 PROCEDURE — 87449 NOS EACH ORGANISM AG IA: CPT | Performed by: NURSE PRACTITIONER

## 2022-10-24 PROCEDURE — 70450 CT HEAD/BRAIN W/O DYE: CPT

## 2022-10-24 PROCEDURE — G1004 CDSM NDSC: HCPCS

## 2022-10-24 PROCEDURE — 85025 COMPLETE CBC W/AUTO DIFF WBC: CPT | Performed by: EMERGENCY MEDICINE

## 2022-10-24 PROCEDURE — 84145 PROCALCITONIN (PCT): CPT | Performed by: EMERGENCY MEDICINE

## 2022-10-24 PROCEDURE — 83735 ASSAY OF MAGNESIUM: CPT | Performed by: EMERGENCY MEDICINE

## 2022-10-24 PROCEDURE — 93005 ELECTROCARDIOGRAM TRACING: CPT

## 2022-10-24 PROCEDURE — 71045 X-RAY EXAM CHEST 1 VIEW: CPT

## 2022-10-24 PROCEDURE — 82948 REAGENT STRIP/BLOOD GLUCOSE: CPT

## 2022-10-24 PROCEDURE — 80053 COMPREHEN METABOLIC PANEL: CPT | Performed by: EMERGENCY MEDICINE

## 2022-10-24 PROCEDURE — 81001 URINALYSIS AUTO W/SCOPE: CPT | Performed by: EMERGENCY MEDICINE

## 2022-10-24 RX ORDER — CEFTRIAXONE 1 G/50ML
1000 INJECTION, SOLUTION INTRAVENOUS ONCE
Status: COMPLETED | OUTPATIENT
Start: 2022-10-24 | End: 2022-10-25

## 2022-10-25 PROBLEM — I10 HYPERTENSION: Status: ACTIVE | Noted: 2022-10-25

## 2022-10-25 PROBLEM — I50.42 CHRONIC COMBINED SYSTOLIC AND DIASTOLIC CONGESTIVE HEART FAILURE (HCC): Status: ACTIVE | Noted: 2022-10-25

## 2022-10-25 PROBLEM — F41.9 ANXIETY: Status: ACTIVE | Noted: 2022-10-25

## 2022-10-25 PROBLEM — E78.5 HYPERLIPIDEMIA: Status: ACTIVE | Noted: 2022-10-25

## 2022-10-25 PROBLEM — R55 SYNCOPE: Status: ACTIVE | Noted: 2022-10-25

## 2022-10-25 PROBLEM — F03.90 DEMENTIA (HCC): Status: ACTIVE | Noted: 2022-10-25

## 2022-10-25 PROBLEM — J18.9 PNEUMONIA: Status: ACTIVE | Noted: 2022-10-25

## 2022-10-25 PROBLEM — A41.9 SEPSIS (HCC): Status: ACTIVE | Noted: 2022-10-25

## 2022-10-25 LAB
2HR DELTA HS TROPONIN: 0 NG/L
4HR DELTA HS TROPONIN: 0 NG/L
ANION GAP SERPL CALCULATED.3IONS-SCNC: 9 MMOL/L (ref 4–13)
BASOPHILS # BLD AUTO: 0.03 THOUSANDS/ÂΜL (ref 0–0.1)
BASOPHILS NFR BLD AUTO: 0 % (ref 0–1)
BUN SERPL-MCNC: 24 MG/DL (ref 5–25)
CALCIUM SERPL-MCNC: 7.8 MG/DL (ref 8.3–10.1)
CARDIAC TROPONIN I PNL SERPL HS: 5 NG/L
CARDIAC TROPONIN I PNL SERPL HS: 5 NG/L
CHLORIDE SERPL-SCNC: 106 MMOL/L (ref 96–108)
CO2 SERPL-SCNC: 22 MMOL/L (ref 21–32)
CREAT SERPL-MCNC: 0.76 MG/DL (ref 0.6–1.3)
EOSINOPHIL # BLD AUTO: 0.02 THOUSAND/ÂΜL (ref 0–0.61)
EOSINOPHIL NFR BLD AUTO: 0 % (ref 0–6)
ERYTHROCYTE [DISTWIDTH] IN BLOOD BY AUTOMATED COUNT: 12.9 % (ref 11.6–15.1)
FLUAV RNA RESP QL NAA+PROBE: NEGATIVE
FLUBV RNA RESP QL NAA+PROBE: NEGATIVE
GFR SERPL CREATININE-BSD FRML MDRD: 75 ML/MIN/1.73SQ M
GLUCOSE SERPL-MCNC: 159 MG/DL (ref 65–140)
HCT VFR BLD AUTO: 34.3 % (ref 34.8–46.1)
HGB BLD-MCNC: 10.9 G/DL (ref 11.5–15.4)
IMM GRANULOCYTES # BLD AUTO: 0.05 THOUSAND/UL (ref 0–0.2)
IMM GRANULOCYTES NFR BLD AUTO: 0 % (ref 0–2)
L PNEUMO1 AG UR QL IA.RAPID: NEGATIVE
LACTATE SERPL-SCNC: 0.8 MMOL/L (ref 0.5–2)
LYMPHOCYTES # BLD AUTO: 1.62 THOUSANDS/ÂΜL (ref 0.6–4.47)
LYMPHOCYTES NFR BLD AUTO: 12 % (ref 14–44)
MAGNESIUM SERPL-MCNC: 2 MG/DL (ref 1.6–2.6)
MCH RBC QN AUTO: 33.6 PG (ref 26.8–34.3)
MCHC RBC AUTO-ENTMCNC: 31.8 G/DL (ref 31.4–37.4)
MCV RBC AUTO: 106 FL (ref 82–98)
MONOCYTES # BLD AUTO: 1.02 THOUSAND/ÂΜL (ref 0.17–1.22)
MONOCYTES NFR BLD AUTO: 7 % (ref 4–12)
NEUTROPHILS # BLD AUTO: 10.99 THOUSANDS/ÂΜL (ref 1.85–7.62)
NEUTS SEG NFR BLD AUTO: 81 % (ref 43–75)
NRBC BLD AUTO-RTO: 0 /100 WBCS
PLATELET # BLD AUTO: 231 THOUSANDS/UL (ref 149–390)
PMV BLD AUTO: 8.4 FL (ref 8.9–12.7)
POTASSIUM SERPL-SCNC: 3.8 MMOL/L (ref 3.5–5.3)
PROCALCITONIN SERPL-MCNC: 0.11 NG/ML
PROCALCITONIN SERPL-MCNC: 0.13 NG/ML
RBC # BLD AUTO: 3.24 MILLION/UL (ref 3.81–5.12)
RSV RNA RESP QL NAA+PROBE: NEGATIVE
S PNEUM AG UR QL: NEGATIVE
SARS-COV-2 RNA RESP QL NAA+PROBE: NEGATIVE
SODIUM SERPL-SCNC: 137 MMOL/L (ref 135–147)
WBC # BLD AUTO: 13.73 THOUSAND/UL (ref 4.31–10.16)

## 2022-10-25 PROCEDURE — 83605 ASSAY OF LACTIC ACID: CPT | Performed by: EMERGENCY MEDICINE

## 2022-10-25 PROCEDURE — 99222 1ST HOSP IP/OBS MODERATE 55: CPT | Performed by: NURSE PRACTITIONER

## 2022-10-25 PROCEDURE — 85025 COMPLETE CBC W/AUTO DIFF WBC: CPT | Performed by: NURSE PRACTITIONER

## 2022-10-25 PROCEDURE — 84145 PROCALCITONIN (PCT): CPT | Performed by: NURSE PRACTITIONER

## 2022-10-25 PROCEDURE — 87040 BLOOD CULTURE FOR BACTERIA: CPT | Performed by: EMERGENCY MEDICINE

## 2022-10-25 PROCEDURE — 36415 COLL VENOUS BLD VENIPUNCTURE: CPT | Performed by: EMERGENCY MEDICINE

## 2022-10-25 PROCEDURE — 84484 ASSAY OF TROPONIN QUANT: CPT | Performed by: EMERGENCY MEDICINE

## 2022-10-25 PROCEDURE — 83735 ASSAY OF MAGNESIUM: CPT | Performed by: NURSE PRACTITIONER

## 2022-10-25 PROCEDURE — 0241U HB NFCT DS VIR RESP RNA 4 TRGT: CPT | Performed by: EMERGENCY MEDICINE

## 2022-10-25 PROCEDURE — 97116 GAIT TRAINING THERAPY: CPT

## 2022-10-25 PROCEDURE — 80048 BASIC METABOLIC PNL TOTAL CA: CPT | Performed by: NURSE PRACTITIONER

## 2022-10-25 PROCEDURE — 84484 ASSAY OF TROPONIN QUANT: CPT | Performed by: NURSE PRACTITIONER

## 2022-10-25 PROCEDURE — 92610 EVALUATE SWALLOWING FUNCTION: CPT

## 2022-10-25 PROCEDURE — 87070 CULTURE OTHR SPECIMN AEROBIC: CPT | Performed by: NURSE PRACTITIONER

## 2022-10-25 PROCEDURE — 97162 PT EVAL MOD COMPLEX 30 MIN: CPT

## 2022-10-25 PROCEDURE — 87205 SMEAR GRAM STAIN: CPT | Performed by: NURSE PRACTITIONER

## 2022-10-25 RX ORDER — CARVEDILOL 3.12 MG/1
3.12 TABLET ORAL 2 TIMES DAILY WITH MEALS
Status: DISCONTINUED | OUTPATIENT
Start: 2022-10-25 | End: 2022-10-26 | Stop reason: HOSPADM

## 2022-10-25 RX ORDER — POTASSIUM CHLORIDE 20 MEQ/1
20 TABLET, EXTENDED RELEASE ORAL ONCE
Status: COMPLETED | OUTPATIENT
Start: 2022-10-25 | End: 2022-10-25

## 2022-10-25 RX ORDER — AZITHROMYCIN 250 MG/1
500 TABLET, FILM COATED ORAL EVERY 24 HOURS
Status: DISCONTINUED | OUTPATIENT
Start: 2022-10-25 | End: 2022-10-26 | Stop reason: HOSPADM

## 2022-10-25 RX ORDER — SACCHAROMYCES BOULARDII 250 MG
250 CAPSULE ORAL 2 TIMES DAILY
Status: DISCONTINUED | OUTPATIENT
Start: 2022-10-25 | End: 2022-10-26 | Stop reason: HOSPADM

## 2022-10-25 RX ORDER — DONEPEZIL HYDROCHLORIDE 5 MG/1
10 TABLET, FILM COATED ORAL
Status: DISCONTINUED | OUTPATIENT
Start: 2022-10-25 | End: 2022-10-26 | Stop reason: HOSPADM

## 2022-10-25 RX ORDER — GUAIFENESIN 600 MG/1
600 TABLET, EXTENDED RELEASE ORAL 2 TIMES DAILY
Status: DISCONTINUED | OUTPATIENT
Start: 2022-10-25 | End: 2022-10-26 | Stop reason: HOSPADM

## 2022-10-25 RX ORDER — ONDANSETRON 2 MG/ML
4 INJECTION INTRAMUSCULAR; INTRAVENOUS EVERY 6 HOURS PRN
Status: DISCONTINUED | OUTPATIENT
Start: 2022-10-25 | End: 2022-10-26 | Stop reason: HOSPADM

## 2022-10-25 RX ORDER — NICOTINE 21 MG/24HR
1 PATCH, TRANSDERMAL 24 HOURS TRANSDERMAL DAILY
Status: DISCONTINUED | OUTPATIENT
Start: 2022-10-25 | End: 2022-10-26 | Stop reason: HOSPADM

## 2022-10-25 RX ORDER — ASCORBIC ACID 500 MG
500 TABLET ORAL DAILY
Status: DISCONTINUED | OUTPATIENT
Start: 2022-10-25 | End: 2022-10-26 | Stop reason: HOSPADM

## 2022-10-25 RX ORDER — CEFTRIAXONE 1 G/50ML
1000 INJECTION, SOLUTION INTRAVENOUS EVERY 24 HOURS
Status: DISCONTINUED | OUTPATIENT
Start: 2022-10-25 | End: 2022-10-25

## 2022-10-25 RX ORDER — SPIRONOLACTONE 25 MG/1
25 TABLET ORAL EVERY OTHER DAY
Status: DISCONTINUED | OUTPATIENT
Start: 2022-10-27 | End: 2022-10-26 | Stop reason: HOSPADM

## 2022-10-25 RX ORDER — ENOXAPARIN SODIUM 100 MG/ML
40 INJECTION SUBCUTANEOUS DAILY
Status: DISCONTINUED | OUTPATIENT
Start: 2022-10-25 | End: 2022-10-25 | Stop reason: DRUGHIGH

## 2022-10-25 RX ORDER — IPRATROPIUM BROMIDE AND ALBUTEROL SULFATE 2.5; .5 MG/3ML; MG/3ML
3 SOLUTION RESPIRATORY (INHALATION) EVERY 4 HOURS PRN
Status: DISCONTINUED | OUTPATIENT
Start: 2022-10-25 | End: 2022-10-26 | Stop reason: HOSPADM

## 2022-10-25 RX ORDER — ENOXAPARIN SODIUM 100 MG/ML
30 INJECTION SUBCUTANEOUS DAILY
Status: DISCONTINUED | OUTPATIENT
Start: 2022-10-25 | End: 2022-10-26 | Stop reason: HOSPADM

## 2022-10-25 RX ORDER — PRAVASTATIN SODIUM 40 MG
40 TABLET ORAL
Status: DISCONTINUED | OUTPATIENT
Start: 2022-10-25 | End: 2022-10-26 | Stop reason: HOSPADM

## 2022-10-25 RX ORDER — LISINOPRIL 2.5 MG/1
2.5 TABLET ORAL DAILY
Status: DISCONTINUED | OUTPATIENT
Start: 2022-10-25 | End: 2022-10-26 | Stop reason: HOSPADM

## 2022-10-25 RX ORDER — ACETAMINOPHEN 325 MG/1
650 TABLET ORAL EVERY 6 HOURS PRN
Status: DISCONTINUED | OUTPATIENT
Start: 2022-10-25 | End: 2022-10-26 | Stop reason: HOSPADM

## 2022-10-25 RX ORDER — MELATONIN
2000 DAILY
Status: DISCONTINUED | OUTPATIENT
Start: 2022-10-25 | End: 2022-10-26 | Stop reason: HOSPADM

## 2022-10-25 RX ORDER — CEFTRIAXONE 1 G/50ML
1000 INJECTION, SOLUTION INTRAVENOUS EVERY 24 HOURS
Status: DISCONTINUED | OUTPATIENT
Start: 2022-10-25 | End: 2022-10-26 | Stop reason: HOSPADM

## 2022-10-25 RX ADMIN — ENOXAPARIN SODIUM 30 MG: 30 INJECTION SUBCUTANEOUS at 08:39

## 2022-10-25 RX ADMIN — AZITHROMYCIN MONOHYDRATE 500 MG: 250 TABLET ORAL at 03:04

## 2022-10-25 RX ADMIN — POTASSIUM CHLORIDE 20 MEQ: 1500 TABLET, EXTENDED RELEASE ORAL at 03:04

## 2022-10-25 RX ADMIN — NICOTINE 1 PATCH: 14 PATCH, EXTENDED RELEASE TRANSDERMAL at 08:39

## 2022-10-25 RX ADMIN — OXYCODONE HYDROCHLORIDE AND ACETAMINOPHEN 500 MG: 500 TABLET ORAL at 08:39

## 2022-10-25 RX ADMIN — GUAIFENESIN 600 MG: 600 TABLET, EXTENDED RELEASE ORAL at 08:39

## 2022-10-25 RX ADMIN — PRAVASTATIN SODIUM 40 MG: 40 TABLET ORAL at 17:25

## 2022-10-25 RX ADMIN — Medication 2000 UNITS: at 08:39

## 2022-10-25 RX ADMIN — DONEPEZIL HYDROCHLORIDE 10 MG: 5 TABLET ORAL at 21:34

## 2022-10-25 RX ADMIN — CARVEDILOL 3.12 MG: 3.12 TABLET, FILM COATED ORAL at 07:30

## 2022-10-25 RX ADMIN — Medication 250 MG: at 08:39

## 2022-10-25 RX ADMIN — CEFTRIAXONE 1000 MG: 1 INJECTION, SOLUTION INTRAVENOUS at 23:01

## 2022-10-25 RX ADMIN — Medication 250 MG: at 17:25

## 2022-10-25 RX ADMIN — B-COMPLEX W/ C & FOLIC ACID TAB 1 TABLET: TAB at 08:39

## 2022-10-25 RX ADMIN — GUAIFENESIN 600 MG: 600 TABLET, EXTENDED RELEASE ORAL at 17:25

## 2022-10-25 RX ADMIN — CEFTRIAXONE 1000 MG: 1 INJECTION, SOLUTION INTRAVENOUS at 00:07

## 2022-10-25 RX ADMIN — CARVEDILOL 3.12 MG: 3.12 TABLET, FILM COATED ORAL at 17:25

## 2022-10-25 RX ADMIN — LISINOPRIL 2.5 MG: 2.5 TABLET ORAL at 08:39

## 2022-10-25 NOTE — CASE MANAGEMENT
Case Management Assessment & Discharge Planning Note    Patient name Taran Tadeo  Location [address] [address] [address]-* MRN [id number]  : 1945 Date 10/25/2022       Current Admission Date: 10/24/2022  Current Admission Diagnosis:Pneumonia   Patient Active Problem List    Diagnosis Date Noted   • Pneumonia 10/25/2022   • Sepsis ([facility] ) 10/25/2022   • Syncope 10/25/2022   • Hyperlipidemia 10/25/2022   • Hypertension 10/25/2022   • Dementia ([facility] [address] ) 10/25/2022   • Chronic combined systolic and diastolic congestive [facility] 10/25/2022   • Anxiety 10/25/2022   • Elevated TSH 2022   • Memory difficulty 2022   • Heart failure, left, with LVEF 41-49% ([facility] ) 2022   • Coronary artery disease involving native coronary artery of native heart without angina pectoris 2022   • BMI less than 19,adult 2022   • S/P hip replacement, right 2022   • Sequelae of protein-calorie malnutrition ([facility] )  2022   • Subclinical hypothyroidism 2022   • Personal history of nicotine dependence  2022   • Age related osteoporosis 2022   • Tobacco abuse 2021      LOS (days): 0  Geometric Mean LOS (GMLOS) (days): 5 00  Days to GMLOS:4 6     OBJECTIVE:    Risk of Unplanned Readmission Score: 12 3         Current admission status: Inpatient       Preferred Pharmacy:   [facility] [address] [facility] [address] [address] [place] [address]  Phone: [phone] Fax: [phone]    [facility]  [facility] [facility],   [address] [place] [address]  [address] [place] [address]  Phone: [phone] Fax: [phone]    Primary Care Provider: Stoney Garcia MD    Primary Insurance: MEDICARE  Secondary Insurance: AETNA    ASSESSMENT:  Active Health Care Proxies    There are no active Health Care Proxies on file                   Readmission Root Cause  30 Day Readmission: No    Patient Information  Admitted from[de-identified] Home  Mental Status: Alert  During Assessment patient was accompanied by: Not accompanied during assessment  Assessment information provided by[de-identified] Son  Primary Caregiver: Self  Support Systems: [address] of Residence: [address] [place] [address] do you live in?: alpha  Home entry access options   Select all that apply : Stairs  Number of steps to enter home : 4  Type of Current Residence: 2 story home  Upon entering residence, is there a bedroom on the main floor (no further steps)?: No  A bedroom is located on the following floor levels of residence (select all that apply):: 2nd Floor  Upon entering residence, is there a bathroom on the main floor (no further steps)?: No  Indicate which floors of current residence have a bathroom (select all the apply):: 2nd Floor  Number of steps to 2nd floor from main floor: One Flight  In the last 12 months, was there a time when you were not able to pay the mortgage or rent on time?: No  In the last 12 months, was there a time when you did not have a steady place to sleep or slept in a shelter (including now)?: No  Homeless/housing insecurity resource given?: N/A  Living Arrangements: Lives Alone    Activities of Daily Living Prior to Admission  Functional Status: Independent  Completes ADLs independently?: Yes  Ambulates independently?: Yes  Does patient use assisted devices?: Yes  Assisted Devices (DME) used: Straight Cane, Shower Chair  Does patient currently own DME?: Yes  What DME does the patient currently own?: Straight Cane, Shower Chair, Bedside Commode, Walker  Does the patient have a history of Short-Term Rehab?: No  Does patient have a history of HHC?: Yes  Does patient currently have [facility]?: No         Patient Information Continued  Income Source: SSI/SSD  Does patient have prescription coverage?: No  Within the past 12 months, you worried that your food would run out before you got the money to buy more : Never true  Within the past 12 months, the food you bought just didn't last and you didn't have money to get more : Never true  Food insecurity resource given?: N/A  Does patient receive dialysis treatments?: No  Does patient have a history of substance abuse?: No  Does patient have a history of Mental Health Diagnosis?: No         Means of Transportation  Means of Transport to Appts[de-identified] Family transport  In the past 12 months, has lack of transportation kept you from medical appointments or from getting medications?: No  In the past 12 months, has lack of transportation kept you from meetings, work, or from getting things needed for daily living?: No  Was application for public transport provided?: N/A        DISCHARGE DETAILS:    Discharge planning discussed with[de-identified] dtr and patient  Freedom of Choice: Yes  Comments - Freedom of Choice: ok with blanket referrals for hhc  has used [facility] in the past would like [facility] first choice    CM contacted family/caregiver?: Yes  Were Treatment Team discharge recommendations reviewed with patient/caregiver?: Yes  Did patient/caregiver verbalize understanding of patient care needs?: Yes  Were patient/caregiver advised of the risks associated with not following Treatment Team discharge recommendations?: Yes    Contacts  Patient Contacts: Marge Schulte (Daughter In-Law)   [phone]  Relationship to Patient[de-identified] Family  Contact Method: Phone  Phone Number: Marge Schulte (Daughter In-Law)   [phone]  Reason/Outcome: Emergency Contact, Referral, Discharge [address]         Is the patient interested in [place] [address] at discharge?: Yes  [address] requested[de-identified] Nursing, Occupational Therapy, Physical [address] Name[de-identified] Other  [address] [place] [address] Provider[de-identified] PCP  Home Health Services Needed[de-identified] Strengthening/Theraputic Exercises to Improve Function, Gait/ADL Training, Evaluate Functional Status and Safety  Homebound Criteria Met[de-identified] Requires the Assistance of Another Person for Safe Ambulation or to Leave the Home  Supporting Clincal Findings[de-identified] Limited Endurance, Fatigues Easliy in [employer]    DME Referral Provided  Referral made for DME?: No         Would you like to participate in our [address] service program?  : No - Declined    Treatment Team Recommendation: Home with  [facility]  Discharge Destination Plan[de-identified] Home with [address] [facility] spoke with Daughter in law [place] states pt's  just  2 weeks ago  She states they have used [facility] in the past and would go with them as first choice  They are ok with blanket referrals for hhc  Valentine also states that pt is requesting to live by herself and does not want anyone staying with her  Family lives 1 mile away and her neighbors check on her daily

## 2022-10-25 NOTE — ED NOTES
Medic also noted orthostatic blood pressure from 339 systolic to 66W upon moving her   Pt very alert and offers no c/o now     Trenton Enrique RN  10/24/22 7091

## 2022-10-25 NOTE — APP STUDENT NOTE
SALONI STUDENT  Inpatient Progress Note for TRAINING ONLY  Not Part of Legal Medical Record     Progress Note - Ghanshyam Mancera 65 y o  female MRN: [id number]  Unit/Bed#: [address] [place] [address] Encounter: [id number]        No new Assessment & Plan notes have been filed under this hospital service since the last note was generated  Service: Internal Medicine     1  Pneumonia  -Patient presented to ED with syncopal event  -Patient reports cough productive of clear phlegm along with a runny nose since 10/23  -In ED, noted to have WBC 13 94, low-grade fever, and tachycardia  -COVID negative  -IV Rocephin given in ED  Continue and added azithromycin (Zithromax)  -Urine legionella and strep pneumo negative  -CXR with findings of opacity withing the RLL and a trace pleural effusion, both consistent with pneumonia  -Continue ABX as noted above  -10/25 - WBC 13 73  -Cleared by speech pathology for regular diet and meds whole with liquid with no noted structural or functional abnormalities  -Transition to PO ABX as outpatient upon discharge    2  Sepsis  -POA with leukocytosis, tachycardia, and suspected pneumonia infection  -lactic acid normal  -Initial procalcitonin negative (0 11 - normal)  -Blood cultures pending  -Continue IV ABX as listed above  -10/25 - WBC 13 73, procalcitonin 0 13 (normal)  -Continue to monitor with CBC    3  Syncope  -Patient reports that she cannot remember the events that occurred prior to arriving at the hospital  -CT showed no acute findings  -EKG showed sinus tach with   -Initial troponin unremarkable  - 3/22 - 2D echo showed EF 35-40%, severe global hypokinesis, grade 1 diastolic dysfunction, no significant valvular disease  - 5/22 - Cardiac catheterization showed mild-mod nonobstructive disease involving all 3 vessels with associated calcification   EF 40-45% with no gradient across aortic valve  -Likely secondary to sepsis  -All troponins negative  -Continue telemetry  -Continue fall precautions  -PT/OT eval    4  Chronic combined systolic and diastolic congestive heart failure  -No dependent or peripheral fluid overload noted on exam  -Continue coreg, lisinopril  -Hold Aldactone  -Continue monitoring daily weights  -Continue monitoring I&Os    5  Anxiety  -Patient under a lot of stress lately secondary to recent loss of   -Seeing PCP as outpatient with plan to start Lexapro  -Continue Atarax PRN    6  Dementia  -Patient with memory difficulty  -Continue Aricept    7  Hypertension  -Home medications include coreg and lisinopril  Continue with holding parameters  -Continue to monitor    8  Hyperlipidemia  -Home medications include resouvastatin (Crestor) 5mg tablet PO  -Start pravastatin (Pravachol) 40mg tablet PO    9  Subclinical hypothyroidism  -TSH 5 93, free T4 normal from 7/22  -F/u with PCP as outpatient    10  CAD without angina pectoris  -Nonobstructive CAD as seen on recent cardiac catheterization  -Continue coreg and statin    11  Tobacco abuse  -Patient reports currently smoking 0 5 packs/day x60 years with a pack history of 30 years  -Recommended smoking cessation  -Continue nicotine patch      VTE Pharmacologic Prophylaxis:   Pharmacologic: Enoxaparin (Lovenox)  Mechanical VTE Prophylaxis in Place: No    Patient Centered Rounds: I have performed bedside rounds with nursing staff today  Discussions with Specialists or Other Care Team Provider: Nursing    Education and Discussions with Family / Patient: Yes    Time Spent for Care: 15 minutes  More than 50% of total time spent on counseling and coordination of care as described above      Current Length of Stay: 0 day(s)    Current Patient Status: Inpatient   Certification Statement: The patient will continue to require additional inpatient hospital stay due to PNA    Discharge Plan: w/in 24 hours    Code Status: Level 1 - Full Code    Subjective:   Baltazar Sanford is a 39FNY with medical history significant for CHF, cardiomyopathy, anxiety, dementia with memory difficulty, HLD, and HTN who presented to the ED on 10/24 with concerns following a syncopal episode  10/25 - Patient denies any trauma associated with the fall and denies pain, but reports that she does not remember the incident and could not state as to why the episode occurred  Patient reported that she has had a cough productive of clear phlegm and a rhinorrhea since the day prior  Patient reports feeling cold but denies fever, chest pain, palpitations, dyspnea, SOB, headaches, dizziness, abdominal pain, n/v/d, or constipation  Patient was found sitting in hospital bed comfortably eating breakfast and offered no acute complaints  Objective:     Vitals:   Temp (24hrs), Av 8 °F (37 1 °C), Min:97 4 °F (36 3 °C), Max:100 4 °F (38 °C)    Temp:  [97 4 °F (36 3 °C)-100 4 °F (38 °C)] 97 4 °F (36 3 °C)  HR:  [] 89  Resp:  [17-24] 17  BP: (100-139)/(51-76) 105/57  SpO2:  [91 %-96 %] 95 %  Body mass index is 17 57 kg/m²  Input and Output Summary (last 24 hours): Intake/Output Summary (Last 24 hours) at 10/25/2022 1351  Last data filed at 10/25/2022 0301  Gross per 24 hour   Intake 380 ml   Output --   Net 380 ml       Physical Exam:     Physical Exam  Constitutional:       General: She is not in acute distress  Appearance: She is not ill-appearing, toxic-appearing or diaphoretic  HENT:      Head: Normocephalic and atraumatic  Eyes:      General: No scleral icterus  Extraocular Movements: Extraocular movements intact  Pupils: Pupils are equal, round, and reactive to light  Cardiovascular:      Rate and Rhythm: Normal rate and regular rhythm  Pulses: Normal pulses  Heart sounds: Normal heart sounds  No murmur heard  No friction rub  No gallop  Pulmonary:      Effort: Pulmonary effort is normal  No respiratory distress  Breath sounds: Normal breath sounds  No stridor  No wheezing, rhonchi or rales     Chest: Chest wall: No tenderness  Abdominal:      General: Bowel sounds are normal       Palpations: Abdomen is soft  Skin:     General: Skin is dry  Neurological:      Mental Status: She is alert and oriented to person, place, and time  Mental status is at baseline  Psychiatric:         Mood and Affect: Mood normal          Behavior: Behavior normal          Thought Content: Thought content normal          Judgment: Judgment normal        Historical Information   Past Medical History:   Diagnosis Date   • Anxiety    • Dementia ([address] )    • Hyperlipidemia    • Hypertension    • Memory difficulty      Past Surgical History:   Procedure Laterality Date   • ARTHROPLASTY HIP TOTAL ANTERIOR Right 4/9/2021    Procedure: ARTHROPLASTY HIP TOTAL ANTERIOR;  Surgeon: Ziggy Villanueva DO;  [facility];  Service: Orthopedics   • CARDIAC CATHETERIZATION Left 5/27/2022    Procedure: Cardiac catheterization;  Surgeon: Trudy Jackson MD;  Location: [place] [address] CATH LAB; Service: Cardiology     Social History   Social History     Substance and Sexual Activity   Alcohol Use Never     Social History     Substance and Sexual Activity   Drug Use Never     Social History     Tobacco Use   Smoking Status Current Every Day Smoker   • Packs/day: 0 50   • Years: 60 00   • Pack years: 30 00   • Types: Cigarettes   Smokeless Tobacco Never Used     Family History: History reviewed  No pertinent family history  Meds/Allergies   PTA meds:   Prior to Admission Medications   Prescriptions Last Dose Informant Patient Reported? Taking?    Calcium Ascorbate (VITAMIN C) 500 mg tablet 10/24/2022 at Unknown time Self Yes Yes   Sig: Take 500 mg by mouth daily   carvedilol (COREG) 3 125 mg tablet 10/24/2022 at Unknown time  No Yes   Sig: Take 1 tablet (3 125 mg total) by mouth 2 (two) times a day with meals   cholecalciferol (VITAMIN D3) 1,000 units tablet   No No   Sig: Take 2 tablets (2,000 Units total) by mouth daily   donepezil (ARICEPT) 10 mg tablet 10/24/2022 at Unknown time  No Yes   Sig: Take 1 tablet (10 mg total) by mouth daily at bedtime   ibandronate (BONIVA) 150 MG tablet Past Month at Unknown time  No Yes   Sig: Take 1 tablet (150 mg total) by mouth every 30 (thirty) days   ketoconazole (NIZORAL) 2 % cream   No No   Sig: Apply topically daily   lisinopril (ZESTRIL) 2 5 mg tablet 10/24/2022 at Unknown time  No Yes   Sig: Take 1 tablet (2 5 mg total) by mouth daily   multivitamin (THERAGRAN) TABS 10/24/2022 at Unknown time Self Yes Yes   Sig: Take 1 tablet by mouth daily   rosuvastatin (CRESTOR) 5 mg tablet 10/24/2022 at Unknown time  No Yes   Sig: Take 1 tablet (5 mg total) by mouth every other day   spironolactone (ALDACTONE) 25 mg tablet 10/24/2022 at Unknown time  No Yes   Sig: Take 1 tablet (25 mg total) by mouth every other day      Facility-Administered Medications: None     No Known Allergies    Additional Data:     Labs:    Results from last 7 days   Lab Units 10/25/22  0512   WBC Thousand/uL 13 73*   HEMOGLOBIN g/dL 10 9*   HEMATOCRIT % 34 3*   PLATELETS Thousands/uL 231   NEUTROS PCT % 81*   LYMPHS PCT % 12*   MONOS PCT % 7   EOS PCT % 0     Results from last 7 days   Lab Units 10/25/22  0512 10/24/22  2230   SODIUM mmol/L 137 137   POTASSIUM mmol/L 3 8 3 7   CHLORIDE mmol/L 106 103   CO2 mmol/L 22 25   BUN mg/dL 24 27*   CREATININE mg/dL 0 76 0 97   ANION GAP mmol/L 9 9   CALCIUM mg/dL 7 8* 7 9*   ALBUMIN g/dL  --  2 9*   TOTAL BILIRUBIN mg/dL  --  0 48   ALK PHOS U/L  --  62   ALT U/L  --  31   AST U/L  --  24   GLUCOSE RANDOM mg/dL 159* 118         Results from last 7 days   Lab Units 10/24/22  2128   POC GLUCOSE mg/dl 143*         Results from last 7 days   Lab Units 10/25/22  0512 10/25/22  0000 10/24/22  2230   LACTIC ACID mmol/L  --  0 8  --    PROCALCITONIN ng/ml 0 13  --  0 11         * I Have Reviewed All Lab Data Listed Above  * Additional Pertinent Lab Tests Reviewed:  All Labs Within Last 24 Hours Reviewed    Imaging:    Imaging Reports Reviewed Today Include: CXR, CT head  Imaging Personally Reviewed by Myself Includes:  N/a    Recent Cultures (last 7 days):     Results from last 7 days   Lab Units 10/25/22  0512 10/25/22  0007 10/25/22  0000 10/24/22  2316   BLOOD CULTURE   --  Received in Microbiology Lab  Culture in Progress  Received in Microbiology Lab  Culture in Progress    --    GRAM STAIN RESULT  Rare Epithelial cells per low power field*  Rare Polys*  3+ Gram positive rods*  2+ Gram negative rods*  2+ Gram positive cocci in pairs and chains*  --   --   --    LEGIONELLA URINARY ANTIGEN   --   --   --  Negative       Last 24 Hours Medication List:   Current Facility-Administered Medications   Medication Dose Route Frequency Provider Last Rate   • acetaminophen  650 mg Oral Q6H PRN BOBBY Samaniego     • ascorbic acid  500 mg Oral Daily BOBBY Samaniego     • azithromycin  500 mg Oral Q24H BOBBY Samaniego     • carvedilol  3 125 mg Oral BID With Meals BOBBY Samaniego     • cefTRIAXone  1,000 mg Intravenous Q24H BOBBY Samaniego     • cholecalciferol  2,000 Units Oral Daily BOBBY Samaniego     • donepezil  10 mg Oral HS BOBBY Samaniego     • enoxaparin  30 mg Subcutaneous Daily BOBBY Samaniego     • guaiFENesin  600 mg Oral BID BOBBY Samaniego     • ipratropium-albuterol  3 mL Nebulization Q4H PRN BOBBY Samaniego     • lisinopril  2 5 mg Oral Daily BOBBY Samaniego     • multivitamin stress formula  1 tablet Oral Daily BOBBY Samaniego     • nicotine  1 patch Transdermal Daily BOBBY Samaniego     • ondansetron  4 mg Intravenous Q6H PRN BOBBY Samaniego     • pravastatin  40 mg Oral Q48H BOBBY Samaniego     • saccharomyces boulardii  250 mg Oral BID BOBBY Samaniego     • [START ON 10/27/2022] spironolactone  25 mg Oral Every Other Day BOBBY Garland          Today, Patient Was Seen By: Fiaza Stoll    ** Please Note: Dictation voice to text software may have been used in the creation of this document   **

## 2022-10-25 NOTE — ED NOTES
This RN called and gave report to [place] [address] [facility]       Dipti Judd RN  10/25/22 5936

## 2022-10-25 NOTE — ED PROVIDER NOTES
History  Chief Complaint   Patient presents with   • Syncope     Brought in by ambulance for poss syncope  Pt cant remember events alert and oriented on arrival     Patient is a 76-year-old female with a prior history of diabetes, hypertension, hyperlipidemia, memory loss on Aricept, presents to the emergency department after a possible syncopal episode  Patient's granddaughter was in the house and states that while patient was attempted to walk from one room to another, she started to sway and she helped her to the floor  Patient was allegedly unresponsive for 30-45 seconds prior to regaining consciousness  Patient has no recollection of this episode  She denies somatic complaints at the time of my initial evaluation  She states that she has been in her usual state of health all day  Patient's  is recently , and patient's daughter who is currently at the bedside, states that patient has been under lot of stress  History provided by:  Patient and relative  History limited by:  Mental status change   used: No    Syncope  Episode history:  Single  Most recent episode: Today  Progression:  Unchanged  Chronicity:  New  Witnessed: yes    Relieved by:  Nothing  Worsened by:  Nothing  Ineffective treatments:  None tried  Associated symptoms: weakness    Associated symptoms: no fever, no nausea, no shortness of breath and no vomiting        Prior to Admission Medications   Prescriptions Last Dose Informant Patient Reported? Taking?    Calcium Ascorbate (VITAMIN C) 500 mg tablet 10/24/2022 at Unknown time Self Yes Yes   Sig: Take 500 mg by mouth daily   carvedilol (COREG) 3 125 mg tablet 10/24/2022 at Unknown time  No Yes   Sig: Take 1 tablet (3 125 mg total) by mouth 2 (two) times a day with meals   cholecalciferol (VITAMIN D3) 1,000 units tablet   No No   Sig: Take 2 tablets (2,000 Units total) by mouth daily   donepezil (ARICEPT) 10 mg tablet 10/24/2022 at Unknown time  No Yes Sig: Take 1 tablet (10 mg total) by mouth daily at bedtime   ibandronate (BONIVA) 150 MG tablet Past Month at Unknown time  No Yes   Sig: Take 1 tablet (150 mg total) by mouth every 30 (thirty) days   ketoconazole (NIZORAL) 2 % cream   No No   Sig: Apply topically daily   lisinopril (ZESTRIL) 2 5 mg tablet 10/24/2022 at Unknown time  No Yes   Sig: Take 1 tablet (2 5 mg total) by mouth daily   multivitamin (THERAGRAN) TABS 10/24/2022 at Unknown time Self Yes Yes   Sig: Take 1 tablet by mouth daily   rosuvastatin (CRESTOR) 5 mg tablet 10/24/2022 at Unknown time  No Yes   Sig: Take 1 tablet (5 mg total) by mouth every other day   spironolactone (ALDACTONE) 25 mg tablet 10/24/2022 at Unknown time  No Yes   Sig: Take 1 tablet (25 mg total) by mouth every other day      Facility-Administered Medications: None       Past Medical History:   Diagnosis Date   • Anxiety    • Dementia ([facility] [address] )    • Hyperlipidemia    • Hypertension    • Memory difficulty        Past Surgical History:   Procedure Laterality Date   • ARTHROPLASTY HIP TOTAL ANTERIOR Right 4/9/2021    Procedure: ARTHROPLASTY HIP TOTAL ANTERIOR;  Surgeon: Ofelia Kunz DO;  Location: [address] [place] [address];  Service: Orthopedics   • CARDIAC CATHETERIZATION Left 5/27/2022    Procedure: Cardiac catheterization;  Surgeon: Angel Rahman MD;  Location: [place] [address] CATH LAB; Service: Cardiology       History reviewed  No pertinent family history  I have reviewed and agree with the history as documented  E-Cigarette/Vaping   • E-Cigarette Use Never User      E-Cigarette/Vaping Substances     Social History     Tobacco Use   • Smoking status: Current Every Day Smoker     Packs/day: 0 50     Years: 60 00     Pack years: 30 00     Types: Cigarettes   • Smokeless tobacco: Never Used   Vaping Use   • Vaping Use: Never used   Substance Use Topics   • Alcohol use: Never   • Drug use: Never       Review of Systems   Constitutional: Negative for chills and fever     Respiratory: Negative for cough, chest tightness and shortness of breath  Cardiovascular: Positive for syncope  Gastrointestinal: Negative for abdominal pain, diarrhea, nausea and vomiting  Genitourinary: Negative for dysuria, frequency, hematuria and urgency  Musculoskeletal: Negative for back pain, neck pain and neck stiffness  Neurological: Positive for syncope and weakness  All other systems reviewed and are negative  Physical Exam  Physical Exam  Vitals and nursing note reviewed  Constitutional:       General: She is not in acute distress  Appearance: She is well-developed  She is not diaphoretic  HENT:      Head: Normocephalic and atraumatic  Eyes:      Conjunctiva/sclera: Conjunctivae normal       Pupils: Pupils are equal, round, and reactive to light  Cardiovascular:      Rate and Rhythm: Normal rate and regular rhythm  Heart sounds: Normal heart sounds  No murmur heard  Pulmonary:      Effort: Pulmonary effort is normal  No respiratory distress  Breath sounds: Normal breath sounds  Abdominal:      General: Bowel sounds are normal  There is no distension  Palpations: Abdomen is soft  Tenderness: There is no abdominal tenderness  Musculoskeletal:         General: No deformity  Normal range of motion  Cervical back: Normal range of motion and neck supple  Skin:     General: Skin is warm and dry  Capillary Refill: Capillary refill takes less than 2 seconds  Coloration: Skin is not pale  Findings: No rash  Neurological:      General: No focal deficit present  Mental Status: She is alert and oriented to person, place, and time  Cranial Nerves: No cranial nerve deficit  Psychiatric:         Mood and Affect: Mood is anxious  Cognition and Memory: She exhibits impaired recent memory           Vital Signs  ED Triage Vitals [10/24/22 2131]   Temperature Pulse Respirations Blood Pressure SpO2   100 4 °F (38 °C) (!) 108 (!) 24 139/62 93 % Temp Source Heart Rate Source Patient Position - Orthostatic VS BP Location FiO2 (%)   Oral Monitor Lying Right arm --      Pain Score       No Pain           Vitals:    10/25/22 1956 10/25/22 1957 10/25/22 1958 10/25/22 2300   BP: 127/60 127/60  121/67   Pulse: 87 88  92   Patient Position - Orthostatic VS: Sitting - Orthostatic VS  Standing for 3 minutes - Orthostatic VS          Visual Acuity  Visual Acuity    Flowsheet Row Most Recent Value   L Pupil Size (mm) 3   R Pupil Size (mm) 3   L Pupil Shape Round   R Pupil Shape Round          ED Medications  Medications   ascorbic acid (VITAMIN C) tablet 500 mg (500 mg Oral Given 10/25/22 0839)   carvedilol (COREG) tablet 3 125 mg (3 125 mg Oral Given 10/25/22 1725)   cholecalciferol (VITAMIN D3) tablet 2,000 Units (2,000 Units Oral Given 10/25/22 0839)   donepezil (ARICEPT) tablet 10 mg (10 mg Oral Given 10/25/22 2134)   lisinopril (ZESTRIL) tablet 2 5 mg (2 5 mg Oral Given 10/25/22 0839)   multivitamin stress formula tablet 1 tablet (1 tablet Oral Given 10/25/22 0839)   pravastatin (PRAVACHOL) tablet 40 mg (40 mg Oral Given 10/25/22 1725)   spironolactone (ALDACTONE) tablet 25 mg (has no administration in time range)   acetaminophen (TYLENOL) tablet 650 mg (has no administration in time range)   ondansetron (ZOFRAN) injection 4 mg (has no administration in time range)   nicotine (NICODERM CQ) 14 mg/24hr TD 24 hr patch 1 patch (1 patch Transdermal Medication Applied 10/25/22 0839)   guaiFENesin (MUCINEX) 12 hr tablet 600 mg (600 mg Oral Given 10/25/22 1725)   azithromycin (ZITHROMAX) tablet 500 mg (500 mg Oral Given 10/26/22 0232)   ipratropium-albuterol (DUO-NEB) 0 5-2 5 mg/3 mL inhalation solution 3 mL (has no administration in time range)   cefTRIAXone (ROCEPHIN) IVPB (premix in dextrose) 1,000 mg 50 mL (1,000 mg Intravenous New Bag 10/25/22 2301)   saccharomyces boulardii (FLORASTOR) capsule 250 mg (250 mg Oral Given 10/25/22 2672)   enoxaparin (LOVENOX) subcutaneous injection 30 mg (30 mg Subcutaneous Given 10/25/22 0839)   cefTRIAXone (ROCEPHIN) IVPB (premix in dextrose) 1,000 mg 50 mL (0 mg Intravenous Stopped 10/25/22 0040)   potassium chloride (K-DUR,KLOR-CON) CR tablet 20 mEq (20 mEq Oral Given 10/25/22 0304)       Diagnostic Studies  Results Reviewed     Procedure Component Value Units Date/Time    Blood culture #1 [[id number]] Collected: 10/25/22 0007    Lab Status: Preliminary result Specimen: Blood from Arm, Left Updated: 10/25/22 1103     Blood Culture Received in Microbiology Lab  Culture in Progress  Blood culture #2 [[id number]] Collected: 10/25/22 0000    Lab Status: Preliminary result Specimen: Blood from Arm, Right Updated: 10/25/22 1103     Blood Culture Received in Microbiology Lab  Culture in Progress  Legionella antigen, Urine [[id number]]  (Normal) Collected: 10/24/22 2316    Lab Status: Final result Specimen: Urine, Clean Catch Updated: 10/25/22 1046     Legionella Urinary Antigen Negative    Strep Pneumoniae, Urine [[id number]]  (Normal) Collected: 10/24/22 2316    Lab Status: Final result Specimen: Urine, Clean Catch Updated: 10/25/22 1045     Strep pneumoniae antigen, urine Negative    HS Troponin I 2hr [[id number]]  (Normal) Collected: 10/25/22 0041    Lab Status: Final result Specimen: Blood from Arm, Right Updated: 10/25/22 0110     hs TnI 2hr 5 ng/L      Delta 2hr hsTnI 0 ng/L     FLU/RSV/COVID - if FLU/RSV clinically relevant [[id number]]  (Normal) Collected: 10/25/22 0003    Lab Status: Final result Specimen: Nares from Nose Updated: 10/25/22 0049     SARS-CoV-2 Negative     INFLUENZA A PCR Negative     INFLUENZA B PCR Negative     RSV PCR Negative    Narrative:      FOR PEDIATRIC PATIENTS - copy/paste COVID Guidelines URL to browser: [email or web address]    SARS-CoV-2 assay is a Nucleic Acid Amplification assay intended for the  qualitative detection of nucleic acid from SARS-CoV-2 in nasopharyngeal  swabs  Results are for the presumptive identification of SARS-CoV-2 RNA  Positive results are indicative of infection with SARS-CoV-2, the virus  causing COVID-19, but do not rule out bacterial infection or co-infection  with other viruses  Laboratories within the [place] and its  territories are required to report all positive results to the appropriate  public health authorities  Negative results do not preclude SARS-CoV-2  infection and should not be used as the sole basis for treatment or other  patient management decisions  Negative results must be combined with  clinical observations, patient history, and epidemiological information  This test has not been FDA cleared or approved  This test has been authorized by FDA under an Emergency Use Authorization  (EUA)  This test is only authorized for the duration of time the  declaration that circumstances exist justifying the authorization of the  emergency use of an in vitro diagnostic tests for detection of SARS-CoV-2  virus and/or diagnosis of COVID-19 infection under section 564(b)(1) of  the Act, 21 U  S C  615AUO-9(E)(1), unless the authorization is terminated  or revoked sooner  The test has been validated but independent review by FDA  and CLIA is pending  Test performed using [employer] Gene[employer]pert: This RT-PCR assay targets N2,  a region unique to SARS-CoV-2  A conserved region in the E-gene was chosen  for pan-Sarbecovirus detection which includes SARS-CoV-2  According to CMS-2020-01-R, this platform meets the definition of high-throughput technology  Lactic acid [[id number]]  (Normal) Collected: 10/25/22 0000    Lab Status: Final result Specimen: Blood from Arm, Right Updated: 10/25/22 0039     LACTIC ACID 0 8 mmol/L     Narrative:      Result may be elevated if tourniquet was used during collection      Procalcitonin [[id number]]  (Normal) Collected: 10/24/22 2230    Lab Status: Final result Specimen: Blood from Arm, Right Updated: 10/25/22 0020     Procalcitonin 0 11 ng/ml     Urine Microscopic [[id number]]  (Abnormal) Collected: 10/24/22 2316    Lab Status: Final result Specimen: Urine, Other Updated: 10/24/22 2332     RBC, UA None Seen /hpf      WBC, UA None Seen /hpf      Epithelial Cells Moderate /hpf      Bacteria, UA Moderate /hpf      MUCUS THREADS Moderate    UA (URINE) with reflex to Scope [[id number]]  (Abnormal) Collected: 10/24/22 2316    Lab Status: Final result Specimen: Urine, Other Updated: 10/24/22 2320     Color, UA Yellow     Clarity, UA Clear     Specific [place], UA 1 020     pH, UA 6 0     Leukocytes, UA Negative     Nitrite, UA Negative     Protein, UA Trace mg/dl      Glucose, UA Negative mg/dl      Ketones, UA Negative mg/dl      Urobilinogen, UA 1 0 E U /dl      Bilirubin, UA Negative     Occult Blood, UA Negative    HS Troponin 0hr (reflex protocol) [[id number]]  (Normal) Collected: 10/24/22 2230    Lab Status: Final result Specimen: Blood from Arm, Right Updated: 10/24/22 2259     hs TnI 0hr 5 ng/L     Comprehensive metabolic panel [[id number]]  (Abnormal) Collected: 10/24/22 2230    Lab Status: Final result Specimen: Blood from Arm, Right Updated: 10/24/22 2253     Sodium 137 mmol/L      Potassium 3 7 mmol/L      Chloride 103 mmol/L      CO2 25 mmol/L      ANION GAP 9 mmol/L      BUN 27 mg/dL      Creatinine 0 97 mg/dL      Glucose 118 mg/dL      Calcium 7 9 mg/dL      Corrected Calcium 8 8 mg/dL      AST 24 U/L      ALT 31 U/L      Alkaline Phosphatase 62 U/L      Total Protein 6 9 g/dL      Albumin 2 9 g/dL      Total Bilirubin 0 48 mg/dL      eGFR 56 ml/min/1 73sq m     Narrative:      [place] guidelines for Chronic Kidney Disease (CKD):   •  Stage 1 with normal or high GFR (GFR > 90 mL/min/1 73 square meters)  •  Stage 2 Mild CKD (GFR = 60-89 mL/min/1 73 square meters)  •  Stage 3A Moderate CKD (GFR = 45-59 mL/min/1 73 square meters)  •  Stage 3B Moderate CKD (GFR = 30-44 mL/min/1 73 square meters)  •  Stage 4 Severe CKD (GFR = 15-29 mL/min/1 73 square meters)  •  Stage 5 End Stage CKD (GFR <15 mL/min/1 73 square meters)  Note: GFR calculation is accurate only with a steady state creatinine    Magnesium [[id number]]  (Normal) Collected: 10/24/22 2230    Lab Status: Final result Specimen: Blood from Arm, Right Updated: 10/24/22 2253     Magnesium 2 0 mg/dL     CBC and differential [[id number]]  (Abnormal) Collected: 10/24/22 2230    Lab Status: Final result Specimen: Blood from Arm, Right Updated: 10/24/22 2237     WBC 13 94 Thousand/uL      RBC 3 42 Million/uL      Hemoglobin 11 8 g/dL      Hematocrit 36 0 %       fL      MCH 34 5 pg      MCHC 32 8 g/dL      RDW 13 1 %      MPV 8 3 fL      Platelets 406 Thousands/uL      nRBC 0 /100 WBCs      Neutrophils Relative 87 %      Immat GRANS % 1 %      Lymphocytes Relative 6 %      Monocytes Relative 6 %      Eosinophils Relative 0 %      Basophils Relative 0 %      Neutrophils Absolute 12 14 Thousands/µL      Immature Grans Absolute 0 07 Thousand/uL      Lymphocytes Absolute 0 84 Thousands/µL      Monocytes Absolute 0 85 Thousand/µL      Eosinophils Absolute 0 01 Thousand/µL      Basophils Absolute 0 03 Thousands/µL     Fingerstick Glucose (POCT) [[id number]]  (Abnormal) Collected: 10/24/22 2128    Lab Status: Final result Updated: 10/24/22 2134     POC Glucose 143 mg/dl                  CT head without contrast   Final Result by Bethel Schilder, MD (10/24 2314)      No acute intracranial abnormality  Workstation performed: [id number]         XR chest 1 view portable   ED Interpretation by Ren Heart DO (10/24 1390)   Rll infiltrate      Final Result by Brie Chinchilla MD (10/25 9520)      New patchy airspace opacity in the right lower lung zone and a trace right pleural effusion, consistent with pneumonia in the appropriate clinical setting    Recommend follow-up chest radiograph in 12 weeks to assess resolution  The study was marked in [facility] for immediate notification  Workstation performed: [id number]                    Procedures  ECG 12 Lead Documentation Only    Date/Time: 10/24/2022 9:28 PM  Performed by: Rhea Chu DO  Authorized by: Rhea Chu DO     Indications / Diagnosis:  Syncope  ECG reviewed by me, the ED Provider: yes    Patient location:  ED  Previous ECG:     Previous ECG:  Compared to current    Similarity:  No change    Comparison to cardiac monitor: Yes    Interpretation:     Interpretation: abnormal    Rate:     ECG rate:  112bpm    ECG rate assessment: tachycardic    Rhythm:     Rhythm: sinus tachycardia    Ectopy:     Ectopy: none    QRS:     QRS axis:  Normal  Conduction:     Conduction: normal    ST segments:     ST segments:  Normal  T waves:     T waves: normal               ED Course                               SBIRT 20yo+    Flowsheet Row Most Recent Value   SBIRT (23 yo +)    In order to provide better care to our patients, we are screening all of our patients for alcohol and drug use  Would it be okay to ask you these screening questions?  No Filed at: 10/24/2022 2230                    MDM  Number of Diagnoses or Management Options  Pneumonia: new and requires workup  Syncope and collapse: new and requires workup     Amount and/or Complexity of Data Reviewed  Clinical lab tests: ordered and reviewed  Tests in the radiology section of CPT®: ordered and reviewed    Risk of Complications, Morbidity, and/or Mortality  Presenting problems: high  Diagnostic procedures: high  Management options: high    Patient Progress  Patient progress: improved      Disposition  Final diagnoses:   Pneumonia   Syncope and collapse     Time reflects when diagnosis was documented in both MDM as applicable and the Disposition within this note     Time User Action Codes Description Comment    10/24/2022 11:54 PM Magali Gupta [J18 [id number]] Pneumonia     10/24/2022 11:54 PM Kamryndaniel LIGHT Add [R55] Syncope and collapse       ED Disposition     ED Disposition   Admit    Condition   Stable    Date/Time   Mon Oct 24, 2022 11:53 PM    Comment   Case was discussed with Renny Ramirez and the patient's admission status was agreed to be Admission Status: observation status to the service of Dr Lencho Espinal              Follow-up Information    None         Current Discharge Medication List      CONTINUE these medications which have NOT CHANGED    Details   Calcium Ascorbate (VITAMIN C) 500 mg tablet Take 500 mg by mouth daily      carvedilol (COREG) 3 125 mg tablet Take 1 tablet (3 125 mg total) by mouth 2 (two) times a day with meals  Qty: 180 tablet, Refills: 1    Associated Diagnoses: Cardiomyopathy, unspecified type (HCC)      donepezil (ARICEPT) 10 mg tablet Take 1 tablet (10 mg total) by mouth daily at bedtime  Qty: 90 tablet, Refills: 1    Comments: Replaces 5mg  Associated Diagnoses: Memory difficulty      ibandronate (BONIVA) 150 MG tablet Take 1 tablet (150 mg total) by mouth every 30 (thirty) days  Qty: 3 tablet, Refills: 1    Comments: 1 hour before any other food/medication, stay upright for 60minutes after taking  Associated Diagnoses: Age related osteoporosis, unspecified pathological fracture presence      lisinopril (ZESTRIL) 2 5 mg tablet Take 1 tablet (2 5 mg total) by mouth daily  Qty: 90 tablet, Refills: 1    Associated Diagnoses: Cardiomyopathy, unspecified type (HCC)      multivitamin (THERAGRAN) TABS Take 1 tablet by mouth daily      rosuvastatin (CRESTOR) 5 mg tablet Take 1 tablet (5 mg total) by mouth every other day  Qty: 45 tablet, Refills: 1    Associated Diagnoses: Dyslipidemia      spironolactone (ALDACTONE) 25 mg tablet Take 1 tablet (25 mg total) by mouth every other day  Qty: 45 tablet, Refills: 1    Associated Diagnoses: Cardiomyopathy, unspecified type (HCC)      cholecalciferol (VITAMIN D3) 1,000 units tablet Take 2 tablets (2,000 Units total) by mouth daily  Qty: 60 tablet, Refills: 0    Associated Diagnoses: COVID-19 virus infection      ketoconazole (NIZORAL) 2 % cream Apply topically daily  Qty: 60 g, Refills: 1    Associated Diagnoses: Onychomycosis; Dermatophytosis             No discharge procedures on file      PDMP Review     None          ED Provider  Electronically Signed by           Valencia Horta DO  10/26/22 0671

## 2022-10-25 NOTE — PLAN OF CARE
Problem: Potential for Falls  Goal: Patient will remain free of falls  Description: INTERVENTIONS:  - Educate patient/family on patient safety including physical limitations  - Instruct patient to call for assistance with activity   - Consult OT/PT to assist with strengthening/mobility   - Keep Call bell within reach  - Keep bed low and locked with side rails adjusted as appropriate  - Keep care items and personal belongings within reach  - Initiate and maintain comfort rounds  - Make Fall Risk Sign visible to staff  - Offer Toileting every 2 Hours, in advance of need  - Initiate/Maintain bed alarm  - Obtain necessary fall risk management equipment: call bell   - Apply yellow socks and bracelet for high fall risk patients  - Consider moving patient to room near nurses station  Outcome: Progressing     Problem: RESPIRATORY - ADULT  Goal: Achieves optimal ventilation and oxygenation  Description: INTERVENTIONS:  - Assess for changes in respiratory status  - Assess for changes in mentation and behavior  - Position to facilitate oxygenation and minimize respiratory effort  - Oxygen administered by appropriate delivery if ordered  - Initiate smoking cessation education as indicated  - Encourage broncho-pulmonary hygiene including cough, deep breathe, Incentive Spirometry  - Assess the need for suctioning and aspirate as needed  - Assess and instruct to report SOB or any respiratory difficulty  - Respiratory Therapy support as indicated  Outcome: Progressing     Problem: INFECTION - ADULT  Goal: Absence or prevention of progression during hospitalization  Description: INTERVENTIONS:  - Assess and monitor for signs and symptoms of infection  - Monitor lab/diagnostic results  - Monitor all insertion sites, i e  indwelling lines, tubes, and drains  - Monitor endotracheal if appropriate and nasal secretions for changes in amount and color  - [place] appropriate cooling/warming therapies per order  - Administer medications as ordered  - Instruct and encourage patient and family to use good hand hygiene technique  - Identify and instruct in appropriate isolation precautions for identified infection/condition  Outcome: Progressing  Goal: Absence of fever/infection during neutropenic period  Description: INTERVENTIONS:  - Monitor WBC    Outcome: Progressing     Problem: SAFETY ADULT  Goal: Patient will remain free of falls  Description: INTERVENTIONS:  - Educate patient/family on patient safety including physical limitations  - Instruct patient to call for assistance with activity   - Consult OT/PT to assist with strengthening/mobility   - Keep Call bell within reach  - Keep bed low and locked with side rails adjusted as appropriate  - Keep care items and personal belongings within reach  - Initiate and maintain comfort rounds  - Make Fall Risk Sign visible to staff  - Offer Toileting every 2 Hours, in advance of need  - Initiate/Maintain bed alarm  - Obtain necessary fall risk management equipment: call bell   - Apply yellow socks and bracelet for high fall risk patients  - Consider moving patient to room near nurses station  Outcome: Progressing  Goal: Maintains/Returns to pre admission functional level  Description: INTERVENTIONS:  - Perform BMAT or MOVE assessment daily    - Set and communicate daily mobility goal to care team and patient/family/caregiver  - Collaborate with rehabilitation services on mobility goals if consulted  - Perform Range of Motion 2 times a day  - Reposition patient every 2 hours    - Dangle patient 2 times a day  - Stand patient 2 times a day  - Ambulate patient 2 times a day  - Out of bed to chair 2 times a day   - Out of bed for meals 2 times a day  - Out of bed for toileting  - Record patient progress and toleration of activity level   Outcome: Progressing     Problem: DISCHARGE PLANNING  Goal: Discharge to home or other facility with appropriate resources  Description: INTERVENTIONS:  - Identify barriers to discharge w/patient and caregiver  - Arrange for needed discharge resources and transportation as appropriate  - Identify discharge learning needs (meds, wound care, etc )  - Arrange for interpretive services to assist at discharge as needed  - Refer to Case Management Department for coordinating discharge planning if the patient needs post-hospital services based on physician/advanced practitioner order or complex needs related to functional status, cognitive ability, or social support system  Outcome: Progressing     Problem: Knowledge Deficit  Goal: Patient/family/caregiver demonstrates understanding of disease process, treatment plan, medications, and discharge instructions  Description: Complete learning assessment and assess knowledge base    Interventions:  - Provide teaching at level of understanding  - Provide teaching via preferred learning methods  Outcome: Progressing     Problem: CARDIOVASCULAR - ADULT  Goal: Maintains optimal cardiac output and hemodynamic stability  Description: INTERVENTIONS:  - Monitor I/O, vital signs and rhythm  - Monitor for S/S and trends of decreased cardiac output  - Administer and titrate ordered vasoactive medications to optimize hemodynamic stability  - Assess quality of pulses, skin color and temperature  - Assess for signs of decreased coronary artery perfusion  - Instruct patient to report change in severity of symptoms  Outcome: Progressing  Goal: Absence of cardiac dysrhythmias or at baseline rhythm  Description: INTERVENTIONS:  - Continuous cardiac monitoring, vital signs, obtain 12 lead EKG if ordered  - Administer antiarrhythmic and heart rate control medications as ordered  - Monitor electrolytes and administer replacement therapy as ordered  Outcome: Progressing

## 2022-10-25 NOTE — SPEECH THERAPY NOTE
Speech-Language Pathology Bedside Swallow Evaluation      Patient Name: Bruce Cr    RHAOW'V Date: 10/25/2022     Problem List  Principal Problem:    Pneumonia  Active Problems:    Tobacco abuse    Coronary artery disease involving native coronary artery of native heart without angina pectoris    Subclinical hypothyroidism    Sepsis ([facility] )    Syncope    Hyperlipidemia    Hypertension    Dementia ([facility] )    Chronic combined systolic and diastolic congestive heart failure ([facility] )    Anxiety      Past Medical History  Past Medical History:   Diagnosis Date   • Anxiety    • Dementia ([facility] )    • Hyperlipidemia    • Hypertension    • Memory difficulty        Summary   Pt presented with functional appearing oral and pharyngeal stage swallowing skills with materials administered today  Recommended Diet: regular diet and thin liquids   Recommended Form of Meds: whole with liquid       Current Medical Status  Bruce Cr is a 65 y o  female with PMH of CHF, cardiomyopathy, dementia, hypertension, hyperlipidemia, anxiety who presents with possible syncope (pt was allegedly unresponsive for 30-45 seconds)  Patient reports cough with clear phlegm and runny nose since yesterday  DX: pneumonia, sepsis, syncope, chronic CHF, anxiety, tobacco abuse  SLP Swallowing Evaluation ordered at this time  Current Precautions:  Fall    Allergies:  No known food allergies    Past medical history:  Please see H&P for details    Special Studies of 10/24:  CT of head: No acute intracranial abnormality    CXR: results pending    Social/Education/Vocational Hx:  Pt lives alone    Swallow Information   Current Symptoms/Concerns: R lobe pna   Current Diet: regular diet and thin liquids   Baseline Diet: regular diet and thin liquids      Baseline Assessment   Behavior/Cognition: alert  Speech/Language Status: able to participate in basic conversation  Patient Positioning: upright in bed  Pain Status/Interventions/Response to Interventions:   No report of or nonverbal indications of pain  Swallow Mechanism Exam  Facial: symmetrical  Labial: WFL  Lingual: WFL  Velum: symmetrical  Mandible: adequate ROM  Dentition: adequate  Vocal quality:full dentures  Respiratory Status: on RA       Consistencies Assessed and Performance   Consistencies Administered: thin liquids, puree, soft solids and multiple pills w/water    Oral Stage:   Mastication was adequate with the materials administered today  Bolus formation and transfer were functional with no significant oral residue noted  No overt s/s reduced oral control  Pharyngeal Stage:   Swallow Mechanics:  Swallowing initiation appeared prompt  Laryngeal rise was palpated and judged to be within functional limits  No coughing, throat clearing, change in vocal quality or respiratory status noted today       Esophageal Concerns: none reported    Strategies and Efficacy: none needed    Summary and Recommendations (see above)    Results Reviewed with: patient and RN     Treatment Recommended: None at this time     Anu Buenrostro [address] [place] [address] [phone]  [place] License [id number]  Available via [facility] Text

## 2022-10-25 NOTE — PHYSICAL THERAPY NOTE
PHYSICAL THERAPY EVALUATION/TREATMENT     10/25/22 1077   Note Type   Note type Evaluation   Pain Assessment   Pain Assessment Tool 0-10   Pain Score No Pain   Restrictions/Precautions   Other Precautions Fall Risk   Home Living   Type of [address] Two level;Bed/bath upstairs;Stairs to enter with rails  (3+1 DEEP)   Home Equipment Cane   Additional Comments Pt reports her  passed away approximately 2 weeks ago on 10/10/2022   Prior Function   Level of [place] Independent with ADLs; Independent with functional mobility; Independent with IADLS   Lives With Alone  (Spouse passed away on 10/10/2022)   Receives Help From Family  (Pt reports constant presence of family since patient's spouse passed away on 10/10/2022)   Falls in the last 6 months 1 to 4   Comments Pt normally ambulates without an assistive device inside the home and uses a cane outside  Pt does report when she is inside that she tends to hold the walls and furniture   General   Additional Pertinent History Pt is admitted with possible syncope and unresponsiveness for 30-45 seconds   Family/Caregiver Present No   Cognition   Overall Cognitive Status WFL;Pt ID by name and   Confirmed by DINESH alves and RN  Arousal/Participation Cooperative   Orientation Level Oriented X4   Following Commands Follows all commands and directions without difficulty   Subjective   Subjective “I feel pretty good”   RLE Assessment   RLE Assessment WFL  (Grossly 3+ to 4-/5)   LLE Assessment   LLE Assessment WFL  (Grossly 3+ to 4-/5)   Bed Mobility   Supine to Sit 7  Independent   Transfers   Sit to Stand   (Close S)   Additional items Assist x 1;Verbal cues; Increased time required   Stand to Sit   (Close S)   Additional items Assist x 1;Verbal cues; Increased time required   Additional Comments When 1st standing pt tends to lean backwards against the bed   Ambulation/Elevation   Gait pattern Wide RAJ; Short stride  (Decrease bilateral arm swing; decreased gait speed; minimal generalized unsteadiness)   Gait Assistance   (Min assist/close S)   Additional items Assist x 1;Verbal cues; Tactile cues   Assistive Device None   Distance 150 ft with change in direction   Balance   Static Sitting Fair +   Dynamic Sitting Fair   Static Standing Fair -   Dynamic Standing   (F-/F)   Ambulatory   (F-to occasional P+)   Activity Tolerance   Activity Tolerance Patient limited by fatigue;Treatment limited secondary to medical complications (Comment)  (Generalized unsteadiness)   Assessment   Problem List Decreased strength;Decreased range of motion;Decreased endurance; Impaired balance;Decreased mobility; Decreased safety awareness   Assessment Patient seen for Physical Therapy evaluation  Patient admitted with Pneumonia  Comorbidities affecting patient's physical performance include:  CAD, sepsis, hyperlipidemia, hypertension, dementia, CHF, anxiety, status post right PRISCILA, age-related osteoporosis  Personal factors affecting patient at time of initial evaluation include: lives in two story house, ambulating with assistive device, stairs to enter home, inability to navigate community distances, inability to navigate level surfaces without external assistance, inability to perform dynamic tasks in community, limited home support, positive fall history and anxiety  Prior to admission, patient was independent with functional mobility with cane, independent with functional mobility without assistive device, independent with ADLS, independent with IADLS and living alone in one story home with 3+1 steps to enter  Please find objective findings from Physical Therapy assessment regarding body systems outlined above with impairments and limitations including weakness, decreased ROM, impaired balance, decreased endurance, gait deviations, decreased activity tolerance, decreased functional mobility tolerance, decreased safety awareness and fall risk    The Barthel Index was used as a functional outcome tool presenting with a score of Barthel Index Score: 70 today indicating moderate limitations of functional mobility and ADLS  Patient's clinical presentation is currently evolving as seen in patient's presentation of vital sign response, increased fall risk, new onset of impairment of functional mobility, decreased endurance and new onset of weakness  Pt would benefit from continued Physical Therapy treatment to address deficits as defined above and maximize level of functional mobility  As demonstrated by objective findings, the assigned level of complexity for this evaluation is moderate  The patient's AM-PAC Basic Mobility Inpatient Short Form Raw Score is 20  A Raw score of greater than 16 suggests the patient may benefit from discharge to home  Please also refer to the recommendation of the Physical Therapist for safe discharge planning  Goals   Patient Goals To go home   STG Expiration Date 11/05/22   Short Term Goal #1 Patient will: Increase bilateral LE strength 1/2 grade to facilitate independent mobility, Perform all transfers independently to improve independence, Ambulate functional household and community distances with single point cane independently w/o LOB, Navigate 3+1 stairs independently with unilateral handrail to facilitate return to previous living environment, Increase ambulatory balance 1 grade to decrease risk for falls, Tolerate at least 15 consecutive minutes of activity to demonstrate improved activity tolerance and endurance and Tolerate 3 hr OOB to faciliate upright tolerance   Plan   Treatment/Interventions ADL retraining;Functional transfer training;LE strengthening/ROM; Elevations; Therapeutic exercise; Endurance training;Patient/family training;Equipment eval/education; Bed mobility;Gait training; Compensatory technique education   PT Frequency Other (Comment)  (5x/wk)   Recommendation   PT Discharge Recommendation Home with home health rehabilitation Equipment Recommended   (Pt has a cane)   AM-PAC Basic Mobility Inpatient   Turning in Bed Without Bedrails 4   Lying on Back to Sitting on Edge of Flat Bed 4   Moving Bed to Chair 3   Standing Up From Chair 3   Walk in Room 3   Climb 3-5 Stairs 3   Basic Mobility Inpatient Raw Score 20   Basic Mobility Standardized Score 43 99   Highest Level Of Mobility   JH-HLM Goal 6: Walk 10 steps or more   JH-HLM Achieved 7: Walk 25 feet or more   Barthel Index   Feeding 10   Bathing 0   Grooming Score 5   Dressing Score 5   Bladder Score 10   Bowels Score 10   Toilet Use Score 5   Transfers (Bed/Chair) Score 10   Mobility (Level Surface) Score 10   Stairs Score 5   Barthel Index Score 70   Additional Treatment Session   Start Time 1335   End Time 1345   Treatment Assessment S:  “I think I will do better with my cane” O:  Pt transfers sit to stand with close S and ambulates with a single-point cane 150 ft with change in direction with close S/S  Pt also ambulates up/down 3 steps with a step 2 and/or reciprocal pattern going up the steps and a step 2 pattern going down the steps with 1 rail with S  pt transfers stand to sit with S  A:  Pt with good tolerance to bed mobility, transfers and ambulation with and without the single-point cane on level surfaces and stairs  Pt is noted with a decrease in gait deviations and an improvement in overall balance with use of cane versus no assistive device  Pt will continue to benefit from skilled physical therapy services to return pt to her prior level of function  She will also benefit from continued ambulation with a single-point cane at this time with progression to no device as able  When medically stable for discharge pt is appropriate for home with family support and home PT     End of Consult   Patient Position at End of Consult All needs within reach   Licensure   [address] Number  Danna Risk PT [id number]     Portions of the documentation may have been created using voice recognition software  Occasional wrong word or sound alike substitutions may have occurred due to the inherent limitation of the voice recognition software  Read the chart carefully and recognize, using context, where substitutions have occurred

## 2022-10-25 NOTE — ED NOTES
accucheck 143 upon entering room  Medic report that clarenceirma noted pt to be staring into space at dining room table  Slumped over and they helped her to floor  No head strike   Was out for about R Ronda Gardner [address], [facility]  10/24/22 2130

## 2022-10-25 NOTE — H&P
Miguel Angel CRUZ/ Alfonso Garcia  [facility] 1945, 65 y o  female MRN: [id number]  Unit/Bed#: ED 10 Encounter: [id number]  Primary Care Provider: Ann Marie Zuluaga MD   Date and time admitted to hospital: 10/24/2022  9:21 PM    * Pneumonia  Assessment & Plan  Patient presents with possible syncope  Patient reports cough with clear phlegm, runny nose since yesterday  · Chest x-ray shows right lower lobe consolidation to me, pending final read  · WBC 13 94, low-grade fever, tachycardia in ED  · Initial procalcitonin negative  · COVID-19/flu/RSV pending  · Patient received IV Rocephin in ED  Will continue Rocephin, add Zithromax  · Mucinex  · Check urine antigens, sputum Gram stain and culture  · DuoNeb p r n  · Repeat CBC with diff, procalcitonin in the morning    Sepsis ([address] )  Assessment & Plan  POA, as evidenced by leukocytosis, tachycardia, with suspected source of infection pneumonia  · Lactic acid normal  · Initial procalcitonin negative  · Blood cultures pending  · UA shows pyuria,no leukocytes or nitrite  · IV antibiotic as above  · Repeat CBC with diff, procalcitonin in the morning    Results from last 7 days   Lab Units 10/25/22  0000 10/24/22  2230   LACTIC ACID mmol/L 0 8  --    PROCALCITONIN ng/ml  --  0 11     Results from last 7 days   Lab Units 10/24/22  2230   WBC Thousand/uL 13 94*           Syncope  Assessment & Plan  Possible syncope  Patient cannot remember events likely due to dementia  Per ED note,Granddaughter reported patient attempted to walk from 1 room to another,started to sway and she assisted her to the floor  Patient was allegedly unresponsive for 30-45 seconds  EMS was called  Patient reports granddaughter was visiting her today  She lives alone    · CT head no acute findings  · EKG showed sinus tach,rate 116  · Initial troponin unremarkable  · Orthostatic vital signs negative in ED  · 2D echo in 3/2022 showed EF 35-40%, severe global hypokinesis, grade 1 diastolic dysfunction, no significant valvular disease  · Cardiac catheterization 2022 showed - Mild-to-moderate nonobstructive disease involving all 3 vessels with calcification  EF is around 40-45% with no gradient across aortic valve  · Likely due to sepsis  · Trend troponin  · Telemetry  · Fall precautions  · PT OT eval treat    Chronic combined systolic and diastolic congestive heart failure (HCC)  Assessment & Plan  Wt Readings from Last 3 Encounters:   22 41 9 kg (92 lb 4 8 oz)   22 41 3 kg (91 lb)   22 41 6 kg (91 lb 12 8 oz)     History of cardiomyopathy, unknown etiology  2D echo in 3/2022 as above  Patient is on Coreg, lisinopril, Aldactone at home  · No fluid overload on exam  · Continue Coreg, lisinopril with holding parameters  · Hold Aldactone for 1 day due to sepsis  · Daily weight, I&Os          Anxiety  Assessment & Plan  Patient's  recently , patient has been under lots of stress recently per ED note  · PCP planning on starting Lexapro in near future  · Will order Atarax p r n  Dementia [facility])  Assessment & Plan  Patient with memory difficulty  · Continue Aricept    Hypertension  Assessment & Plan  On Coreg, lisinopril likely more for CHF  · Continue Coreg, lisinopril with holding parameter  · BP soft in ED  · Will monitor    Hyperlipidemia  Assessment & Plan  Continue statin    Subclinical hypothyroidism  Assessment & Plan  TSH 5 93, free T4 normal in July this year  · Follow-up PCP as outpatient    Coronary artery disease involving native coronary artery of native heart without angina pectoris  Assessment & Plan  Nonobstructive CAD as showed on recent cardiac catheterization    · Continue Coreg, statin    Tobacco abuse  Assessment & Plan  Smoking cessation, nicotine patch        VTE Prophylaxis: Enoxaparin (Lovenox)  / reason for no mechanical VTE prophylaxis On Lovenox   Code Status:  Full code  POLST: POLST form is not discussed and not completed at this time  Anticipated Length of Stay:  Patient will be admitted on an inpatient basis with an anticipated length of stay of  > 2 midnights  Justification for Hospital Stay:  Pneumonia, syncope, sepsis    Total Time for Visit, including Counseling / Coordination of Care: 45 minutes  Greater than 50% of this total time spent on direct patient counseling and coordination of care  Chief Complaint:   Possible syncope per family    History of Present Illness:    Nataliya Kelly is a 65 y o  female with PMH of CHF, cardiomyopathy, dementia, hypertension, hyperlipidemia, anxiety who presents with possible syncope per family  Per ED note,Granddaughter reported patient attempted to walk from 1 room to another,started to sway and she assisted her to the floor  Patient was allegedly unresponsive for 30-45 seconds  EMS was called  Patient reports granddaughter was visiting her today  She lives alone  Patient reports cough with clear phlegm and runny nose since yesterday  Denies fever, reports always feel cold  Patient denies chest pain, headache, dizziness, nausea vomiting diarrhea, constipation  Reports SOB at times  Patient lives alone, ambulates with cane when go outside  Review of Systems:    Review of Systems   Unable to perform ROS: Dementia (Reviewed ED note)   HENT: Positive for rhinorrhea  Respiratory: Positive for cough  SOB at times   Neurological:        Possible syncope   All other systems reviewed and are negative        Past Medical and Surgical History:     Past Medical History:   Diagnosis Date   • Anxiety    • Dementia ([facility] [address] )    • Hyperlipidemia    • Hypertension    • Memory difficulty        Past Surgical History:   Procedure Laterality Date   • ARTHROPLASTY HIP TOTAL ANTERIOR Right 4/9/2021    Procedure: ARTHROPLASTY HIP TOTAL ANTERIOR;  Surgeon: Kiara Magdaleno DO;  Location: [address] [place] [address];  Service: Orthopedics   • CARDIAC CATHETERIZATION Left 5/27/2022 Procedure: Cardiac catheterization;  Surgeon: Crys Louie MD;  Location: [place] [address] CATH LAB; Service: Cardiology       Meds/Allergies:    Prior to Admission medications    Medication Sig Start Date End Date Taking? Authorizing Provider   carvedilol (COREG) 3 125 mg tablet Take 1 tablet (3 125 mg total) by mouth 2 (two) times a day with meals 6/16/22  Yes Crys Louie MD   lisinopril (ZESTRIL) 2 5 mg tablet Take 1 tablet (2 5 mg total) by mouth daily 6/16/22  Yes Crys Louie MD   Calcium Ascorbate (VITAMIN C) 500 mg tablet Take 500 mg by mouth daily    Historical Provider, MD   cholecalciferol (VITAMIN D3) 1,000 units tablet Take 2 tablets (2,000 Units total) by mouth daily 4/15/21 9/20/22  BOBBY Samaniego   donepezil (ARICEPT) 10 mg tablet Take 1 tablet (10 mg total) by mouth daily at bedtime 9/20/22   Chico Elam DO   ibandronate (BONIVA) 150 MG tablet Take 1 tablet (150 mg total) by mouth every 30 (thirty) days 8/16/22   [place] Elam DO   ketoconazole (NIZORAL) 2 % cream Apply topically daily 7/6/22 9/20/22  Wanda Sanchez DPM   multivitamin [facility]) TABS Take 1 tablet by mouth daily    Historical Provider, MD   rosuvastatin (CRESTOR) 5 mg tablet Take 1 tablet (5 mg total) by mouth every other day 6/16/22   Crys Louie MD   spironolactone (ALDACTONE) 25 mg tablet Take 1 tablet (25 mg total) by mouth every other day 6/16/22   Crys Louie MD     I have reviewed home medications using allscripts      Allergies: No Known Allergies    Social History:     Marital Status: /Civil Union   Occupation:  Retired  Patient Pre-hospital Living Situation:  Lives alone  Patient Pre-hospital Level of Mobility:  Ambulates with cane when go outside  Patient Pre-hospital Diet Restrictions:  Regular diet  Substance Use History:   Social History     Substance and Sexual Activity   Alcohol Use Never     Social History     Tobacco Use   Smoking Status Current Every Day Smoker   • Packs/day: 0 50   • Years: 60 00   • Pack years: 30 00   • Types: Cigarettes   Smokeless Tobacco Never Used     Social History     Substance and Sexual Activity   Drug Use Never       Family History:    non-contributory    Physical Exam:     Vitals:   Blood Pressure: 100/51 (10/25/22 0054)  Pulse: 90 (10/25/22 0054)  Temperature: 98 2 °F (36 8 °C) (10/25/22 0054)  Temp Source: Oral (10/25/22 0054)  Respirations: 20 (10/25/22 0054)  SpO2: 95 % (10/25/22 0054)    Physical Exam  Vitals and nursing note reviewed  Constitutional:       Appearance: She is well-developed  HENT:      Head: Normocephalic and atraumatic  Nose: Rhinorrhea present  Neck:      Thyroid: No thyromegaly  Vascular: No JVD  Trachea: No tracheal deviation  Cardiovascular:      Rate and Rhythm: Normal rate and regular rhythm  Heart sounds: Normal heart sounds  Comments: Heart rate 90s currently  Pulmonary:      Effort: Pulmonary effort is normal  No respiratory distress  Breath sounds: No wheezing or rales  Comments: Diminished breath sounds right lower lobe, on room air, respirations easy  Abdominal:      General: Bowel sounds are normal  There is no distension  Palpations: Abdomen is soft  Tenderness: There is no abdominal tenderness  There is no guarding  Musculoskeletal:         General: No swelling or deformity  Cervical back: Neck supple  Right lower leg: No edema  Left lower leg: No edema  Skin:     General: Skin is warm and dry  Neurological:      General: No focal deficit present  Mental Status: She is alert and oriented to person, place, and time  Comments: Follows commands  Psychiatric:         Mood and Affect: Mood normal          Judgment: Judgment normal          Additional Data:     Lab Results: I have personally reviewed pertinent reports        Results from last 7 days   Lab Units 10/24/22  2230   WBC Thousand/uL 13 94*   HEMOGLOBIN g/dL 11 8   HEMATOCRIT % 36 0 PLATELETS Thousands/uL 231   NEUTROS PCT % 87*   LYMPHS PCT % 6*   MONOS PCT % 6   EOS PCT % 0     Results from last 7 days   Lab Units 10/24/22  2230   POTASSIUM mmol/L 3 7   CHLORIDE mmol/L 103   CO2 mmol/L 25   BUN mg/dL 27*   CREATININE mg/dL 0 97   CALCIUM mg/dL 7 9*   ALK PHOS U/L 62   ALT U/L 31   AST U/L 24           Imaging: I have personally reviewed pertinent reports  CT head without contrast    Result Date: 10/24/2022  Narrative: CT BRAIN - WITHOUT CONTRAST INDICATION:   Syncope, simple, normal neuro exam syncope  COMPARISON:  CT 4/9/21 TECHNIQUE:  CT examination of the brain was performed  In addition to axial images, sagittal and coronal 2D reformatted images were created and submitted for interpretation  Radiation dose length product (DLP) for this visit:  849 71 mGy-cm   This examination, like all CT scans performed in the [facility], was performed utilizing techniques to minimize radiation dose exposure, including the use of iterative  reconstruction and automated exposure control  IMAGE QUALITY:  Diagnostic  FINDINGS: PARENCHYMA: Decreased attenuation is noted in periventricular and subcortical white matter demonstrating an appearance that is statistically most likely to represent mild microangiopathic change  No CT signs of acute infarction  No intracranial mass, mass effect or midline shift  No acute parenchymal hemorrhage  VENTRICLES AND EXTRA-AXIAL SPACES:  Normal for the patient's age  VISUALIZED ORBITS AND PARANASAL SINUSES:  Unremarkable  CALVARIUM AND EXTRACRANIAL SOFT TISSUES:  Normal      Impression: No acute intracranial abnormality  Workstation performed: [id number]       EKG, Pathology, and Other Studies Reviewed on Admission:   · EKG:  Sinus tach    Allscripts Records Reviewed: Yes     ** Please Note: Dragon 360 Dictation voice to text software may have been used in the creation of this document   **

## 2022-10-25 NOTE — ED NOTES
Patient transported to room #417 by Chintan Chavez, Patient Care Tech       Christian Cuello RN  10/25/22 7699

## 2022-10-25 NOTE — ASSESSMENT & PLAN NOTE
POA, as evidenced by leukocytosis, tachycardia, with suspected source of infection pneumonia  · Lactic acid normal  · Initial procalcitonin negative  · Blood cultures pending  · UA shows pyuria,no leukocytes or nitrite    · IV antibiotic as above  · Repeat CBC with diff, procalcitonin in the morning    Results from last 7 days   Lab Units 10/25/22  0000 10/24/22  2230   LACTIC ACID mmol/L 0 8  --    PROCALCITONIN ng/ml  --  0 11     Results from last 7 days   Lab Units 10/24/22  2230   WBC Thousand/uL 13 94*

## 2022-10-25 NOTE — ASSESSMENT & PLAN NOTE
Possible syncope  Patient cannot remember events likely due to dementia  Per ED note,Granddaughter reported patient attempted to walk from 1 room to another,started to sway and she assisted her to the floor  Patient was allegedly unresponsive for 30-45 seconds  EMS was called  Patient reports granddaughter was visiting her today  She lives alone  · CT head no acute findings  · EKG showed sinus tach,rate 116  · Initial troponin unremarkable  · Orthostatic vital signs negative in ED  · 2D echo in 3/2022 showed EF 35-40%, severe global hypokinesis, grade 1 diastolic dysfunction, no significant valvular disease  · Cardiac catheterization 5/2022 showed - Mild-to-moderate nonobstructive disease involving all 3 vessels with calcification  EF is around 40-45% with no gradient across aortic valve     · Likely due to sepsis  · Trend troponin  · Telemetry  · Fall precautions  · PT OT eval treat

## 2022-10-25 NOTE — ASSESSMENT & PLAN NOTE
On Coreg, lisinopril likely more for CHF  · Continue Coreg, lisinopril with holding parameter    · BP soft in ED  · Will monitor

## 2022-10-25 NOTE — PLAN OF CARE
Problem: PHYSICAL THERAPY ADULT  Goal: Performs mobility at highest level of function for planned discharge setting  See evaluation for individualized goals  Description: Treatment/Interventions: ADL retraining, Functional transfer training, LE strengthening/ROM, Elevations, Therapeutic exercise, Endurance training, Patient/family training, Equipment eval/education, Bed mobility, Gait training, Compensatory technique education  Equipment Recommended:  (Pt has a cane)       See flowsheet documentation for full assessment, interventions and recommendations  10/25/2022 1402 by Osmel Hernandez PT  Note:    Problem List: Decreased strength, Decreased range of motion, Decreased endurance, Impaired balance, Decreased mobility, Decreased safety awareness  Assessment: Patient seen for Physical Therapy evaluation  Patient admitted with Pneumonia  Comorbidities affecting patient's physical performance include:  CAD, sepsis, hyperlipidemia, hypertension, dementia, CHF, anxiety, status post right PRISCILA, age-related osteoporosis  Personal factors affecting patient at time of initial evaluation include: lives in two story house, ambulating with assistive device, stairs to enter home, inability to navigate community distances, inability to navigate level surfaces without external assistance, inability to perform dynamic tasks in community, limited home support, positive fall history and anxiety  Prior to admission, patient was independent with functional mobility with cane, independent with functional mobility without assistive device, independent with ADLS, independent with IADLS and living alone in one story home with 3+1 steps to enter    Please find objective findings from Physical Therapy assessment regarding body systems outlined above with impairments and limitations including weakness, decreased ROM, impaired balance, decreased endurance, gait deviations, decreased activity tolerance, decreased functional mobility tolerance, decreased safety awareness and fall risk  The Barthel Index was used as a functional outcome tool presenting with a score of Barthel Index Score: 70 today indicating moderate limitations of functional mobility and ADLS  Patient's clinical presentation is currently evolving as seen in patient's presentation of vital sign response, increased fall risk, new onset of impairment of functional mobility, decreased endurance and new onset of weakness  Pt would benefit from continued Physical Therapy treatment to address deficits as defined above and maximize level of functional mobility  As demonstrated by objective findings, the assigned level of complexity for this evaluation is moderate  The patient's AM-[facility] Basic Mobility Inpatient Short Form Raw Score is 20  A Raw score of greater than 16 suggests the patient may benefit from discharge to home  Please also refer to the recommendation of the Physical Therapist for safe discharge planning  PT Discharge Recommendation: Home with home health rehabilitation    See flowsheet documentation for full assessment  10/25/2022 1402 by Rosa Sawant PT  Outcome: Progressing  Note:    Problem List: Decreased strength, Decreased range of motion, Decreased endurance, Impaired balance, Decreased mobility, Decreased safety awareness  Assessment: Patient seen for Physical Therapy evaluation  Patient admitted with Pneumonia  Comorbidities affecting patient's physical performance include:  CAD, sepsis, hyperlipidemia, hypertension, dementia, CHF, anxiety, status post right PRISCILA, age-related osteoporosis    Personal factors affecting patient at time of initial evaluation include: lives in two story house, ambulating with assistive device, stairs to enter home, inability to navigate community distances, inability to navigate level surfaces without external assistance, inability to perform dynamic tasks in community, limited home support, positive fall history and anxiety  Prior to admission, patient was independent with functional mobility with cane, independent with functional mobility without assistive device, independent with ADLS, independent with IADLS and living alone in one story home with 3+1 steps to enter  Please find objective findings from Physical Therapy assessment regarding body systems outlined above with impairments and limitations including weakness, decreased ROM, impaired balance, decreased endurance, gait deviations, decreased activity tolerance, decreased functional mobility tolerance, decreased safety awareness and fall risk  The Barthel Index was used as a functional outcome tool presenting with a score of Barthel Index Score: 70 today indicating moderate limitations of functional mobility and ADLS  Patient's clinical presentation is currently evolving as seen in patient's presentation of vital sign response, increased fall risk, new onset of impairment of functional mobility, decreased endurance and new onset of weakness  Pt would benefit from continued Physical Therapy treatment to address deficits as defined above and maximize level of functional mobility  As demonstrated by objective findings, the assigned level of complexity for this evaluation is moderate  The patient's AM-PAC Basic Mobility Inpatient Short Form Raw Score is 20  A Raw score of greater than 16 suggests the patient may benefit from discharge to home  Please also refer to the recommendation of the Physical Therapist for safe discharge planning  PT Discharge Recommendation: Home with home health rehabilitation    See flowsheet documentation for full assessment

## 2022-10-25 NOTE — ASSESSMENT & PLAN NOTE
Patient presents with possible syncope  Patient reports cough with clear phlegm, runny nose since yesterday  · Chest x-ray shows right lower lobe consolidation to me, pending final read  · WBC 13 94, low-grade fever, tachycardia in ED  · Initial procalcitonin negative  · COVID-19/flu/RSV pending  · Patient received IV Rocephin in ED  Will continue Rocephin, add Zithromax  · Mucinex  · Check urine antigens, sputum Gram stain and culture  · DuoNeb p r n    · Repeat CBC with diff, procalcitonin in the morning

## 2022-10-25 NOTE — ASSESSMENT & PLAN NOTE
Wt Readings from Last 3 Encounters:   09/20/22 41 9 kg (92 lb 4 8 oz)   09/08/22 41 3 kg (91 lb)   08/16/22 41 6 kg (91 lb 12 8 oz)     History of cardiomyopathy, unknown etiology  2D echo in 3/2022 as above  Patient is on Coreg, lisinopril, Aldactone at home  · No fluid overload on exam  · Continue Coreg, lisinopril with holding parameters    · Hold Aldactone for 1 day due to sepsis  · Daily weight, I&Os

## 2022-10-25 NOTE — ASSESSMENT & PLAN NOTE
Patient's  recently , patient has been under lots of stress recently per ED note  · PCP planning on starting Lexapro in near future  · Will order Atarax p r n

## 2022-10-25 NOTE — PLAN OF CARE
Problem: Potential for Falls  Goal: Patient will remain free of falls  Description: INTERVENTIONS:  - Educate patient/family on patient safety including physical limitations  - Instruct patient to call for assistance with activity   - Consult OT/PT to assist with strengthening/mobility   - Keep Call bell within reach  - Keep bed low and locked with side rails adjusted as appropriate  - Keep care items and personal belongings within reach  - Initiate and maintain comfort rounds  - Make Fall Risk Sign visible to staff  - Offer Toileting every 2 Hours, in advance of need  - Initiate/Maintain bed alarm  - Obtain necessary fall risk management equipment:   - Apply yellow socks and bracelet for high fall risk patients  - Consider moving patient to room near nurses station  Outcome: Progressing     Problem: RESPIRATORY - ADULT  Goal: Achieves optimal ventilation and oxygenation  Description: INTERVENTIONS:  - Assess for changes in respiratory status  - Assess for changes in mentation and behavior  - Position to facilitate oxygenation and minimize respiratory effort  - Oxygen administered by appropriate delivery if ordered  - Initiate smoking cessation education as indicated  - Encourage broncho-pulmonary hygiene including cough, deep breathe, Incentive Spirometry  - Assess the need for suctioning and aspirate as needed  - Assess and instruct to report SOB or any respiratory difficulty  - Respiratory Therapy support as indicated  Outcome: Progressing     Problem: CARDIOVASCULAR - ADULT  Goal: Maintains optimal cardiac output and hemodynamic stability  Description: INTERVENTIONS:  - Monitor I/O, vital signs and rhythm  - Monitor for S/S and trends of decreased cardiac output  - Administer and titrate ordered vasoactive medications to optimize hemodynamic stability  - Assess quality of pulses, skin color and temperature  - Assess for signs of decreased coronary artery perfusion  - Instruct patient to report change in severity of symptoms  Outcome: Progressing     Problem: CARDIOVASCULAR - ADULT  Goal: Absence of cardiac dysrhythmias or at baseline rhythm  Description: INTERVENTIONS:  - Continuous cardiac monitoring, vital signs, obtain 12 lead EKG if ordered  - Administer antiarrhythmic and heart rate control medications as ordered  - Monitor electrolytes and administer replacement therapy as ordered  Outcome: Progressing     Problem: INFECTION - ADULT  Goal: Absence or prevention of progression during hospitalization  Description: INTERVENTIONS:  - Assess and monitor for signs and symptoms of infection  - Monitor lab/diagnostic results  - Monitor all insertion sites, i e  indwelling lines, tubes, and drains  - Monitor endotracheal if appropriate and nasal secretions for changes in amount and color  - [place] appropriate cooling/warming therapies per order  - Administer medications as ordered  - Instruct and encourage patient and family to use good hand hygiene technique  - Identify and instruct in appropriate isolation precautions for identified infection/condition  Outcome: Progressing     Problem: INFECTION - ADULT  Goal: Absence of fever/infection during neutropenic period  Description: INTERVENTIONS:  - Monitor WBC    Outcome: Progressing     Problem: SAFETY ADULT  Goal: Maintains/Returns to pre admission functional level  Description: INTERVENTIONS:  - Perform BMAT or MOVE assessment daily    - Set and communicate daily mobility goal to care team and patient/family/caregiver  - Collaborate with rehabilitation services on mobility goals if consulted  - Perform Range of Motion 3  times a day  - Actively turns self     - Dangle patient 3 times a day  - Stand patient 3 times a day  - Ambulate patient 3 times a day  - Out of bed to chair 3 times a day   - Out of bed for meals 3 times a day  - Out of bed for toileting  - Record patient progress and toleration of activity level   Outcome: Progressing     Problem: DISCHARGE PLANNING  Goal: Discharge to home or other facility with appropriate resources  Description: INTERVENTIONS:  - Identify barriers to discharge w/patient and caregiver  - Arrange for needed discharge resources and transportation as appropriate  - Identify discharge learning needs (meds, wound care, etc )  - Arrange for interpretive services to assist at discharge as needed  - Refer to Case Management Department for coordinating discharge planning if the patient needs post-hospital services based on physician/advanced practitioner order or complex needs related to functional status, cognitive ability, or social support system  Outcome: Progressing     Problem: Knowledge Deficit  Goal: Patient/family/caregiver demonstrates understanding of disease process, treatment plan, medications, and discharge instructions  Description: Complete learning assessment and assess knowledge base    Interventions:  - Provide teaching at level of understanding  - Provide teaching via preferred learning methods  Outcome: Progressing

## 2022-10-26 ENCOUNTER — TELEPHONE (OUTPATIENT)
Dept: FAMILY MEDICINE CLINIC | Facility: CLINIC | Age: 77
End: 2022-10-26

## 2022-10-26 VITALS
HEIGHT: 61 IN | WEIGHT: 91.71 LBS | OXYGEN SATURATION: 97 % | BODY MASS INDEX: 17.32 KG/M2 | SYSTOLIC BLOOD PRESSURE: 109 MMHG | RESPIRATION RATE: 16 BRPM | DIASTOLIC BLOOD PRESSURE: 71 MMHG | TEMPERATURE: 97.6 F | HEART RATE: 80 BPM

## 2022-10-26 PROBLEM — R63.6 UNDERWEIGHT: Status: ACTIVE | Noted: 2022-10-26

## 2022-10-26 LAB
BASOPHILS # BLD AUTO: 0.03 THOUSANDS/ÂΜL (ref 0–0.1)
BASOPHILS NFR BLD AUTO: 0 % (ref 0–1)
EOSINOPHIL # BLD AUTO: 0.12 THOUSAND/ÂΜL (ref 0–0.61)
EOSINOPHIL NFR BLD AUTO: 1 % (ref 0–6)
ERYTHROCYTE [DISTWIDTH] IN BLOOD BY AUTOMATED COUNT: 12.7 % (ref 11.6–15.1)
HCT VFR BLD AUTO: 37.4 % (ref 34.8–46.1)
HGB BLD-MCNC: 12.7 G/DL (ref 11.5–15.4)
IMM GRANULOCYTES # BLD AUTO: 0.05 THOUSAND/UL (ref 0–0.2)
IMM GRANULOCYTES NFR BLD AUTO: 0 % (ref 0–2)
LYMPHOCYTES # BLD AUTO: 1.98 THOUSANDS/ÂΜL (ref 0.6–4.47)
LYMPHOCYTES NFR BLD AUTO: 16 % (ref 14–44)
MCH RBC QN AUTO: 34.6 PG (ref 26.8–34.3)
MCHC RBC AUTO-ENTMCNC: 34 G/DL (ref 31.4–37.4)
MCV RBC AUTO: 102 FL (ref 82–98)
MONOCYTES # BLD AUTO: 0.88 THOUSAND/ÂΜL (ref 0.17–1.22)
MONOCYTES NFR BLD AUTO: 7 % (ref 4–12)
NEUTROPHILS # BLD AUTO: 9.33 THOUSANDS/ÂΜL (ref 1.85–7.62)
NEUTS SEG NFR BLD AUTO: 76 % (ref 43–75)
NRBC BLD AUTO-RTO: 0 /100 WBCS
PLATELET # BLD AUTO: 272 THOUSANDS/UL (ref 149–390)
PMV BLD AUTO: 8.4 FL (ref 8.9–12.7)
RBC # BLD AUTO: 3.67 MILLION/UL (ref 3.81–5.12)
WBC # BLD AUTO: 12.39 THOUSAND/UL (ref 4.31–10.16)

## 2022-10-26 PROCEDURE — 99239 HOSP IP/OBS DSCHRG MGMT >30: CPT | Performed by: INTERNAL MEDICINE

## 2022-10-26 PROCEDURE — 85025 COMPLETE CBC W/AUTO DIFF WBC: CPT | Performed by: INTERNAL MEDICINE

## 2022-10-26 RX ORDER — CEFDINIR 300 MG/1
300 CAPSULE ORAL EVERY 12 HOURS SCHEDULED
Qty: 10 CAPSULE | Refills: 0 | Status: SHIPPED | OUTPATIENT
Start: 2022-10-26 | End: 2022-10-31

## 2022-10-26 RX ORDER — NICOTINE 21 MG/24HR
1 PATCH, TRANSDERMAL 24 HOURS TRANSDERMAL DAILY
Qty: 28 PATCH | Refills: 0 | Status: SHIPPED | OUTPATIENT
Start: 2022-10-27

## 2022-10-26 RX ADMIN — Medication 2000 UNITS: at 08:56

## 2022-10-26 RX ADMIN — Medication 250 MG: at 08:56

## 2022-10-26 RX ADMIN — ENOXAPARIN SODIUM 30 MG: 30 INJECTION SUBCUTANEOUS at 08:56

## 2022-10-26 RX ADMIN — AZITHROMYCIN MONOHYDRATE 500 MG: 250 TABLET ORAL at 02:32

## 2022-10-26 RX ADMIN — NICOTINE 1 PATCH: 14 PATCH, EXTENDED RELEASE TRANSDERMAL at 08:57

## 2022-10-26 RX ADMIN — CARVEDILOL 3.12 MG: 3.12 TABLET, FILM COATED ORAL at 08:58

## 2022-10-26 RX ADMIN — LISINOPRIL 2.5 MG: 2.5 TABLET ORAL at 08:56

## 2022-10-26 RX ADMIN — B-COMPLEX W/ C & FOLIC ACID TAB 1 TABLET: TAB at 08:56

## 2022-10-26 RX ADMIN — OXYCODONE HYDROCHLORIDE AND ACETAMINOPHEN 500 MG: 500 TABLET ORAL at 08:56

## 2022-10-26 RX ADMIN — GUAIFENESIN 600 MG: 600 TABLET, EXTENDED RELEASE ORAL at 08:56

## 2022-10-26 NOTE — PLAN OF CARE
Problem: Potential for Falls  Goal: Patient will remain free of falls  Description: INTERVENTIONS:  - Educate patient/family on patient safety including physical limitations  - Instruct patient to call for assistance with activity   - Consult OT/PT to assist with strengthening/mobility   - Keep Call bell within reach  - Keep bed low and locked with side rails adjusted as appropriate  - Keep care items and personal belongings within reach  - Initiate and maintain comfort rounds  - Make Fall Risk Sign visible to staff  - Offer Toileting every 2 Hours, in advance of need  - Initiate/Maintain bed alarm  - Obtain necessary fall risk management equipment: cane  - Apply yellow socks and bracelet for high fall risk patients  - Consider moving patient to room near nurses station  10/26/2022 1605 by Khai Shipman RN  Outcome: Adequate for Discharge  10/26/2022 1430 by Khai Shipman RN  Outcome: Progressing     Problem: RESPIRATORY - ADULT  Goal: Achieves optimal ventilation and oxygenation  Description: INTERVENTIONS:  - Assess for changes in respiratory status  - Assess for changes in mentation and behavior  - Position to facilitate oxygenation and minimize respiratory effort  - Oxygen administered by appropriate delivery if ordered  - Initiate smoking cessation education as indicated  - Encourage broncho-pulmonary hygiene including cough, deep breathe, Incentive Spirometry  - Assess the need for suctioning and aspirate as needed  - Assess and instruct to report SOB or any respiratory difficulty  - Respiratory Therapy support as indicated  10/26/2022 1605 by Khai Shipman RN  Outcome: Adequate for Discharge  10/26/2022 1430 by Khai Shipman RN  Outcome: Progressing     Problem: CARDIOVASCULAR - ADULT  Goal: Maintains optimal cardiac output and hemodynamic stability  Description: INTERVENTIONS:  - Monitor I/O, vital signs and rhythm  - Monitor for S/S and trends of decreased cardiac output  - Administer and titrate ordered vasoactive medications to optimize hemodynamic stability  - Assess quality of pulses, skin color and temperature  - Assess for signs of decreased coronary artery perfusion  - Instruct patient to report change in severity of symptoms  10/26/2022 1605 by Jay Peacock RN  Outcome: Adequate for Discharge  10/26/2022 1430 by Jay Peacock RN  Outcome: Progressing  Goal: Absence of cardiac dysrhythmias or at baseline rhythm  Description: INTERVENTIONS:  - Continuous cardiac monitoring, vital signs, obtain 12 lead EKG if ordered  - Administer antiarrhythmic and heart rate control medications as ordered  - Monitor electrolytes and administer replacement therapy as ordered  10/26/2022 1605 by Jay Peacock RN  Outcome: Adequate for Discharge  10/26/2022 1430 by Jay Peacock RN  Outcome: Progressing     Problem: INFECTION - ADULT  Goal: Absence or prevention of progression during hospitalization  Description: INTERVENTIONS:  - Assess and monitor for signs and symptoms of infection  - Monitor lab/diagnostic results  - Monitor all insertion sites, i e  indwelling lines, tubes, and drains  - Monitor endotracheal if appropriate and nasal secretions for changes in amount and color  - [place] appropriate cooling/warming therapies per order  - Administer medications as ordered  - Instruct and encourage patient and family to use good hand hygiene technique  - Identify and instruct in appropriate isolation precautions for identified infection/condition  10/26/2022 1605 by Jay Peacock RN  Outcome: Adequate for Discharge  10/26/2022 1430 by Jay Peacock RN  Outcome: Progressing  Goal: Absence of fever/infection during neutropenic period  Description: INTERVENTIONS:  - Monitor WBC    10/26/2022 1605 by Jay Peacock RN  Outcome: Adequate for Discharge  10/26/2022 1430 by Jay Peacock RN  Outcome: Progressing     Problem: SAFETY ADULT  Goal: Patient will remain free of falls  Description: INTERVENTIONS:  - Educate patient/family on patient safety including physical limitations  - Instruct patient to call for assistance with activity   - Consult OT/PT to assist with strengthening/mobility   - Keep Call bell within reach  - Keep bed low and locked with side rails adjusted as appropriate  - Keep care items and personal belongings within reach  - Initiate and maintain comfort rounds  - Make Fall Risk Sign visible to staff  - Offer Toileting every 3 Hours, in advance of need  - Initiate/Maintain bed alarm  - Obtain necessary fall risk management equipment: cane  - Apply yellow socks and bracelet for high fall risk patients  - Consider moving patient to room near nurses station  10/26/2022 1605 by Adam Bangura RN  Outcome: Adequate for Discharge  10/26/2022 1430 by Adam Bangura RN  Outcome: Progressing  Goal: Maintains/Returns to pre admission functional level  Description: INTERVENTIONS:  - Perform BMAT or MOVE assessment daily    - Set and communicate daily mobility goal to care team and patient/family/caregiver     - Collaborate with rehabilitation services on mobility goals if consulted  - Ambulate patient 3 times a day  - Out of bed to chair 3 times a day   - Out of bed for meals 3 times a day  - Out of bed for toileting  - Record patient progress and toleration of activity level   10/26/2022 1605 by Adam Bangura RN  Outcome: Adequate for Discharge  10/26/2022 1430 by Adam Bangura RN  Outcome: Progressing     Problem: DISCHARGE PLANNING  Goal: Discharge to home or other facility with appropriate resources  Description: INTERVENTIONS:  - Identify barriers to discharge w/patient and caregiver  - Arrange for needed discharge resources and transportation as appropriate  - Identify discharge learning needs (meds, wound care, etc )  - Arrange for interpretive services to assist at discharge as needed  - Refer to Case Management Department for coordinating discharge planning if the patient needs post-hospital services based on physician/advanced practitioner order or complex needs related to functional status, cognitive ability, or social support system  10/26/2022 1605 by Марина Vergara RN  Outcome: Adequate for Discharge  10/26/2022 1430 by Марина Vergara RN  Outcome: Progressing     Problem: Knowledge Deficit  Goal: Patient/family/caregiver demonstrates understanding of disease process, treatment plan, medications, and discharge instructions  Description: Complete learning assessment and assess knowledge base  Interventions:  - Provide teaching at level of understanding  - Provide teaching via preferred learning methods  10/26/2022 1605 by Марина Vergara RN  Outcome: Adequate for Discharge  10/26/2022 1430 by Марина Vergara RN  Outcome: Progressing     Problem: MOBILITY - ADULT  Goal: Maintains/Returns to pre admission functional level  Description: INTERVENTIONS:  - Perform BMAT or MOVE assessment daily    - Set and communicate daily mobility goal to care team and patient/family/caregiver     - Collaborate with rehabilitation services on mobility goals if consulted  - Stand patient 3 times a day  - Ambulate patient 3 times a day  - Out of bed to chair 3 times a day   - Out of bed for meals 3 times a day  - Out of bed for toileting  - Record patient progress and toleration of activity level   10/26/2022 1605 by Марина Vergara RN  Outcome: Adequate for Discharge  10/26/2022 1430 by Марина Vergara RN  Outcome: Progressing  Goal: Maintain or return to baseline ADL function  Description: INTERVENTIONS:  -  Assess patient's ability to carry out ADLs; assess patient's baseline for ADL function and identify physical deficits which impact ability to perform ADLs (bathing, care of mouth/teeth, toileting, grooming, dressing, etc )  - Assess/evaluate cause of self-care deficits   - Assess range of motion  - Assess patient's mobility; develop plan if impaired  - Assess patient's need for assistive devices and provide as appropriate  - Encourage maximum independence but intervene and supervise when necessary  - Involve family in performance of ADLs  - Assess for home care needs following discharge   - Consider OT consult to assist with ADL evaluation and planning for discharge  - Provide patient education as appropriate  10/26/2022 1605 by Adam Bangura, RN  Outcome: Adequate for Discharge  10/26/2022 1430 by Adam Bangura RN  Outcome: Progressing

## 2022-10-26 NOTE — PLAN OF CARE
Problem: Potential for Falls  Goal: Patient will remain free of falls  Description: INTERVENTIONS:  - Educate patient/family on patient safety including physical limitations  - Instruct patient to call for assistance with activity   - Consult OT/PT to assist with strengthening/mobility   - Keep Call bell within reach  - Keep bed low and locked with side rails adjusted as appropriate  - Keep care items and personal belongings within reach  - Initiate and maintain comfort rounds  - Make Fall Risk Sign visible to staff  - Offer Toileting every 2 Hours, in advance of need  - Initiate/Maintain bed alarm  - Obtain necessary fall risk management equipment: yellow socks  - Apply yellow socks and bracelet for high fall risk patients  - Consider moving patient to room near nurses station  Outcome: Progressing     Problem: RESPIRATORY - ADULT  Goal: Achieves optimal ventilation and oxygenation  Description: INTERVENTIONS:  - Assess for changes in respiratory status  - Assess for changes in mentation and behavior  - Position to facilitate oxygenation and minimize respiratory effort  - Oxygen administered by appropriate delivery if ordered  - Initiate smoking cessation education as indicated  - Encourage broncho-pulmonary hygiene including cough, deep breathe, Incentive Spirometry  - Assess the need for suctioning and aspirate as needed  - Assess and instruct to report SOB or any respiratory difficulty  - Respiratory Therapy support as indicated  Outcome: Progressing     Problem: INFECTION - ADULT  Goal: Absence or prevention of progression during hospitalization  Description: INTERVENTIONS:  - Assess and monitor for signs and symptoms of infection  - Monitor lab/diagnostic results  - Monitor all insertion sites, i e  indwelling lines, tubes, and drains  - Monitor endotracheal if appropriate and nasal secretions for changes in amount and color  - [place] appropriate cooling/warming therapies per order  - Administer medications as ordered  - Instruct and encourage patient and family to use good hand hygiene technique  - Identify and instruct in appropriate isolation precautions for identified infection/condition  Outcome: Progressing  Goal: Absence of fever/infection during neutropenic period  Description: INTERVENTIONS:  - Monitor WBC    Outcome: Progressing     Problem: SAFETY ADULT  Goal: Patient will remain free of falls  Description: INTERVENTIONS:  - Educate patient/family on patient safety including physical limitations  - Instruct patient to call for assistance with activity   - Consult OT/PT to assist with strengthening/mobility   - Keep Call bell within reach  - Keep bed low and locked with side rails adjusted as appropriate  - Keep care items and personal belongings within reach  - Initiate and maintain comfort rounds  - Make Fall Risk Sign visible to staff  - Offer Toileting every 2 Hours, in advance of need  - Initiate/Maintain bed alarm  - Obtain necessary fall risk management equipment: yellow socks  - Apply yellow socks and bracelet for high fall risk patients  - Consider moving patient to room near nurses station  Outcome: Progressing  Goal: Maintains/Returns to pre admission functional level  Description: INTERVENTIONS:  - Perform BMAT or MOVE assessment daily    - Set and communicate daily mobility goal to care team and patient/family/caregiver  - Collaborate with rehabilitation services on mobility goals if consulted  - Perform Range of Motion 4 times a day  - Reposition patient every 2 hours    - Dangle patient 3 times a day  - Stand patient 3 times a day  - Ambulate patient 3 times a day  - Out of bed to chair 3 times a day   - Out of bed for meals 3 times a day  - Out of bed for toileting  - Record patient progress and toleration of activity level   Outcome: Progressing     Problem: DISCHARGE PLANNING  Goal: Discharge to home or other facility with appropriate resources  Description: INTERVENTIONS:  - Identify barriers to discharge w/patient and caregiver  - Arrange for needed discharge resources and transportation as appropriate  - Identify discharge learning needs (meds, wound care, etc )  - Arrange for interpretive services to assist at discharge as needed  - Refer to Case Management Department for coordinating discharge planning if the patient needs post-hospital services based on physician/advanced practitioner order or complex needs related to functional status, cognitive ability, or social support system  Outcome: Progressing     Problem: Knowledge Deficit  Goal: Patient/family/caregiver demonstrates understanding of disease process, treatment plan, medications, and discharge instructions  Description: Complete learning assessment and assess knowledge base    Interventions:  - Provide teaching at level of understanding  - Provide teaching via preferred learning methods  Outcome: Progressing     Problem: CARDIOVASCULAR - ADULT  Goal: Maintains optimal cardiac output and hemodynamic stability  Description: INTERVENTIONS:  - Monitor I/O, vital signs and rhythm  - Monitor for S/S and trends of decreased cardiac output  - Administer and titrate ordered vasoactive medications to optimize hemodynamic stability  - Assess quality of pulses, skin color and temperature  - Assess for signs of decreased coronary artery perfusion  - Instruct patient to report change in severity of symptoms  Outcome: Progressing  Goal: Absence of cardiac dysrhythmias or at baseline rhythm  Description: INTERVENTIONS:  - Continuous cardiac monitoring, vital signs, obtain 12 lead EKG if ordered  - Administer antiarrhythmic and heart rate control medications as ordered  - Monitor electrolytes and administer replacement therapy as ordered  Outcome: Progressing

## 2022-10-26 NOTE — PLAN OF CARE
Problem: Potential for Falls  Goal: Patient will remain free of falls  Description: INTERVENTIONS:  - Educate patient/family on patient safety including physical limitations  - Instruct patient to call for assistance with activity   - Consult OT/PT to assist with strengthening/mobility   - Keep Call bell within reach  - Keep bed low and locked with side rails adjusted as appropriate  - Keep care items and personal belongings within reach  - Initiate and maintain comfort rounds  - Make Fall Risk Sign visible to staff  - Obtain necessary fall risk management equipment: cane  - Apply yellow socks and bracelet for high fall risk patients  - Consider moving patient to room near nurses station  Outcome: Progressing     Problem: RESPIRATORY - ADULT  Goal: Achieves optimal ventilation and oxygenation  Description: INTERVENTIONS:  - Assess for changes in respiratory status  - Assess for changes in mentation and behavior  - Position to facilitate oxygenation and minimize respiratory effort  - Oxygen administered by appropriate delivery if ordered  - Initiate smoking cessation education as indicated  - Encourage broncho-pulmonary hygiene including cough, deep breathe, Incentive Spirometry  - Assess the need for suctioning and aspirate as needed  - Assess and instruct to report SOB or any respiratory difficulty  - Respiratory Therapy support as indicated  Outcome: Progressing     Problem: CARDIOVASCULAR - ADULT  Goal: Maintains optimal cardiac output and hemodynamic stability  Description: INTERVENTIONS:  - Monitor I/O, vital signs and rhythm  - Monitor for S/S and trends of decreased cardiac output  - Administer and titrate ordered vasoactive medications to optimize hemodynamic stability  - Assess quality of pulses, skin color and temperature  - Assess for signs of decreased coronary artery perfusion  - Instruct patient to report change in severity of symptoms  Outcome: Progressing  Goal: Absence of cardiac dysrhythmias or at baseline rhythm  Description: INTERVENTIONS:  - Continuous cardiac monitoring, vital signs, obtain 12 lead EKG if ordered  - Administer antiarrhythmic and heart rate control medications as ordered  - Monitor electrolytes and administer replacement therapy as ordered  Outcome: Progressing     Problem: INFECTION - ADULT  Goal: Absence or prevention of progression during hospitalization  Description: INTERVENTIONS:  - Assess and monitor for signs and symptoms of infection  - Monitor lab/diagnostic results  - Monitor all insertion sites, i e  indwelling lines, tubes, and drains  - Monitor endotracheal if appropriate and nasal secretions for changes in amount and color  - Finger appropriate cooling/warming therapies per order  - Administer medications as ordered  - Instruct and encourage patient and family to use good hand hygiene technique  - Identify and instruct in appropriate isolation precautions for identified infection/condition  Outcome: Progressing  Goal: Absence of fever/infection during neutropenic period  Description: INTERVENTIONS:  - Monitor WBC    Outcome: Progressing     Problem: SAFETY ADULT  Goal: Patient will remain free of falls  Description: INTERVENTIONS:  - Educate patient/family on patient safety including physical limitations  - Instruct patient to call for assistance with activity   - Consult OT/PT to assist with strengthening/mobility   - Keep Call bell within reach  - Keep bed low and locked with side rails adjusted as appropriate  - Keep care items and personal belongings within reach  - Initiate and maintain comfort rounds  - Make Fall Risk Sign visible to staff  - Offer Toileting every 2 Hours, in advance of need  - Initiate/Maintain bed alarm  - Obtain necessary fall risk management equipment: cane  - Apply yellow socks and bracelet for high fall risk patients  - Consider moving patient to room near nurses station  Outcome: Progressing  Goal: Maintains/Returns to pre admission functional level  Description: INTERVENTIONS:  - Perform BMAT or MOVE assessment daily    - Set and communicate daily mobility goal to care team and patient/family/caregiver  - Collaborate with rehabilitation services on mobility goals if consulted  - Stand patient 2 times a day  - Ambulate patient 2 times a day  - Out of bed to chair 3 times a day   - Out of bed for meals 3 times a day  - Out of bed for toileting  - Record patient progress and toleration of activity level   Outcome: Progressing     Problem: DISCHARGE PLANNING  Goal: Discharge to home or other facility with appropriate resources  Description: INTERVENTIONS:  - Identify barriers to discharge w/patient and caregiver  - Arrange for needed discharge resources and transportation as appropriate  - Identify discharge learning needs (meds, wound care, etc )  - Arrange for interpretive services to assist at discharge as needed  - Refer to Case Management Department for coordinating discharge planning if the patient needs post-hospital services based on physician/advanced practitioner order or complex needs related to functional status, cognitive ability, or social support system  Outcome: Progressing     Problem: Knowledge Deficit  Goal: Patient/family/caregiver demonstrates understanding of disease process, treatment plan, medications, and discharge instructions  Description: Complete learning assessment and assess knowledge base  Interventions:  - Provide teaching at level of understanding  - Provide teaching via preferred learning methods  Outcome: Progressing     Problem: MOBILITY - ADULT  Goal: Maintains/Returns to pre admission functional level  Description: INTERVENTIONS:  - Perform BMAT or MOVE assessment daily    - Set and communicate daily mobility goal to care team and patient/family/caregiver     - Collaborate with rehabilitation services on mobility goals if consulted  - Stand patient 3 times a day  - Ambulate patient 3 times a day  - Out of bed to chair 3 times a day   - Out of bed for meals 3 times a day  - Out of bed for toileting  - Record patient progress and toleration of activity level   Outcome: Progressing  Goal: Maintain or return to baseline ADL function  Description: INTERVENTIONS:  -  Assess patient's ability to carry out ADLs; assess patient's baseline for ADL function and identify physical deficits which impact ability to perform ADLs (bathing, care of mouth/teeth, toileting, grooming, dressing, etc )  - Assess/evaluate cause of self-care deficits   - Assess range of motion  - Assess patient's mobility; develop plan if impaired  - Assess patient's need for assistive devices and provide as appropriate  - Encourage maximum independence but intervene and supervise when necessary  - Involve family in performance of ADLs  - Assess for home care needs following discharge   - Consider OT consult to assist with ADL evaluation and planning for discharge  - Provide patient education as appropriate  Outcome: Progressing

## 2022-10-26 NOTE — ASSESSMENT & PLAN NOTE
Possible syncope  Patient cannot remember events likely due to dementia  Per ED note,Granddaughter reported patient attempted to walk from 1 room to another,started to sway and she assisted her to the floor  Patient was allegedly unresponsive for 30-45 seconds  EMS was called  Patient reports granddaughter was visiting her on the day of admission She lives alone  · CT head no acute findings  · EKG showed sinus tach,rate 116  · Troponins have been negative  · Orthostatic vital signs negative in ED  Repeat orthostatic vital signs have been negative  · 2D echo in 3/2022 showed EF 35-40%, severe global hypokinesis, grade 1 diastolic dysfunction, no significant valvular disease  · Cardiac catheterization 5/2022 showed - Mild-to-moderate nonobstructive disease involving all 3 vessels with calcification  EF is around 40-45% with no gradient across aortic valve     · Likely due to sepsis  · Telemetry showed no evidence of arrhythmias

## 2022-10-26 NOTE — ASSESSMENT & PLAN NOTE
Wt Readings from Last 3 Encounters:   10/26/22 41 6 kg (91 lb 11 4 oz)   09/20/22 41 9 kg (92 lb 4 8 oz)   09/08/22 41 3 kg (91 lb)     History of cardiomyopathy, unknown etiology  2D echo in 3/2022 as above  Patient is on Coreg, lisinopril, Aldactone at home  · No fluid overload on exam  · Continue Coreg, lisinopril with holding parameters    Aldactone was on hold which can be restarted upon discharge

## 2022-10-26 NOTE — ASSESSMENT & PLAN NOTE
POA, as evidenced by leukocytosis, tachycardia, with suspected source of infection pneumonia  · Lactic acid normal  · Procalcitonin x2 negative  · Blood cultures negative at 24 hours  · UA shows pyuria,no leukocytes or nitrite    · IV antibiotic as above      Results from last 7 days   Lab Units 10/25/22  0512 10/25/22  0000 10/24/22  2230   LACTIC ACID mmol/L  --  0 8  --    PROCALCITONIN ng/ml 0 13  --  0 11     Results from last 7 days   Lab Units 10/26/22  0515 10/25/22  0512 10/24/22  2230   WBC Thousand/uL 12 39* 13 73* 13 94*

## 2022-10-26 NOTE — DISCHARGE SUMMARY
Callum 44  Discharge- Katiuska Acosta 1945, 65 y o  female MRN: [id number]  Unit/Bed#: [address] [place] [address] Encounter: [id number]  Primary Care Provider: Tres Kay MD   Date and time admitted to hospital: 10/24/2022  9:21 PM    * Pneumonia  Assessment & Plan  Patient presents with possible syncope  Patient reports cough with clear phlegm, runny nose since yesterday  · Chest x-ray shows right lower lobe consolidation to me, pending final read  · WBC 13 94, low-grade fever, tachycardia in ED  · Procalcitonin x2 negative  · COVID flu and RSV was negative  · Patient was continued on Rocephin and azithromycin and Mucinex  · Urine for Legionella pneumococcal antigens were negative  · Sputum cultures with Gram-positive rods, Gram-negative rods and Gram-positive cocci  · Patient to be discharged on cefdinir for 5 more days to finish a 1 week course    Sepsis [facility])  Assessment & Plan  POA, as evidenced by leukocytosis, tachycardia, with suspected source of infection pneumonia  · Lactic acid normal  · Procalcitonin x2 negative  · Blood cultures negative at 24 hours  · UA shows pyuria,no leukocytes or nitrite  · IV antibiotic as above      Results from last 7 days   Lab Units 10/25/22  0512 10/25/22  0000 10/24/22  2230   LACTIC ACID mmol/L  --  0 8  --    PROCALCITONIN ng/ml 0 13  --  0 11     Results from last 7 days   Lab Units 10/26/22  0515 10/25/22  0512 10/24/22  2230   WBC Thousand/uL 12 39* 13 73* 13 94*           Syncope  Assessment & Plan  Possible syncope  Patient cannot remember events likely due to dementia  Per ED note,Granddaughter reported patient attempted to walk from 1 room to another,started to sway and she assisted her to the floor  Patient was allegedly unresponsive for 30-45 seconds  EMS was called  Patient reports granddaughter was visiting her on the day of admission She lives alone    · CT head no acute findings  · EKG showed sinus tach,rate 116  · Troponins have been negative  · Orthostatic vital signs negative in ED  Repeat orthostatic vital signs have been negative  · 2D echo in 3/2022 showed EF 35-40%, severe global hypokinesis, grade 1 diastolic dysfunction, no significant valvular disease  · Cardiac catheterization 2022 showed - Mild-to-moderate nonobstructive disease involving all 3 vessels with calcification  EF is around 40-45% with no gradient across aortic valve  · Likely due to sepsis  · Telemetry showed no evidence of arrhythmias    Underweight  Assessment & Plan  As evidenced by BMI of 17  Continue diet and nutritional supplementation as needed    Anxiety  Assessment & Plan  Patient's  recently , patient has been under lots of stress recently per ED note  · PCP planning on starting Lexapro in near future  · Patient has been on Atarax p r n  Inpatient    Chronic combined systolic and diastolic congestive heart failure [facility])  Assessment & Plan  Wt Readings from Last 3 Encounters:   10/26/22 41 6 kg (91 lb 11 4 oz)   22 41 9 kg (92 lb 4 8 oz)   22 41 3 kg (91 lb)     History of cardiomyopathy, unknown etiology  2D echo in 3/2022 as above  Patient is on Coreg, lisinopril, Aldactone at home  · No fluid overload on exam  · Continue Coreg, lisinopril with holding parameters  Aldactone was on hold which can be restarted upon discharge        Dementia [facility])  Assessment & Plan  Patient with memory difficulty  · Continue Aricept    Hypertension  Assessment & Plan  On Coreg, lisinopril likely more for CHF  · Continue Coreg, lisinopril with holding parameter  · Blood pressure stable    Hyperlipidemia  Assessment & Plan  Continue statin    Subclinical hypothyroidism  Assessment & Plan  TSH 5 93, free T4 normal in July this year    · Follow-up PCP as outpatient    Coronary artery disease involving native coronary artery of native heart without angina pectoris  Assessment & Plan  Nonobstructive CAD as showed on recent cardiac catheterization  · Continue Coreg, statin    Tobacco abuse  Assessment & Plan  Smoking cessation, nicotine patch      Medical Problems             Resolved Problems  Date Reviewed: 10/26/2022   None               Discharging Physician / Practitioner: Joline Councilman  PCP: Bijan Brock MD  Admission Date:   Admission Orders (From admission, onward)     Ordered        10/25/22 0348  Inpatient Admission  Once            10/24/22 2352  Place in Observation  Once                      Discharge Date: 10/26/22       Outpatient Tests Requested:  · Follow-up with PCP    Complications:  None    Reason for Admission:     Hospital Course:   Machelle Salamanca is a 65 y o  female patient with past medical history of CHF, cardiomyopathy, dementia, hypertension, hyperlipidemia who originally presented to the hospital on 10/24/2022 due to loss of consciousness  Patient also reported that she has been having some cough productive of mucoid phlegm for the past few days  In the ED patient met sepsis criteria and patient noted have right lower lobe infiltrate  Patient is admitted hospital   Patient's orthostatic vital signs were negative and telemetry did not show any evidence of arrhythmias  Patient was treated with IV antibiotics and will be transitioned to p o  Antibiotics for 5 more days to finish 1 week course  Please see above list of diagnoses and related plan for additional information  Condition at Discharge: stable    Discharge Day Visit / Exam:   Subjective:  Patient is feeling better  Denies any shortness of breath, headache or dizziness  Still having some cough productive of mucoid phlegm    Vitals: Blood Pressure: 109/71 (10/26/22 1516)  Pulse: 80 (10/26/22 1516)  Temperature: 97 6 °F (36 4 °C) (10/26/22 1516)  Temp Source: Oral (10/26/22 0900)  Respirations: 16 (10/26/22 1516)  Height: 5' 1" (154 9 cm) (10/25/22 0218)  Weight - Scale: 41 6 kg (91 lb 11 4 oz) (10/26/22 0600)  SpO2: 97 % (10/26/22 1516)  Exam:   Physical Exam  Constitutional:       Appearance: Normal appearance  HENT:      Head: Normocephalic and atraumatic  Nose: Nose normal       Mouth/Throat:      Mouth: Mucous membranes are moist       Pharynx: Oropharynx is clear  Eyes:      Extraocular Movements: Extraocular movements intact  Pupils: Pupils are equal, round, and reactive to light  Cardiovascular:      Rate and Rhythm: Normal rate and regular rhythm  Pulmonary:      Effort: Pulmonary effort is normal       Breath sounds: Normal breath sounds  Abdominal:      General: Bowel sounds are normal  There is no distension  Palpations: Abdomen is soft  Tenderness: There is no abdominal tenderness  Musculoskeletal:         General: No swelling  Cervical back: Normal range of motion and neck supple  Skin:     General: Skin is warm and dry  Neurological:      General: No focal deficit present  Mental Status: She is alert  Discussion with Family: Updated  (daughter in law) via phone  Discharge instructions/Information to patient and family:   See after visit summary for information provided to patient and family  Provisions for Follow-Up Care:  See after visit summary for information related to follow-up care and any pertinent home health orders  Disposition:   Home with [facility] Services (Reminder: Complete face to face encounter)    Planned Readmission: No     Discharge Statement:  I spent 35 minutes discharging the patient  This time was spent on the day of discharge  I had direct contact with the patient on the day of discharge  Greater than 50% of the total time was spent examining patient, answering all patient questions, arranging and discussing plan of care with patient as well as directly providing post-discharge instructions  Additional time then spent on discharge activities      Discharge Medications:  See after visit summary for reconciled discharge medications provided to patient and/or family        **Please Note: This note may have been constructed using a voice recognition system**

## 2022-10-26 NOTE — APP STUDENT NOTE
SALONI STUDENT  Inpatient Progress Note for TRAINING ONLY  Not Part of Legal Medical Record     Progress Note - Chrisandra Bernheim 65 y o  female MRN: [id number]  Unit/Bed#: [address] [place] [address] Encounter: [id number]        No new Assessment & Plan notes have been filed under this hospital service since the last note was generated  Service: Internal Medicine     1  Pneumonia  -Patient presented to ED with syncopal event  -Patient reports cough productive of clear phlegm along with a runny nose since 10/23  -In ED, noted to have WBC 13 94, low-grade fever, and tachycardia  -COVID negative  -IV Rocephin given in ED  Continue and added azithromycin (Zithromax)  -Urine legionella and strep pneumo negative  -CXR with findings of opacity withing the RLL and a trace pleural effusion, both consistent with pneumonia  -Continue ABX as noted above  -10/25 - WBC 13 73  -Cleared by speech pathology for regular diet and meds whole with liquid with no noted structural or functional abnormalities  -Transition to PO ABX as outpatient upon discharge  -10/26 - WBC 12 39     2  Sepsis  -POA with leukocytosis, tachycardia, and suspected pneumonia infection  -lactic acid normal  -Initial procalcitonin negative (0 11 - normal)  -Blood cultures pending  -Continue IV ABX as listed above  -10/25 - WBC 13 73, procalcitonin 0 13 (normal)  -Continue to monitor with CBC  -10/26 - WBC 12 39    3  Syncope  -Patient reports that she cannot remember the events that occurred prior to arriving at the hospital  -CT showed no acute findings  -EKG showed sinus tach with   -Initial troponin unremarkable  - 3/22 - 2D echo showed EF 35-40%, severe global hypokinesis, grade 1 diastolic dysfunction, no significant valvular disease  - 5/22 - Cardiac catheterization showed mild-mod nonobstructive disease involving all 3 vessels with associated calcification   EF 40-45% with no gradient across aortic valve  -Likely secondary to sepsis  -All troponins negative  -Continue telemetry  -Continue fall precautions  -PT/OT eval  -10/24 - CT with no acute intracranial abnormality  -10/26 - Patient reports no dizziness or syncopal episodes     4  Chronic combined systolic and diastolic congestive heart failure  -No dependent or peripheral fluid overload noted on exam  -Continue coreg, lisinopril  -Hold Aldactone  -Continue monitoring daily weights  -Continue monitoring I&Os     5  Anxiety  -Patient under a lot of stress lately secondary to recent loss of   -Seeing PCP as outpatient with plan to start Lexapro  -Continue Atarax PRN     6  Dementia  -Patient with memory difficulty  -Continue Aricept     7  Hypertension  -Home medications include coreg and lisinopril  Continue with holding parameters  -Continue to monitor     8  Hyperlipidemia  -Home medications include resouvastatin (Crestor) 5mg tablet PO  -Start pravastatin (Pravachol) 40mg tablet PO     9  Subclinical hypothyroidism  -TSH 5 93, free T4 normal from 7/22  -F/u with PCP as outpatient     10  CAD without angina pectoris  -Nonobstructive CAD as seen on recent cardiac catheterization  -Continue coreg and statin     11  Tobacco abuse  -Patient reports currently smoking 0 5 packs/day x60 years with a pack history of 30 years  -Recommended smoking cessation  -Continue nicotine patch    VTE Pharmacologic Prophylaxis:   Pharmacologic: Enoxaparin (Lovenox)  Mechanical VTE Prophylaxis in Place: Yes    Patient Centered Rounds: I have performed bedside rounds with nursing staff today  Discussions with Specialists or Other Care Team Provider: SW, nursing, PT    Education and Discussions with Family / Patient: Yes    Time Spent for Care: 15 minutes  More than 50% of total time spent on counseling and coordination of care as described above      Current Length of Stay: 1 day(s)    Current Patient Status: Inpatient   Certification Statement: The patient will continue to require additional inpatient hospital stay due to antibiotics treatment for PNA  Discharge Plan: 24-48 hrs    Code Status: Level 1 - Full Code    Subjective:   [place] is a 52OZB with medical history significant for CHF, cardiomyopathy, anxiety, dementia with memory difficulty, HLD, and HTN who presented to the ED on 10/24 with concerns following a syncopal episode      10/25 - Patient denies any trauma associated with the fall and denies pain, but reports that she does not remember the incident and could not state as to why the episode occurred  Patient reported that she has had a cough productive of clear phlegm and a rhinorrhea since the day prior  Patient reports feeling cold but denies fever, chest pain, palpitations, dyspnea, SOB, headaches, dizziness, abdominal pain, n/v/d, or constipation  Patient was found sitting in hospital bed comfortably eating breakfast and offered no acute complaints      10/26 - Patient was found sitting in the chair next to her bed after just finishing breakfast  Patient denies any n/v or abdominal pain following eating or any issues with swallowing  Patient reports that she is feeling well and denies any chest pain, difficulty breathing, dizziness or weakness  Patient reported that she performed PT yesterday and that she was able to ambulate as per her normal with a cane  Objective:     Vitals:   Temp (24hrs), Av 8 °F (36 6 °C), Min:97 4 °F (36 3 °C), Max:98 5 °F (36 9 °C)    Temp:  [97 4 °F (36 3 °C)-98 5 °F (36 9 °C)] 98 5 °F (36 9 °C)  HR:  [] 101  Resp:  [16-18] 16  BP: (105-155)/() 114/90  SpO2:  [93 %-100 %] 94 %  Body mass index is 17 33 kg/m²  Input and Output Summary (last 24 hours): Intake/Output Summary (Last 24 hours) at 10/26/2022 1155  Last data filed at 10/25/2022 1601  Gross per 24 hour   Intake 520 ml   Output 600 ml   Net -80 ml       Physical Exam:     Physical Exam  Constitutional:       General: She is not in acute distress  Appearance: Normal appearance   She is not ill-appearing, toxic-appearing or diaphoretic  Eyes:      General: No scleral icterus  Extraocular Movements: Extraocular movements intact  Pupils: Pupils are equal, round, and reactive to light  Cardiovascular:      Rate and Rhythm: Normal rate and regular rhythm  Pulses: Normal pulses  Heart sounds: Normal heart sounds  Pulmonary:      Effort: Pulmonary effort is normal  No respiratory distress  Breath sounds: Normal breath sounds  No stridor  No wheezing, rhonchi or rales  Chest:      Chest wall: No tenderness  Abdominal:      General: Bowel sounds are normal  There is no distension  Palpations: Abdomen is soft  There is no mass  Tenderness: There is no abdominal tenderness  There is no guarding or rebound  Hernia: No hernia is present  Musculoskeletal:      Right lower leg: No edema  Left lower leg: No edema  Skin:     General: Skin is warm and dry  Neurological:      Mental Status: She is alert and oriented to person, place, and time  Mental status is at baseline  Psychiatric:         Mood and Affect: Mood normal          Behavior: Behavior normal        Historical Information   Past Medical History:   Diagnosis Date   • Anxiety    • Dementia ([address] )    • Hyperlipidemia    • Hypertension    • Memory difficulty      Past Surgical History:   Procedure Laterality Date   • ARTHROPLASTY HIP TOTAL ANTERIOR Right 4/9/2021    Procedure: ARTHROPLASTY HIP TOTAL ANTERIOR;  Surgeon: Tammi Steele DO;  Location: [address] [place] [address];  Service: Orthopedics   • CARDIAC CATHETERIZATION Left 5/27/2022    Procedure: Cardiac catheterization;  Surgeon: Antonia Green MD;  Location: [place] [address] CATH LAB;   Service: Cardiology     Social History   Social History     Substance and Sexual Activity   Alcohol Use Never     Social History     Substance and Sexual Activity   Drug Use Never     Social History     Tobacco Use   Smoking Status Current Every Day Smoker   • Packs/day: 0 50   • Years: 60 00   • Pack years: 30 00   • Types: Cigarettes   Smokeless Tobacco Never Used     Family History: History reviewed  No pertinent family history  Meds/Allergies   PTA meds:   Prior to Admission Medications   Prescriptions Last Dose Informant Patient Reported? Taking?    Calcium Ascorbate (VITAMIN C) 500 mg tablet 10/24/2022 at Unknown time Self Yes Yes   Sig: Take 500 mg by mouth daily   carvedilol (COREG) 3 125 mg tablet 10/24/2022 at Unknown time  No Yes   Sig: Take 1 tablet (3 125 mg total) by mouth 2 (two) times a day with meals   cholecalciferol (VITAMIN D3) 1,000 units tablet   No No   Sig: Take 2 tablets (2,000 Units total) by mouth daily   donepezil (ARICEPT) 10 mg tablet 10/24/2022 at Unknown time  No Yes   Sig: Take 1 tablet (10 mg total) by mouth daily at bedtime   ibandronate (BONIVA) 150 MG tablet Past Month at Unknown time  No Yes   Sig: Take 1 tablet (150 mg total) by mouth every 30 (thirty) days   ketoconazole (NIZORAL) 2 % cream   No No   Sig: Apply topically daily   lisinopril (ZESTRIL) 2 5 mg tablet 10/24/2022 at Unknown time  No Yes   Sig: Take 1 tablet (2 5 mg total) by mouth daily   multivitamin (THERAGRAN) TABS 10/24/2022 at Unknown time Self Yes Yes   Sig: Take 1 tablet by mouth daily   rosuvastatin (CRESTOR) 5 mg tablet 10/24/2022 at Unknown time  No Yes   Sig: Take 1 tablet (5 mg total) by mouth every other day   spironolactone (ALDACTONE) 25 mg tablet 10/24/2022 at Unknown time  No Yes   Sig: Take 1 tablet (25 mg total) by mouth every other day      Facility-Administered Medications: None     No Known Allergies    Additional Data:     Labs:    Results from last 7 days   Lab Units 10/26/22  0515   WBC Thousand/uL 12 39*   HEMOGLOBIN g/dL 12 7   HEMATOCRIT % 37 4   PLATELETS Thousands/uL 272   NEUTROS PCT % 76*   LYMPHS PCT % 16   MONOS PCT % 7   EOS PCT % 1     Results from last 7 days   Lab Units 10/25/22  0512 10/24/22  2230   SODIUM mmol/L 137 137   POTASSIUM mmol/L 3 8 3 7   CHLORIDE mmol/L 106 103   CO2 mmol/L 22 25   BUN mg/dL 24 27*   CREATININE mg/dL 0 76 0 97   ANION GAP mmol/L 9 9   CALCIUM mg/dL 7 8* 7 9*   ALBUMIN g/dL  --  2 9*   TOTAL BILIRUBIN mg/dL  --  0 48   ALK PHOS U/L  --  62   ALT U/L  --  31   AST U/L  --  24   GLUCOSE RANDOM mg/dL 159* 118         Results from last 7 days   Lab Units 10/24/22  2128   POC GLUCOSE mg/dl 143*         Results from last 7 days   Lab Units 10/25/22  0512 10/25/22  0000 10/24/22  2230   LACTIC ACID mmol/L  --  0 8  --    PROCALCITONIN ng/ml 0 13  --  0 11         * I Have Reviewed All Lab Data Listed Above  * Additional Pertinent Lab Tests Reviewed: All Labs Within Last 24 Hours Reviewed    Imaging:    Imaging Reports Reviewed Today Include: n/a  Imaging Personally Reviewed by Myself Includes:  n/a    Recent Cultures (last 7 days):     Results from last 7 days   Lab Units 10/25/22  0512 10/25/22  0007 10/25/22  0000 10/24/22  2316   BLOOD CULTURE   --  No Growth at 24 hrs   No Growth at 24 hrs   --    GRAM STAIN RESULT  Rare Epithelial cells per low power field*  Rare Polys*  3+ Gram positive rods*  2+ Gram negative rods*  2+ Gram positive cocci in pairs and chains*  --   --   --    LEGIONELLA URINARY ANTIGEN   --   --   --  Negative       Last 24 Hours Medication List:   Current Facility-Administered Medications   Medication Dose Route Frequency Provider Last Rate   • acetaminophen  650 mg Oral Q6H PRN BOBBY Samaniego     • ascorbic acid  500 mg Oral Daily BOBBY Samaniego     • azithromycin  500 mg Oral Q24H BOBBY Samaniego     • carvedilol  3 125 mg Oral BID With Meals BOBBY Samaniego     • cefTRIAXone  1,000 mg Intravenous Q24H BOBBY Samaniego 1,000 mg (10/25/22 2301)   • cholecalciferol  2,000 Units Oral Daily BOBBY Samaniego     • donepezil  10 mg Oral HS BOBBY Samaniego     • enoxaparin  30 mg Subcutaneous Daily BOBBY Samaniego     • guaiFENesin  600 mg Oral BID BOBBY Markham     • ipratropium-albuterol  3 mL Nebulization Q4H PRN BOBBY Samaniego     • lisinopril  2 5 mg Oral Daily BOBBY Samaniego     • multivitamin stress formula  1 tablet Oral Daily BOBBY Samaniego     • nicotine  1 patch Transdermal Daily BOBBY Samaniego     • ondansetron  4 mg Intravenous Q6H PRN BOBBY Samaniego     • pravastatin  40 mg Oral Q48H BOBBY Samanigeo     • saccharomyces boulardii  250 mg Oral BID BOBBY Samaniego     • [START ON 10/27/2022] spironolactone  25 mg Oral Every Other Day BOBBY Dempsey          Today, Patient Was Seen By: Fab Foreman    ** Please Note: Dictation voice to text software may have been used in the creation of this document   **

## 2022-10-26 NOTE — ASSESSMENT & PLAN NOTE
Patient's  recently , patient has been under lots of stress recently per ED note  · PCP planning on starting Lexapro in near future  · Patient has been on Atarax p r n   Inpatient

## 2022-10-26 NOTE — ASSESSMENT & PLAN NOTE
On Coreg, lisinopril likely more for CHF  · Continue Coreg, lisinopril with holding parameter    · Blood pressure stable

## 2022-10-26 NOTE — ASSESSMENT & PLAN NOTE
Patient presents with possible syncope  Patient reports cough with clear phlegm, runny nose since yesterday  · Chest x-ray shows right lower lobe consolidation to me, pending final read  · WBC 13 94, low-grade fever, tachycardia in ED    · Procalcitonin x2 negative  · COVID flu and RSV was negative  · Patient was continued on Rocephin and azithromycin and Mucinex  · Urine for Legionella pneumococcal antigens were negative  · Sputum cultures with Gram-positive rods, Gram-negative rods and Gram-positive cocci  · Patient to be discharged on cefdinir for 5 more days to finish a 1 week course

## 2022-10-26 NOTE — TELEPHONE ENCOUNTER
----- Message from Lelia Chavez MD sent at 10/26/2022  3:43 PM EDT -----  Thank you for allowing us to participate in the care of your patient, Katiuska Acosta, who was hospitalized from 10/24/2022 through 10/26/2022 with the admitting diagnosis of syncope and right lower lobe pneumonia  Patient's workup was negative for syncope and patient was treated with IV antibiotics  Cultures remained negative and patient be discharged on p o  Antibiotics with outpatient follow-up  Patient will need repeat chest x-ray in 2-3 months to ensure resolution of the right lower lobe infiltrate  If you have any additional questions or would like to discuss further, please feel free to contact me      Lelia Chavez MD  [place] [address] Internal Medicine, Hospitalist  [phone]

## 2022-10-26 NOTE — NURSING NOTE
Patient and patient's son indicated understanding of discharge instructions and medications   Patient left [address] [place] [address] in a wheelchair accompanied by her son

## 2022-10-27 LAB
BACTERIA SPT RESP CULT: ABNORMAL
GRAM STN SPEC: ABNORMAL

## 2022-10-28 LAB
ATRIAL RATE: 112 BPM
P AXIS: 73 DEGREES
PR INTERVAL: 146 MS
QRS AXIS: 48 DEGREES
QRSD INTERVAL: 88 MS
QT INTERVAL: 344 MS
QTC INTERVAL: 469 MS
T WAVE AXIS: 93 DEGREES
VENTRICULAR RATE: 112 BPM

## 2022-10-30 LAB
BACTERIA BLD CULT: NORMAL
BACTERIA BLD CULT: NORMAL

## 2022-11-04 ENCOUNTER — OFFICE VISIT (OUTPATIENT)
Dept: FAMILY MEDICINE CLINIC | Facility: CLINIC | Age: 77
End: 2022-11-04

## 2022-11-04 VITALS
SYSTOLIC BLOOD PRESSURE: 116 MMHG | WEIGHT: 92 LBS | HEIGHT: 61 IN | DIASTOLIC BLOOD PRESSURE: 68 MMHG | TEMPERATURE: 97.1 F | RESPIRATION RATE: 16 BRPM | BODY MASS INDEX: 17.37 KG/M2

## 2022-11-04 DIAGNOSIS — I50.42 CHRONIC COMBINED SYSTOLIC AND DIASTOLIC CONGESTIVE HEART FAILURE (HCC): ICD-10-CM

## 2022-11-04 DIAGNOSIS — Z72.0 TOBACCO ABUSE: ICD-10-CM

## 2022-11-04 DIAGNOSIS — I50.1 HEART FAILURE, LEFT, WITH LVEF 41-49% (HCC): ICD-10-CM

## 2022-11-04 DIAGNOSIS — J18.9 PNEUMONIA OF RIGHT LOWER LOBE DUE TO INFECTIOUS ORGANISM: Primary | ICD-10-CM

## 2022-11-04 PROBLEM — A41.9 SEPSIS (HCC): Status: RESOLVED | Noted: 2022-10-25 | Resolved: 2022-11-04

## 2022-11-04 NOTE — PROGRESS NOTES
Assessment/Plan:    No problem-specific Assessment & Plan notes found for this encounter  Diagnoses and all orders for this visit:    Pneumonia of right lower lobe due to infectious organism  -     XR chest pa & lateral; Future  -     CBC and differential; Future    Heart failure, left, with LVEF 41-49% (HCC)    Chronic combined systolic and diastolic congestive heart failure (HCC)    Tobacco abuse        No diarrhea or vaginits after abx  Finished cefdinir  F/u cxr in 2m  Avoid tobacco use    Repeat cbc for leukocytosis resolution    chf stable  mucinex otc, fluids  Offer tessalon if requested    Return if symptoms worsen or fail to improve  Subjective:      Patient ID: Raad Vela is a 65 y o  female  Chief Complaint   Patient presents with   • Follow-up     hospital f/u nm lpn       HPI  mucinex taken but then syncopal episode  Pneumonia noted  Still smoking but less lately  Lingering cough  No mucus  No fever  Breathing ok now    The following portions of the patient's history were reviewed and updated as appropriate: allergies, current medications, past family history, past medical history, past social history, past surgical history and problem list     Review of Systems   Constitutional: Negative for fever  Respiratory: Positive for cough            Current Outpatient Medications   Medication Sig Dispense Refill   • Calcium Ascorbate (VITAMIN C) 500 mg tablet Take 500 mg by mouth daily     • carvedilol (COREG) 3 125 mg tablet Take 1 tablet (3 125 mg total) by mouth 2 (two) times a day with meals 180 tablet 1   • cholecalciferol (VITAMIN D3) 1,000 units tablet Take 2 tablets (2,000 Units total) by mouth daily 60 tablet 0   • donepezil (ARICEPT) 10 mg tablet Take 1 tablet (10 mg total) by mouth daily at bedtime 90 tablet 1   • ibandronate (BONIVA) 150 MG tablet Take 1 tablet (150 mg total) by mouth every 30 (thirty) days 3 tablet 1   • ketoconazole (NIZORAL) 2 % cream Apply topically daily 60 g 1 • lisinopril (ZESTRIL) 2 5 mg tablet Take 1 tablet (2 5 mg total) by mouth daily 90 tablet 1   • multivitamin (THERAGRAN) TABS Take 1 tablet by mouth daily     • rosuvastatin (CRESTOR) 5 mg tablet Take 1 tablet (5 mg total) by mouth every other day 45 tablet 1   • spironolactone (ALDACTONE) 25 mg tablet Take 1 tablet (25 mg total) by mouth every other day 45 tablet 1   • nicotine (NICODERM CQ) 14 mg/24hr TD 24 hr patch Place 1 patch on the skin daily Do not start before October 27, 2022  (Patient not taking: Reported on 11/4/2022) 28 patch 0     No current facility-administered medications for this visit  Objective:    /68   Temp (!) 97 1 °F (36 2 °C)   Resp 16   Ht 5' 1" (1 549 m)   Wt 41 7 kg (92 lb)   BMI 17 38 kg/m²        Physical Exam  Vitals and nursing note reviewed  Constitutional:       Appearance: She is well-developed  She is not ill-appearing  HENT:      Head: Normocephalic  Right Ear: Tympanic membrane normal       Left Ear: Tympanic membrane normal    Eyes:      General: No scleral icterus  Conjunctiva/sclera: Conjunctivae normal    Cardiovascular:      Rate and Rhythm: Normal rate and regular rhythm  Pulmonary:      Effort: Pulmonary effort is normal  No respiratory distress  Breath sounds: No wheezing or rales  Abdominal:      Palpations: Abdomen is soft  Musculoskeletal:         General: No deformity  Cervical back: Neck supple  Skin:     General: Skin is warm and dry  Coloration: Skin is not pale  Neurological:      Mental Status: She is alert  Psychiatric:         Behavior: Behavior normal          Thought Content:  Thought content normal                 Ferrel Schwab

## 2022-11-10 ENCOUNTER — APPOINTMENT (OUTPATIENT)
Dept: RADIOLOGY | Facility: CLINIC | Age: 77
End: 2022-11-10

## 2022-11-10 ENCOUNTER — APPOINTMENT (OUTPATIENT)
Dept: LAB | Facility: CLINIC | Age: 77
End: 2022-11-10

## 2022-11-10 ENCOUNTER — OFFICE VISIT (OUTPATIENT)
Dept: PODIATRY | Facility: CLINIC | Age: 77
End: 2022-11-10

## 2022-11-10 VITALS
HEIGHT: 61 IN | SYSTOLIC BLOOD PRESSURE: 116 MMHG | DIASTOLIC BLOOD PRESSURE: 68 MMHG | BODY MASS INDEX: 17.37 KG/M2 | WEIGHT: 92 LBS

## 2022-11-10 DIAGNOSIS — I70.209 PERIPHERAL ARTERIOSCLEROSIS (HCC): Primary | ICD-10-CM

## 2022-11-10 DIAGNOSIS — M79.672 PAIN IN BOTH FEET: ICD-10-CM

## 2022-11-10 DIAGNOSIS — B35.1 ONYCHOMYCOSIS: ICD-10-CM

## 2022-11-10 DIAGNOSIS — J18.9 PNEUMONIA OF RIGHT LOWER LOBE DUE TO INFECTIOUS ORGANISM: ICD-10-CM

## 2022-11-10 DIAGNOSIS — M79.671 PAIN IN BOTH FEET: ICD-10-CM

## 2022-11-10 LAB
BASOPHILS # BLD AUTO: 0.07 THOUSANDS/ÂΜL (ref 0–0.1)
BASOPHILS NFR BLD AUTO: 1 % (ref 0–1)
EOSINOPHIL # BLD AUTO: 0.13 THOUSAND/ÂΜL (ref 0–0.61)
EOSINOPHIL NFR BLD AUTO: 1 % (ref 0–6)
ERYTHROCYTE [DISTWIDTH] IN BLOOD BY AUTOMATED COUNT: 13 % (ref 11.6–15.1)
HCT VFR BLD AUTO: 38.5 % (ref 34.8–46.1)
HGB BLD-MCNC: 12.2 G/DL (ref 11.5–15.4)
IMM GRANULOCYTES # BLD AUTO: 0.05 THOUSAND/UL (ref 0–0.2)
IMM GRANULOCYTES NFR BLD AUTO: 0 % (ref 0–2)
LYMPHOCYTES # BLD AUTO: 2.21 THOUSANDS/ÂΜL (ref 0.6–4.47)
LYMPHOCYTES NFR BLD AUTO: 19 % (ref 14–44)
MCH RBC QN AUTO: 33.1 PG (ref 26.8–34.3)
MCHC RBC AUTO-ENTMCNC: 31.7 G/DL (ref 31.4–37.4)
MCV RBC AUTO: 104 FL (ref 82–98)
MONOCYTES # BLD AUTO: 0.73 THOUSAND/ÂΜL (ref 0.17–1.22)
MONOCYTES NFR BLD AUTO: 6 % (ref 4–12)
NEUTROPHILS # BLD AUTO: 8.34 THOUSANDS/ÂΜL (ref 1.85–7.62)
NEUTS SEG NFR BLD AUTO: 73 % (ref 43–75)
NRBC BLD AUTO-RTO: 0 /100 WBCS
PLATELET # BLD AUTO: 427 THOUSANDS/UL (ref 149–390)
PMV BLD AUTO: 8.5 FL (ref 8.9–12.7)
RBC # BLD AUTO: 3.69 MILLION/UL (ref 3.81–5.12)
WBC # BLD AUTO: 11.53 THOUSAND/UL (ref 4.31–10.16)

## 2022-11-10 NOTE — PROGRESS NOTES
Assessment/Plan:  Pain upon ambulation secondary to peripheral artery disease   Mycosis of nail hallux nail demonstrate a paronychia bilateral   Xerosis of skin      Plan   Foot exam performed   Patient educated on condition   All nails debrided without pain or complication   Patient will moisturize daily      Patient remain on topical antifungal            Diagnoses and all orders for this visit:     Pain in both feet     Peripheral arteriosclerosis ([address] )     Onychomycosis     Paronychia of toenail, unspecified laterality     Xerosis of skin            Subjective:  Patient has pain in her feet and toes with ambulation   She has pain when she wears shoes   No history of trauma  No Known Allergies        Current Outpatient Medications:   •  ALPRAZolam (XANAX) 0 25 mg tablet, Take 0 25 mg by mouth, Disp: , Rfl:   •  Calcium Ascorbate (VITAMIN C) 500 mg tablet, Take 500 mg by mouth daily, Disp: , Rfl:   •  carvedilol (COREG) 3 125 mg tablet, Take 1 tablet (3 125 mg total) by mouth 2 (two) times a day with meals, Disp: 60 tablet, Rfl: 1  •  cholecalciferol (VITAMIN D3) 1,000 units tablet, Take 2 tablets (2,000 Units total) by mouth daily, Disp: 60 tablet, Rfl: 0  •  lisinopril (ZESTRIL) 2 5 mg tablet, Take 1 tablet (2 5 mg total) by mouth daily, Disp: 30 tablet, Rfl: 1  •  multivitamin (THERAGRAN) TABS, Take 1 tablet by mouth daily, Disp: , Rfl:   •  rosuvastatin (CRESTOR) 5 mg tablet, Take 1 tablet (5 mg total) by mouth every other day, Disp: 45 tablet, Rfl: 1  •  spironolactone (ALDACTONE) 25 mg tablet, Take 1 tablet (25 mg total) by mouth every other day, Disp: 15 tablet, Rfl: 1           Patient Active Problem List   Diagnosis   • COVID-19 virus infection   • Closed displaced fracture of right femoral neck (HCC)   • Tobacco abuse   • Tachycardia             Patient ID: Maryellen Bowman is a 67 y  o  female      HPI     The following portions of the patient's history were reviewed and updated as appropriate:      family history is not on file        reports that she has been smoking cigarettes  She has a 30 00 pack-year smoking history  She has never used smokeless tobacco  She reports that she does not drink alcohol and does not use drugs      Objective:  Patient's shoes and socks removed    Foot Exam     General  General Appearance: appears stated age and healthy   Orientation: alert and oriented to person, place, and time   Affect: appropriate         Right Foot/Ankle      Inspection and Palpation  Tenderness: metatarsals   Swelling: dorsum   Arch: pes planus  Claw Toes: fifth toe  Skin Exam: dry skin;      Neurovascular  Dorsalis pedis: 1+  Posterior tibial: 1+  Superficial peroneal nerve sensation: diminished  Deep peroneal nerve sensation: diminished        Left Foot/Ankle       Inspection and Palpation  Tenderness: metatarsals   Swelling: dorsum   Arch: pes planus  Claw toes: fifth toe  Skin Exam: callus and dry skin;      Neurovascular  Dorsalis pedis: 1+  Posterior tibial: 1+  Superficial peroneal nerve sensation: diminished  Deep peroneal nerve sensation: diminished           Physical Exam  Vitals and nursing note reviewed  Constitutional:       Appearance: Normal appearance  Cardiovascular:      Rate and Rhythm: Normal rate and regular rhythm       Pulses:           Dorsalis pedis pulses are 1+ on the right side and 1+ on the left side         Posterior tibial pulses are 1+ on the right side and 1+ on the left side       Comments: Q, 9 findings bilateral   Negative digital hair   Positive abnormal cooling   Positive significant telangiectasia development foot and ankle bilateral  Feet:      Right foot:      Skin integrity: Dry skin present       Left foot:      Skin integrity: Callus and dry skin present     Skin:     Capillary Refill: Capillary refill takes 2 to 3 seconds       Comments: Patient demonstrates xerosis of skin   All nails are mycotic   They demonstrate distal lysis  Connie griffith malodor   Hallux bilateral has mild paronychia without evidence of cellulitis    Patient also has dermatophytosis right greater than left  Neurological:      Mental Status: She is alert  Psychiatric:         Mood and Affect: Mood normal          BehaviorMary Tovar         Thought Content:  Thought content normal          Judgment: Judgment normal

## 2022-11-14 DIAGNOSIS — I42.9 CARDIOMYOPATHY, UNSPECIFIED TYPE (HCC): ICD-10-CM

## 2022-11-14 DIAGNOSIS — E78.5 DYSLIPIDEMIA: ICD-10-CM

## 2022-11-14 RX ORDER — ROSUVASTATIN CALCIUM 5 MG/1
5 TABLET, COATED ORAL EVERY OTHER DAY
Qty: 45 TABLET | Refills: 3 | Status: SHIPPED | OUTPATIENT
Start: 2022-11-14

## 2022-11-14 RX ORDER — CARVEDILOL 3.12 MG/1
3.12 TABLET ORAL 2 TIMES DAILY WITH MEALS
Qty: 180 TABLET | Refills: 3 | Status: SHIPPED | OUTPATIENT
Start: 2022-11-14

## 2022-11-14 RX ORDER — SPIRONOLACTONE 25 MG/1
25 TABLET ORAL EVERY OTHER DAY
Qty: 45 TABLET | Refills: 3 | Status: SHIPPED | OUTPATIENT
Start: 2022-11-14

## 2022-11-14 RX ORDER — LISINOPRIL 2.5 MG/1
2.5 TABLET ORAL DAILY
Qty: 90 TABLET | Refills: 3 | Status: SHIPPED | OUTPATIENT
Start: 2022-11-14

## 2022-12-05 ENCOUNTER — TELEPHONE (OUTPATIENT)
Dept: NEUROLOGY | Facility: CLINIC | Age: 77
End: 2022-12-05

## 2022-12-05 ENCOUNTER — CONSULT (OUTPATIENT)
Dept: NEUROLOGY | Facility: CLINIC | Age: 77
End: 2022-12-05

## 2022-12-05 ENCOUNTER — APPOINTMENT (OUTPATIENT)
Dept: LAB | Facility: CLINIC | Age: 77
End: 2022-12-05

## 2022-12-05 VITALS
BODY MASS INDEX: 17.56 KG/M2 | TEMPERATURE: 96.4 F | SYSTOLIC BLOOD PRESSURE: 122 MMHG | DIASTOLIC BLOOD PRESSURE: 84 MMHG | HEIGHT: 61 IN | WEIGHT: 93 LBS | OXYGEN SATURATION: 98 % | HEART RATE: 73 BPM

## 2022-12-05 DIAGNOSIS — G31.84 MCI (MILD COGNITIVE IMPAIRMENT): Primary | ICD-10-CM

## 2022-12-05 DIAGNOSIS — E55.9 VITAMIN D DEFICIENCY, UNSPECIFIED: ICD-10-CM

## 2022-12-05 DIAGNOSIS — F03.90 DEMENTIA (HCC): ICD-10-CM

## 2022-12-05 DIAGNOSIS — G31.84 MCI (MILD COGNITIVE IMPAIRMENT): ICD-10-CM

## 2022-12-05 DIAGNOSIS — R41.3 MEMORY DIFFICULTY: ICD-10-CM

## 2022-12-05 RX ORDER — PHENOL 1.4 %
600 AEROSOL, SPRAY (ML) MUCOUS MEMBRANE DAILY
COMMUNITY

## 2022-12-05 NOTE — PROGRESS NOTES
Outpatient Neurology History and Physical  Keegan Bryan  [id number]  58 y o   1945          Consult: Yes    Purnima Almendarez MD      Chief Complaint   Patient presents with   • Memory Loss           History Obtained from: patient and daughters     HPI:     Keegan Bryan is a 66 yo F with PMH of memory disturbance presents to establish care  According to daughter in law, patient had suffered a fall and resulting fracture in April 2021  At the time she was in unfamiliar setting for quite some time and her problem was more pronounced  Patient was started on aricept by her pcp which initially was making a difference  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 who just passed away in Oct this year  Patient gets worked up over numbers  She obsesses over certain things  She easily gets confused and then upset over it  She's still able to live independently  She is able to perform her ADLs  Patient still does her own finances and daughters check to make sure they are ok  Her TSH has been slightly elevated but since T4 was normal her pcp doesn't feel she needs to start medication  She does word finding games  She doesn't use computer and wouldn't know how to her smart phone for games       Past Medical History:   Diagnosis Date   • Anxiety    • Dementia ([facility] [address] )    • Hyperlipidemia    • Hypertension    • Memory difficulty                Current Outpatient Medications on File Prior to Visit   Medication Sig Dispense Refill   • Calcium Ascorbate (VITAMIN C) 500 mg tablet Take 500 mg by mouth daily     • calcium carbonate (OS-JUD) 600 MG tablet Take 600 mg by mouth daily     • carvedilol (COREG) 3 125 mg tablet Take 1 tablet (3 125 mg total) by mouth 2 (two) times a day with meals 180 tablet 3   • cholecalciferol (VITAMIN D3) 1,000 units tablet Take 2 tablets (2,000 Units total) by mouth daily 60 tablet 0   • donepezil (ARICEPT) 10 mg tablet Take 1 tablet (10 mg total) by mouth daily at bedtime 90 tablet 1   • ibandronate (BONIVA) 150 MG tablet Take 1 tablet (150 mg total) by mouth every 30 (thirty) days 3 tablet 1   • lisinopril (ZESTRIL) 2 5 mg tablet Take 1 tablet (2 5 mg total) by mouth daily 90 tablet 3   • multivitamin (THERAGRAN) TABS Take 1 tablet by mouth daily     • rosuvastatin (CRESTOR) 5 mg tablet Take 1 tablet (5 mg total) by mouth every other day 45 tablet 3   • spironolactone (ALDACTONE) 25 mg tablet Take 1 tablet (25 mg total) by mouth every other day 45 tablet 3   • ketoconazole (NIZORAL) 2 % cream Apply topically daily (Patient not taking: Reported on 12/5/2022) 60 g 1   • nicotine (NICODERM CQ) 14 mg/24hr TD 24 hr patch Place 1 patch on the skin daily Do not start before October 27, 2022  (Patient not taking: Reported on 11/4/2022) 28 patch 0     No current facility-administered medications on file prior to visit  No Known Allergies      History reviewed  No pertinent family history  Past Surgical History:   Procedure Laterality Date   • ARTHROPLASTY HIP TOTAL ANTERIOR Right 4/9/2021    Procedure: ARTHROPLASTY HIP TOTAL ANTERIOR;  Surgeon: Ana Akhtar DO;  Location: [address] [place] [address];  Service: Orthopedics   • CARDIAC CATHETERIZATION Left 5/27/2022    Procedure: Cardiac catheterization;  Surgeon: Raul Cole MD;  Location: [place] [address] CATH LAB; Service: Cardiology           Social History     Socioeconomic History   • Marital status:       Spouse name: Not on file   • Number of children: Not on file   • Years of education: Not on file   • Highest education level: Not on file   Occupational History   • Not on file   Tobacco Use   • Smoking status: Every Day     Packs/day: 0 50     Years: 60 00     Pack years: 30 00     Types: Cigarettes   • Smokeless tobacco: Never   Vaping Use   • Vaping Use: Never used   Substance and Sexual Activity   • Alcohol use: Never   • Drug use: Never   • Sexual activity: Not Currently     Partners: Male   Other Topics Concern   • Not on file   Social History Narrative   • Not on file     Social Determinants of Health     Financial Resource Strain: Not on file   Food Insecurity: No Food Insecurity   • Worried About Running Out of Food in the Last Year: Never true   • Ran Out of Food in the Last Year: Never true   Transportation Needs: No Transportation Needs   • Lack of Transportation (Medical): No   • Lack of Transportation (Non-Medical):  No   Physical Activity: Not on file   Stress: Not on file   Social Connections: Not on file   Intimate Partner Violence: Not on file   Housing Stability: Unknown   • Unable to Pay for Housing in the Last Year: No   • Number of Places Lived in the Last Year: Not on file   • Unstable Housing in the Last Year: No       Review of Systems  Refer to positive review of systems in HPI  Constitutional- No fever  Eyes- No visual change  ENT- Hearing normal  CV- No chest pain  Resp- No Shortness of breath  GI- No diarrhea  - Bladder normal  MS- No Arthritis   Skin- No rash  Psych- No depression  Endo- No DM  Heme- No nodes    PHYSICAL EXAM:    Vitals:    12/05/22 1309   BP: 122/84   BP Location: Right arm   Patient Position: Sitting   Cuff Size: Standard   Pulse: 73   Temp: (!) 96 4 °F (35 8 °C)   TempSrc: Tympanic   SpO2: 98%   Weight: 42 2 kg (93 lb)   Height: 5' 1" (1 549 m)         Appearance: No Acute Distress  Ophthalmoscopic: Disc Flat, Normal fundus  Carotid/Heart/Peripheral Vascular: No Bruits, RRR  Orientation: Awake, Alert, and Oriented x 3  Mental status:  Memory: MOCA: 21/30  Attention: Normal  Knowledge: Appropriate  Language: No aphasia  Speech: No dysarthria  Cranial Nerves:  2 No Visual Defect on Confrontation; Pupils round, equal, reactive to light  3,4,6 Extraocular Movements Intact; no nystagmus  5 Facial Sensation Intact  7 No facial asymmetry  8 Intact hearing  9,10 Palate symmetric, normal gag  11 Good shoulder shrug  12 Tongue Midline  Gait: Stable, No ataxia, can perform tandem walking  Coordination: No ataxia with finger to nose testing and heel to shin testing  Sensory: Intact, Symmetric to Pinprick, Light Touch, Vibration, and Joint Position  Muscle Tone: Normal  Muscle exam  Arm Right Left Leg Right Left   Deltoid 5/5 5/5 Iliopsoas 5/5 5/5   Biceps 5/5 5/5 Quads 5/5 5/5   Triceps 5/5 5/5 Hamstrings 5/5 5/5   Wrist Extension 5/5 5/5 Ankle Dorsi Flexion 5/5 5/5   Wrist Flexion 5/5 5/5 Ankle Plantar Flexion 5/5 5/5   Interossei 5/5 5/5 Ankle Eversion 5/5 5/5   APB 5/5 5/5 Ankle Inversion 5/5 5/5       Reflexes   RJ BJ TJ KJ AJ Plantars Rene's   Right 2+ 2+ 2+ 2+  Downgoing Not present   Left 2+ 2+ 2+ 2+  Downgoing Not present           Personal review of                Assessment/Plan:     1  MCI (mild cognitive impairment)  Vitamin D 25 hydroxy    TSH, 3rd generation with Free T4 reflex    RPR    MRI brain NeuroQuant wo and w contrast    Ambulatory referral to Neuropsychology      2  Memory difficulty  Ambulatory referral to Neurology      3  Dementia ([facility] [address] )        4  Vitamin D deficiency, unspecified  Vitamin D 25 hydroxy          Patient's clinical history and evaluation suggests MCI  Will check for reversible causes of memory impairment  We feel her TSH level should be treated but will defer it to PCP or contact him after getting new result  Will get MRI brain neuroquant study  Will refer her for neuropsych testing  Counseling Documentation:  The patient and/or patient's family were  counseled regarding diagnostic results  Instructions for management,risk factor reductions,prognosis of disease were discussed  Patient and family were educated regarding impressions,risks and benefits of treatment options,importance of compliance with treatment  Total time of encounter: 60 min   More than 50% of time was spent in counseling and coordination of care of patient  CHRISTIANNE Loaiza    [facility]  [address] [place] [id number]

## 2022-12-06 LAB
25(OH)D3 SERPL-MCNC: 43.2 NG/ML (ref 30–100)
RPR SER QL: NORMAL
TSH SERPL DL<=0.05 MIU/L-ACNC: 4.1 UIU/ML (ref 0.45–4.5)

## 2022-12-22 ENCOUNTER — OFFICE VISIT (OUTPATIENT)
Dept: CARDIOLOGY CLINIC | Facility: CLINIC | Age: 77
End: 2022-12-22

## 2022-12-22 VITALS
HEIGHT: 61 IN | HEART RATE: 63 BPM | OXYGEN SATURATION: 94 % | WEIGHT: 94.5 LBS | SYSTOLIC BLOOD PRESSURE: 138 MMHG | DIASTOLIC BLOOD PRESSURE: 68 MMHG | BODY MASS INDEX: 17.84 KG/M2 | TEMPERATURE: 97 F

## 2022-12-22 DIAGNOSIS — R00.0 TACHYCARDIA: ICD-10-CM

## 2022-12-22 DIAGNOSIS — Z72.0 TOBACCO ABUSE: ICD-10-CM

## 2022-12-22 DIAGNOSIS — I25.10 CORONARY ARTERY DISEASE INVOLVING NATIVE CORONARY ARTERY OF NATIVE HEART WITHOUT ANGINA PECTORIS: ICD-10-CM

## 2022-12-22 DIAGNOSIS — I10 PRIMARY HYPERTENSION: ICD-10-CM

## 2022-12-22 DIAGNOSIS — F41.9 ANXIETY: ICD-10-CM

## 2022-12-22 DIAGNOSIS — E78.5 DYSLIPIDEMIA: ICD-10-CM

## 2022-12-22 DIAGNOSIS — I50.42 CHRONIC COMBINED SYSTOLIC AND DIASTOLIC CONGESTIVE HEART FAILURE (HCC): ICD-10-CM

## 2022-12-22 NOTE — PROGRESS NOTES
Progress note - Cardiology Office   [facility]  Chuck Montes De Oca Stamets 65 y o  female MRN: [id number]  : 1945  Unit/Bed#:  Encounter: [id number]      Assessment:     1  Chronic combined systolic and diastolic congestive heart failure ([facility] [address] )    2  Coronary artery disease involving native coronary artery of native heart without angina pectoris    3  Primary hypertension    4  Tachycardia    5  Dyslipidemia    6  Tobacco abuse    7  Anxiety        Discussion summary and Plan:    1  Exertional shortness of breath  Etiology of her exertional shortness of breath appears to be multifactorial   She has been a smoker for long period of time  She had a cardiomyopathy but she has to be to be euvolemic EF seems to have improved 40-45%  Cardiac catheterization shows nonobstructive disease  Will continue medical Rx  Felicitas Hernandes Her shortness of breath has improved    2  Cardiomyopathy  She is diagnosed to have cardiomyopathy of unclear etiology  She has multiple risk factors  She will be scheduled for cardiac catheterization  Will start on heart failure medication initial dose will be very loaded Coreg 3 125 twice a day along with lisinopril 2 5 and Aldactone 25 mg every other day  Test done in 2022 acceptable  3  Continues tobacco abuse  Patient has been smoker when she was a teenager  She smokes about half pack a day does not want to quit  Advised to get lung cancer screening with primary care doctor  4  Dyslipidemia with high risk for cardiovascular event around 30%  Crestor 5 mg every other day and she is tolerating it very well    5  Coronary artery disease  She is found to have coronary artery disease which is moderate calcified arteries EF 40 45%  Catheterization finding reviewed with them catheterization was done in May 2022 no change in symptom    6   History of COVID-19 virus fraction patient has recovered but chronic shortness of breath may be related to other factors pain    Continue current Rx  Prescriptions renewed      Patient and patient's daughter in-law was advised and educated to call our office  immediately if  patient has any new symptoms of chest pain/shortness of breath, near-syncope, syncope, light headedness sustained palpitations  or any other cardiovascular symptoms before their scheduled follow-up appointment  Office #[phone]  Thank you for your consultation  If you have any question please call me at [phone]    Counseling :  A description of the counseling  Goals and Barriers  Patient's ability to self care: Yes  Medication side effect reviewed with patient in detail and all their questions answered to their satisfaction  Primary Care Physician : No primary care provider on file  HPI :     Nydia Walter is a 65y o  year old female who was referred by primary care doctor for exertional shortness of breath and abnormal EKG  Patient who has medical history significant for tobacco abuse for almost all her life since she has teenager, dyslipidemia, history of COVID-19 infection, 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 was noted to have abnormal EKG and some exertional shortness of breath  Patient now smokes about half pack a day she started smoking long time ago  Currently she is not on any significant cardiovascular medications though her cholesterol is acceptable her risk for cardiovascular event is noted to be high  She denies any chest pain with normal activities at she walks with the help of cane  No other cardiovascular issues at this time  She came with her daughter-in-law  Her  is also our patient  History of hip replacement surgery    History of tonsillectomy    04/20/2022  Above reviewed  Patient came for follow-up  She was initially seen for exertional shortness of breath    Patient who has medical history significant for tobacco abuse for almost all her life since she has teenager, dyslipidemia, history of DJD with hip replacement surgery, history of COVID-19 infection who was having exertional shortness of breath  Her cardiac workup shows she has cardiomyopathy with EF 35-40% with severe global hypokinesis  Her nuclear stress test was read as markedly abnormal with reducing EF  Cannot rule out small infarction versus ischemia in apical area  Patient came with her daughter  She has noted occasionally her legs swell up but not all the time  No swelling is present today  She still have exertional shortness of breath that she always attributed to her smoking  She is using her pressure dressing  No other issues at this time  06/16/2022  Above reviewed  Patient came for follow-up  She was initially seen for shortness of breath  She is not having that much shortness of breath  She does have history of tobacco abuse and she still smokes about half pack a day, dyslipidemia, hip replacement surgery who was noted to have cardiomyopathy with EF around 35 40%  She underwent cardiac catheterization which shows nonobstructive disease and her EF seems to have improved 45%  Currently she is taking Aldactone every other day along with lisinopril 2 5 daily, Crestor 5 mg and Coreg 3 125 twice a day  Heart rate and blood pressure seems to have improved  She is breathing is better she denies any chest pain she finds it hard to quit smoking and not likely to quit  No other cardiovascular issues at this time  12/22/2022  Above reviewed  Patient came for follow-up  She has medical history significant for tobacco abuse but still not able to quit smoking now cut back to half pack a day, dyslipidemia, hip replacement surgery who was noted to have cardiomyopathy with EF around 35 to 40%  She underwent cardiac aspiration which shows no obstructive coronary artery disease  And with medical therapy her EF seems to have improved to 45%    She is currently taking Toprol 2 5 mg daily, Aldactone 25 every the day, Coreg 3 125 twice a day, Crestor 5 mg every other day  Her last blood test in October 2022 has been acceptable hemoglobin is stable as her vitals are stable  And her heart rate is 63 bpm today  No other cardiovascular issues  Review of Systems   Constitutional: Negative for activity change, chills, diaphoresis, fever and unexpected weight change  HENT: Negative for congestion  Eyes: Negative for discharge and redness  Respiratory: Positive for shortness of breath  Negative for cough, chest tightness and wheezing  Chronic exertional or not change   Cardiovascular: Negative  Negative for chest pain, palpitations and leg swelling  Gastrointestinal: Negative for abdominal pain, diarrhea and nausea  Endocrine: Negative  Genitourinary: Negative for decreased urine volume and urgency  Musculoskeletal: Positive for arthralgias, back pain and gait problem  Skin: Negative for rash and wound  Allergic/Immunologic: Negative  Neurological: Negative for dizziness, seizures, syncope, weakness, light-headedness and headaches  Hematological: Negative  Psychiatric/Behavioral: Negative for agitation and confusion  The patient is not nervous/anxious  Historical Information   Past Medical History:   Diagnosis Date   • Anxiety    • Dementia ([facility] [address] )    • Hyperlipidemia    • Hypertension    • Memory difficulty      Past Surgical History:   Procedure Laterality Date   • ARTHROPLASTY HIP TOTAL ANTERIOR Right 4/9/2021    Procedure: ARTHROPLASTY HIP TOTAL ANTERIOR;  Surgeon: Vicente Ortiz DO;  [facility];  Service: Orthopedics   • CARDIAC CATHETERIZATION Left 5/27/2022    Procedure: Cardiac catheterization;  Surgeon: Fady Kraus MD;  Location: [place] [address] CATH LAB;   Service: Cardiology     Social History     Substance and Sexual Activity   Alcohol Use Never     Social History     Substance and Sexual Activity   Drug Use Never     Social History     Tobacco Use Smoking Status Every Day   • Packs/day: 0 50   • Years: 60 00   • Pack years: 30 00   • Types: Cigarettes   Smokeless Tobacco Never     Family History: History reviewed  No pertinent family history  Meds/Allergies     No Known Allergies    Current Outpatient Medications:   •  Calcium Ascorbate (VITAMIN C) 500 mg tablet, Take 500 mg by mouth daily, Disp: , Rfl:   •  calcium carbonate (OS-JUD) 600 MG tablet, Take 600 mg by mouth daily, Disp: , Rfl:   •  carvedilol (COREG) 3 125 mg tablet, Take 1 tablet (3 125 mg total) by mouth 2 (two) times a day with meals, Disp: 180 tablet, Rfl: 3  •  donepezil (ARICEPT) 10 mg tablet, Take 1 tablet (10 mg total) by mouth daily at bedtime, Disp: 90 tablet, Rfl: 1  •  ibandronate (BONIVA) 150 MG tablet, Take 1 tablet (150 mg total) by mouth every 30 (thirty) days, Disp: 3 tablet, Rfl: 1  •  lisinopril (ZESTRIL) 2 5 mg tablet, Take 1 tablet (2 5 mg total) by mouth daily, Disp: 90 tablet, Rfl: 3  •  multivitamin (THERAGRAN) TABS, Take 1 tablet by mouth daily, Disp: , Rfl:   •  rosuvastatin (CRESTOR) 5 mg tablet, Take 1 tablet (5 mg total) by mouth every other day, Disp: 45 tablet, Rfl: 3  •  spironolactone (ALDACTONE) 25 mg tablet, Take 1 tablet (25 mg total) by mouth every other day, Disp: 45 tablet, Rfl: 3  •  cholecalciferol (VITAMIN D3) 1,000 units tablet, Take 2 tablets (2,000 Units total) by mouth daily, Disp: 60 tablet, Rfl: 0  •  ketoconazole (NIZORAL) 2 % cream, Apply topically daily (Patient not taking: Reported on 12/5/2022), Disp: 60 g, Rfl: 1  •  nicotine (NICODERM CQ) 14 mg/24hr TD 24 hr patch, Place 1 patch on the skin daily Do not start before October 27, 2022  (Patient not taking: Reported on 11/4/2022), Disp: 28 patch, Rfl: 0    Vitals: Blood pressure 138/68, pulse 63, temperature (!) 97 °F (36 1 °C), height 5' 1" (1 549 m), weight 42 9 kg (94 lb 8 oz), SpO2 94 %  ?  Body mass index is 17 86 kg/m²    Wt Readings from Last 3 Encounters:   12/22/22 42 9 kg (94 lb 8 oz)   12/05/22 42 2 kg (93 lb)   11/10/22 41 7 kg (92 lb)     Vitals:    12/22/22 1414   Weight: 42 9 kg (94 lb 8 oz)     BP Readings from Last 3 Encounters:   12/22/22 138/68   12/05/22 122/84   11/10/22 116/68         Physical Exam  Constitutional:       General: She is not in acute distress  Appearance: She is well-developed  She is not diaphoretic  Neck:      Thyroid: No thyromegaly  Vascular: No JVD  Trachea: No tracheal deviation  Cardiovascular:      Rate and Rhythm: Normal rate and regular rhythm  Heart sounds: S1 normal and S2 normal  Heart sounds not distant  Murmur heard  Systolic (ejection) murmur is present with a grade of 2/6  No friction rub  No gallop  No S3 or S4 sounds  Pulmonary:      Effort: Pulmonary effort is normal  No respiratory distress  Breath sounds: No wheezing or rales  Comments: Bilateral air entry coarse breath sounds  Chest:      Chest wall: No tenderness  Abdominal:      General: Bowel sounds are normal  There is no distension  Palpations: Abdomen is soft  Tenderness: There is no abdominal tenderness  Musculoskeletal:         General: No deformity  Cervical back: Neck supple  Comments: No lower extremity swelling   Skin:     General: Skin is warm and dry  Coloration: Skin is not pale  Findings: No rash  Neurological:      Mental Status: She is alert and oriented to person, place, and time  Psychiatric:         Behavior: Behavior normal          Judgment: Judgment normal            Diagnostic Studies Review Cardio:    Echo Doppler done on 03/23/2022 shows patient has EF 35-40%  Global hypokinesis mild MR mild TR  Echodensity in right atrium most likely eustachian valve  Nuclear stress test done in March 2022 shows EF around 30%, fix partially reversible perfusion defect in apical area as      Catheterization done May 2022 shows her EF is 40-45%, nonobstructive mid LAD 50% stenosis, proximal LAD 40%, mid RCA 50% and ostial to proximal RCA has around 30%  She had a calcified arteries with mild-to-moderate nonobstructive disease medical therapy was recommended not likely to cause her cardiomyopathy  EKG:  Twelve-lead EKG 12/22/2022 shows normal sinus rhythm heart rate 63 bpm few PACs LVH by voltage  Imaging:  Chest X-Ray:   XR chest pa & lateral    Result Date: 8/6/2021  Impression Hyperinflation due to emphysema  Mild reticulation in the right base, likely mild fibrosis  Question right base bronchiectasis  Workstation performed: [id number]       CT-scan of the chest:     No CTA results available for this patient    Lab Review   Lab Results   Component Value Date    WBC 11 53 (H) 11/10/2022    HGB 12 2 11/10/2022    HCT 38 5 11/10/2022     (H) 11/10/2022    RDW 13 0 11/10/2022     (H) 11/10/2022     BMP:  Lab Results   Component Value Date    SODIUM 137 10/25/2022    K 3 8 10/25/2022     10/25/2022    CO2 22 10/25/2022    BUN 24 10/25/2022    CREATININE 0 76 10/25/2022    GLUC 159 (H) 10/25/2022    GLUF 104 (H) 04/29/2022    CALCIUM 7 8 (L) 10/25/2022    CORRECTEDCA 8 8 10/24/2022    EGFR 75 10/25/2022    MG 2 0 10/25/2022     Troponins:    LFT:  Lab Results   Component Value Date    AST 24 10/24/2022    ALT 31 10/24/2022    ALKPHOS 62 10/24/2022    TP 6 9 10/24/2022    ALB 2 9 (L) 10/24/2022      Lab Results   Component Value Date    MWE7QKIXNJVJ 4 100 12/05/2022     Lipid Profile:   Lab Results   Component Value Date    CHOLESTEROL 117 04/13/2022    HDL 51 04/13/2022    LDLCALC 56 04/13/2022    TRIG 50 04/13/2022     Lab Results   Component Value Date    CHOLESTEROL 117 04/13/2022    CHOLESTEROL 172 08/03/2021     Lab Results   Component Value Date    CKTOTAL 497 (H) 04/12/2021    CKMB 1 8 04/12/2021    CKMBINDEX <1 0 04/12/2021    TROPONINI <0 02 04/09/2021     Lab Results   Component Value Date    NTBNP 381 04/12/2021      The ASCVD Risk score (Nuha QUIROGA, et al , 2019) failed to calculate for the following reasons: The valid total cholesterol range is 130 to 320 mg/dL      Dr Hilary Guan MD [facility]      "This note has been constructed using a voice recognition system  Therefore there may be syntax, spelling, and/or grammatical errors   Please call if you have any questions  "

## 2022-12-25 PROBLEM — J18.9 PNEUMONIA: Status: RESOLVED | Noted: 2022-10-25 | Resolved: 2022-12-25

## 2023-01-05 ENCOUNTER — TELEPHONE (OUTPATIENT)
Dept: NEUROLOGY | Facility: CLINIC | Age: 78
End: 2023-01-05

## 2023-01-05 NOTE — TELEPHONE ENCOUNTER
Scheduled intake and testing appointment       Intake : 03/01/2023 at 9 am  Testing : 03/01/2023 at 10 am

## 2023-01-20 ENCOUNTER — OFFICE VISIT (OUTPATIENT)
Dept: PODIATRY | Facility: CLINIC | Age: 78
End: 2023-01-20

## 2023-01-20 VITALS
WEIGHT: 94 LBS | RESPIRATION RATE: 17 BRPM | BODY MASS INDEX: 17.75 KG/M2 | SYSTOLIC BLOOD PRESSURE: 138 MMHG | HEIGHT: 61 IN | DIASTOLIC BLOOD PRESSURE: 86 MMHG

## 2023-01-20 DIAGNOSIS — M79.672 PAIN IN BOTH FEET: ICD-10-CM

## 2023-01-20 DIAGNOSIS — M79.671 PAIN IN BOTH FEET: ICD-10-CM

## 2023-01-20 DIAGNOSIS — I70.209 PERIPHERAL ARTERIOSCLEROSIS (HCC): Primary | ICD-10-CM

## 2023-01-20 DIAGNOSIS — B35.1 ONYCHOMYCOSIS: ICD-10-CM

## 2023-01-20 NOTE — PROGRESS NOTES
Assessment/Plan:  Pain upon ambulation secondary to peripheral artery disease   Mycosis of nail hallux nail demonstrate a paronychia bilateral   Xerosis of skin      Plan   Foot exam performed   Patient educated on condition   All nails debrided without pain or complication   Patient will moisturize daily      Patient remain on topical antifungal            Diagnoses and all orders for this visit:     Pain in both feet     Peripheral arteriosclerosis ([address] )     Onychomycosis     Paronychia of toenail, unspecified laterality     Xerosis of skin            Subjective:  Patient has pain in her feet and toes with ambulation   She has pain when she wears shoes   No history of trauma  No Known Allergies        Current Outpatient Medications:   •  ALPRAZolam (XANAX) 0 25 mg tablet, Take 0 25 mg by mouth, Disp: , Rfl:   •  Calcium Ascorbate (VITAMIN C) 500 mg tablet, Take 500 mg by mouth daily, Disp: , Rfl:   •  carvedilol (COREG) 3 125 mg tablet, Take 1 tablet (3 125 mg total) by mouth 2 (two) times a day with meals, Disp: 60 tablet, Rfl: 1  •  cholecalciferol (VITAMIN D3) 1,000 units tablet, Take 2 tablets (2,000 Units total) by mouth daily, Disp: 60 tablet, Rfl: 0  •  lisinopril (ZESTRIL) 2 5 mg tablet, Take 1 tablet (2 5 mg total) by mouth daily, Disp: 30 tablet, Rfl: 1  •  multivitamin (THERAGRAN) TABS, Take 1 tablet by mouth daily, Disp: , Rfl:   •  rosuvastatin (CRESTOR) 5 mg tablet, Take 1 tablet (5 mg total) by mouth every other day, Disp: 45 tablet, Rfl: 1  •  spironolactone (ALDACTONE) 25 mg tablet, Take 1 tablet (25 mg total) by mouth every other day, Disp: 15 tablet, Rfl: 1           Patient Active Problem List   Diagnosis   • COVID-19 virus infection   • Closed displaced fracture of right femoral neck (HCC)   • Tobacco abuse   • Tachycardia             Patient ID: Maryellen Bowman is a 65 y  o  female      HPI     The following portions of the patient's history were reviewed and updated as appropriate:      family history is not on file        reports that she has been smoking cigarettes  She has a 30 00 pack-year smoking history  She has never used smokeless tobacco  She reports that she does not drink alcohol and does not use drugs      Objective:  Patient's shoes and socks removed    Foot Exam     General  General Appearance: appears stated age and healthy   Orientation: alert and oriented to person, place, and time   Affect: appropriate         Right Foot/Ankle      Inspection and Palpation  Tenderness: metatarsals   Swelling: dorsum   Arch: pes planus  Claw Toes: fifth toe  Skin Exam: dry skin;      Neurovascular  Dorsalis pedis: 1+  Posterior tibial: 1+  Superficial peroneal nerve sensation: diminished  Deep peroneal nerve sensation: diminished        Left Foot/Ankle       Inspection and Palpation  Tenderness: metatarsals   Swelling: dorsum   Arch: pes planus  Claw toes: fifth toe  Skin Exam: callus and dry skin;      Neurovascular  Dorsalis pedis: 1+  Posterior tibial: 1+  Superficial peroneal nerve sensation: diminished  Deep peroneal nerve sensation: diminished           Physical Exam  Vitals and nursing note reviewed  Constitutional:       Appearance: Normal appearance  Cardiovascular:      Rate and Rhythm: Normal rate and regular rhythm       Pulses:           Dorsalis pedis pulses are 1+ on the right side and 1+ on the left side         Posterior tibial pulses are 1+ on the right side and 1+ on the left side       Comments: Q, 9 findings bilateral   Negative digital hair   Positive abnormal cooling   Positive significant telangiectasia development foot and ankle bilateral  Feet:      Right foot:      Skin integrity: Dry skin present       Left foot:      Skin integrity: Callus and dry skin present     Skin:     Capillary Refill: Capillary refill takes 2 to 3 seconds       Comments: Patient demonstrates xerosis of skin   All nails are mycotic   They demonstrate distal lysis  Rahul Covert is malodor   Hallux bilateral has mild paronychia without evidence of cellulitis    Patient also has dermatophytosis right greater than left  Neurological:      Mental Status: She is alert  Psychiatric:         Mood and Affect: Mood normal          BehaviorCristhian Cabrera         Thought Content:  Thought content normal          Judgment: Judgment normal

## 2023-02-08 ENCOUNTER — OFFICE VISIT (OUTPATIENT)
Dept: FAMILY MEDICINE CLINIC | Facility: CLINIC | Age: 78
End: 2023-02-08

## 2023-02-08 DIAGNOSIS — M81.0 AGE RELATED OSTEOPOROSIS, UNSPECIFIED PATHOLOGICAL FRACTURE PRESENCE: ICD-10-CM

## 2023-02-08 DIAGNOSIS — N39.0 ACUTE UTI (URINARY TRACT INFECTION): Primary | ICD-10-CM

## 2023-02-08 LAB
SL AMB  POCT GLUCOSE, UA: 0
SL AMB LEUKOCYTE ESTERASE,UA: ABNORMAL
SL AMB POCT BILIRUBIN,UA: 0
SL AMB POCT BLOOD,UA: 0
SL AMB POCT CLARITY,UA: CLEAR
SL AMB POCT COLOR,UA: YELLOW
SL AMB POCT KETONES,UA: 0
SL AMB POCT NITRITE,UA: 0
SL AMB POCT PH,UA: 6
SL AMB POCT SPECIFIC GRAVITY,UA: 1.02
SL AMB POCT URINE PROTEIN: 0
SL AMB POCT UROBILINOGEN: 0.2

## 2023-02-08 RX ORDER — IBANDRONATE SODIUM 150 MG/1
150 TABLET, FILM COATED ORAL
Qty: 3 TABLET | Refills: 0 | Status: SHIPPED | OUTPATIENT
Start: 2023-02-08

## 2023-02-08 RX ORDER — SULFAMETHOXAZOLE AND TRIMETHOPRIM 800; 160 MG/1; MG/1
1 TABLET ORAL 2 TIMES DAILY
Qty: 6 TABLET | Refills: 0 | Status: SHIPPED | OUTPATIENT
Start: 2023-02-08 | End: 2023-02-11

## 2023-02-08 NOTE — PROGRESS NOTES
Name: Enoc Gardner      : 1945      MRN: [id number]  Encounter Provider: Keyana Wells MD  Encounter Date: 2023   Encounter department: [address] [place] [address]     1  Acute UTI (urinary tract infection)  -     Urine culture  -     POCT urine dip auto non-scope  -     sulfamethoxazole-trimethoprim (BACTRIM DS) 800-160 mg per tablet; Take 1 tablet by mouth 2 (two) times a day for 3 days         Subjective      Urinary Tract Infection   This is a new problem  The current episode started more than 1 month ago  The problem has been gradually worsening  The patient is experiencing no pain  There has been no fever  Associated symptoms include frequency and urgency  Pertinent negatives include no chills, discharge, flank pain, hematuria, hesitancy, nausea, possible pregnancy, sweats or vomiting  She has tried nothing for the symptoms  Review of Systems   Constitutional: Negative for chills  Gastrointestinal: Negative for nausea and vomiting  Genitourinary: Positive for frequency and urgency  Negative for flank pain, hematuria and hesitancy         Current Outpatient Medications on File Prior to Visit   Medication Sig   • Calcium Ascorbate (VITAMIN C) 500 mg tablet Take 500 mg by mouth daily   • calcium carbonate (OS-JUD) 600 MG tablet Take 600 mg by mouth daily   • carvedilol (COREG) 3 125 mg tablet Take 1 tablet (3 125 mg total) by mouth 2 (two) times a day with meals   • cholecalciferol (VITAMIN D3) 1,000 units tablet Take 2 tablets (2,000 Units total) by mouth daily   • donepezil (ARICEPT) 10 mg tablet Take 1 tablet (10 mg total) by mouth daily at bedtime   • ibandronate (BONIVA) 150 MG tablet Take 1 tablet (150 mg total) by mouth every 30 (thirty) days   • ketoconazole (NIZORAL) 2 % cream Apply topically daily (Patient not taking: Reported on 2022)   • lisinopril (ZESTRIL) 2 5 mg tablet Take 1 tablet (2 5 mg total) by mouth daily   • multivitamin (THERAGRAN) TABS Take 1 tablet by mouth daily   • nicotine (NICODERM CQ) 14 mg/24hr TD 24 hr patch Place 1 patch on the skin daily Do not start before October 27, 2022  (Patient not taking: Reported on 11/4/2022)   • rosuvastatin (CRESTOR) 5 mg tablet Take 1 tablet (5 mg total) by mouth every other day   • spironolactone (ALDACTONE) 25 mg tablet Take 1 tablet (25 mg total) by mouth every other day       Objective     There were no vitals taken for this visit  Physical Exam  Constitutional:       General: She is not in acute distress  Appearance: She is well-developed  She is not diaphoretic  HENT:      Head: Normocephalic and atraumatic  Cardiovascular:      Rate and Rhythm: Normal rate and regular rhythm  Heart sounds: Normal heart sounds  No murmur heard  No friction rub  No gallop  Pulmonary:      Effort: Pulmonary effort is normal  No respiratory distress  Breath sounds: Normal breath sounds  No wheezing or rales  Chest:      Chest wall: No tenderness  Abdominal:      General: Abdomen is flat  Bowel sounds are normal  There is no distension  Palpations: Abdomen is soft  There is no mass  Tenderness: There is no abdominal tenderness  There is no right CVA tenderness, left CVA tenderness, guarding or rebound  Hernia: No hernia is present  Musculoskeletal:         General: No deformity  Normal range of motion  Cervical back: Normal range of motion and neck supple  Skin:     General: Skin is warm and dry  Neurological:      Mental Status: She is alert and oriented to person, place, and time  Psychiatric:         Behavior: Behavior normal          Thought Content:  Thought content normal          Judgment: Judgment normal        Yandy Parry MD

## 2023-02-09 LAB — BACTERIA UR CULT: NORMAL

## 2023-02-10 ENCOUNTER — TELEPHONE (OUTPATIENT)
Dept: FAMILY MEDICINE CLINIC | Facility: CLINIC | Age: 78
End: 2023-02-10

## 2023-02-10 ENCOUNTER — HOSPITAL ENCOUNTER (EMERGENCY)
Facility: HOSPITAL | Age: 78
Discharge: HOME | End: 2023-02-10
Attending: GENERAL PRACTICE

## 2023-02-10 ENCOUNTER — APPOINTMENT (EMERGENCY)
Dept: RADIOLOGY | Facility: HOSPITAL | Age: 78
End: 2023-02-10

## 2023-02-10 VITALS
OXYGEN SATURATION: 92 % | SYSTOLIC BLOOD PRESSURE: 170 MMHG | DIASTOLIC BLOOD PRESSURE: 98 MMHG | RESPIRATION RATE: 22 BRPM | TEMPERATURE: 97.6 F | HEART RATE: 52 BPM

## 2023-02-10 DIAGNOSIS — R41.0 CONFUSION: ICD-10-CM

## 2023-02-10 DIAGNOSIS — G93.89 CEREBRAL VENTRICULOMEGALY: Primary | ICD-10-CM

## 2023-02-10 LAB
ALBUMIN SERPL BCP-MCNC: 3.5 G/DL (ref 3.5–5)
ALP SERPL-CCNC: 53 U/L (ref 46–116)
ALT SERPL W P-5'-P-CCNC: 38 U/L (ref 12–78)
ANION GAP SERPL CALCULATED.3IONS-SCNC: 7 MMOL/L (ref 4–13)
AST SERPL W P-5'-P-CCNC: 28 U/L (ref 5–45)
BASOPHILS # BLD AUTO: 0.08 THOUSANDS/ÂΜL (ref 0–0.1)
BASOPHILS NFR BLD AUTO: 1 % (ref 0–1)
BILIRUB SERPL-MCNC: 0.3 MG/DL (ref 0.2–1)
BILIRUB UR QL STRIP: NEGATIVE
BUN SERPL-MCNC: 25 MG/DL (ref 5–25)
CALCIUM SERPL-MCNC: 8.9 MG/DL (ref 8.3–10.1)
CARDIAC TROPONIN I PNL SERPL HS: 4 NG/L
CHLORIDE SERPL-SCNC: 104 MMOL/L (ref 96–108)
CLARITY UR: CLEAR
CO2 SERPL-SCNC: 28 MMOL/L (ref 21–32)
COLOR UR: NORMAL
CREAT SERPL-MCNC: 0.84 MG/DL (ref 0.6–1.3)
EOSINOPHIL # BLD AUTO: 0.23 THOUSAND/ÂΜL (ref 0–0.61)
EOSINOPHIL NFR BLD AUTO: 3 % (ref 0–6)
ERYTHROCYTE [DISTWIDTH] IN BLOOD BY AUTOMATED COUNT: 13.2 % (ref 11.6–15.1)
FLUAV RNA RESP QL NAA+PROBE: NEGATIVE
FLUBV RNA RESP QL NAA+PROBE: NEGATIVE
GFR SERPL CREATININE-BSD FRML MDRD: 66 ML/MIN/1.73SQ M
GLUCOSE SERPL-MCNC: 94 MG/DL (ref 65–140)
GLUCOSE UR STRIP-MCNC: NEGATIVE MG/DL
HCT VFR BLD AUTO: 39.7 % (ref 34.8–46.1)
HGB BLD-MCNC: 12.9 G/DL (ref 11.5–15.4)
HGB UR QL STRIP.AUTO: NEGATIVE
IMM GRANULOCYTES # BLD AUTO: 0.02 THOUSAND/UL (ref 0–0.2)
IMM GRANULOCYTES NFR BLD AUTO: 0 % (ref 0–2)
KETONES UR STRIP-MCNC: NEGATIVE MG/DL
LACTATE SERPL-SCNC: 0.5 MMOL/L (ref 0.5–2)
LEUKOCYTE ESTERASE UR QL STRIP: NEGATIVE
LYMPHOCYTES # BLD AUTO: 1.68 THOUSANDS/ÂΜL (ref 0.6–4.47)
LYMPHOCYTES NFR BLD AUTO: 20 % (ref 14–44)
MCH RBC QN AUTO: 33.8 PG (ref 26.8–34.3)
MCHC RBC AUTO-ENTMCNC: 32.5 G/DL (ref 31.4–37.4)
MCV RBC AUTO: 104 FL (ref 82–98)
MONOCYTES # BLD AUTO: 0.58 THOUSAND/ÂΜL (ref 0.17–1.22)
MONOCYTES NFR BLD AUTO: 7 % (ref 4–12)
NEUTROPHILS # BLD AUTO: 5.67 THOUSANDS/ÂΜL (ref 1.85–7.62)
NEUTS SEG NFR BLD AUTO: 69 % (ref 43–75)
NITRITE UR QL STRIP: NEGATIVE
NRBC BLD AUTO-RTO: 0 /100 WBCS
PH UR STRIP.AUTO: 6.5 [PH]
PLATELET # BLD AUTO: 247 THOUSANDS/UL (ref 149–390)
PMV BLD AUTO: 9.1 FL (ref 8.9–12.7)
POTASSIUM SERPL-SCNC: 4.5 MMOL/L (ref 3.5–5.3)
PROT SERPL-MCNC: 6.9 G/DL (ref 6.4–8.4)
PROT UR STRIP-MCNC: NEGATIVE MG/DL
RBC # BLD AUTO: 3.82 MILLION/UL (ref 3.81–5.12)
RSV RNA RESP QL NAA+PROBE: NEGATIVE
SARS-COV-2 RNA RESP QL NAA+PROBE: NEGATIVE
SODIUM SERPL-SCNC: 139 MMOL/L (ref 135–147)
SP GR UR STRIP.AUTO: <=1.005 (ref 1–1.03)
UROBILINOGEN UR QL STRIP.AUTO: 0.2 E.U./DL
WBC # BLD AUTO: 8.26 THOUSAND/UL (ref 4.31–10.16)

## 2023-02-10 RX ORDER — ONDANSETRON 2 MG/ML
INJECTION INTRAMUSCULAR; INTRAVENOUS
Status: DISCONTINUED
Start: 2023-02-10 | End: 2023-02-10 | Stop reason: HOSPADM

## 2023-02-10 RX ORDER — LISINOPRIL 5 MG/1
2.5 TABLET ORAL ONCE
Status: COMPLETED | OUTPATIENT
Start: 2023-02-10 | End: 2023-02-10

## 2023-02-10 RX ORDER — SPIRONOLACTONE 25 MG/1
25 TABLET ORAL ONCE
Status: COMPLETED | OUTPATIENT
Start: 2023-02-10 | End: 2023-02-10

## 2023-02-10 RX ORDER — ONDANSETRON 2 MG/ML
4 INJECTION INTRAMUSCULAR; INTRAVENOUS ONCE
Status: DISCONTINUED | OUTPATIENT
Start: 2023-02-10 | End: 2023-02-10

## 2023-02-10 RX ADMIN — SODIUM CHLORIDE 500 ML: 0.9 INJECTION, SOLUTION INTRAVENOUS at 15:45

## 2023-02-10 RX ADMIN — SPIRONOLACTONE 25 MG: 25 TABLET, FILM COATED ORAL at 15:45

## 2023-02-10 RX ADMIN — LISINOPRIL 2.5 MG: 5 TABLET ORAL at 15:45

## 2023-02-10 NOTE — ED PROVIDER NOTES
History  Chief Complaint   Patient presents with   • Altered Mental Status     Per granddaughter patient has been having increased periods of confusion since about Tuesday  Was seen at PMD and tested for a UTI, which came back negative  Ze Alvarado says patient has been hallucinating  Lives at home alone  Patient is currently alert and oriented x 4  Patient is a 67yo F with PMHx HTN, HLD, HFrEF (40-50%), CAD, who presents to the ED with 4-5 day history of AMS  Granddaughter reports paranoid delusions -- patient stating there was an accident at the facility where her son works  Also having urinary incontinence  There's also a question of whether she's been taking her medications as prescribed  Patient lives by herself but family checks on her daily and fills her pill boxes with her medications  Patient reports waking up this morning and making herself oatmeal for breakfast  Another granddaughter took her to her PMD a few days ago for same symptoms, thinking she might have a UTI, and she was started on bactrim  Altered Mental Status  Presenting symptoms: confusion    Associated symptoms: no abdominal pain, no fever, no headaches, no light-headedness, no nausea, no palpitations and no vomiting        Prior to Admission Medications   Prescriptions Last Dose Informant Patient Reported? Taking?    Calcium Ascorbate (VITAMIN C) 500 mg tablet   Yes No   Sig: Take 500 mg by mouth daily   calcium carbonate (OS-JUD) 600 MG tablet   Yes No   Sig: Take 600 mg by mouth daily   carvedilol (COREG) 3 125 mg tablet   No No   Sig: Take 1 tablet (3 125 mg total) by mouth 2 (two) times a day with meals   cholecalciferol (VITAMIN D3) 1,000 units tablet   No No   Sig: Take 2 tablets (2,000 Units total) by mouth daily   donepezil (ARICEPT) 10 mg tablet   No No   Sig: Take 1 tablet (10 mg total) by mouth daily at bedtime   ibandronate (BONIVA) 150 MG tablet   No No   Sig: Take 1 tablet (150 mg total) by mouth every 30 (thirty) days   ketoconazole (NIZORAL) 2 % cream   No No   Sig: Apply topically daily   Patient not taking: Reported on 12/5/2022   lisinopril (ZESTRIL) 2 5 mg tablet   No No   Sig: Take 1 tablet (2 5 mg total) by mouth daily   multivitamin (THERAGRAN) TABS   Yes No   Sig: Take 1 tablet by mouth daily   nicotine (NICODERM CQ) 14 mg/24hr TD 24 hr patch   No No   Sig: Place 1 patch on the skin daily Do not start before October 27, 2022  Patient not taking: Reported on 11/4/2022   rosuvastatin (CRESTOR) 5 mg tablet   No No   Sig: Take 1 tablet (5 mg total) by mouth every other day   spironolactone (ALDACTONE) 25 mg tablet   No No   Sig: Take 1 tablet (25 mg total) by mouth every other day   sulfamethoxazole-trimethoprim (BACTRIM DS) 800-160 mg per tablet   No No   Sig: Take 1 tablet by mouth 2 (two) times a day for 3 days      Facility-Administered Medications: None       Past Medical History:   Diagnosis Date   • Anxiety    • Dementia ([facility] [address] )    • Hyperlipidemia    • Hypertension    • Memory difficulty        Past Surgical History:   Procedure Laterality Date   • ARTHROPLASTY HIP TOTAL ANTERIOR Right 4/9/2021    Procedure: ARTHROPLASTY HIP TOTAL ANTERIOR;  Surgeon: Brandy Mcgrath DO;  Location: [address] [place] [address];  Service: Orthopedics   • CARDIAC CATHETERIZATION Left 5/27/2022    Procedure: Cardiac catheterization;  Surgeon: Marquis Bell MD;  Location: [place] [address] CATH LAB; Service: Cardiology       History reviewed  No pertinent family history  I have reviewed and agree with the history as documented      E-Cigarette/Vaping   • E-Cigarette Use Never User      E-Cigarette/Vaping Substances     Social History     Tobacco Use   • Smoking status: Every Day     Packs/day: 0 50     Years: 60 00     Pack years: 30 00     Types: Cigarettes   • Smokeless tobacco: Never   Vaping Use   • Vaping Use: Never used   Substance Use Topics   • Alcohol use: Never   • Drug use: Never       Review of Systems   Constitutional: Negative for chills, diaphoresis and fever  HENT: Negative for congestion, rhinorrhea and sore throat  Eyes: Negative for redness and visual disturbance  Respiratory: Negative for shortness of breath and wheezing  Cardiovascular: Negative for chest pain and palpitations  Gastrointestinal: Negative for abdominal pain, constipation, diarrhea, nausea and vomiting  Endocrine: Negative for cold intolerance and heat intolerance  Genitourinary: Positive for enuresis  Negative for dysuria and hematuria  Musculoskeletal: Negative for arthralgias and myalgias  Neurological: Negative for light-headedness and headaches  Hematological: Negative for adenopathy  Does not bruise/bleed easily  Psychiatric/Behavioral: Positive for confusion and sleep disturbance  Negative for dysphoric mood  The patient is not nervous/anxious  Physical Exam  Physical Exam  Vitals and nursing note reviewed  Constitutional:       General: She is not in acute distress  Appearance: Normal appearance  She is not ill-appearing  HENT:      Head: Normocephalic and atraumatic  Mouth/Throat:      Mouth: Mucous membranes are moist       Pharynx: Oropharynx is clear  Eyes:      General: No scleral icterus  Conjunctiva/sclera: Conjunctivae normal    Cardiovascular:      Rate and Rhythm: Normal rate and regular rhythm  Pulses: Normal pulses  Heart sounds: Normal heart sounds  No murmur heard  No friction rub  No gallop  Pulmonary:      Effort: Pulmonary effort is normal  No respiratory distress  Breath sounds: Normal breath sounds  No wheezing, rhonchi or rales  Abdominal:      Palpations: Abdomen is soft  Tenderness: There is no abdominal tenderness  Musculoskeletal:         General: No swelling or tenderness  Cervical back: Normal range of motion and neck supple  Skin:     General: Skin is warm and dry  Capillary Refill: Capillary refill takes less than 2 seconds     Neurological: General: No focal deficit present  Mental Status: She is alert  Mental status is at baseline     Psychiatric:         Mood and Affect: Mood normal          Behavior: Behavior normal          Vital Signs  ED Triage Vitals [02/10/23 1437]   Temperature Pulse Respirations Blood Pressure SpO2   97 6 °F (36 4 °C) 62 19 (!) 182/80 98 %      Temp Source Heart Rate Source Patient Position - Orthostatic VS BP Location FiO2 (%)   Tympanic Monitor Sitting Right arm --      Pain Score       No Pain           Vitals:    02/10/23 1645 02/10/23 1710 02/10/23 1715 02/10/23 1730   BP:  169/76 169/76 170/98   Pulse: (!) 53 56 60 (!) 52   Patient Position - Orthostatic VS:             Visual Acuity      ED Medications  Medications   sodium chloride 0 9 % bolus 500 mL (0 mL Intravenous Stopped 2/10/23 1718)   lisinopril (ZESTRIL) tablet 2 5 mg (2 5 mg Oral Given 2/10/23 1545)   spironolactone (ALDACTONE) tablet 25 mg (25 mg Oral Given 2/10/23 1545)       Diagnostic Studies  Results Reviewed     Procedure Component Value Units Date/Time    UA w Reflex to Microscopic w Reflex to Culture [[id number]] Collected: 02/10/23 1710    Lab Status: Final result Specimen: Urine, Other Updated: 02/10/23 1724     Color, UA Light Yellow     Clarity, UA Clear     Specific Gravity, UA <=1 005     pH, UA 6 5     Leukocytes, UA Negative     Nitrite, UA Negative     Protein, UA Negative mg/dl      Glucose, UA Negative mg/dl      Ketones, UA Negative mg/dl      Urobilinogen, UA 0 2 E U /dl      Bilirubin, UA Negative     Occult Blood, UA Negative    COVID/FLU/RSV [[id number]]  (Normal) Collected: 02/10/23 1547    Lab Status: Final result Specimen: Nares from Nose Updated: 02/10/23 1644     SARS-CoV-2 Negative     INFLUENZA A PCR Negative     INFLUENZA B PCR Negative     RSV PCR Negative    Narrative:      FOR PEDIATRIC PATIENTS - copy/paste COVID Guidelines URL to browser: [email or web address]    SARS-CoV-2 assay is a Nucleic Acid Amplification assay intended for the  qualitative detection of nucleic acid from SARS-CoV-2 in nasopharyngeal  swabs  Results are for the presumptive identification of SARS-CoV-2 RNA  Positive results are indicative of infection with SARS-CoV-2, the virus  causing COVID-19, but do not rule out bacterial infection or co-infection  with other viruses  Laboratories within the [place] and its  territories are required to report all positive results to the appropriate  public health authorities  Negative results do not preclude SARS-CoV-2  infection and should not be used as the sole basis for treatment or other  patient management decisions  Negative results must be combined with  clinical observations, patient history, and epidemiological information  This test has not been FDA cleared or approved  This test has been authorized by FDA under an Emergency Use Authorization  (EUA)  This test is only authorized for the duration of time the  declaration that circumstances exist justifying the authorization of the  emergency use of an in vitro diagnostic tests for detection of SARS-CoV-2  virus and/or diagnosis of COVID-19 infection under section 564(b)(1) of  the Act, 21 U  S C  490VVZ-5(L)(1), unless the authorization is terminated  or revoked sooner  The test has been validated but independent review by FDA  and CLIA is pending  Test performed using [employer]: This RT-PCR assay targets N2,  a region unique to SARS-CoV-2  A conserved region in the E-gene was chosen  for pan-Sarbecovirus detection which includes SARS-CoV-2  According to CMS-2020-01-R, this platform meets the definition of high-throughput technology      HS Troponin I 2hr [[id number]]     Lab Status: No result Specimen: Blood     HS Troponin I 4hr [[id number]]     Lab Status: No result Specimen: Blood     HS Troponin 0hr (reflex protocol) [[id number]]  (Normal) Collected: 02/10/23 1542    Lab Status: Final result Specimen: Blood from Line, Venous Updated: 02/10/23 1619     hs TnI 0hr 4 ng/L     Lactic acid, plasma [[id number]]  (Normal) Collected: 02/10/23 1542    Lab Status: Final result Specimen: Blood from Line, Venous Updated: 02/10/23 1615     LACTIC ACID 0 5 mmol/L     Narrative:      Result may be elevated if tourniquet was used during collection      CMP [[id number]] Collected: 02/10/23 1542    Lab Status: Final result Specimen: Blood from Line, Venous Updated: 02/10/23 1610     Sodium 139 mmol/L      Potassium 4 5 mmol/L      Chloride 104 mmol/L      CO2 28 mmol/L      ANION GAP 7 mmol/L      BUN 25 mg/dL      Creatinine 0 84 mg/dL      Glucose 94 mg/dL      Calcium 8 9 mg/dL      AST 28 U/L      ALT 38 U/L      Alkaline Phosphatase 53 U/L      Total Protein 6 9 g/dL      Albumin 3 5 g/dL      Total Bilirubin 0 30 mg/dL      eGFR 66 ml/min/1 73sq m     Narrative:      [facility] guidelines for Chronic Kidney Disease (CKD):   •  Stage 1 with normal or high GFR (GFR > 90 mL/min/1 73 square meters)  •  Stage 2 Mild CKD (GFR = 60-89 mL/min/1 73 square meters)  •  Stage 3A Moderate CKD (GFR = 45-59 mL/min/1 73 square meters)  •  Stage 3B Moderate CKD (GFR = 30-44 mL/min/1 73 square meters)  •  Stage 4 Severe CKD (GFR = 15-29 mL/min/1 73 square meters)  •  Stage 5 End Stage CKD (GFR <15 mL/min/1 73 square meters)  Note: GFR calculation is accurate only with a steady state creatinine    CBC and differential [[id number]]  (Abnormal) Collected: 02/10/23 1542    Lab Status: Final result Specimen: Blood from Line, Venous Updated: 02/10/23 1549     WBC 8 26 Thousand/uL      RBC 3 82 Million/uL      Hemoglobin 12 9 g/dL      Hematocrit 39 7 %       fL      MCH 33 8 pg      MCHC 32 5 g/dL      RDW 13 2 %      MPV 9 1 fL      Platelets 001 Thousands/uL      nRBC 0 /100 WBCs      Neutrophils Relative 69 % Immat GRANS % 0 %      Lymphocytes Relative 20 %      Monocytes Relative 7 %      Eosinophils Relative 3 %      Basophils Relative 1 %      Neutrophils Absolute 5 67 Thousands/µL      Immature Grans Absolute 0 02 Thousand/uL      Lymphocytes Absolute 1 68 Thousands/µL      Monocytes Absolute 0 58 Thousand/µL      Eosinophils Absolute 0 23 Thousand/µL      Basophils Absolute 0 08 Thousands/µL                  CT head without contrast   Final Result by Delphine Bolton MD (02/10 1603)      No acute intracranial abnormality  Unchanged moderate ventriculomegaly out of proportion to degree of cerebral atrophy  Question normal pressure hydrocephalus  Unchanged small area of nodularity extending from the sella into suprasellar region  Differential includes pituitary adenoma, Rathke's cleft cyst, or hypophysitis  Recommend MRI brain pituitary protocol with and without contrast for further evaluation  The study was marked in [facility] for immediate notification  Workstation performed: [id number]         XR chest 1 view    (Results Pending)              Procedures  Procedures         ED Course  ED Course as of 02/10/23 1742   Fri Feb 10, 2023   1736 Patient re-evaluated  Feeling well  All results discussed  Plan for outpatient follow-up as previously scheduled with neurology Dr Stephen Raymundo for further evaluation and workup of possible NPH  Patient stable, not confused now, and family comfortable with taking her home                                                MDM    Disposition  Final diagnoses:   Cerebral ventriculomegaly   Confusion     Time reflects when diagnosis was documented in both MDM as applicable and the Disposition within this note     Time User Action Codes Description Comment    2/10/2023  5:40 PM Elena Sauce Add [G93 89] Cerebral ventriculomegaly     2/10/2023  5:40 PM Elena Sauce Add [R41 0] Confusion       ED Disposition     ED Disposition   Discharge    Condition   Stable Date/Time   Fri Feb 10, 2023  5:40 PM    Comment   Kamran Bowman discharge to home/self care  Follow-up Information     Follow up With Specialties Details Why Contact Info    Bev Sen MD Neurology Schedule an appointment as soon as possible for a visit   [address]  [phone]            Patient's Medications   Discharge Prescriptions    No medications on file       No discharge procedures on file      PDMP Review     None          ED Provider  Electronically Signed by           Nimisha Whitt MD  02/10/23 7425

## 2023-02-10 NOTE — TELEPHONE ENCOUNTER
Called and spoke with Maryellen's daughter Yevgeniy Adorno  She reports Azar Mehta has been increasingly confused, has been weak/dehydrated and continues to have urinary accidents  I did review that her urine culture that was tested this week showed no evidence of a UTI, however given her worsening symptoms, recommend evaluation in the ER   NFA

## 2023-02-10 NOTE — TELEPHONE ENCOUNTER
patients daughter called and stated patient saw Dr Keyshawn Reid the other day  She is having accidents in her pants and they would like some direction on what to do  She also stated that she is not sleeping      Please call Dipak Huynh  [phone]

## 2023-02-13 ENCOUNTER — HOSPITAL ENCOUNTER (INPATIENT)
Facility: HOSPITAL | Age: 78
LOS: 5 days | Discharge: NURSING, REHAB OR LONG-TERM CARE | End: 2023-02-19
Attending: EMERGENCY MEDICINE | Admitting: INTERNAL MEDICINE

## 2023-02-13 ENCOUNTER — TELEPHONE (OUTPATIENT)
Dept: NEUROLOGY | Facility: CLINIC | Age: 78
End: 2023-02-13

## 2023-02-13 DIAGNOSIS — E03.9 HYPOTHYROIDISM: ICD-10-CM

## 2023-02-13 DIAGNOSIS — R41.82 ALTERED MENTAL STATUS, UNSPECIFIED ALTERED MENTAL STATUS TYPE: Primary | ICD-10-CM

## 2023-02-13 DIAGNOSIS — R44.3 HALLUCINATIONS: ICD-10-CM

## 2023-02-13 DIAGNOSIS — F03.918 DEMENTIA WITH BEHAVIORAL DISTURBANCE: ICD-10-CM

## 2023-02-13 DIAGNOSIS — F32.89 OTHER DEPRESSION: ICD-10-CM

## 2023-02-13 PROBLEM — I42.9 CARDIOMYOPATHY (HCC): Status: ACTIVE | Noted: 2023-02-13

## 2023-02-13 LAB
2HR DELTA HS TROPONIN: 0 NG/L
ALBUMIN SERPL BCP-MCNC: 3.7 G/DL (ref 3.5–5)
ALP SERPL-CCNC: 57 U/L (ref 46–116)
ALT SERPL W P-5'-P-CCNC: 46 U/L (ref 12–78)
AMMONIA PLAS-SCNC: 26 UMOL/L (ref 11–35)
ANION GAP SERPL CALCULATED.3IONS-SCNC: 4 MMOL/L (ref 4–13)
AST SERPL W P-5'-P-CCNC: 35 U/L (ref 5–45)
ATRIAL RATE: 54 BPM
ATRIAL RATE: 57 BPM
BASOPHILS # BLD AUTO: 0.07 THOUSANDS/ÂΜL (ref 0–0.1)
BASOPHILS NFR BLD AUTO: 1 % (ref 0–1)
BILIRUB SERPL-MCNC: 0.19 MG/DL (ref 0.2–1)
BILIRUB UR QL STRIP: NEGATIVE
BUN SERPL-MCNC: 19 MG/DL (ref 5–25)
CALCIUM SERPL-MCNC: 8.8 MG/DL (ref 8.3–10.1)
CARDIAC TROPONIN I PNL SERPL HS: 4 NG/L
CARDIAC TROPONIN I PNL SERPL HS: 4 NG/L
CHLORIDE SERPL-SCNC: 103 MMOL/L (ref 96–108)
CLARITY UR: CLEAR
CO2 SERPL-SCNC: 29 MMOL/L (ref 21–32)
COLOR UR: NORMAL
CREAT SERPL-MCNC: 0.83 MG/DL (ref 0.6–1.3)
EOSINOPHIL # BLD AUTO: 0.16 THOUSAND/ÂΜL (ref 0–0.61)
EOSINOPHIL NFR BLD AUTO: 2 % (ref 0–6)
ERYTHROCYTE [DISTWIDTH] IN BLOOD BY AUTOMATED COUNT: 13.2 % (ref 11.6–15.1)
GFR SERPL CREATININE-BSD FRML MDRD: 67 ML/MIN/1.73SQ M
GLUCOSE SERPL-MCNC: 101 MG/DL (ref 65–140)
GLUCOSE UR STRIP-MCNC: NEGATIVE MG/DL
HCT VFR BLD AUTO: 41.6 % (ref 34.8–46.1)
HGB BLD-MCNC: 13.8 G/DL (ref 11.5–15.4)
HGB UR QL STRIP.AUTO: NEGATIVE
IMM GRANULOCYTES # BLD AUTO: 0.02 THOUSAND/UL (ref 0–0.2)
IMM GRANULOCYTES NFR BLD AUTO: 0 % (ref 0–2)
KETONES UR STRIP-MCNC: NEGATIVE MG/DL
LEUKOCYTE ESTERASE UR QL STRIP: NEGATIVE
LYMPHOCYTES # BLD AUTO: 2.27 THOUSANDS/ÂΜL (ref 0.6–4.47)
LYMPHOCYTES NFR BLD AUTO: 27 % (ref 14–44)
MCH RBC QN AUTO: 34.4 PG (ref 26.8–34.3)
MCHC RBC AUTO-ENTMCNC: 33.2 G/DL (ref 31.4–37.4)
MCV RBC AUTO: 104 FL (ref 82–98)
MONOCYTES # BLD AUTO: 0.49 THOUSAND/ÂΜL (ref 0.17–1.22)
MONOCYTES NFR BLD AUTO: 6 % (ref 4–12)
NEUTROPHILS # BLD AUTO: 5.26 THOUSANDS/ÂΜL (ref 1.85–7.62)
NEUTS SEG NFR BLD AUTO: 64 % (ref 43–75)
NITRITE UR QL STRIP: NEGATIVE
NRBC BLD AUTO-RTO: 0 /100 WBCS
P AXIS: 54 DEGREES
P AXIS: 65 DEGREES
PH UR STRIP.AUTO: 6 [PH]
PLATELET # BLD AUTO: 278 THOUSANDS/UL (ref 149–390)
PMV BLD AUTO: 8.9 FL (ref 8.9–12.7)
POTASSIUM SERPL-SCNC: 4.2 MMOL/L (ref 3.5–5.3)
PR INTERVAL: 124 MS
PR INTERVAL: 124 MS
PROT SERPL-MCNC: 7.4 G/DL (ref 6.4–8.4)
PROT UR STRIP-MCNC: NEGATIVE MG/DL
QRS AXIS: 84 DEGREES
QRS AXIS: 84 DEGREES
QRSD INTERVAL: 86 MS
QRSD INTERVAL: 86 MS
QT INTERVAL: 470 MS
QT INTERVAL: 492 MS
QTC INTERVAL: 457 MS
QTC INTERVAL: 466 MS
RBC # BLD AUTO: 4.01 MILLION/UL (ref 3.81–5.12)
SODIUM SERPL-SCNC: 136 MMOL/L (ref 135–147)
SP GR UR STRIP.AUTO: <=1.005 (ref 1–1.03)
T WAVE AXIS: 93 DEGREES
T WAVE AXIS: 98 DEGREES
TSH SERPL DL<=0.05 MIU/L-ACNC: 7.03 UIU/ML (ref 0.45–4.5)
UROBILINOGEN UR QL STRIP.AUTO: 0.2 E.U./DL
VENTRICULAR RATE: 54 BPM
VENTRICULAR RATE: 57 BPM
WBC # BLD AUTO: 8.27 THOUSAND/UL (ref 4.31–10.16)

## 2023-02-13 RX ORDER — LISINOPRIL 2.5 MG/1
2.5 TABLET ORAL DAILY
Status: DISCONTINUED | OUTPATIENT
Start: 2023-02-13 | End: 2023-02-19 | Stop reason: HOSPADM

## 2023-02-13 RX ORDER — ASCORBIC ACID 500 MG
500 TABLET ORAL DAILY
Status: DISCONTINUED | OUTPATIENT
Start: 2023-02-14 | End: 2023-02-19 | Stop reason: HOSPADM

## 2023-02-13 RX ORDER — ENOXAPARIN SODIUM 100 MG/ML
40 INJECTION SUBCUTANEOUS DAILY
Status: DISCONTINUED | OUTPATIENT
Start: 2023-02-13 | End: 2023-02-19 | Stop reason: HOSPADM

## 2023-02-13 RX ORDER — LANOLIN ALCOHOL/MO/W.PET/CERES
3 CREAM (GRAM) TOPICAL
Status: DISCONTINUED | OUTPATIENT
Start: 2023-02-13 | End: 2023-02-19 | Stop reason: HOSPADM

## 2023-02-13 RX ORDER — MELATONIN
2000 DAILY
Status: DISCONTINUED | OUTPATIENT
Start: 2023-02-13 | End: 2023-02-19 | Stop reason: HOSPADM

## 2023-02-13 RX ORDER — NICOTINE 21 MG/24HR
1 PATCH, TRANSDERMAL 24 HOURS TRANSDERMAL DAILY
Status: DISCONTINUED | OUTPATIENT
Start: 2023-02-13 | End: 2023-02-19 | Stop reason: HOSPADM

## 2023-02-13 RX ORDER — CARVEDILOL 3.12 MG/1
3.12 TABLET ORAL 2 TIMES DAILY WITH MEALS
Status: DISCONTINUED | OUTPATIENT
Start: 2023-02-13 | End: 2023-02-16

## 2023-02-13 RX ORDER — SPIRONOLACTONE 25 MG/1
25 TABLET ORAL EVERY OTHER DAY
Status: DISCONTINUED | OUTPATIENT
Start: 2023-02-14 | End: 2023-02-19 | Stop reason: HOSPADM

## 2023-02-13 RX ORDER — PRAVASTATIN SODIUM 40 MG
40 TABLET ORAL
Status: DISCONTINUED | OUTPATIENT
Start: 2023-02-13 | End: 2023-02-13

## 2023-02-13 RX ORDER — DONEPEZIL HYDROCHLORIDE 5 MG/1
10 TABLET, FILM COATED ORAL
Status: DISCONTINUED | OUTPATIENT
Start: 2023-02-13 | End: 2023-02-19 | Stop reason: HOSPADM

## 2023-02-13 RX ORDER — SPIRONOLACTONE 25 MG/1
25 TABLET ORAL EVERY OTHER DAY
Status: DISCONTINUED | OUTPATIENT
Start: 2023-02-13 | End: 2023-02-13

## 2023-02-13 RX ORDER — PRAVASTATIN SODIUM 40 MG
40 TABLET ORAL EVERY OTHER DAY
Status: DISCONTINUED | OUTPATIENT
Start: 2023-02-14 | End: 2023-02-19 | Stop reason: HOSPADM

## 2023-02-13 RX ADMIN — CARVEDILOL 3.12 MG: 3.12 TABLET, FILM COATED ORAL at 17:19

## 2023-02-13 RX ADMIN — B-COMPLEX W/ C & FOLIC ACID TAB 1 TABLET: TAB at 17:19

## 2023-02-13 RX ADMIN — ENOXAPARIN SODIUM 40 MG: 40 INJECTION SUBCUTANEOUS at 17:19

## 2023-02-13 RX ADMIN — Medication 2000 UNITS: at 17:19

## 2023-02-13 RX ADMIN — DONEPEZIL HYDROCHLORIDE 10 MG: 5 TABLET ORAL at 21:38

## 2023-02-13 RX ADMIN — NICOTINE 1 PATCH: 14 PATCH, EXTENDED RELEASE TRANSDERMAL at 17:19

## 2023-02-13 RX ADMIN — LISINOPRIL 2.5 MG: 2.5 TABLET ORAL at 17:19

## 2023-02-13 NOTE — TELEPHONE ENCOUNTER
ADD ON-     Dr Farooq, OVL, 02/15/23, 2 pm  [address] No Name [address]  E-Verified      Cortes Luque, Pt's daughter-in-law has called and asked for patient to be seen as soon as possible as the patient had to be taken to the emergency room on 02/10/23, records in chart due to new changes on her mental status  They are requesting if something is open sooner to please call her back and schedule sooner than Wednesday  I also made it as an OVL as she indicated new changes      Thank you in advance,     Shaniqua Kessler

## 2023-02-13 NOTE — ED PROVIDER NOTES
History  Chief Complaint   Patient presents with   • Altered Mental Status     Here with son for reevaluation of same problem she was here for 3 days  Ago  She lives alone, forgot to take her meds today, having hallucinations     74-year-old female presents to the ED with her son who states that they called neurology office and they advised him to come to the ED for further evaluation and reevaluation of the same problem that she had 3 days ago patient has been confused she is starting to have more hallucinations she does live home alone  No other complaints patient is having more hallucinations at home  Prior to Admission Medications   Prescriptions Last Dose Informant Patient Reported? Taking? Calcium Ascorbate (VITAMIN C) 500 mg tablet   Yes No   Sig: Take 500 mg by mouth daily   MELATONIN PO 2/12/2023  Yes Yes   Sig: Take by mouth daily at bedtime as needed   calcium carbonate (OS-JUD) 600 MG tablet   Yes No   Sig: Take 600 mg by mouth daily   carvedilol (COREG) 3 125 mg tablet   No No   Sig: Take 1 tablet (3 125 mg total) by mouth 2 (two) times a day with meals   cholecalciferol (VITAMIN D3) 1,000 units tablet   No No   Sig: Take 2 tablets (2,000 Units total) by mouth daily   donepezil (ARICEPT) 10 mg tablet   No No   Sig: Take 1 tablet (10 mg total) by mouth daily at bedtime   ibandronate (BONIVA) 150 MG tablet   No No   Sig: Take 1 tablet (150 mg total) by mouth every 30 (thirty) days   ketoconazole (NIZORAL) 2 % cream   No No   Sig: Apply topically daily   Patient not taking: Reported on 12/5/2022   lisinopril (ZESTRIL) 2 5 mg tablet   No No   Sig: Take 1 tablet (2 5 mg total) by mouth daily   multivitamin (THERAGRAN) TABS   Yes No   Sig: Take 1 tablet by mouth daily   nicotine (NICODERM CQ) 14 mg/24hr TD 24 hr patch   No No   Sig: Place 1 patch on the skin daily Do not start before October 27, 2022     Patient not taking: Reported on 11/4/2022   rosuvastatin (CRESTOR) 5 mg tablet   No No   Sig: Take 1 tablet (5 mg total) by mouth every other day   spironolactone (ALDACTONE) 25 mg tablet   No No   Sig: Take 1 tablet (25 mg total) by mouth every other day      Facility-Administered Medications: None       Past Medical History:   Diagnosis Date   • Anxiety    • Dementia ([facility] [address] )    • Hyperlipidemia    • Hypertension    • Memory difficulty        Past Surgical History:   Procedure Laterality Date   • ARTHROPLASTY HIP TOTAL ANTERIOR Right 4/9/2021    Procedure: ARTHROPLASTY HIP TOTAL ANTERIOR;  Surgeon: Nasir Egan DO;  Location: [address] [place] [address];  Service: Orthopedics   • CARDIAC CATHETERIZATION Left 5/27/2022    Procedure: Cardiac catheterization;  Surgeon: Lizbeth Mario MD;  Location: [place] [address] CATH LAB; Service: Cardiology       History reviewed  No pertinent family history  I have reviewed and agree with the history as documented  E-Cigarette/Vaping   • E-Cigarette Use Never User      E-Cigarette/Vaping Substances     Social History     Tobacco Use   • Smoking status: Every Day     Packs/day: 0 50     Years: 60 00     Pack years: 30 00     Types: Cigarettes   • Smokeless tobacco: Never   Vaping Use   • Vaping Use: Never used   Substance Use Topics   • Alcohol use: Never   • Drug use: Never       Review of Systems   Constitutional: Negative for activity change, chills, diaphoresis and fever  HENT: Negative for congestion, ear pain, nosebleeds, sore throat, trouble swallowing and voice change  Eyes: Negative for pain, discharge and redness  Respiratory: Negative for apnea, cough, choking, shortness of breath, wheezing and stridor  Cardiovascular: Negative for chest pain and palpitations  Gastrointestinal: Negative for abdominal distention, abdominal pain, constipation, diarrhea, nausea and vomiting  Endocrine: Negative for polydipsia  Genitourinary: Negative for difficulty urinating, dysuria, flank pain, frequency, hematuria and urgency     Musculoskeletal: Negative for back pain, gait problem, joint swelling, myalgias, neck pain and neck stiffness  Skin: Negative for pallor and rash  Neurological: Negative for dizziness, tremors, syncope, speech difficulty, weakness, numbness and headaches  Hematological: Negative for adenopathy  Psychiatric/Behavioral: Positive for confusion and hallucinations  Negative for self-injury and suicidal ideas  The patient is not nervous/anxious  Physical Exam  Physical Exam  Vitals and nursing note reviewed  Constitutional:       General: She is not in acute distress  Appearance: She is well-developed  She is not diaphoretic  HENT:      Head: Normocephalic and atraumatic  Right Ear: External ear normal       Left Ear: External ear normal       Nose: Nose normal    Eyes:      Conjunctiva/sclera: Conjunctivae normal       Pupils: Pupils are equal, round, and reactive to light  Cardiovascular:      Rate and Rhythm: Normal rate and regular rhythm  Heart sounds: Normal heart sounds  Pulmonary:      Effort: Pulmonary effort is normal       Breath sounds: Normal breath sounds  Abdominal:      General: Bowel sounds are normal       Palpations: Abdomen is soft  Musculoskeletal:         General: Normal range of motion  Cervical back: Normal range of motion and neck supple  Skin:     General: Skin is warm and dry  Neurological:      Mental Status: She is alert and oriented to person, place, and time  Deep Tendon Reflexes: Reflexes are normal and symmetric           Vital Signs  ED Triage Vitals [02/13/23 1309]   Temperature Pulse Respirations Blood Pressure SpO2   97 5 °F (36 4 °C) 80 20 112/72 97 %      Temp Source Heart Rate Source Patient Position - Orthostatic VS BP Location FiO2 (%)   Temporal Monitor Sitting Left arm --      Pain Score       No Pain           Vitals:    02/13/23 1309 02/13/23 1415 02/13/23 1445   BP: 112/72 123/57 148/71   Pulse: 80 74 62   Patient Position - Orthostatic VS: Sitting Lying Lying Visual Acuity  Visual Acuity    Flowsheet Row Most Recent Value   L Pupil Size (mm) 3   R Pupil Size (mm) 3          ED Medications  Medications - No data to display    Diagnostic Studies  Results Reviewed     Procedure Component Value Units Date/Time    CBC and differential [[id number]]  (Abnormal) Collected: 02/13/23 1441    Lab Status: Final result Specimen: Blood from Arm, Left Updated: 02/13/23 1451     WBC 8 27 Thousand/uL      RBC 4 01 Million/uL      Hemoglobin 13 8 g/dL      Hematocrit 41 6 %       fL      MCH 34 4 pg      MCHC 33 2 g/dL      RDW 13 2 %      MPV 8 9 fL      Platelets 707 Thousands/uL      nRBC 0 /100 WBCs      Neutrophils Relative 64 %      Immat GRANS % 0 %      Lymphocytes Relative 27 %      Monocytes Relative 6 %      Eosinophils Relative 2 %      Basophils Relative 1 %      Neutrophils Absolute 5 26 Thousands/µL      Immature Grans Absolute 0 02 Thousand/uL      Lymphocytes Absolute 2 27 Thousands/µL      Monocytes Absolute 0 49 Thousand/µL      Eosinophils Absolute 0 16 Thousand/µL      Basophils Absolute 0 07 Thousands/µL     Ammonia [[id number]] Collected: 02/13/23 1441    Lab Status: In process Specimen: Blood from Arm, Left Updated: 02/13/23 1448    HS Troponin 0hr (reflex protocol) [[id number]] Collected: 02/13/23 1441    Lab Status: In process Specimen: Blood from Arm, Left Updated: 02/13/23 1448    Comprehensive metabolic panel [[id number]] Collected: 02/13/23 1441    Lab Status: In process Specimen: Blood from Arm, Left Updated: 02/13/23 1448    UA (URINE) with reflex to Scope [[id number]]     Lab Status: No result Specimen: Urine                  No orders to display              Procedures  Procedures         ED Course                               SBIRT 20yo+    Flowsheet Row Most Recent Value   SBIRT (25 yo +)    In order to provide better care to our patients, we are screening all of our patients for alcohol and drug use   Would it be okay to ask you these screening questions? No Filed at: 02/13/2023 0433                    Medical Decision Making  Patient does have hydrocephalus on recent CAT scan they are questioning the possibility of normal pressure hydrocephalus as the patient is having some hallucinations and some confusion  Neurology is aware and would like the patient to be admitted as she is not safe at home  Altered mental status, unspecified altered mental status type: acute illness or injury  Amount and/or Complexity of Data Reviewed  Independent Historian: caregiver     Details: Son is here with his mother  Labs: ordered  Discussion of management or test interpretation with external provider(s): Discussed the case with neurology who would advise patient to be admitted due to her being alone at home and now having hallucinations and not being safe  Risk  Decision regarding hospitalization  Disposition  Final diagnoses: Altered mental status, unspecified altered mental status type     Time reflects when diagnosis was documented in both MDM as applicable and the Disposition within this note     Time User Action Codes Description Comment    2/13/2023  2:58 PM Ishmaelchristi De La Fuenteferdinand Add [R41 [id number]] Altered mental status, unspecified altered mental status type       ED Disposition     ED Disposition   Admit    Condition   Stable    Date/Time   Mon Feb 13, 2023  2:58 PM    Comment   Case was discussed with  Dr Chase Lowe and the patient's admission status was agreed to be Admission Status: observation status to the service of Dr Chase Lowe   Follow-up Information    None         Patient's Medications   Discharge Prescriptions    No medications on file       No discharge procedures on file      PDMP Review     None          ED Provider  Electronically Signed by           Alexander Mcdermott DO  02/13/23 4558

## 2023-02-13 NOTE — ASSESSMENT & PLAN NOTE
Patient presented to ED on 2/10 for altered mental status and hallucinations  She lives alone at home, questionable whether she was taking her medications as prescribed  Her granddaughter took her to her PCP a few days prior thinking she may have a UTI and was started on bactrim  She presents today with increased hallucinations at home  · Pt had MRI scheduled for Thursday, family spoke with Dr Evelia Segura who recommended they take her to ER for admission  · CT head: No acute intracranial abnormality  Unchanged moderate ventriculomegaly out of proportion to degree of cerebral atrophy  Question normal pressure hydrocephalus  Unchanged small area of nodularity extending from the sella into suprasellar region  Differential includes pituitary adenoma, Rathke's cleft cyst, or hypophysitis  Recommend MRI brain pituitary protocol with and without contrast for further evaluation    · VSS and cbc and bmp WNL  · Neuro checks q4  · UA on 2/10 normal, recheck UA  · Urine culture form 2/8 shows no growth  · Lactic acid, COVID/flu/RSV negative on 2/10  · Initial troponin negative, ammonia WNL  · Neurology consulted for MRI and possible LP

## 2023-02-13 NOTE — H&P
Miguel Angel Garcia  [facility] [facility] 1945, 65 y o  female MRN: [id number]  Unit/Bed#: [address] [place] [address] Encounter: [id number]  Primary Care Provider: No primary care provider on file  Date and time admitted to hospital: 2/13/2023  1:12 PM    * Altered mental status  Assessment & Plan  Patient presented to ED on 2/10 for altered mental status and hallucinations  She lives alone at home, questionable whether she was taking her medications as prescribed  Her granddaughter took her to her PCP a few days prior thinking she may have a UTI and was started on bactrim  She presents today with increased hallucinations at home  · Pt had MRI scheduled for Thursday, family spoke with Dr Donna Ortiz who recommended they take her to ER for admission  · CT head: No acute intracranial abnormality  Unchanged moderate ventriculomegaly out of proportion to degree of cerebral atrophy  Question normal pressure hydrocephalus  Unchanged small area of nodularity extending from the sella into suprasellar region  Differential includes pituitary adenoma, Rathke's cleft cyst, or hypophysitis  Recommend MRI brain pituitary protocol with and without contrast for further evaluation    · VSS and cbc and bmp WNL  · Neuro checks q4  · UA on 2/10 normal, recheck UA  · Urine culture form 2/8 shows no growth  · Lactic acid, COVID/flu/RSV negative on 2/10  · Initial troponin negative, ammonia WNL  · Neurology consulted for MRI and possible LP    Cardiomyopathy [facility])  Assessment & Plan  Patient has cardiomyopathy of unclear etiology  · Continue coreg 3 125 mg twice a day, lisinopril 2 5 mg and aldactone 25 mg every other day  · No evidence of acute exacerbation    Dementia (HCC)  Assessment & Plan  · Continue donepezil 10 mg    Hypertension  Assessment & Plan  · Continue coreg and lisinopril  · BP stable  · Continue to monitor    Hyperlipidemia  Assessment & Plan  · Continue statin    Coronary artery disease involving native coronary artery of native heart without angina pectoris  Assessment & Plan  Nonobstructive CAD seen on cardiac catheterization in 5/2022  · Continue coreg, statin    Tobacco abuse  Assessment & Plan  · Patient has smoked since she was a teenager  · Recently cut down to 3/4 pack from a pack per day  · Nicotine patch  · Encourage cessation    VTE Pharmacologic Prophylaxis: VTE Score: 3 Moderate Risk (Score 3-4) - Pharmacological DVT Prophylaxis Ordered: enoxaparin (Lovenox)  Code Status: Level 3 - DNAR and DNI   Discussion with family: Updated  (daughter) at bedside  Anticipated Length of Stay: Patient will be admitted on an observation basis with an anticipated length of stay of less than 2 midnights secondary to altered mental status  Total Time for Visit, including Counseling / Coordination of Care: 60 minutes Greater than 50% of this total time spent on direct patient counseling and coordination of care  Chief Complaint: altered mental status, hallucinations    History of Present Illness:  Bright Brumfield is a 65 y o  female with a PMH of HTN, HLD, cardiomyopathy, dementia, CAD who presents with altered mental status and hallucinations for the past week, worsening over the past three days  Patient originally came to ED on 2/10 for altered metal status  CT revealed possible normal pressure hydrocephalus  She had an MRI scheduled for Thursday, 2/16, however, family called neurology Dr Concepción Kirkpatrick office over concerns of increasing hallucinations  They were told to bring her to the emergency room for admission for an MRI sooner and for safety concerns, since patient lives alone and there is concern she may not be taking her medications as prescribed  Her family recently took her to PCP prior to first presentation to ED, thinking she may have a UTI and she was treated with bactrim  Urine culture from 2/8 show no growth and UA done on 2/10 is normal   Patient is currently alert and oriented x 3  She denies fever, chills, chest pain, shortness of breath, headache, dizziness, abdominal pain, nausea, vomiting, diarrhea, ambulatory dysfunction or urinary symptoms  Review of Systems:  Review of Systems   Constitutional: Negative for chills and fever  HENT: Negative for ear pain and sore throat  Eyes: Negative for pain and visual disturbance  Respiratory: Negative for cough and shortness of breath  Cardiovascular: Negative for chest pain and palpitations  Gastrointestinal: Negative for abdominal pain and vomiting  Genitourinary: Negative for dysuria and hematuria  Musculoskeletal: Negative for arthralgias and back pain  Skin: Negative for color change and rash  Neurological: Negative for seizures and syncope  Psychiatric/Behavioral: Positive for confusion and hallucinations  All other systems reviewed and are negative  Past Medical and Surgical History:   Past Medical History:   Diagnosis Date   • Anxiety    • Dementia ([facility] [address] )    • Hyperlipidemia    • Hypertension    • Memory difficulty        Past Surgical History:   Procedure Laterality Date   • ARTHROPLASTY HIP TOTAL ANTERIOR Right 4/9/2021    Procedure: ARTHROPLASTY HIP TOTAL ANTERIOR;  Surgeon: Dennis Interiano DO;  Location: [address] [place] [address];  Service: Orthopedics   • CARDIAC CATHETERIZATION Left 5/27/2022    Procedure: Cardiac catheterization;  Surgeon: Tito Rizo MD;  Location: [place] [address] CATH LAB; Service: Cardiology       Meds/Allergies:  Prior to Admission medications    Medication Sig Start Date End Date Taking?  Authorizing Provider   MELATONIN PO Take by mouth daily at bedtime as needed   Yes Historical Provider, MD   Calcium Ascorbate (VITAMIN C) 500 mg tablet Take 500 mg by mouth daily    Historical Provider, MD   calcium carbonate (OS-JUD) 600 MG tablet Take 600 mg by mouth daily    Historical Provider, MD   carvedilol (COREG) 3 125 mg tablet Take 1 tablet (3 125 mg total) by mouth 2 (two) times a day with meals 11/14/22   Eliane Carver MD   cholecalciferol (VITAMIN D3) 1,000 units tablet Take 2 tablets (2,000 Units total) by mouth daily 4/15/21 12/5/22  BOBBY Samaniego   donepezil (ARICEPT) 10 mg tablet Take 1 tablet (10 mg total) by mouth daily at bedtime 9/20/22   Janel Graham DO   ibandronate (BONIVA) 150 MG tablet Take 1 tablet (150 mg total) by mouth every 30 (thirty) days 2/8/23   Gildardo Lizarraga MD   ketoconazole (NIZORAL) 2 % cream Apply topically daily  Patient not taking: Reported on 12/5/2022 7/6/22 11/4/22  Felix Valle DPM   lisinopril (ZESTRIL) 2 5 mg tablet Take 1 tablet (2 5 mg total) by mouth daily 11/14/22   Eliane Carver MD   multivitamin [facility]) TABS Take 1 tablet by mouth daily    Historical Provider, MD   nicotine (NICODERM CQ) 14 mg/24hr TD 24 hr patch Place 1 patch on the skin daily Do not start before October 27, 2022  Patient not taking: Reported on 11/4/2022 10/27/22   Mo Kendall MD   rosuvastatin (CRESTOR) 5 mg tablet Take 1 tablet (5 mg total) by mouth every other day 11/14/22   Eliane Carver MD   spironolactone (ALDACTONE) 25 mg tablet Take 1 tablet (25 mg total) by mouth every other day 11/14/22   Eliane Carver MD     I have reviewed home medications with patient personally  Allergies: No Known Allergies    Social History:  Marital Status:    Patient Pre-hospital Living Situation: Home  Patient Pre-hospital Level of Mobility: walks with cane  Patient Pre-hospital Diet Restrictions: none  Substance Use History:   Social History     Substance and Sexual Activity   Alcohol Use Never     Social History     Tobacco Use   Smoking Status Every Day   • Packs/day: 0 50   • Years: 60 00   • Pack years: 30 00   • Types: Cigarettes   Smokeless Tobacco Never     Social History     Substance and Sexual Activity   Drug Use Never       Family History:  History reviewed  No pertinent family history      Physical Exam:     Vitals:   Blood Pressure: 155/79 (02/13/23 1551)  Pulse: 58 (02/13/23 1551)  Temperature: 97 8 °F (36 6 °C) (02/13/23 1621)  Temp Source: Temporal (02/13/23 1309)  Respirations: 20 (02/13/23 1621)  Height: 5' 1" (154 9 cm) (02/13/23 1621)  Weight - Scale: 44 kg (97 lb) (02/13/23 1309)  SpO2: 93 % (02/13/23 1621)    Physical Exam  Vitals and nursing note reviewed  Constitutional:       General: She is not in acute distress  Appearance: She is underweight  HENT:      Head: Normocephalic and atraumatic  Eyes:      Conjunctiva/sclera: Conjunctivae normal    Cardiovascular:      Rate and Rhythm: Normal rate and regular rhythm  Heart sounds: No murmur heard  Pulmonary:      Effort: Pulmonary effort is normal  No respiratory distress  Breath sounds: Normal breath sounds  Abdominal:      Palpations: Abdomen is soft  Tenderness: There is no abdominal tenderness  Musculoskeletal:         General: No swelling  Cervical back: Neck supple  Skin:     General: Skin is warm and dry  Capillary Refill: Capillary refill takes less than 2 seconds  Neurological:      General: No focal deficit present  Mental Status: She is alert and oriented to person, place, and time     Psychiatric:         Mood and Affect: Mood normal           Additional Data:     Lab Results:  Results from last 7 days   Lab Units 02/13/23  1441   WBC Thousand/uL 8 27   HEMOGLOBIN g/dL 13 8   HEMATOCRIT % 41 6   PLATELETS Thousands/uL 278   NEUTROS PCT % 64   LYMPHS PCT % 27   MONOS PCT % 6   EOS PCT % 2     Results from last 7 days   Lab Units 02/13/23  1441   SODIUM mmol/L 136   POTASSIUM mmol/L 4 2   CHLORIDE mmol/L 103   CO2 mmol/L 29   BUN mg/dL 19   CREATININE mg/dL 0 83   ANION GAP mmol/L 4   CALCIUM mg/dL 8 8   ALBUMIN g/dL 3 7   TOTAL BILIRUBIN mg/dL 0 19*   ALK PHOS U/L 57   ALT U/L 46   AST U/L 35   GLUCOSE RANDOM mg/dL 101                 Results from last 7 days   Lab Units 02/10/23  1542   LACTIC ACID mmol/L 0 5       Lines/Drains:  Invasive Devices     Peripheral Intravenous Line  Duration           Peripheral IV 02/13/23 Distal;Left;Upper;Ventral (anterior) Arm <1 day                    Imaging: Reviewed radiology reports from this admission including: chest xray and CT head from 2/10  No orders to display       EKG and Other Studies Reviewed on Admission:   · EKG: Sinus Bradycardia  HR 57 on 2/10    ** Please Note: This note has been constructed using a voice recognition system   **

## 2023-02-13 NOTE — TELEPHONE ENCOUNTER
Pts daughter in law just called in saying Pt is having major concerns and they are unsure if they should take the Pt to the ED  Please call ASAP

## 2023-02-13 NOTE — TELEPHONE ENCOUNTER
Spoke with pt's DIL Valentine  Worse in the past 3 days  Was in the ED (2/10) with similar symptoms but was discharged in stable condition  However, DIL says pt remains confused (not making any sense) and experiencing hallucinations and delusions  DIL state that pt didn't take her pills at all this morning and possibly didn't take them last night  Pt's DIL says pt will not talk with her, refusing to speak as if against DIL  DIL says the family remembers that pt fell down the steps smacked her head into wall, put dent into wall, never got checked out  Few months later pt fell again and it was at that time pt went to ED  Says this happened 3 years ago  But ever since then pt started getting more forgetful, not walking right, unbalanced, walking side ways  Pt currently has MRI scheduled for Thursday and hoping that if they bring her to ED, they can get MRI sooner and further evaluation  DIL states that they are afraid for pt to be on her own, afraid for her safety  Says pt's son will bring her to the ED again because pt is still not acting herself and still having symptoms of altered mental status  Dr Alessandra Vargas - Please advise  Best  [phone], ok to leave detailed message

## 2023-02-13 NOTE — ASSESSMENT & PLAN NOTE
· Patient has smoked since she was a teenager  · Recently cut down to 3/4 pack from a pack per day  · Nicotine patch  · Encourage cessation

## 2023-02-13 NOTE — TELEPHONE ENCOUNTER
Shayy Packer MD  to Me      1:14 PM   Yes inpatient, we would get ct brain in ED and she would be admitted for MRI  If there have been falls, it's reasonable to come to ED for evaluation

## 2023-02-13 NOTE — ASSESSMENT & PLAN NOTE
Patient has cardiomyopathy of unclear etiology  · Continue coreg 3 125 mg twice a day, lisinopril 2 5 mg and aldactone 25 mg every other day  · No evidence of acute exacerbation

## 2023-02-13 NOTE — ED NOTES
Patient ambulated to commode  Unable to urinate at this time, will continue to encourage        Marge Mcghee RN  02/13/23 1293

## 2023-02-14 LAB
ANION GAP SERPL CALCULATED.3IONS-SCNC: 7 MMOL/L (ref 4–13)
BUN SERPL-MCNC: 16 MG/DL (ref 5–25)
CALCIUM SERPL-MCNC: 8.4 MG/DL (ref 8.3–10.1)
CHLORIDE SERPL-SCNC: 106 MMOL/L (ref 96–108)
CO2 SERPL-SCNC: 27 MMOL/L (ref 21–32)
CREAT SERPL-MCNC: 0.63 MG/DL (ref 0.6–1.3)
ERYTHROCYTE [DISTWIDTH] IN BLOOD BY AUTOMATED COUNT: 12.9 % (ref 11.6–15.1)
GFR SERPL CREATININE-BSD FRML MDRD: 86 ML/MIN/1.73SQ M
GLUCOSE P FAST SERPL-MCNC: 90 MG/DL (ref 65–99)
GLUCOSE SERPL-MCNC: 90 MG/DL (ref 65–140)
HCT VFR BLD AUTO: 36.4 % (ref 34.8–46.1)
HGB BLD-MCNC: 12.4 G/DL (ref 11.5–15.4)
MCH RBC QN AUTO: 34.5 PG (ref 26.8–34.3)
MCHC RBC AUTO-ENTMCNC: 34.1 G/DL (ref 31.4–37.4)
MCV RBC AUTO: 101 FL (ref 82–98)
PLATELET # BLD AUTO: 232 THOUSANDS/UL (ref 149–390)
PMV BLD AUTO: 9.3 FL (ref 8.9–12.7)
POTASSIUM SERPL-SCNC: 4.4 MMOL/L (ref 3.5–5.3)
RBC # BLD AUTO: 3.59 MILLION/UL (ref 3.81–5.12)
SODIUM SERPL-SCNC: 140 MMOL/L (ref 135–147)
WBC # BLD AUTO: 8.64 THOUSAND/UL (ref 4.31–10.16)

## 2023-02-14 RX ADMIN — Medication 2000 UNITS: at 09:38

## 2023-02-14 RX ADMIN — CARVEDILOL 3.12 MG: 3.12 TABLET, FILM COATED ORAL at 09:36

## 2023-02-14 RX ADMIN — CARVEDILOL 3.12 MG: 3.12 TABLET, FILM COATED ORAL at 17:00

## 2023-02-14 RX ADMIN — PRAVASTATIN SODIUM 40 MG: 40 TABLET ORAL at 09:36

## 2023-02-14 RX ADMIN — NICOTINE 1 PATCH: 14 PATCH, EXTENDED RELEASE TRANSDERMAL at 09:36

## 2023-02-14 RX ADMIN — SPIRONOLACTONE 25 MG: 25 TABLET, FILM COATED ORAL at 09:36

## 2023-02-14 RX ADMIN — LISINOPRIL 2.5 MG: 2.5 TABLET ORAL at 09:36

## 2023-02-14 RX ADMIN — DONEPEZIL HYDROCHLORIDE 10 MG: 5 TABLET ORAL at 22:18

## 2023-02-14 RX ADMIN — Medication 500 MG: at 09:36

## 2023-02-14 RX ADMIN — ENOXAPARIN SODIUM 40 MG: 40 INJECTION SUBCUTANEOUS at 09:36

## 2023-02-14 NOTE — CASE MANAGEMENT
Case Management Assessment & Discharge Planning Note    Patient name Ilia Roof  Location [address] [place] [address]-* MRN [id number]  : 1945 Date 2023       Current Admission Date: 2023  Current Admission Diagnosis:Altered mental status   Patient Active Problem List    Diagnosis Date Noted   • Cardiomyopathy ([facility] ) 2023   • Altered mental status 2023   • Underweight 10/26/2022   • Syncope 10/25/2022   • Hyperlipidemia 10/25/2022   • Hypertension 10/25/2022   • Dementia ([facility] ) 10/25/2022   • Chronic combined systolic and diastolic congestive [facility] failure [facility] 10/25/2022   • Anxiety 10/25/2022   • Elevated TSH 2022   • Memory difficulty 2022   • Heart failure, left, with LVEF 41-49% ([facility] ) 2022   • Coronary artery disease involving native coronary artery of native heart without angina pectoris 2022   • BMI less than 19,adult 2022   • S/P hip replacement, right 2022   • Sequelae of protein-calorie malnutrition ([facility] )  2022   • Subclinical hypothyroidism 2022   • Personal history of nicotine dependence  2022   • Age related osteoporosis 2022   • Tobacco abuse 2021      LOS (days): 0  Geometric Mean LOS (GMLOS) (days):   Days to GMLOS:     OBJECTIVE:            Current admission status: Observation     Preferred Pharmacy:   [facility] DR KAMLA HEATH [address]  [address]  Phone: [phone] Fax: [phone]    [facility] ([facility]  [address] [place] [address]  [address] [place], [place] [id number]  Phone: [phone] Fax: [phone]    Primary Care Provider: No primary care provider on file  Primary Insurance: MEDICARE  Secondary Insurance: AETNA    ASSESSMENT:  Active Health Care Proxies    There are no active Health Care Proxies on file             Readmission Root Cause  30 Day Readmission: No    Patient Information  Admitted from[de-identified] Home  Mental Status: Alert  During Assessment patient was accompanied by: Not accompanied during assessment  Assessment information provided by[de-identified] Patient, Son  Primary Caregiver: Family  Caregiver's Name[de-identified] Son Lakeisha Sweeney in law [address] Relationship to Patient[de-identified] Family Member  Caregiver's Telephone Number[de-identified] [phone]  Support Systems: Sky Reynoso  of Residence: [address] [place] [address] do you live in?: [place] entry access options   Select all that apply : Stairs  Number of steps to enter home : 3  Do the steps have railings?: Yes  Type of Current Residence: [address] [place] home  Upon entering residence, is there a bedroom on the main floor (no further steps)?: Yes  Upon entering residence, is there a bathroom on the main floor (no further steps)?: Yes  In the last 12 months, was there a time when you were not able to pay the mortgage or rent on time?: No  In the last 12 months, how many places have you lived?: 1  Homeless/housing insecurity resource given?: N/A  Living Arrangements: Lives Alone    Activities of Daily Living Prior to Admission  Functional Status: Independent  Completes ADLs independently?: Yes  Ambulates independently?: Yes  Does patient use assisted devices?: Yes  Assisted Devices (DME) used: Straight Cane  Does patient have a history of Outpatient Therapy (PT/OT)?: No  Does the patient have a history of Short-Term Rehab?: No  Does patient have a history of HHC?: Yes ([facility])  Does patient currently have [address]?: No     Patient Information Continued  Does patient have prescription coverage?: Yes  Within the past 12 months, you worried that your food would run out before you got the money to buy more : Never true  Within the past 12 months, the food you bought just didn't last and you didn't have money to get more : Never true  Food insecurity resource given?: N/A     Means of Transportation  Means of Transport to [facility][de-identified] Family transport  In the past 12 months, has lack of transportation kept you from medical appointments or from getting medications?: No  In the past 12 months, has lack of transportation kept you from meetings, work, or from getting things needed for daily living?: No  Was application for public transport provided?: N/A    DISCHARGE DETAILS:    Discharge planning discussed with[de-identified] Renzo Carmichael        Contacts  Patient Contacts: Rowan Carmichael (son)  Relationship to Patient[de-identified] Family  Contact Method: Phone  Phone Number: [phone]  Reason/Outcome: Emergency Contact, Discharge Planning        Other Referral/Resources/Interventions Provided:  Referral Comments: CM met with patient at bedside, introduced self, role, purpose of initial assessment and discharge planning  CM obtained consent to call family and patient okay with CM to call son Rowan Carmichael or dtr Neli Ruggiero  CM called and spoke with renzo Carmichael, introduced self, role, reason for phone call related to continuity of care and discharge planning  Rowan Carmichael stated that his wife Sumanth Mckenzie has been helping his mother with her needs and fills her pill box as well  He stated that his mother was to get MRI done as outpatient but now in hospital so hoping it can be done while she is here  He did meniton that patient has had vna services in the past and if needed and recommended, okay with home health services, pending clinical progress  Pan aware that CM will reach out to dtr in [facility] if needed  No CM needs identified at this time, CM will continue to assess, pending clinical progress

## 2023-02-14 NOTE — ASSESSMENT & PLAN NOTE
Patient presented to ED on 2/10 for altered mental status and hallucinations  She lives alone at home, questionable whether she was taking her medications as prescribed  Her granddaughter took her to her PCP a few days prior thinking she may have a UTI and was started on bactrim  She presents today with increased hallucinations at home  · Pt had MRI scheduled for Thursday, family spoke with Dr Denia Lassiter who recommended they take her to ER for admission  · CT head: No acute intracranial abnormality  Unchanged moderate ventriculomegaly out of proportion to degree of cerebral atrophy  Question normal pressure hydrocephalus  Unchanged small area of nodularity extending from the sella into suprasellar region  Differential includes pituitary adenoma, Rathke's cleft cyst, or hypophysitis  Recommend MRI brain pituitary protocol with and without contrast for further evaluation    · VSS and cbc and bmp WNL  · Neuro checks q4  · UA on 2/10 normal, UA 2/14 normal  · Urine culture form 2/8 shows no growth  · Lactic acid, COVID/flu/RSV negative on 2/10  · Initial troponin negative, ammonia WNL  · Check B12  · TSH 7 03, follow up free T3 and T4  · Neurology consulted for MRI and possible LP  · MRI to be done on Thursday  · Consult psychology for hallucinations

## 2023-02-14 NOTE — PLAN OF CARE
Problem: Potential for Falls  Goal: Patient will remain free of falls  Description: INTERVENTIONS:  - Educate patient/family on patient safety including physical limitations  - Instruct patient to call for assistance with activity   - Consult OT/PT to assist with strengthening/mobility   - Keep Call bell within reach  - Keep bed low and locked with side rails adjusted as appropriate  - Keep care items and personal belongings within reach  - Initiate and maintain comfort rounds  - Make Fall Risk Sign visible to staff  - Offer Toileting every 2 Hours, in advance of need  - Initiate/Maintain bed alarm  - Obtain necessary fall risk management equipment: cane  Problem: Prexisting or High Potential for Compromised Skin Integrity  Goal: Skin integrity is maintained or improved  Description: INTERVENTIONS:  - Identify patients at risk for skin breakdown  - Assess and monitor skin integrity  - Assess and monitor nutrition and hydration status  - Monitor labs   - Assess for incontinence   - Turn and reposition patient  - Assist with mobility/ambulation  - Relieve pressure over bony prominences  - Avoid friction and shearing  - Provide appropriate hygiene as needed including keeping skin clean and dry  - Evaluate need for skin moisturizer/barrier cream  - Collaborate with interdisciplinary team   - Patient/family teaching  - Consider wound care consult   Outcome: Progressing     - Apply yellow socks and bracelet for high fall risk patients  - Consider moving patient to room near nurses station  Outcome: Progressing

## 2023-02-14 NOTE — CONSULTS
[address]   Neurology Initial Consult    Yaquelin Chapa is a 65 y o  female  [address] [place]          Information obtained from:   Chief Complaint   Patient presents with   • Altered Mental Status     Here with son for reevaluation of same problem she was here for 3 days  Ago  She lives alone, forgot to take her meds today, having hallucinations         Assessment/Plan:    1  AMS with hallucinations  2  Dementia  3  Ventriculomegaly, possible NPH  4  Abnormal CT with possible pituitary adenoma  5  Gait dysfunction with ataxia    -Monitored on telemetry  -Fall risk  -We will get MRI of the brain with without contrast pituitary focus and neuro quant if available   Advised today patient will be having her inpatient MRI done on previously    scheduled date 2/16/2023  -Can continue on Aricept 10 mg daily  -Medical team to continue to assess for other metabolic etiology  -Possible consideration for psychiatric evaluation regarding increased hallucinations-appears as though patient has outpatient consultation on 3/1/2023 with Dr Cecy Patel PT/OT for gait disturbance/ataxia  -We will refer patient to outpatient neurosurgical team for further evaluation and treatment of NPH  Patient with Medicare primary, can be referred to Dr Elaine Almendarez or colleague in the [place] office  -Social work team to discuss and assist patient family with possible placement  It is unsafe for patient to live independently at this point in time  Patient is a 27-year-old female with PMH of dementia, HLD, HTN and anxiety was brought to the ER for increased altered mental status and hallucinations  Patient has had worsening over the past week, the last few days have increased with hallucinations  According to patient's family they do not believe she has been taking her medications, she does live alone  Patient was brought to the ER blood pressure 112/72    She had a CTh completed that showed ventriculomegaly with possible NPH  Also showed some nodules and recommended MRI with without contrast with focus of the pituitary for differential pituitary adenoma versus Rathke's cyst or hypophysitis  Patient admitted, MRI of the brain is ordered, will likely be done on 2/16/2023 as previously scheduled as an outpatient  In discussing with patient she has had episodes of incontinence, states that she has been having urinary frequency for which she treated with antibiotics although could not name the medication, when she treated her how long she treated  Patient states when she has to urinate at times she will have control and she will just go  Patient denies having difficulties with ambulation or falls however on gait testing patient unsteady with the use of a cane  Static standing she had imbalance with near fall  She was unable to tandem gait or heel toe step  Patient is confused with conversation, requires more time to answer questions  Patient also need more ways of asking questions and refer her to understand what is being asked  There is possibility of NPH causing some of patient's symptomology  Question if hallucinations are part of the symptomology  Recommend follow-up with psychiatry  Patient appears to have an appointment scheduled in the outpatient setting on 3/1/2023, may need inpatient psych evaluation for hallucinations if worsened over time  Would initiate referral for outpatient neurosurgery to evaluate patient for degree of NPH and possible intervention needs  Wymorena Pierce will need follow up in 8 weeks with general attending  She will not require outpatient neurological testing  If unable to have MRI done while patient is inpatient, this will need to be completed in the outpatient setting on 2/16/2023  Patient has an appointment scheduled with neurologist tomorrow 2/15/2023, this will need to be canceled and scheduled at a later date    Appears as though she also has an appointment scheduled in May which can be maintained  Would likely refer patient to outpatient neurosurgery for further evaluation of her NPH       HPI:  Fredy Little is a 67yo female with PMH of anxiety, HLD, HTN and dementia who was brought to the hospital by her family after having an increase in hallucinations over the last 3 to 4 days  Patient generally lives alone and has had increased episodes of forgetfulness  According to her family this past week has been worse than others in the past few days she is having increased hallucinations  Patient's family believes she has not been taking her medication  Patient was brought to the ER, she had slight elevation in her TSH  She had a CTh showing ventriculomegaly with possible NPH  Also notes nodules suggestive of possible pituitary adenomas, Rathke's cyst score hypophysitis  Recommendations for MRI with without contrast focus on pituitary gland  Patient was admitted to the hospital is unsafe for her to carry on at home independently  Patient was lucid and able to answer questions appropriately during the admission process, later on in the evening patient became more confused and having more difficulties with repeat questions  Met with patient at bedside, she is alert and oriented to herself and place  Patient has difficulties identifying the date  She is able to name objects and repeat sentences  Upon questioning of her mentation and other symptoms that she may be having at home, patient has difficulties in comprehending the question, answers and roundabout stories and unable to fully commit to an answer  She is able to move all of her extremities, has equal motor strength and equal sensation  She has equal reflexes  Patient denies any falls or disbalance when she gets up and ambulates however she does ambulate with a cane for stability  To note on neurological exam patient was unsteady with her casual gait, use of cane    When static and standing still, patient nearly fell over despite the use of her cane  Patient was unable to heel toe walk or tandem gait due to instability  In review of patient's CTh showing enlarged ventricles out of proportion with patient's age and degree of atrophy, possible NPH  Patient does have confusion which has been increasing over the recent past   She is also reported having some urinary frequency which she saw her PCP and was started on antibiotics for  Patient had initially denied the question of incontinence however reports that if she does not get to the bathroom as soon as she feels that she has to go she will have urinary incontinence  Patient states that it will happen all of a sudden  Patient denies difficulties with ambulating however her gait was unsteady  Classically patient could be having effects of NPH  MRI of the brain with without contrast pituitary focus and if possible neuro quant which was ordered in the outpatient setting for her forgetfulness and mentation changes will be done as inpatient  Discussed with radiology team, patient will likely maintain her outpatient appointment on 2/16/2023  Would also recommend patient have psychiatric consultation, it appears as though she was scheduled for outpatient consult on 3/1/2023  Patient may need additional medical management for hallucinations  Past Medical History:   Diagnosis Date   • Anxiety    • Dementia ([facility] )    • Hyperlipidemia    • Hypertension    • Memory difficulty        Past Surgical History:   Procedure Laterality Date   • ARTHROPLASTY HIP TOTAL ANTERIOR Right 4/9/2021    Procedure: ARTHROPLASTY HIP TOTAL ANTERIOR;  Surgeon: Karen Solis DO;  Location: [address] [place] [address];  Service: Orthopedics   • CARDIAC CATHETERIZATION Left 5/27/2022    Procedure: Cardiac catheterization;  Surgeon: Nevaeh Chen MD;  Location: [place] [address] CATH LAB;   Service: Cardiology       No Known Allergies      Current Facility-Administered Medications:   • ascorbic acid (VITAMIN C) tablet 500 mg, 500 mg, Oral, Daily, Nohemi Sean, PA-C, 500 mg at 02/14/23 7337  •  carvedilol (COREG) tablet 3 125 mg, 3 125 mg, Oral, BID With Meals, Nohemi Sean, PA-C, 3 125 mg at 02/14/23 6639  •  cholecalciferol (VITAMIN D3) tablet 2,000 Units, 2,000 Units, Oral, Daily, Nohemisharon Estrada PA-C, 2,000 Units at 02/14/23 5403  •  donepezil (ARICEPT) tablet 10 mg, 10 mg, Oral, HS, Nohemisharon Estrada, PA-C, 10 mg at 02/13/23 2138  •  enoxaparin (LOVENOX) subcutaneous injection 40 mg, 40 mg, Subcutaneous, Daily, Nohemi Estrada, PA-C, 40 mg at 02/14/23 1209  •  lisinopril (ZESTRIL) tablet 2 5 mg, 2 5 mg, Oral, Daily, Nohemi Estrada PA-C, 2 5 mg at 02/14/23 3862  •  melatonin tablet 3 mg, 3 mg, Oral, HS PRN, Nohemi Estrada PA-C  •  multivitamin stress formula tablet 1 tablet, 1 tablet, Oral, Daily, Nohemi Estrada PA-C, 1 tablet at 02/13/23 1719  •  nicotine (NICODERM CQ) 14 mg/24hr TD 24 hr patch 1 patch, 1 patch, Transdermal, Daily, Nohemi Estrada PA-C, 1 patch at 02/14/23 0627  •  pravastatin (PRAVACHOL) tablet 40 mg, 40 mg, Oral, Every Other Day, Nohemi Porch, PA-C, 40 mg at 02/14/23 0528  •  spironolactone (ALDACTONE) tablet 25 mg, 25 mg, Oral, Every Other Day, Nohemi Porch, PA-C, 25 mg at 02/14/23 8440    Social History     Socioeconomic History   • Marital status:       Spouse name: Not on file   • Number of children: Not on file   • Years of education: Not on file   • Highest education level: Not on file   Occupational History   • Not on file   Tobacco Use   • Smoking status: Every Day     Packs/day: 0 50     Years: 60 00     Pack years: 30 00     Types: Cigarettes   • Smokeless tobacco: Never   Vaping Use   • Vaping Use: Never used   Substance and Sexual Activity   • Alcohol use: Never   • Drug use: Never   • Sexual activity: Not Currently     Partners: Male   Other Topics Concern   • Not on file   Social History Narrative   • Not on file     Social Determinants of Health     Financial Resource Strain: Not on file   Food Insecurity: No Food Insecurity   • Worried About [address] [employer] in the Last Year: Never true   • Ran Out of Food in the Last Year: Never true   Transportation Needs: No Transportation Needs   • Lack of Transportation (Medical): No   • Lack of Transportation (Non-Medical): No   Physical Activity: Not on file   Stress: Not on file   Social Connections: Not on file   Intimate Partner Violence: Not on file   Housing Stability: Unknown   • Unable to Pay for Housing in the Last Year: No   • Number of Places Lived in the Last Year: Not on file   • Unstable Housing in the Last Year: No       History reviewed  No pertinent family history  Review of systems:  Please see HPI for positive symptoms  Constitutional: No fever, no chills, no weight change  Ocular: No diplopia, no blurred vision, spots/zigzag lines  HEENT:  No sore throat, headache or congestion  COR:  No chest pain  No palpitations  Lungs:  no sob  GI:  no  nausea, no vomiting, no diarrhea, no constipation, no anorexia  :  No dysuria, frequency, or urgency  No hematuria    + Incontinence  Musculoskeletal:  No joint pain or swelling   + Occasional imbalance, no falls  Skin:  No rash or itching  Psychiatric:  no anxiety, no depression  Endocrine:  No polyuria or polydipsia  Physical examination:  /73   Pulse 92   Temp 98 °F (36 7 °C)   Resp 18   Ht 5' 1" (1 549 m)   Wt 44 kg (97 lb)   SpO2 95%   BMI 18 33 kg/m²     GENERAL APPEARANCE:  The patient is alert, oriented  HEENT:  Head is normocephalic  Pupils are equal and reactive  NECK:  Supple without lymphadenopathy  HEART:  Regular rate and rhythm  LUNGS: No audible wheezing or stridor heard  ABDOMEN:  Soft, nontender, nondistended   EXTREMITIES:  Without cyanosis, clubbing or edema  Mental status: The patient is alert, attentive, and oriented x2    Patient not really aware of the date  Speech is clear, good repetition, fair to poor comprehension of questioning, and naming  Patient loses focus of conversation at times, minimizes her symptoms  Cranial nerves:  CN II: Visual fields are full to confrontation  Fundoscopic exam is normal with sharp discs and no vascular changes  Pupils are 3 mm and briskly reactive to light  CN III, IV, VI: At primary gaze, there is no eye deviation  CN V: Facial sensation is intact to pinprick in all 3 divisions bilaterally  Corneal responses are intact  CN VII: Face is symmetric with normal eye closure and smile  CN VIII: Hearing is normal to rubbing fingers  CN IX, X: Palate elevates symmetrically  Phonation is normal   CN XI: Head turning and shoulder shrug are intact  CN XII: Tongue is midline with normal movements and no atrophy  Motor: There is no pronator drift of out-stretched arms  Muscle bulk and tone are normal    Muscle exam  Arm Right Left Leg Right Left   Deltoid 5/5 5/5 Iliopsoas 5/5 5/5   Biceps 5/5 5/5 Quads 5/5 5/5   Triceps 5/5 5/5 Hamstrings 5/5 5/5   Wrist Extension 5/5 5/5 Ankle Dorsi Flexion 5/5 5/5   Wrist Flexion 5/5 5/5 Ankle Plantar Flexion 5/5 5/5        Reflexes    RJ BJ TJ KJ AJ Plantars Rene's   Right 2+ 2+ 2+ 1+ 2+ Downgoing Not present   Left 2+ 2+ 2+ 1+ 2+ Downgoing Not present      Sensory:  Light touch, Temperature, position sense, and vibration sense are intact in fingers and toes  Coordination:  Rapid alternating movements and fine finger movements are intact  There is no dysmetria on finger-to-nose and heel-knee-shin  There are no abnormal or extraneous movements  Romberg negative  Gait/Stance:  Gait unsteady, uses a cane  Patient off balance with static standing  Unable to heel toe walk or tandem gait      Lab Results   Component Value Date    WBC 8 64 02/14/2023    HGB 12 4 02/14/2023    HCT 36 4 02/14/2023     (H) 02/14/2023     02/14/2023     No results found for: HGBA1C  Lab Results   Component Value Date    ALT 46 02/13/2023 AST 35 02/13/2023    ALKPHOS 57 02/13/2023     Lab Results   Component Value Date    CALCIUM 8 4 02/14/2023    K 4 4 02/14/2023    CO2 27 02/14/2023     02/14/2023    BUN 16 02/14/2023    CREATININE 0 63 02/14/2023         Review of reports and notes reveal:  Independent Interpretation of images or specimens:  CT head without contrast    Result Date: 2/10/2023  No acute intracranial abnormality  Unchanged moderate ventriculomegaly out of proportion to degree of cerebral atrophy  Question normal pressure hydrocephalus  Unchanged small area of nodularity extending from the sella into suprasellar region  Differential includes pituitary adenoma, Rathke's cleft cyst, or hypophysitis  Recommend MRI brain pituitary protocol with and without contrast for further evaluation  The study was marked in [facility] for immediate notification  Workstation performed: [id number]     XR chest 1 view    Result Date: 2/11/2023  Emphysema with no definite acute disease  Workstation performed: [id number]           Thank you for this consult  Total time of encounter: 70 Minutes  More than 50% of time was spent in counseling and coordination of care of patient  Discussed with patient at bedside her home symptomology, her current symptoms bringing her to the hospital, diagnostic testing, treatment recommendations and follow-up care    Discussed with the medical team and neurology MD

## 2023-02-14 NOTE — PROGRESS NOTES
Callum 44  Progress Note Saw Ontiveros 1945, 65 y o  female MRN: [id number]  Unit/Bed#: [address] [place] [address] Encounter: [id number]  Primary Care Provider: No primary care provider on file  Date and time admitted to hospital: 2/13/2023  1:12 PM    * Altered mental status  Assessment & Plan  Patient presented to ED on 2/10 for altered mental status and hallucinations  She lives alone at home, questionable whether she was taking her medications as prescribed  Her granddaughter took her to her PCP a few days prior thinking she may have a UTI and was started on bactrim  She presents today with increased hallucinations at home  · Pt had MRI scheduled for Thursday, family spoke with Dr Miki Hayes who recommended they take her to ER for admission  · CT head: No acute intracranial abnormality  Unchanged moderate ventriculomegaly out of proportion to degree of cerebral atrophy  Question normal pressure hydrocephalus  Unchanged small area of nodularity extending from the sella into suprasellar region  Differential includes pituitary adenoma, Rathke's cleft cyst, or hypophysitis  Recommend MRI brain pituitary protocol with and without contrast for further evaluation    · VSS and cbc and bmp WNL  · Neuro checks q4  · UA on 2/10 normal, UA 2/14 normal  · Urine culture form 2/8 shows no growth  · Lactic acid, COVID/flu/RSV negative on 2/10  · Initial troponin negative, ammonia WNL  · Check B12  · TSH 7 03, follow up free T3 and T4  · Neurology consulted for MRI and possible LP  · MRI to be done on Thursday  · Consult psychology for hallucinations    Cardiomyopathy [facility])  Assessment & Plan  Patient has cardiomyopathy of unclear etiology  · Continue coreg 3 125 mg twice a day, lisinopril 2 5 mg and aldactone 25 mg every other day  · No evidence of acute exacerbation    Dementia ([facility] [address] )  Assessment & Plan  · Continue donepezil 10 mg    Hypertension  Assessment & Plan  · Continue coreg and lisinopril  · BP stable  · Continue to monitor    Hyperlipidemia  Assessment & Plan  · Continue statin    Coronary artery disease involving native coronary artery of native heart without angina pectoris  Assessment & Plan  Nonobstructive CAD seen on cardiac catheterization in 2022  · Continue coreg, statin    Tobacco abuse  Assessment & Plan  · Patient has smoked since she was a teenager  · Recently cut down to 3/4 pack from a pack per day  · Nicotine patch  · Encourage cessation      VTE Pharmacologic Prophylaxis: VTE Score: 3 Moderate Risk (Score 3-4) - Pharmacological DVT Prophylaxis Ordered: enoxaparin (Lovenox)  Patient Centered Rounds: I performed bedside rounds with nursing staff today  Discussions with Specialists or Other Care Team Provider: nursing, case management    Education and Discussions with Family / Patient: Updated  (son) via phone  Total Time Spent on Date of Encounter in care of patient: 55 minutes This time was spent on one or more of the following: performing physical exam; counseling and coordination of care; obtaining or reviewing history; documenting in the medical record; reviewing/ordering tests, medications or procedures; communicating with other healthcare professionals and discussing with patient's family/caregivers  Current Length of Stay: 0 day(s)  Current Patient Status: Observation   Certification Statement: The patient will continue to require additional inpatient hospital stay due to MRI thursday, discharge planning, psych consult  Discharge Plan: Anticipate discharge in 48 hrs to discharge location to be determined pending rehab evaluations  Code Status: Level 3 - DNAR and DNI    Subjective:   Patient seen and examined at bedside this morning  She denies any complaints  She was alert and oriented x 3, but was pleasantly confused about the situation      Objective:     Vitals:   Temp (24hrs), Av 6 °F (36 4 °C), Min:97 °F (36 1 °C), Max:98 °F (36 7 °C)    Temp:  [97 °F (36 1 °C)-98 °F (36 7 °C)] 97 °F (36 1 °C)  HR:  [63-96] 69  Resp:  [18-19] 18  BP: ()/(60-98) 141/62  SpO2:  [91 %-98 %] 98 %  Body mass index is 18 33 kg/m²  Input and Output Summary (last 24 hours): Intake/Output Summary (Last 24 hours) at 2/14/2023 1843  Last data filed at 2/14/2023 1000  Gross per 24 hour   Intake 710 ml   Output 650 ml   Net 60 ml       Physical Exam:   Physical Exam  Vitals and nursing note reviewed  Constitutional:       General: She is not in acute distress  Appearance: She is well-developed  HENT:      Head: Normocephalic and atraumatic  Eyes:      Conjunctiva/sclera: Conjunctivae normal    Cardiovascular:      Rate and Rhythm: Normal rate and regular rhythm  Heart sounds: No murmur heard  Pulmonary:      Effort: Pulmonary effort is normal  No respiratory distress  Breath sounds: Normal breath sounds  Abdominal:      Palpations: Abdomen is soft  Tenderness: There is no abdominal tenderness  Musculoskeletal:         General: No swelling  Cervical back: Neck supple  Skin:     General: Skin is warm and dry  Capillary Refill: Capillary refill takes less than 2 seconds  Neurological:      General: No focal deficit present  Mental Status: She is alert and oriented to person, place, and time  Mental status is at baseline     Psychiatric:         Mood and Affect: Mood normal           Additional Data:     Labs:  Results from last 7 days   Lab Units 02/14/23  0542 02/13/23  1441   WBC Thousand/uL 8 64 8 27   HEMOGLOBIN g/dL 12 4 13 8   HEMATOCRIT % 36 4 41 6   PLATELETS Thousands/uL 232 278   NEUTROS PCT %  --  64   LYMPHS PCT %  --  27   MONOS PCT %  --  6   EOS PCT %  --  2     Results from last 7 days   Lab Units 02/14/23  0542 02/13/23  1441   SODIUM mmol/L 140 136   POTASSIUM mmol/L 4 4 4 2   CHLORIDE mmol/L 106 103   CO2 mmol/L 27 29   BUN mg/dL 16 19   CREATININE mg/dL 0 63 0 83   ANION GAP mmol/L 7 4 CALCIUM mg/dL 8 4 8 8   ALBUMIN g/dL  --  3 7   TOTAL BILIRUBIN mg/dL  --  0 19*   ALK PHOS U/L  --  57   ALT U/L  --  46   AST U/L  --  35   GLUCOSE RANDOM mg/dL 90 101                 Results from last 7 days   Lab Units 02/10/23  1542   LACTIC ACID mmol/L 0 5       Lines/Drains:  Invasive Devices     Peripheral Intravenous Line  Duration           Peripheral IV 02/13/23 Distal;Left;Upper;Ventral (anterior) Arm 1 day                  Telemetry:  Telemetry Orders (From admission, onward)             48 Hour Telemetry Monitoring  Continuous x 48 hours        References:    Telemetry Guidelines   Question:  Reason for 48 Hour Telemetry  Answer:  Acute CVA (<24 hrs old, hemispheric strokes, selected brainstem strokes, cardiac arrhythmias)                 Telemetry Reviewed: Normal Sinus Rhythm  Indication for Continued Telemetry Use: No indication for continued use  Will discontinue  Imaging: No pertinent imaging reviewed      Recent Cultures (last 7 days):   Results from last 7 days   Lab Units 02/08/23  1648   URINE CULTURE  No Growth <1000 cfu/mL       Last 24 Hours Medication List:   Current Facility-Administered Medications   Medication Dose Route Frequency Provider Last Rate   • ascorbic acid  500 mg Oral Daily Kassandra Mcfarlane PA-C     • carvedilol  3 125 mg Oral BID With Meals Kassandra Mcfarlane PA-C     • cholecalciferol  2,000 Units Oral Daily Kassandra Mcfarlane PA-C     • donepezil  10 mg Oral HS Kassandra Mcfarlane PA-C     • enoxaparin  40 mg Subcutaneous Daily Kassandra Mcfarlane PA-C     • lisinopril  2 5 mg Oral Daily Kassandra Mcfarlane PA-C     • melatonin  3 mg Oral HS PRN Kassandra Mcfarlane PA-C     • multivitamin stress formula  1 tablet Oral Daily Kassandra Mcfarlane PA-C     • nicotine  1 patch Transdermal Daily Kassandra Mcfarlane PA-C     • pravastatin  40 mg Oral Every Other Day Kassandra Mcfarlane PA-C     • spironolactone  25 mg Oral Every Other Day Kassandra Mcfarlane PA-C          Today, Patient Was Seen By: Kassandra Mcfarlane SHANTHI    **Please Note: This note may have been constructed using a voice recognition system  **

## 2023-02-15 PROBLEM — F32.A DEPRESSION: Status: ACTIVE | Noted: 2023-02-15

## 2023-02-15 PROBLEM — G93.40 ENCEPHALOPATHY: Status: ACTIVE | Noted: 2023-02-13

## 2023-02-15 PROBLEM — J43.9 EMPHYSEMA LUNG (HCC): Status: ACTIVE | Noted: 2023-02-15

## 2023-02-15 LAB
ANION GAP SERPL CALCULATED.3IONS-SCNC: 4 MMOL/L (ref 4–13)
BUN SERPL-MCNC: 21 MG/DL (ref 5–25)
CALCIUM SERPL-MCNC: 8.1 MG/DL (ref 8.4–10.2)
CHLORIDE SERPL-SCNC: 106 MMOL/L (ref 96–108)
CO2 SERPL-SCNC: 27 MMOL/L (ref 21–32)
CREAT SERPL-MCNC: 0.72 MG/DL (ref 0.6–1.3)
ERYTHROCYTE [DISTWIDTH] IN BLOOD BY AUTOMATED COUNT: 12.9 % (ref 11.6–15.1)
GFR SERPL CREATININE-BSD FRML MDRD: 80 ML/MIN/1.73SQ M
GLUCOSE SERPL-MCNC: 89 MG/DL (ref 65–140)
HCT VFR BLD AUTO: 37 % (ref 34.8–46.1)
HGB BLD-MCNC: 12.4 G/DL (ref 11.5–15.4)
MCH RBC QN AUTO: 34.1 PG (ref 26.8–34.3)
MCHC RBC AUTO-ENTMCNC: 33.5 G/DL (ref 31.4–37.4)
MCV RBC AUTO: 102 FL (ref 82–98)
PLATELET # BLD AUTO: 216 THOUSANDS/UL (ref 149–390)
PMV BLD AUTO: 9.1 FL (ref 8.9–12.7)
POTASSIUM SERPL-SCNC: 4.2 MMOL/L (ref 3.5–5.3)
RBC # BLD AUTO: 3.64 MILLION/UL (ref 3.81–5.12)
SODIUM SERPL-SCNC: 137 MMOL/L (ref 135–147)
T3FREE SERPL-MCNC: 1.72 PG/ML (ref 2.3–4.2)
T4 FREE SERPL-MCNC: 0.83 NG/DL (ref 0.76–1.46)
VIT B12 SERPL-MCNC: 593 PG/ML (ref 100–900)
WBC # BLD AUTO: 8.98 THOUSAND/UL (ref 4.31–10.16)

## 2023-02-15 RX ORDER — MEMANTINE HYDROCHLORIDE 5 MG/1
5 TABLET ORAL DAILY
Status: DISCONTINUED | OUTPATIENT
Start: 2023-02-15 | End: 2023-02-19 | Stop reason: HOSPADM

## 2023-02-15 RX ORDER — OLANZAPINE 5 MG/1
2.5 TABLET, ORALLY DISINTEGRATING ORAL EVERY 12 HOURS PRN
Status: DISCONTINUED | OUTPATIENT
Start: 2023-02-15 | End: 2023-02-16

## 2023-02-15 RX ORDER — MIRTAZAPINE 15 MG/1
15 TABLET, FILM COATED ORAL
Status: DISCONTINUED | OUTPATIENT
Start: 2023-02-15 | End: 2023-02-16

## 2023-02-15 RX ADMIN — ENOXAPARIN SODIUM 40 MG: 40 INJECTION SUBCUTANEOUS at 09:11

## 2023-02-15 RX ADMIN — NICOTINE 1 PATCH: 14 PATCH, EXTENDED RELEASE TRANSDERMAL at 09:12

## 2023-02-15 RX ADMIN — LISINOPRIL 2.5 MG: 2.5 TABLET ORAL at 09:11

## 2023-02-15 RX ADMIN — MEMANTINE 5 MG: 5 TABLET ORAL at 16:52

## 2023-02-15 RX ADMIN — MIRTAZAPINE 15 MG: 15 TABLET, FILM COATED ORAL at 21:52

## 2023-02-15 RX ADMIN — Medication 2000 UNITS: at 09:11

## 2023-02-15 RX ADMIN — B-COMPLEX W/ C & FOLIC ACID TAB 1 TABLET: TAB at 09:11

## 2023-02-15 RX ADMIN — CARVEDILOL 3.12 MG: 3.12 TABLET, FILM COATED ORAL at 16:52

## 2023-02-15 RX ADMIN — Medication 500 MG: at 09:11

## 2023-02-15 RX ADMIN — DONEPEZIL HYDROCHLORIDE 10 MG: 5 TABLET ORAL at 21:52

## 2023-02-15 RX ADMIN — CARVEDILOL 3.12 MG: 3.12 TABLET, FILM COATED ORAL at 09:11

## 2023-02-15 RX ADMIN — MELATONIN TAB 3 MG 3 MG: 3 TAB at 01:43

## 2023-02-15 NOTE — CONSULTS
Consultation - Willow Bowman 65 y o  female MRN: [id number]    Unit/Bed#: [address] [place] [address] Encounter: [id number]      Identifying Data: 65years old white female is admitted at [address] [place] [address] on 2023 with confusion and hallucination patient examined spoke to the nurse history physical medications labs reviewed and noted Home medication list reviewed and noted  Patient is on Aricept 10 mg at bedtime at home and melatonin 3 mg at bedtime as needed  Currently patient is started on Remeron 15 mg at bedtime and Zyprexa 2 5 mg p o  twice daily as needed  Patient is a poor historian she could not tell me why family brought her to the hospital I reviewed the neurological consultation  Patient has been deteriorating over the last few days and she has been getting confused and hallucinating when I asked her about the hallucination patient reports that she lost her  in 2022 also her daughter  from the heart in  she reports that she was seeing her  who is dead she could not tell me what other hallucinations she had she does not remember whether she was seeing other thing which is not there according to the family she may not be taking her medications properly  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   Patient is not agitated she was able to give out the basic background information but she is a very poor historian and forgetful patient looks severely malnourished probably she is not taking good care of herself in terms of the taking the medications eating proper food etc   She reported that she lives alone and she still drives when I ask her who is helping her at home she responded by saying that'' my son does not want me to drive'' when I asked her why she came to the hospital she is not sure and she could not tell me that why she is here she reports that she feels fine and she wants to go home    Patient has trouble in ambulation she feels weak and looks malnourished underweight  Patient was able to tell me her age date of birth current year current month but could not tell me current date she was able to tell me the name of the president'[employer]' she denied psychiatric history and she is not aware that she is taking Aricept at home for the memory issue she could not provide any clear background history that how long she has been started losing memory  CAT scan of the head shows the enlarged ventricle  Social history patient lives alone she is still driving she smokes cigarette she denies abusing alcohol or drugs she denies history of drugs and alcohol abuse she has 12 grade education and she was working in [employer] for 30+ years she is retired she had 3 children 1 son and 2 daughters 1 daughter  in 2018 from the heart condition and her   in 2022 from the heart condition according to the patient  Allergy  No known drug allergy  Diagnosis  Dementia with depression  Delirium with psychosis hallucination  Anxiety  Hypertension, hyperlipidemia  Hip surgery  Cardiac catheterization    Chief Complaints: Confusion and hallucination    Family History: History reviewed  No pertinent family history  Patient denies    Legal History:     Mental Status Exam: 65years old white female is alert awake oriented to place and person she knows that she is at the hospital but could not tell me when she came here and why she came here memory impaired patient hallucinate seeing things she reported that she is seeing her  who  last year  Patient is probably seeing other things but she could not elaborated that at this time  She is not aware that she has episode of confusion and why she is here in the hospital   Poor sleep poor appetite she looks malnourished patient denies suicidal or homicidal ideation  No paranoia no delusion elucidated poor concentration Insight and judgment are adequate        History of Present Illness HPI: Roni Neumann is a 65y o  year old female who presents with change of mental status and hallucination    Consults      Historical Information   Past Psychiatric History: Patient is suffering from dementia with depression she has been on Aricept 10 mg at bedtime from the family physician  Patient denies psych history in the past she denies seeing a psychiatrist she does not remember taking any psychotropic medications in the past she is not aware that how long she has been on Aricept  Patient denies psychiatric admissions in the past patient denies suicide attempts in the past patient denies history of drugs and alcohol abuse in the past currently she is not under psychiatric care  Past Medical History:   Diagnosis Date   • Anxiety    • Dementia ([address] )    • Hyperlipidemia    • Hypertension    • Memory difficulty      Past Surgical History:   Procedure Laterality Date   • ARTHROPLASTY HIP TOTAL ANTERIOR Right 4/9/2021    Procedure: ARTHROPLASTY HIP TOTAL ANTERIOR;  Surgeon: Sveta Montgomery DO;  Location: [address] [place] [address];  Service: Orthopedics   • CARDIAC CATHETERIZATION Left 5/27/2022    Procedure: Cardiac catheterization;  Surgeon: Leopoldo Boas, MD;  Location: [place] [address] CATH LAB;   Service: Cardiology     Social History   Social History     Substance and Sexual Activity   Alcohol Use Never     Social History     Substance and Sexual Activity   Drug Use Never     Social History     Tobacco Use   Smoking Status Every Day   • Packs/day: 0 50   • Years: 60 00   • Pack years: 30 00   • Types: Cigarettes   Smokeless Tobacco Never       Meds/Allergies   current meds:   Current Facility-Administered Medications   Medication Dose Route Frequency   • ascorbic acid (VITAMIN C) tablet 500 mg  500 mg Oral Daily   • carvedilol (COREG) tablet 3 125 mg  3 125 mg Oral BID With Meals   • cholecalciferol (VITAMIN D3) tablet 2,000 Units  2,000 Units Oral Daily   • donepezil (ARICEPT) tablet 10 mg  10 mg Oral HS   • enoxaparin (LOVENOX) subcutaneous injection 40 mg  40 mg Subcutaneous Daily   • lisinopril (ZESTRIL) tablet 2 5 mg  2 5 mg Oral Daily   • melatonin tablet 3 mg  3 mg Oral HS PRN   • mirtazapine (REMERON) tablet 15 mg  15 mg Oral HS   • multivitamin stress formula tablet 1 tablet  1 tablet Oral Daily   • nicotine (NICODERM CQ) 14 mg/24hr TD 24 hr patch 1 patch  1 patch Transdermal Daily   • OLANZapine (ZyPREXA ZYDIS) dispersible tablet 2 5 mg  2 5 mg Oral Q12H PRN   • pravastatin (PRAVACHOL) tablet 40 mg  40 mg Oral Every Other Day   • spironolactone (ALDACTONE) tablet 25 mg  25 mg Oral Every Other Day    and PTA meds:    Medications Prior to Admission   Medication   • MELATONIN PO   • rosuvastatin (CRESTOR) 5 mg tablet   • Calcium Ascorbate (VITAMIN C) 500 mg tablet   • calcium carbonate (OS-JUD) 600 MG tablet   • carvedilol (COREG) 3 125 mg tablet   • cholecalciferol (VITAMIN D3) 1,000 units tablet   • donepezil (ARICEPT) 10 mg tablet   • ibandronate (BONIVA) 150 MG tablet   • ketoconazole (NIZORAL) 2 % cream   • lisinopril (ZESTRIL) 2 5 mg tablet   • multivitamin (THERAGRAN) TABS   • nicotine (NICODERM CQ) 14 mg/24hr TD 24 hr patch   • spironolactone (ALDACTONE) 25 mg tablet     No Known Allergies    Objective   Vitals: Blood pressure 129/86, pulse 82, temperature 98 2 °F (36 8 °C), resp  rate 18, height 5' 1" (1 549 m), weight 44 kg (97 lb), SpO2 97 %        Routine Lab Results:   Admission on 02/13/2023   Component Date Value Ref Range Status   • WBC 02/13/2023 8 27  4 31 - 10 16 Thousand/uL Final   • RBC 02/13/2023 4 01  3 81 - 5 12 Million/uL Final   • Hemoglobin 02/13/2023 13 8  11 5 - 15 4 g/dL Final   • Hematocrit 02/13/2023 41 6  34 8 - 46 1 % Final   • MCV 02/13/2023 104 (H)  82 - 98 fL Final   • MCH 02/13/2023 34 4 (H)  26 8 - 34 3 pg Final   • MCHC 02/13/2023 33 2  31 4 - 37 4 g/dL Final   • RDW 02/13/2023 13 2  11 6 - 15 1 % Final   • MPV 02/13/2023 8 9  8 9 - 12 7 fL Final   • Platelets 88/59/8353 278  149 - 390 Thousands/uL Final   • nRBC 02/13/2023 0  /100 WBCs Final   • Neutrophils Relative 02/13/2023 64  43 - 75 % Final   • Immat GRANS % 02/13/2023 0  0 - 2 % Final   • Lymphocytes Relative 02/13/2023 27  14 - 44 % Final   • Monocytes Relative 02/13/2023 6  4 - 12 % Final   • Eosinophils Relative 02/13/2023 2  0 - 6 % Final   • Basophils Relative 02/13/2023 1  0 - 1 % Final   • Neutrophils Absolute 02/13/2023 5 26  1 85 - 7 62 Thousands/µL Final   • Immature Grans Absolute 02/13/2023 0 02  0 00 - 0 20 Thousand/uL Final   • Lymphocytes Absolute 02/13/2023 2 27  0 60 - 4 47 Thousands/µL Final   • Monocytes Absolute 02/13/2023 0 49  0 17 - 1 22 Thousand/µL Final   • Eosinophils Absolute 02/13/2023 0 16  0 00 - 0 61 Thousand/µL Final   • Basophils Absolute 02/13/2023 0 07  0 00 - 0 10 Thousands/µL Final   • Sodium 02/13/2023 136  135 - 147 mmol/L Final   • Potassium 02/13/2023 4 2  3 5 - 5 3 mmol/L Final   • Chloride 02/13/2023 103  96 - 108 mmol/L Final   • CO2 02/13/2023 29  21 - 32 mmol/L Final   • ANION GAP 02/13/2023 4  4 - 13 mmol/L Final   • BUN 02/13/2023 19  5 - 25 mg/dL Final   • Creatinine 02/13/2023 0 83  0 60 - 1 30 mg/dL Final    Standardized to IDMS reference method   • Glucose 02/13/2023 101  65 - 140 mg/dL Final    If the patient is fasting, the ADA then defines impaired fasting glucose as > 100 mg/dL and diabetes as > or equal to 123 mg/dL  Specimen collection should occur prior to Sulfasalazine administration due to the potential for falsely depressed results  Specimen collection should occur prior to Sulfapyridine administration due to the potential for falsely elevated results  • Calcium 02/13/2023 8 8  8 3 - 10 1 mg/dL Final   • AST 02/13/2023 35  5 - 45 U/L Final    Specimen collection should occur prior to Sulfasalazine administration due to the potential for falsely depressed results      • ALT 02/13/2023 46  12 - 78 U/L Final    Specimen collection should occur prior to Sulfasalazine administration due to the potential for falsely depressed results  • Alkaline Phosphatase 02/13/2023 57  46 - 116 U/L Final   • Total Protein 02/13/2023 7 4  6 4 - 8 4 g/dL Final   • Albumin 02/13/2023 3 7  3 5 - 5 0 g/dL Final   • Total Bilirubin 02/13/2023 0 19 (L)  0 20 - 1 00 mg/dL Final    Use of this assay is not recommended for patients undergoing treatment with eltrombopag due to the potential for falsely elevated results  • eGFR 02/13/2023 67  ml/min/1 73sq m Final   • hs TnI 0hr 02/13/2023 4  "Refer to ACS Flowchart"- see link ng/L Final    Comment:                                              Initial (time 0) result  If >=50 ng/L, Myocardial injury suggested ;  Type of myocardial injury and treatment strategy  to be determined  If 5-49 ng/L, a delta result at 2 hours and or 4 hours will be needed to further evaluate  If <4 ng/L, and chest pain has been >3 hours since onset, patient may qualify for discharge based on the HEART score in the ED  If <5 ng/L and <3hours since onset of chest pain, a delta result at 2 hours will be needed to further evaluate  HS Troponin 99th Percentile URL of a Health Population=12 ng/L with a 95% Confidence Interval of 8-18 ng/L  Second Troponin (time 2 hours)  If calculated delta >= 20 ng/L,  Myocardial injury suggested ; Type of myocardial injury and treatment strategy to be determined  If 5-49 ng/L and the calculated delta is 5-19 ng/L, consult medical service for evaluation  Continue evaluation for ischemia on ecg and other possible etiology and repeat hs troponin at 4 hours  If delta                            is <5 ng/L at 2 hours, consider discharge based on risk stratification via the HEART score (if in ED), or MELVINA risk score in IP/Observation  HS Troponin 99th Percentile URL of a Health Population=12 ng/L with a 95% Confidence Interval of 8-18 ng/L     • Color, UA 02/13/2023 Light Yellow   Final   • Clarity, UA 02/13/2023 Clear   Final   • Specific [place], UA 02/13/2023 <=1 005  1 000 - 1 030 Final   • pH, UA 02/13/2023 6 0  5 0, 5 5, 6 0, 6 5, 7 0, 7 5, 8 0, 8 5, 9 0 Final   • Leukocytes, UA 02/13/2023 Negative  Negative Final   • Nitrite, UA 02/13/2023 Negative  Negative Final   • Protein, UA 02/13/2023 Negative  Negative mg/dl Final   • Glucose, UA 02/13/2023 Negative  Negative mg/dl Final   • Ketones, UA 02/13/2023 Negative  Negative mg/dl Final   • Urobilinogen, UA 02/13/2023 0 2  0 2, 1 0 E U /dl E U /dl Final   • Bilirubin, UA 02/13/2023 Negative  Negative Final   • Occult Blood, UA 02/13/2023 Negative  Negative Final   • Ammonia 02/13/2023 26  11 - 35 umol/L Final    Specimen collection should occur prior to Sulfapyridine administration due to the potential for falsely depressed results  • hs TnI 2hr 02/13/2023 4  "Refer to ACS Flowchart"- see link ng/L Final    Comment:                                              Initial (time 0) result  If >=50 ng/L, Myocardial injury suggested ;  Type of myocardial injury and treatment strategy  to be determined  If 5-49 ng/L, a delta result at 2 hours and or 4 hours will be needed to further evaluate  If <4 ng/L, and chest pain has been >3 hours since onset, patient may qualify for discharge based on the HEART score in the ED  If <5 ng/L and <3hours since onset of chest pain, a delta result at 2 hours will be needed to further evaluate  HS Troponin 99th Percentile URL of a Health Population=12 ng/L with a 95% Confidence Interval of 8-18 ng/L  Second Troponin (time 2 hours)  If calculated delta >= 20 ng/L,  Myocardial injury suggested ; Type of myocardial injury and treatment strategy to be determined  If 5-49 ng/L and the calculated delta is 5-19 ng/L, consult medical service for evaluation  Continue evaluation for ischemia on ecg and other possible etiology and repeat hs troponin at 4 hours    If delta                            is <5 ng/L at 2 hours, consider discharge based on risk stratification via the HEART score (if in ED), or MELVINA risk score in IP/Observation  HS Troponin 99th Percentile URL of a Health Population=12 ng/L with a 95% Confidence Interval of 8-18 ng/L  • Delta 2hr hsTnI 02/13/2023 0  <20 ng/L Final   • TSH 3RD GENERATON 02/13/2023 7 030 (H)  0 450 - 4 500 uIU/mL Final    The recommended reference ranges for TSH during pregnancy are as follows:   First trimester 0 1 to 2 5 uIU/mL   Second trimester  0 2 to 3 0 uIU/mL   Third trimester 0 3 to 3 0 uIU/m    Note: Normal ranges may not apply to patients who are transgender, non-binary, or whose legal sex, sex at birth, and gender identity differ  Adult TSH (3rd generation) reference range follows the recommended guidelines of the American Thy[employer] Association, January, 2020  • Sodium 02/14/2023 140  135 - 147 mmol/L Final   • Potassium 02/14/2023 4 4  3 5 - 5 3 mmol/L Final   • Chloride 02/14/2023 106  96 - 108 mmol/L Final   • CO2 02/14/2023 27  21 - 32 mmol/L Final   • ANION GAP 02/14/2023 7  4 - 13 mmol/L Final   • BUN 02/14/2023 16  5 - 25 mg/dL Final   • Creatinine 02/14/2023 0 63  0 60 - 1 30 mg/dL Final    Standardized to IDMS reference method   • Glucose 02/14/2023 90  65 - 140 mg/dL Final    If the patient is fasting, the ADA then defines impaired fasting glucose as > 100 mg/dL and diabetes as > or equal to 123 mg/dL  Specimen collection should occur prior to Sulfasalazine administration due to the potential for falsely depressed results  Specimen collection should occur prior to Sulfapyridine administration due to the potential for falsely elevated results  • Glucose, Fasting 02/14/2023 90  65 - 99 mg/dL Final    Specimen collection should occur prior to Sulfasalazine administration due to the potential for falsely depressed results  Specimen collection should occur prior to Sulfapyridine administration due to the potential for falsely elevated results     • Calcium 02/14/2023 8 4  8 3 - 10 1 mg/dL Final   • eGFR 02/14/2023 86  ml/min/1 73sq m Final   • WBC 02/14/2023 8 64  4 31 - 10 16 Thousand/uL Final   • RBC 02/14/2023 3 59 (L)  3 81 - 5 12 Million/uL Final   • Hemoglobin 02/14/2023 12 4  11 5 - 15 4 g/dL Final   • Hematocrit 02/14/2023 36 4  34 8 - 46 1 % Final   • MCV 02/14/2023 101 (H)  82 - 98 fL Final   • MCH 02/14/2023 34 5 (H)  26 8 - 34 3 pg Final   • MCHC 02/14/2023 34 1  31 4 - 37 4 g/dL Final   • RDW 02/14/2023 12 9  11 6 - 15 1 % Final   • Platelets 70/04/2378 232  149 - 390 Thousands/uL Final   • MPV 02/14/2023 9 3  8 9 - 12 7 fL Final   • WBC 02/15/2023 8 98  4 31 - 10 16 Thousand/uL Final   • RBC 02/15/2023 3 64 (L)  3 81 - 5 12 Million/uL Final   • Hemoglobin 02/15/2023 12 4  11 5 - 15 4 g/dL Final   • Hematocrit 02/15/2023 37 0  34 8 - 46 1 % Final   • MCV 02/15/2023 102 (H)  82 - 98 fL Final   • MCH 02/15/2023 34 1  26 8 - 34 3 pg Final   • MCHC 02/15/2023 33 5  31 4 - 37 4 g/dL Final   • RDW 02/15/2023 12 9  11 6 - 15 1 % Final   • Platelets 11/96/8379 216  149 - 390 Thousands/uL Final   • MPV 02/15/2023 9 1  8 9 - 12 7 fL Final   • Sodium 02/15/2023 137  135 - 147 mmol/L Final   • Potassium 02/15/2023 4 2  3 5 - 5 3 mmol/L Final   • Chloride 02/15/2023 106  96 - 108 mmol/L Final   • CO2 02/15/2023 27  21 - 32 mmol/L Final   • ANION GAP 02/15/2023 4  4 - 13 mmol/L Final   • BUN 02/15/2023 21  5 - 25 mg/dL Final   • Creatinine 02/15/2023 0 72  0 60 - 1 30 mg/dL Final    Standardized to IDMS reference method   • Glucose 02/15/2023 89  65 - 140 mg/dL Final    If the patient is fasting, the ADA then defines impaired fasting glucose as > 100 mg/dL and diabetes as > or equal to 123 mg/dL  Specimen collection should occur prior to Sulfasalazine administration due to the potential for falsely depressed results  Specimen collection should occur prior to Sulfapyridine administration due to the potential for falsely elevated results     • Calcium 02/15/2023 8 1 (L)  8 4 - 10 2 mg/dL Final   • eGFR 02/15/2023 80 ml/min/1 73sq m Final   • T3, Free 02/15/2023 1 72 (L)  2 30 - 4 20 pg/mL Final   • Free T4 02/15/2023 0 83  0 76 - 1 46 ng/dL Final    Specimen collection should occur prior to Sulfasalazine administration due to the potential for falsely elevated results  • Vitamin B-12 02/15/2023 593  100 - 900 pg/mL Final         Diagnosis: Mild to moderate dementia with depression  Delirium with psychosis hallucination  Plan: Continue Remeron 15 mg at bedtime  Continue Aricept 10 mg at bedtime  Namenda 5 mg daily  Continue Zyprexa 2 5 mg p o  twice daily as needed for a short period of time for hallucination and psychosis  Supervised living  Psychiatric follow-up recommended after the discharge  Discussed with the nurse  I will follow-up during the hospital stay      Tierney Rahman MD

## 2023-02-15 NOTE — ASSESSMENT & PLAN NOTE
Appreciate psychiatry input  Suspecting dementia with depression with hallucinations  · Start Remeron 15 mg HS (discussed with daughter)  · Start Zyprexa PRN hallucinations

## 2023-02-15 NOTE — PLAN OF CARE
Problem: Potential for Falls  Goal: Patient will remain free of falls  Description: INTERVENTIONS:  - Educate patient/family on patient safety including physical limitations  - Instruct patient to call for assistance with activity   - Consult OT/PT to assist with strengthening/mobility   - Keep Call bell within reach    Outcome: Progressing     Problem: Prexisting or High Potential for Compromised Skin Integrity  Goal: Skin integrity is maintained or improved  Description: INTERVENTIONS:  - Identify patients at risk for skin breakdown  - Assess and monitor skin integrity  - Assess and monitor nutrition and hydration status    Outcome: Progressing     Problem: MOBILITY - ADULT  Goal: Maintain or return to baseline ADL function  Description: INTERVENTIONS:  -  Assess patient's ability to carry out ADLs; assess patient's baseline for ADL function and identify physical deficits which impact ability to perform ADLs (bathing, care of mouth/teeth, toileting, grooming, dressing, etc )  - Assess/evaluate cause of self-care deficits   - Assess range of motion    Outcome: Progressing  Goal: Maintains/Returns to pre admission functional level  Description: INTERVENTIONS:  - Perform BMAT or MOVE assessment daily    - Set and communicate daily mobility goal to care team and patient/family/caregiver     - Collaborate with rehabilitation services on mobility goals if consulted    Outcome: Progressing Statement Selected

## 2023-02-15 NOTE — TELEMEDICINE
TeleConsultation - [address] [place] 65 y o  female MRN: [id number]  Unit/Bed#: [address] [place] [address] Encounter: [id number]        REQUIRED DOCUMENTATION:     1  This service was provided via Telemedicine  2  Provider located at [place]  3  TeleMed provider: Elsa Trevino MD   4  Identify all parties in room with patient during tele consult:  pt  5  Patient was then informed that this was a Telemedicine visit and that the exam was being conducted confidentially over secure lines  My office door was closed  No one else was in the room  Patient acknowledged consent and understanding of privacy and security of the Telemedicine visit, and gave us permission to have the assistant stay in the room in order to assist with the history and to conduct the exam   I informed the patient that I have reviewed their record in Epic and presented the opportunity for them to ask any questions regarding the visit today  The patient agreed to participate  Assessment/Plan     Principal Problem:    Altered mental status  Active Problems:    Tobacco abuse    Coronary artery disease involving native coronary artery of native heart without angina pectoris    Hyperlipidemia    Hypertension    Dementia (HCC)    Cardiomyopathy ([facility] [address] )    Assessment:    Rule out encephalopathy/delirium with hallucinations possibly multifactorial in etiology (to include depression) superimposed on baseline dementia  Treatment Plan:    Continue to identify and treat potential underlying factors related to altered mental status  Consider starting Remeron 15 mg p o  nightly for complaint of depression and insomnia  If any further hallucinations are observed then consider starting Zyprexa 2 5 mg orally disintegrating tablet p o  nightly  May consider additional Zyprexa 2 5 mg p o  or IM every 6 hours as needed psychosis  Suicide precautions are not indicated       Recommend Delirium Precautions:  Maintain sleep-wake cycle, avoid nighttime interruptions  Provide adequate pain control  Avoid urinary retention and constipation  Provide frequent and early mobilization  Provide frequent redirection and reorientation as needed  Avoid medications that may worsen or precipitate delirium such as tramadol, benzodiazepines, anticholinergics, and Benadryl  Redirect unwanted behaviors as first-line therapy, avoid physical restraints as able to  Continue to monitor    Reconsult psychiatry as needed  Current Medications:     Current Facility-Administered Medications   Medication Dose Route Frequency Provider Last Rate   • ascorbic acid  500 mg Oral Daily Gibson Restrepo PA-C     • carvedilol  3 125 mg Oral BID With Meals Gibson Restrepo PA-C     • cholecalciferol  2,000 Units Oral Daily Gibson Restrepo PA-C     • donepezil  10 mg Oral HS Gibson Restrepo PA-C     • enoxaparin  40 mg Subcutaneous Daily Gibson Restrepo PA-C     • lisinopril  2 5 mg Oral Daily Gibson Restrepo PA-C     • melatonin  3 mg Oral HS PRN Gibson Restrepo PA-C     • multivitamin stress formula  1 tablet Oral Daily Gibson Restrepo PA-C     • nicotine  1 patch Transdermal Daily Gibson Restrepo PA-C     • pravastatin  40 mg Oral Every Other Day Gibson Restrepo PA-C     • spironolactone  25 mg Oral Every Other Day Gibson Restrepo PA-C         Risks / Benefits of Treatment:    Risks, benefits, and possible side effects of medications explained to patient and patient verbalizes understanding        Inpatient consult to Psychiatry  Consult performed by: Ulises Madden MD  Consult ordered by: Gibson Restrepo PA-C        Physician Requesting Consult: Kelsi Orona MD  Principal Problem:Altered mental status    Reason for Consult: Hallucinations; altered mental status      History of Present Illness      Patient is a 65 y o  female who presented to the emergency department with a physician document the followin-year-old female presents to the ED with her son who states that they called neurology office and they advised him to come to the ED for further evaluation and reevaluation of the same problem that she had 3 days ago patient has been confused she is starting to have more hallucinations she does live home alone  No other complaints patient is having more hallucinations at home               Prior to Admission Medications   Prescriptions Last Dose Informant Patient Reported? Taking? Calcium Ascorbate (VITAMIN C) 500 mg tablet     Yes No   Sig: Take 500 mg by mouth daily   MELATONIN PO 2/12/2023   Yes Yes   Sig: Take by mouth daily at bedtime as needed   calcium carbonate (OS-JUD) 600 MG tablet     Yes No   Sig: Take 600 mg by mouth daily   carvedilol (COREG) 3 125 mg tablet     No No   Sig: Take 1 tablet (3 125 mg total) by mouth 2 (two) times a day with meals   cholecalciferol (VITAMIN D3) 1,000 units tablet     No No   Sig: Take 2 tablets (2,000 Units total) by mouth daily   donepezil (ARICEPT) 10 mg tablet     No No   Sig: Take 1 tablet (10 mg total) by mouth daily at bedtime   ibandronate (BONIVA) 150 MG tablet     No No   Sig: Take 1 tablet (150 mg total) by mouth every 30 (thirty) days   ketoconazole (NIZORAL) 2 % cream     No No   Sig: Apply topically daily   Patient not taking: Reported on 12/5/2022   lisinopril (ZESTRIL) 2 5 mg tablet     No No   Sig: Take 1 tablet (2 5 mg total) by mouth daily   multivitamin (THERAGRAN) TABS     Yes No   Sig: Take 1 tablet by mouth daily   nicotine (NICODERM CQ) 14 mg/24hr TD 24 hr patch     No No   Sig: Place 1 patch on the skin daily Do not start before October 27, 2022     Patient not taking: Reported on 11/4/2022   rosuvastatin (CRESTOR) 5 mg tablet     No No   Sig: Take 1 tablet (5 mg total) by mouth every other day   spironolactone (ALDACTONE) 25 mg tablet     No No   Sig: Take 1 tablet (25 mg total) by mouth every other day      Facility-Administered Medications: None         Medical History[]Expand by Default        Past Medical History:   Diagnosis Date   • Anxiety     • Dementia ([facility] [address] )     • Hyperlipidemia     • Hypertension     • Memory difficulty              Surgical History[]Expand by Default         Past Surgical History:   Procedure Laterality Date   • ARTHROPLASTY HIP TOTAL ANTERIOR Right 4/9/2021     Procedure: ARTHROPLASTY HIP TOTAL ANTERIOR;  Surgeon: Sylvia Self DO;  Location: [address] [place] [address];  Service: Orthopedics   • CARDIAC CATHETERIZATION Left 5/27/2022     Procedure: Cardiac catheterization;  Surgeon: Pedro Centeno MD;  Location: [place] [address] CATH LAB; Service: Cardiology            Family History[]Expand by Default   History reviewed  No pertinent family history  I have reviewed and agree with the history as documented            E-Cigarette/Vaping   • E-Cigarette Use Never User        E-Cigarette/Vaping Substances      Social History            Tobacco Use   • Smoking status: Every Day       Packs/day: 0 50       Years: 60 00       Pack years: 30 00       Types: Cigarettes   • Smokeless tobacco: Never   Vaping Use   • Vaping Use: Never used   Substance Use Topics   • Alcohol use: Never   • Drug use: Never         Review of Systems   Constitutional: Negative for activity change, chills, diaphoresis and fever  HENT: Negative for congestion, ear pain, nosebleeds, sore throat, trouble swallowing and voice change  Eyes: Negative for pain, discharge and redness  Respiratory: Negative for apnea, cough, choking, shortness of breath, wheezing and stridor  Cardiovascular: Negative for chest pain and palpitations  Gastrointestinal: Negative for abdominal distention, abdominal pain, constipation, diarrhea, nausea and vomiting  Endocrine: Negative for polydipsia  Genitourinary: Negative for difficulty urinating, dysuria, flank pain, frequency, hematuria and urgency  Musculoskeletal: Negative for back pain, gait problem, joint swelling, myalgias, neck pain and neck stiffness  Skin: Negative for pallor and rash     Neurological: Negative for dizziness, tremors, syncope, speech difficulty, weakness, numbness and headaches  Hematological: Negative for adenopathy  Psychiatric/Behavioral: Positive for confusion and hallucinations  Negative for self-injury and suicidal ideas  The patient is not nervous/anxious  Nursing reports the patient has not been exhibiting hallucinations  At times she may exhibit some confusion  She has been pleasant and cooperative without behavioral disturbance  Past psychiatric history: The patient presents to support historian for past psychiatric and psychosocial information  Social history: Patient reports she is  with her  dying October 10, 2022  She continues to experience severe grief  She reports he had 3 children 1 of whom is   She states that she lives alone but everything is okay at home  She reports no abuse  Family history: Patient reports no significant family psychiatric history    Substance use history: Unremarkable as above    Mental status examination: The patient is alert and oriented to person, birthdate, [facility] and the correct date  She is pleasant cooperative the assessment  She became tearful when speaking of how much she is missing her  admits that she feels sad and depressed frequently with some difficulty sleeping  She states she "over thinks" disrupting her sleep  She reports appetites been good  Speech is unremarkable  Thought process is logical and linear  Thought content appears to be reality based at this time  Associations were tight  She does show memory impairment in recent or remote spheres  She likely has been of average and logical functioning by use of vocabulary, sentence structure and syntax  She denies suicidal homicidal ideation  She was vague in her responses regarding hallucinations seeming to indicate that she may see things that present time spent was around about this    Insight and judgment are impaired by limitations of her cognition  She does present as motivated for treatment of her depression and insomnia however  Past Medical History:   Diagnosis Date   • Anxiety    • Dementia ([address] )    • Hyperlipidemia    • Hypertension    • Memory difficulty        Medical Review Of Systems:    Review of Systems    Meds/Allergies     all current active meds have been reviewed  No Known Allergies    Objective     Vital signs in last 24 hours:  Temp:  [97 °F (36 1 °C)-98 2 °F (36 8 °C)] 98 2 °F (36 8 °C)  HR:  [67-82] 82  Resp:  [17-19] 18  BP: ()/(51-86) 129/86      Intake/Output Summary (Last 24 hours) at 2/15/2023 1116  Last data filed at 2/14/2023 2200  Gross per 24 hour   Intake 210 ml   Output --   Net 210 ml         Lab Results: I have personally reviewed all pertinent laboratory/tests results  Imaging Studies: CT head without contrast    Result Date: 2/10/2023  Narrative: CT BRAIN - WITHOUT CONTRAST INDICATION:   Delirium ams  COMPARISON:  CT head without contrast 10/24/2022, 4/9/2021 TECHNIQUE:  CT examination of the brain was performed  In addition to axial images, sagittal and coronal 2D reformatted images were created and submitted for interpretation  Radiation dose length product (DLP) for this visit:  842 mGy-cm   This examination, like all CT scans performed in the [facility], was performed utilizing techniques to minimize radiation dose exposure, including the use of iterative reconstruction and automated exposure control  IMAGE QUALITY:  Diagnostic  FINDINGS: PARENCHYMA: Decreased attenuation is noted in periventricular and subcortical white matter demonstrating an appearance that is statistically most likely to represent moderate microangiopathic change  Unchanged chronic lacunar infarcts in bilateral basal  ganglia  No CT signs of acute infarction  No intracranial mass, mass effect or midline shift  No acute parenchymal hemorrhage   Arterial calcifications of carotid siphons and to a lesser extent in bilateral intradural vertebral artery  Unchanged small area of nodularity extending from the sella into the suprasellar region  VENTRICLES AND EXTRA-AXIAL SPACES:  Unchanged moderate ventriculomegaly out of proportion to degree of cerebral atrophy  No acute intraventricular or extra-axial hemorrhage  VISUALIZED ORBITS: Normal visualized orbits  PARANASAL SINUSES: Normal visualized paranasal sinuses  CALVARIUM AND EXTRACRANIAL SOFT TISSUES:  Normal      Impression: No acute intracranial abnormality  Unchanged moderate ventriculomegaly out of proportion to degree of cerebral atrophy  Question normal pressure hydrocephalus  Unchanged small area of nodularity extending from the sella into suprasellar region  Differential includes pituitary adenoma, Rathke's cleft cyst, or hypophysitis  Recommend MRI brain pituitary protocol with and without contrast for further evaluation  The study was marked in [facility] for immediate notification  Workstation performed: [id number]     XR chest 1 view    Result Date: 2/11/2023  Narrative: CHEST INDICATION:   ams  COMPARISON:  CXR 11/10/2022, chest CT 8/13/2013  EXAM PERFORMED/VIEWS:  XR CHEST 1 VIEW FINDINGS: Cardiomediastinal silhouette appears unremarkable  Hyperinflated due to emphysema  Slightly increased interstitial markings in the lung bases may be due to mild fibrosis  No effusion or pneumothorax  Osseous structures appear within normal limits for patient age  Impression: Emphysema with no definite acute disease   Workstation performed: [id number]     EKG/Pathology/Other Studies:   Lab Results   Component Value Date    VENTRATE 57 02/10/2023    ATRIALRATE 57 02/10/2023    PRINT 124 02/10/2023    QRSDINT 86 02/10/2023    QTINT 470 02/10/2023    QTCINT 457 02/10/2023    PAXIS 65 02/10/2023    QRSAXIS 84 02/10/2023    TWAVEAXIS 98 02/10/2023        Code Status: Level 3 - DNAR and DNI  Advance Directive and Living Will:      Power of :    POLST:      Counseling / Coordination of Care: Total floor / unit time spent today 30 minutes  Greater than 50% of total time was spent with the patient and / or family counseling and / or coordination of care  A description of the counseling / coordination of care: Chart review, patient evaluation, coordination communication with staff, nursing and provider

## 2023-02-15 NOTE — ASSESSMENT & PLAN NOTE
Patient presented to ED on 2/10 for altered mental status and hallucinations  She lives alone at home, questionable whether she was taking her medications as prescribed  Her granddaughter took her to her PCP a few days prior thinking she may have a UTI and was started on bactrim  She presents today with increased hallucinations at home  · Pt had MRI brain scheduled for Thursday, family spoke with Dr Donna Ortiz who recommended they take her to ER for admission  · CT head: No acute intracranial abnormality  Unchanged moderate ventriculomegaly out of proportion to degree of cerebral atrophy  Question normal pressure hydrocephalus  Unchanged small area of nodularity extending from the sella into suprasellar region  Differential includes pituitary adenoma, Rathke's cleft cyst, or hypophysitis  Recommend MRI brain pituitary protocol with and without contrast for further evaluation    · VSS and cbc and bmp WNL  · Urine culture shows no growth  · Covid negative   · Appreciate neurology input   · Planning for inpatient MRI brain for further evaluation   · Appreciate psychiatry input   · Concern for dementia with depression with hallucinations  · Start Remeron 15 mg HS and Zyprexa 2 5 mg PRN   · Neuro checks q4  · B12 593  · TSH 7 03, free T3 and T4 abnormal but collected from different specimen   · Repeat thyroid studies in am

## 2023-02-15 NOTE — OCCUPATIONAL THERAPY NOTE
Occupational Therapy Evaluation/Treatment & Cognitive Assessment       02/15/23 7690   Note Type   Note type Evaluation   Pain Assessment   Pain Assessment Tool 0-10   Pain Score No Pain   Restrictions/Precautions   Other Precautions Chair Alarm; Bed Alarm; Fall Risk   Home Living   Type of [address] [place] [address] Two level;Stairs to enter with rails;Bed/bath upstairs  (3 DEEP +HR back entrance; sleeps on first floor)   Bathroom Shower/Tub Walk-in shower   Bathroom Toilet Standard   Bathroom Equipment Grab bars in shower; Shower chair   Home Equipment Cane   Additional Comments Patient is a questionable historian at times   Prior Function   Level of [place] Independent with ADLs; Needs assistance with IADLS   Lives With Alone   Receives Help From Family  (Daughter in law and Son live nearby)   IADLs Family/Friend/Other provides transportation; Family/Friend/Other provides meals; Family/Friend/Other provides medication management   Comments Patient reports PTA independent in all ADLs and mobility ; uses cane sometimes; Per chart, patient's daughter in law fills patient's pill box however patient has been forgetting to take medications   General   Additional Pertinent History Patient presented to hospital with c/o hallucinations; patient lives alone, forgot to take medications; same occurence happened a few days prior and brought patient to ED   ADL   Eating Assistance 7  Independent   Grooming Assistance 5  Supervision/Setup   [address] 5  Supervision/Setup   LB Bathing Assistance 4  Minimal Assistance   [address] 5  Supervision/Setup   LB Dressing Assistance 4  [address]  5  Supervision/Setup   Bed Mobility   Additional Comments Not assessed, patient received seated OOB in chair   Transfers   Sit to Stand 5  Supervision   Additional items Verbal cues   Stand to Sit 5  Supervision   Additional items Verbal cues   Additional Comments Single point cane for support   Functional Mobility   Functional Mobility 4  Minimal assistance   Additional Comments Patient ambulated few feet in room with single point cane; contact guard assist   Balance   Static Sitting Good   Dynamic Sitting Fair +   Static Standing Fair   Dynamic Standing Fair   Activity Tolerance   Activity Tolerance Other (Comment)  (Limited by cognition)   RUE Assessment   RUE Assessment WFL   LUE Assessment   LUE Assessment WFL   Cognition   Overall Cognitive Status Impaired   Arousal/Participation Alert; Cooperative  [facility])   Attention Attends with cues to redirect   Orientation Level Oriented to person;Oriented to place;Oriented to time;Disoriented to situation   Following Commands Follows multistep commands with increased time or repetition   Comments Patient is intermittently confused; disoriented to situation; confused when asking multiple questions and difficulty with multistep command follow; Speaking to this therapist as if she knew her and asking about children, calling therapist 'maria isabel' redirected patient to OT's name/role, however patient continued with confusion as to who therapist was and why she was in her room; [address] [facility] stacia mckenzie scored 24/30, of note Normal score is considered 26 or higher; see details below   Cognition Assessment Tools MOCA   Score 24   Assessment   Limitation Decreased ADL status; Decreased UE strength;Decreased Safe judgement during ADL;Decreased cognition;Decreased endurance;Decreased self-care trans;Decreased high-level ADLs   Prognosis Good   Assessment Patient evaluated by Occupational Therapy  Patient admitted with Encephalopathy  The patients occupational profile, medical and therapy history includes a extensive additional review of physical, cognitive, or psychosocial history related to current functional performance  Comorbidities affecting functional mobility and ADLS include: HTN, HLD, memory difficulty, dementia, anxiety    Prior to admission, patient was independent with functional mobility with cane, independent with ADLS and requiring assist for IADLS  The evaluation identifies the following performance deficits: weakness, impaired balance, decreased endurance, increased fall risk, new onset of impairment of functional mobility, decreased ADLS, decreased IADLS, decreased activity tolerance, decreased safety awareness, impaired judgement, decreased cognition and decreased strength, that result in activity limitations and/or participation restrictions  This evaluation requires clinical decision making of high complexity, because the patient presents with comorbidites that affect occupational performance and required significant modification of tasks or assistance with consideration of multiple treatment options  The Barthel Index was used as a functional outcome tool presenting with a score of Barthel Index Score: 55, indicating marked limitations of functional mobility and ADLS  The patient's raw score on the AM-PAC Daily Activity Inpatient Short Form is 20  A raw score of greater than or equal to 19 suggests the patient may benefit from discharge to home  Please refer to the recommendation of the Occupational Therapist for safe discharge planning  Please refer to the recommendation of the Occupational Therapist for safe DC planning  Patient will benefit from skilled Occupational Therapy services to address above deficits and facilitate a safe return to prior level of function   Goals   Patient Goals Go home   STG Time Frame   (1-7 days)   Short Term Goal  Goals established to promote Patient Goals: Go home: Grooming: independent seated; Bathing: supervision; Upper Body Dressing independent; Lower Body Dressing: supervision; Toileting: independent; Patient will increase ambulatory standard toilet transfer to supervision with single point cane to increase performance and safety with ADLS and functional mobility;  Patient will increase standing tolerance to 10 minutes during ADL task to decrease assistance level and decrease fall risk; Patient will increase bed mobility to supervision in preparation for ADLS and transfers; Patient will increase functional mobility to and from bathroom with single point cane with supervision to increase performance with ADLS and to use a toilet; Patient will tolerate 10 minutes of UE ROM/strengthening to increase general activity tolerance and performance in ADLS/IADLS; Patient will improve functional activity tolerance to 10 minutes of sustained functional tasks to increase participation in basic self-care and decrease assistance level;  Patient will be able to to verbalize understanding and perform energy conservation/proper body mechanics during ADLS and functional mobility at least 50% of the time with moderate cueing to decrease signs of fatigue and increase stamina to return to prior level of function; Patient will increase dynamic sitting balance to good to improve the ability to sit at edge of bed or on a chair for ADLS;  Patient will increase static/dynamic standing balance to fair+ to improve postural stability and decrease fall risk during standing ADLS and transfers  LTG Time Frame   (8-14 days)   Long Term Goal Grooming: independent standing at sink; Bathing: independent; Lower Body Dressing: independent; Toileting: independent; Patient will increase ambulatory standard toilet transfer to independent with single point cane to increase performance and safety with ADLS and functional mobility; Patient will increase standing tolerance to 15 minutes during ADL task to decrease assistance level and decrease fall risk; Patient will increase bed mobility to independent in preparation for ADLS and transfers;  Patient will increase functional mobility to and from bathroom with single point cane independently to increase performance with ADLS and to use a toilet; Patient will tolerate 15 minutes of UE ROM/strengthening to increase general activity tolerance and performance in ADLS/IADLS; Patient will improve functional activity tolerance to 15 minutes of sustained functional tasks to increase participation in basic self-care and decrease assistance level;  Patient will be able to to verbalize understanding and perform energy conservation/proper body mechanics during ADLS and functional mobility at least 75% of the time with minimal cueing to decrease signs of fatigue and increase stamina to return to prior level of function; Patient will increase static/dynamic standing balance to good to improve postural stability and decrease fall risk during standing ADLS and transfers  Pt will score >/= 24/24 on AM-PAC Daily Activity Inpatient scale to promote safe independence with ADLs and functional mobility; Pt will score >/= 95/100 on Barthel Index in order to decrease caregiver assistance needed and increase ability to perform ADLs and functional mobility  Plan   Treatment Interventions ADL retraining;Functional transfer training;UE strengthening/ROM; Endurance training;Patient/family training;Equipment evaluation/education; Compensatory technique education;Continued evaluation; Energy conservation; Activityengagement   Goal Expiration Date 03/01/23   OT Frequency 3-5x/wk   Recommendation   OT Discharge Recommendation Home with home health rehabilitation   AM-PAC Daily Activity Inpatient   Lower Body Dressing 3   Bathing 3   Toileting 3   Upper Body Dressing 3   Grooming 4   Eating 4   Daily Activity Raw Score 20   Daily Activity Standardized Score (Calc for Raw Score >=11) 42 03   AM-PAC Applied Cognition Inpatient   Following a Speech/Presentation 2   Understanding Ordinary Conversation 3   Taking Medications 2   Remembering Where Things Are Placed or Put Away 3   Remembering List of 4-5 Errands 3   Taking Care of Complicated Tasks 2   Applied Cognition Raw Score 15   Applied Cognition Standardized Score 33 54   MOCA   Version 8 1 Visuopatial/Executive 3   Naming 2   Memory 0   Attention: Digits 2   Attention: Letters 1   Attention: Serial 3   Language: Repeat 2   Language: Fluency 0   Abstraction 2   Delayed Recall 4   Orientation 5   Does patient have less than or equal to 12 years of education? 0   MOCA Total Score 24   MOCA Comments Patient with difficulty with visuospatial task of redrawing cube; drawing is oriented wrong way and missing one edge; Clock is drawn in [place] and all numbers present; Hour arrow is not longer than minute arrow; Named 'Hippo' instead of rhinocerous; Language fluency only able to name 5 words starting with letter F in one minute; delayed recall able to recall 4/5 words without cueing   Barthel Index   Feeding 10   Bathing 0   Grooming Score 0   Dressing Score 5   Bladder Score 10   Bowels Score 10   Toilet Use Score 5   Transfers (Bed/Chair) Score 10   Mobility (Level Surface) Score 0   Stairs Score 5   Barthel Index Score 55   Additional Treatment Session   Start Time 1527   End Time 1550   Treatment Assessment S: "I think I have to go for a test" O: Patient ambulated short household distance to/from bathroom with single point cane and supervision; Ambulatory transfer to/from standard toilet with [facility] and supervision; Toileting including hygiene and clothing management completed with supervision; Hand hygiene standing to sink unsupported with supervision; Stand to sit to bedside chair with supervision; Patient also participated in [facility] cognitive assessment; A: Patient mildly unsteady with ambulation even with use of single point cane; poor safety awareness and is forgetful; Scored a 24/30 on MOCA indicating mild cognitive impairment; see above evaluation for details;  At end of session patient seated in chair with all needs met, chair alarm set; P: Home OT; may benefit from increased supervision at home for medication management, etc  Due to recent episodes of forgetfulness   Licensure   [place] License Number Kervin Janice OTR/L [id number]

## 2023-02-15 NOTE — PROGRESS NOTES
Callum 44  Progress Note Ken Stoll 1945, 65 y o  female MRN: [id number]  Unit/Bed#: [address] [place] [address] Encounter: [id number]  Primary Care Provider: No primary care provider on file  Date and time admitted to hospital: 2/13/2023  1:12 PM    * Encephalopathy  Assessment & Plan  Patient presented to ED on 2/10 for altered mental status and hallucinations  She lives alone at home, questionable whether she was taking her medications as prescribed  Her granddaughter took her to her PCP a few days prior thinking she may have a UTI and was started on bactrim  She presents today with increased hallucinations at home  · Pt had MRI brain scheduled for Thursday, family spoke with Dr Anuradha Fernandez who recommended they take her to ER for admission  · CT head: No acute intracranial abnormality  Unchanged moderate ventriculomegaly out of proportion to degree of cerebral atrophy  Question normal pressure hydrocephalus  Unchanged small area of nodularity extending from the sella into suprasellar region  Differential includes pituitary adenoma, Rathke's cleft cyst, or hypophysitis  Recommend MRI brain pituitary protocol with and without contrast for further evaluation    · VSS and cbc and bmp WNL  · Urine culture shows no growth  · Covid negative   · Appreciate neurology input   · Planning for inpatient MRI brain for further evaluation   · Appreciate psychiatry input   · Concern for dementia with depression with hallucinations  · Start Remeron 15 mg HS and Zyprexa 2 5 mg PRN   · Neuro checks q4  · B12 593  · TSH 7 03, free T3 and T4 abnormal but collected from different specimen   · Repeat thyroid studies in am     Depression  Assessment & Plan  Appreciate psychiatry input  Suspecting dementia with depression with hallucinations  · Start Remeron 15 mg HS (discussed with daughter)  · Start Zyprexa PRN hallucinations     Cardiomyopathy [facility])  Assessment & Plan  Patient has cardiomyopathy of unclear etiology  · Continue coreg 3 125 mg twice a day, lisinopril 2 5 mg and aldactone 25 mg every other day  · No evidence of acute exacerbation    Dementia (HCC)  Assessment & Plan  · Continue donepezil 10 mg    Emphysema lung (HCC)  Assessment & Plan  Noted on CXR  Continues to smoke  · Albuterol INH PRN     Hypertension  Assessment & Plan  · Continue coreg and lisinopril  · BP stable  · Continue to monitor    Hyperlipidemia  Assessment & Plan  · Continue statin    Coronary artery disease involving native coronary artery of native heart without angina pectoris  Assessment & Plan  Nonobstructive CAD seen on cardiac catheterization in 5/2022  · Continue coreg, statin    Tobacco abuse  Assessment & Plan  · Patient has smoked since she was a teenager  · Recently cut down to 3/4 pack from a pack per day  · Nicotine patch  · Encourage cessation      VTE Pharmacologic Prophylaxis: VTE Score: 3 Moderate Risk (Score 3-4) - Pharmacological DVT Prophylaxis Ordered: enoxaparin (Lovenox)  Patient Centered Rounds: I performed bedside rounds with nursing staff today  Discussions with Specialists or Other Care Team Provider: nursing, CM, psychiatry     Education and Discussions with Family / Patient: Updated  (daughter) via phone  Total Time Spent on Date of Encounter in care of patient: 35 minutes This time was spent on one or more of the following: performing physical exam; counseling and coordination of care; obtaining or reviewing history; documenting in the medical record; reviewing/ordering tests, medications or procedures; communicating with other healthcare professionals and discussing with patient's family/caregivers  Current Length of Stay: 1 day(s)  Current Patient Status: Inpatient   Certification Statement: The patient will continue to require additional inpatient hospital stay due to MRI brain tomorrow  Discharge Plan: Anticipate discharge in 48 hrs to home      Code Status: Level 3 - DNAR and DNI    Subjective:   Patient seen and examined at bedside  Resting oob in chair on room air  Ipad in front of her as she was recently talking to tele psych  She denies any pain, HA, dizziness, CP, SOB, nausea, vomiting, or diarrhea  She would like "Reginald to visit so I can tell him I love him " Per daughter, Petty Chowdhury is her grandson  Objective:     Vitals:   Temp (24hrs), Av 7 °F (36 5 °C), Min:97 °F (36 1 °C), Max:98 2 °F (36 8 °C)    Temp:  [97 °F (36 1 °C)-98 2 °F (36 8 °C)] 98 2 °F (36 8 °C)  HR:  [67-82] 82  Resp:  [17-19] 18  BP: ()/(51-86) 129/86  SpO2:  [92 %-98 %] 97 %  Body mass index is 18 33 kg/m²  Input and Output Summary (last 24 hours): Intake/Output Summary (Last 24 hours) at 2/15/2023 1440  Last data filed at 2023 2200  Gross per 24 hour   Intake 210 ml   Output --   Net 210 ml       Physical Exam:   Physical Exam  Vitals and nursing note reviewed  Constitutional:       General: She is not in acute distress  Appearance: She is ill-appearing (mildly deconditioned )  She is not toxic-appearing or diaphoretic  Comments: Pleasantly confused female resting oob in chair on room air    HENT:      Head: Normocephalic  Mouth/Throat:      Mouth: Mucous membranes are moist    Eyes:      Conjunctiva/sclera: Conjunctivae normal    Cardiovascular:      Rate and Rhythm: Normal rate  Pulmonary:      Effort: Pulmonary effort is normal       Breath sounds: Normal breath sounds  No wheezing, rhonchi or rales  Abdominal:      General: Bowel sounds are normal  There is no distension  Palpations: Abdomen is soft  Tenderness: There is no abdominal tenderness  Musculoskeletal:         General: Normal range of motion  Cervical back: Normal range of motion  Right lower leg: No edema  Left lower leg: No edema  Skin:     General: Skin is warm and dry  Capillary Refill: Capillary refill takes less than 2 seconds     Neurological:      Mental Status: She is alert and oriented to person, place, and time  Mental status is at baseline  Motor: No weakness  Psychiatric:         Mood and Affect: Mood normal          Speech: Speech normal          Behavior: Behavior normal  Behavior is not agitated or aggressive  Behavior is cooperative  Thought Content: Thought content normal          Cognition and Memory: Cognition is impaired  Memory is impaired  Judgment: Judgment is inappropriate  Additional Data:     Labs:  Results from last 7 days   Lab Units 02/15/23  0523 02/14/23  0542 02/13/23  1441   WBC Thousand/uL 8 98   < > 8 27   HEMOGLOBIN g/dL 12 4   < > 13 8   HEMATOCRIT % 37 0   < > 41 6   PLATELETS Thousands/uL 216   < > 278   NEUTROS PCT %  --   --  64   LYMPHS PCT %  --   --  27   MONOS PCT %  --   --  6   EOS PCT %  --   --  2    < > = values in this interval not displayed  Results from last 7 days   Lab Units 02/15/23  0523 02/14/23  0542 02/13/23  1441   SODIUM mmol/L 137   < > 136   POTASSIUM mmol/L 4 2   < > 4 2   CHLORIDE mmol/L 106   < > 103   CO2 mmol/L 27   < > 29   BUN mg/dL 21   < > 19   CREATININE mg/dL 0 72   < > 0 83   ANION GAP mmol/L 4   < > 4   CALCIUM mg/dL 8 1*   < > 8 8   ALBUMIN g/dL  --   --  3 7   TOTAL BILIRUBIN mg/dL  --   --  0 19*   ALK PHOS U/L  --   --  57   ALT U/L  --   --  46   AST U/L  --   --  35   GLUCOSE RANDOM mg/dL 89   < > 101    < > = values in this interval not displayed                   Results from last 7 days   Lab Units 02/10/23  1542   LACTIC ACID mmol/L 0 5       Lines/Drains:  Invasive Devices     Peripheral Intravenous Line  Duration           Peripheral IV 02/13/23 Distal;Left;Upper;Ventral (anterior) Arm 1 day                      Imaging: Reviewed radiology reports from this admission including: chest xray and CT head    Recent Cultures (last 7 days):   Results from last 7 days   Lab Units 02/08/23  1648   URINE CULTURE  No Growth <1000 cfu/mL       Last 24 Hours Medication List:   Current Facility-Administered Medications   Medication Dose Route Frequency Provider Last Rate   • ascorbic acid  500 mg Oral Daily Ty Gibbs PA-C     • carvedilol  3 125 mg Oral BID With Meals Ty Gibbs PA-C     • cholecalciferol  2,000 Units Oral Daily Ty Gibbs PA-C     • donepezil  10 mg Oral HS Ty Gibbs PA-C     • enoxaparin  40 mg Subcutaneous Daily Ty Gibbs PA-C     • lisinopril  2 5 mg Oral Daily Ty Gibbs PA-C     • melatonin  3 mg Oral HS PRN Ty Gibbs PA-C     • memantine  5 mg Oral Daily Slime Couch MD     • mirtazapine  15 mg Oral HS BOBBY Villeda     • multivitamin stress formula  1 tablet Oral Daily Ty Gibbs PA-C     • nicotine  1 patch Transdermal Daily Ty Gibbs PA-C     • OLANZapine  2 5 mg Oral Q12H PRN BOBBY Villeda     • pravastatin  40 mg Oral Every Other Day Ty Gibbs PA-C     • spironolactone  25 mg Oral Every Other Day Ty Gibbs PA-C          Today, Patient Was Seen By: BOBBY Villeda    **Please Note: This note may have been constructed using a voice recognition system  **

## 2023-02-16 ENCOUNTER — HOSPITAL ENCOUNTER (OUTPATIENT)
Dept: RADIOLOGY | Facility: HOSPITAL | Age: 78
Discharge: HOME | End: 2023-02-16
Attending: PSYCHIATRY & NEUROLOGY

## 2023-02-16 DIAGNOSIS — G93.89 CEREBRAL VENTRICULOMEGALY: ICD-10-CM

## 2023-02-16 DIAGNOSIS — Q04.8: Primary | ICD-10-CM

## 2023-02-16 PROBLEM — F03.918 DEMENTIA WITH BEHAVIORAL DISTURBANCE (HCC): Status: ACTIVE | Noted: 2022-10-25

## 2023-02-16 PROBLEM — E03.9 HYPOTHYROIDISM: Status: ACTIVE | Noted: 2023-02-16

## 2023-02-16 PROBLEM — E07.81 EUTHYROID SICK SYNDROME: Status: ACTIVE | Noted: 2023-02-16

## 2023-02-16 LAB
ANION GAP SERPL CALCULATED.3IONS-SCNC: 6 MMOL/L (ref 4–13)
BUN SERPL-MCNC: 25 MG/DL (ref 5–25)
CALCIUM SERPL-MCNC: 8.2 MG/DL (ref 8.4–10.2)
CHLORIDE SERPL-SCNC: 107 MMOL/L (ref 96–108)
CO2 SERPL-SCNC: 25 MMOL/L (ref 21–32)
CREAT SERPL-MCNC: 0.75 MG/DL (ref 0.6–1.3)
GFR SERPL CREATININE-BSD FRML MDRD: 76 ML/MIN/1.73SQ M
GLUCOSE SERPL-MCNC: 85 MG/DL (ref 65–140)
MAGNESIUM SERPL-MCNC: 2.1 MG/DL (ref 1.9–2.7)
PHOSPHATE SERPL-MCNC: 3.6 MG/DL (ref 2.3–4.1)
POTASSIUM SERPL-SCNC: 4.3 MMOL/L (ref 3.5–5.3)
SODIUM SERPL-SCNC: 138 MMOL/L (ref 135–147)
T4 FREE SERPL-MCNC: 0.78 NG/DL (ref 0.76–1.46)
TSH SERPL DL<=0.05 MIU/L-ACNC: 9.99 UIU/ML (ref 0.45–4.5)

## 2023-02-16 RX ORDER — CARVEDILOL 3.12 MG/1
3.12 TABLET ORAL 2 TIMES DAILY WITH MEALS
Status: DISCONTINUED | OUTPATIENT
Start: 2023-02-16 | End: 2023-02-19 | Stop reason: HOSPADM

## 2023-02-16 RX ORDER — LEVOTHYROXINE SODIUM 0.03 MG/1
25 TABLET ORAL
Status: DISCONTINUED | OUTPATIENT
Start: 2023-02-16 | End: 2023-02-16

## 2023-02-16 RX ORDER — ONDANSETRON 2 MG/ML
4 INJECTION INTRAMUSCULAR; INTRAVENOUS EVERY 6 HOURS PRN
Status: DISCONTINUED | OUTPATIENT
Start: 2023-02-16 | End: 2023-02-19 | Stop reason: HOSPADM

## 2023-02-16 RX ORDER — ESCITALOPRAM OXALATE 10 MG/1
10 TABLET ORAL DAILY
Status: DISCONTINUED | OUTPATIENT
Start: 2023-02-16 | End: 2023-02-19 | Stop reason: HOSPADM

## 2023-02-16 RX ORDER — LEVOTHYROXINE SODIUM 0.03 MG/1
25 TABLET ORAL
Status: DISCONTINUED | OUTPATIENT
Start: 2023-02-17 | End: 2023-02-19 | Stop reason: HOSPADM

## 2023-02-16 RX ORDER — ACETAMINOPHEN 325 MG/1
650 TABLET ORAL EVERY 6 HOURS PRN
Status: DISCONTINUED | OUTPATIENT
Start: 2023-02-16 | End: 2023-02-19 | Stop reason: HOSPADM

## 2023-02-16 RX ADMIN — MEMANTINE 5 MG: 5 TABLET ORAL at 09:10

## 2023-02-16 RX ADMIN — LISINOPRIL 2.5 MG: 2.5 TABLET ORAL at 09:10

## 2023-02-16 RX ADMIN — CARVEDILOL 3.12 MG: 3.12 TABLET, FILM COATED ORAL at 09:12

## 2023-02-16 RX ADMIN — GADOBUTROL 4 ML: 604.72 INJECTION INTRAVENOUS at 15:45

## 2023-02-16 RX ADMIN — NICOTINE 1 PATCH: 14 PATCH, EXTENDED RELEASE TRANSDERMAL at 09:09

## 2023-02-16 RX ADMIN — MELATONIN TAB 3 MG 3 MG: 3 TAB at 22:39

## 2023-02-16 RX ADMIN — LEVOTHYROXINE SODIUM 25 MCG: 25 TABLET ORAL at 09:06

## 2023-02-16 RX ADMIN — B-COMPLEX W/ C & FOLIC ACID TAB 1 TABLET: TAB at 10:50

## 2023-02-16 RX ADMIN — ESCITALOPRAM OXALATE 10 MG: 10 TABLET, FILM COATED ORAL at 12:39

## 2023-02-16 RX ADMIN — Medication 500 MG: at 09:07

## 2023-02-16 RX ADMIN — ENOXAPARIN SODIUM 40 MG: 40 INJECTION SUBCUTANEOUS at 09:06

## 2023-02-16 RX ADMIN — CARVEDILOL 3.12 MG: 3.12 TABLET, FILM COATED ORAL at 17:04

## 2023-02-16 RX ADMIN — PRAVASTATIN SODIUM 40 MG: 40 TABLET ORAL at 09:10

## 2023-02-16 RX ADMIN — SPIRONOLACTONE 25 MG: 25 TABLET, FILM COATED ORAL at 09:10

## 2023-02-16 RX ADMIN — DONEPEZIL HYDROCHLORIDE 10 MG: 5 TABLET ORAL at 22:30

## 2023-02-16 RX ADMIN — Medication 2000 UNITS: at 09:10

## 2023-02-16 NOTE — PLAN OF CARE
Problem: Potential for Falls  Goal: Patient will remain free of falls  Description: INTERVENTIONS:  - Educate patient/family on patient safety including physical limitations  - Instruct patient to call for assistance with activity   - Consult OT/PT to assist with strengthening/mobility   - Keep Call bell within reach    Outcome: Progressing     Problem: Prexisting or High Potential for Compromised Skin Integrity  Goal: Skin integrity is maintained or improved  Description: INTERVENTIONS:  - Identify patients at risk for skin breakdown  - Assess and monitor skin integrity  - Assess and monitor nutrition and hydration status    Outcome: Progressing     Problem: MOBILITY - ADULT  Goal: Maintain or return to baseline ADL function  Description: INTERVENTIONS:  -  Assess patient's ability to carry out ADLs; assess patient's baseline for ADL function and identify physical deficits which impact ability to perform ADLs (bathing, care of mouth/teeth, toileting, grooming, dressing, etc )  - Assess/evaluate cause of self-care deficits   - Assess range of motion    Outcome: Progressing  Goal: Maintains/Returns to pre admission functional level  Description: INTERVENTIONS:  - Perform BMAT or MOVE assessment daily    - Set and communicate daily mobility goal to care team and patient/family/caregiver     - Collaborate with rehabilitation services on mobility goals if consulted    Outcome: Progressing

## 2023-02-16 NOTE — PROGRESS NOTES
Neurology Consult Follow Up      Mary Jo Crowley is a 65 y o  female  [address] [facility]-*    [id number]        Assessment/Recommendations:    1  AMS with hallucinations  2  Dementia  3  Ventriculomegaly, possible NPH  4  Abnormal CT with possible pituitary adenoma  5  Gait dysfunction with ataxia     -Monitored on telemetry  -Fall risk  -MRI of the brain with without contrast pituitary focus and neuro quant  -Can continue on Aricept 10 mg daily  -Namenda per psychiatric team  -Remeron started per psychiatric team  -Medical team to continue to assess for other metabolic etiology  -Recommend PT/OT for gait disturbance/ataxia  -We will refer patient to outpatient neurosurgical team for further evaluation and treatment of NPH  Patient with Medicare primary, can be referred to Dr Collette Edwards or colleague in the [facility] office  -Social work team to discuss and assist patient family with possible placement  It is unsafe for patient to live independently at this point in time      Patient is a 58-year-old female with PMH of dementia, HLD, HTN and anxiety was brought to the ER for increased altered mental status and hallucinations  Patient has had worsening over the past week, the last few days have increased with hallucinations  According to patient's family they do not believe she has been taking her medications, she does live alone  Patient was brought to the ER blood pressure 112/72  She had a CTh completed that showed ventriculomegaly with possible NPH  Also showed some nodules and recommended MRI with without contrast with focus of the pituitary for differential pituitary adenoma versus Rathke's cyst or hypophysitis  Patient admitted, MRI of the brain is ordered, will likely be done on 2/16/2023 as previously scheduled as an outpatient  In discussing with the patient she has had episodes of incontinence at home  States she has had urinary frequency when she has to go sometimes she will just go  Patient has had no episodes of incontinence during her hospital stay  Patient denies difficulties with ambulation, denies any falls however gait testing initially she had unsteady gait patterns  Patient has since had some improvements with regards to the strength in her legs and her gait patterns  Patient is alert and oriented intermittently throughout the day  She does have periods of confusion  She has been seen by psychiatry who is added Zyprexa as needed for her hallucinations  She is started on Remeron for better sleep and donepezil/memantine combination for her memory changes  Patient is scheduled for MRI this afternoon, waiting its findings for further recommendations  In the meantime patient will follow-up in the outpatient neurosurgery office with for evaluation of possible NPH in light of her ventriculomegaly seen on CTh  Patient should continue to work with psychiatry team in the outpatient setting  There is also been recommended patient has supervised living due to her intermittent confusion and her inability to fully manage for herself  Patient appears to have an appointment scheduled in the outpatient setting on 3/1/2023, may need inpatient psych evaluation for hallucinations if worsened over time           Mary Jo Crowley will need follow up in 8 weeks with general attending  She will not require outpatient neurological testing  Appears as though she also has an appointment scheduled in May which can be maintained  Would likely refer patient to outpatient neurosurgery for further evaluation of her NPH  Chief Complaint:    Subjective:   Patient without complaints today, states she feels she is doing well  Patient aware she is scheduled for MRI this afternoon  Past Medical History:   Diagnosis Date   • Anxiety    • Dementia ([facility] [address] )    • Hyperlipidemia    • Hypertension    • Memory difficulty      Social History     Socioeconomic History   • Marital status:       Spouse name: Not on file   • Number of children: Not on file   • Years of education: Not on file   • Highest education level: Not on file   Occupational History   • Not on file   Tobacco Use   • Smoking status: Every Day     Packs/day: 0 50     Years: 60 00     Pack years: 30 00     Types: Cigarettes   • Smokeless tobacco: Never   Vaping Use   • Vaping Use: Never used   Substance and Sexual Activity   • Alcohol use: Never   • Drug use: Never   • Sexual activity: Not Currently     Partners: Male   Other Topics Concern   • Not on file   Social History Narrative   • Not on file     Social Determinants of Health     Financial Resource Strain: Not on file   Food Insecurity: No Food Insecurity   • Worried About Running Out of Food in the Last Year: Never true   • Ran Out of Food in the Last Year: Never true   Transportation Needs: No Transportation Needs   • Lack of Transportation (Medical): No   • Lack of Transportation (Non-Medical): No   Physical Activity: Not on file   Stress: Not on file   Social Connections: Not on file   Intimate Partner Violence: Not on file   Housing Stability: Low Risk    • Unable to Pay for Housing in the Last Year: No   • Number of Places Lived in the Last Year: 1   • Unstable Housing in the Last Year: No     History reviewed  No pertinent family history  ROS:  Please see HPI for positive symptoms  No fever, no chills, no weight change  Ocular: No diplopia, no blurred vision, spot/zigzag lines   HEENT:  No sore throat, headache or congestion  No neck pain  COR:  No chest pain  No palpitations  Lungs:  no sob  GI:  no  nausea, no vomiting, no diarrhea, no constipation, no anorexia  :  No dysuria, frequency, or urgency  No hematuria  Musculoskeletal:  No joint pain or swelling   Skin:  No rash or itching  Psychiatric:  no anxiety, no depression  Endocrine:  No polyuria or polydipsia        Objective:  /70 (BP Location: Left arm)   Pulse 87   Temp 98 3 °F (36 8 °C) (Oral)   Resp 18   Ht 5' 1" (1 549 m)   Wt 44 kg (97 lb)   SpO2 92%   BMI 18 33 kg/m²     General: alert   Mental status: oriented x3  Attention: normal  Knowledge: fair  Language and Speech: normal  Cranial nerves:   CN II: Visual fields are full to confrontation  Fundoscopic exam is normal with sharp discs and no vascular changes  Pupils are 3 mm and briskly reactive to light  CN III, IV, VI: At primary gaze, there is no eye deviation  CN V: Facial sensation is intact in all 3 divisions bilaterally  Corneal responses are intact  CN VII: Face is symmetrical, with normal eye closure and smile  CN VIII: Hearing is normal to rubbing fingers  CN IX, X: Palate elevates symmetrically  Phonation is normal   CN XI: Head turning and shoulder shrug are intact  CN XII: Tongue is midline with normal movements and no atrophy  Motor: There is no pronator drift of out-stretched arms  Muscle bulk and tone are normal       Muscle exam  Arm Right Left Leg Right Left   Deltoid 5/5 5/5 Iliopsoas 5/5 5/5   Biceps 5/5 5/5 Quads 5/5 5/5   Triceps 5/5 5/5 Hamstrings 5/5 5/5   Wrist Extension 5/5 5/5 Ankle Dorsi Flexion 5/5 5/5   Wrist Flexion 5/5 5/5 Ankle Plantar Flexion 5/5 5/5       Sensory: normal to light touch, temperature, vibration   Proprioception intact  Gait: steady with use of cane  Coordination: finger to nose and heel to toe normal     Reflexes    RJ BJ TJ KJ AJ Plantars Rene's   Right 2+ 2+ 2+ 2+ 2+ Downgoing Not present   Left 2+ 2+ 2+ 2+ 2+ Downgoing Not present      Heart: Regular rate and rhythm  Lung: No audible wheezing or stridor heard  Abd: soft, non-tender, non-distended   Skin: dry and intact    Labs:      Lab Results   Component Value Date    WBC 8 98 02/15/2023    HGB 12 4 02/15/2023    HCT 37 0 02/15/2023     (H) 02/15/2023     02/15/2023     No results found for: HGBA1C  Lab Results   Component Value Date    ALT 46 02/13/2023    AST 35 02/13/2023    ALKPHOS 57 02/13/2023     Lab Results   Component Value Date    CALCIUM 8 2 (L) 02/16/2023    K 4 3 02/16/2023    CO2 25 02/16/2023     02/16/2023    BUN 25 02/16/2023    CREATININE 0 75 02/16/2023         Review of reports and notes reveal:   Independent review of films/reports:  CT head without contrast    Result Date: 2/10/2023  No acute intracranial abnormality  Unchanged moderate ventriculomegaly out of proportion to degree of cerebral atrophy  Question normal pressure hydrocephalus  Unchanged small area of nodularity extending from the sella into suprasellar region  Differential includes pituitary adenoma, Rathke's cleft cyst, or hypophysitis  Recommend MRI brain pituitary protocol with and without contrast for further evaluation  The study was marked in [facility] for immediate notification  Workstation performed: [id number]     XR chest 1 view    Result Date: 2/11/2023  Emphysema with no definite acute disease  Workstation performed: [id number]         Thank you for this consult  Total time of encounter:  40 min  More than 50% of the time was used in counseling and/or coordination of care  Extent of couseling and/or coordination of care regarding symptoms, diagnostics and treatment plans    Discussed with medical team and neurology MD

## 2023-02-16 NOTE — PROGRESS NOTES
[address] [address]  Progress Note Anais Lima 1945, 65 y o  female MRN: [id number]  Unit/Bed#: [address] [place] [address] Encounter: [id number]  Primary Care Provider: No primary care provider on file  Date and time admitted to hospital: 2/13/2023  1:12 PM    * Encephalopathy  Assessment & Plan  Patient presented to ED on 2/10 for altered mental status and hallucinations  She lives alone at home, questionable whether she was taking her medications as prescribed  Her granddaughter took her to her PCP a few days prior thinking she may have a UTI and was started on bactrim  She presents today with increased hallucinations at home  · Pt had MRI brain scheduled for Thursday, family spoke with Dr Denia Lassiter who recommended they take her to ER for admission  · CT head: No acute intracranial abnormality  Unchanged moderate ventriculomegaly out of proportion to degree of cerebral atrophy  Question normal pressure hydrocephalus  Unchanged small area of nodularity extending from the sella into suprasellar region  Differential includes pituitary adenoma, Rathke's cleft cyst, or hypophysitis  Recommend MRI brain pituitary protocol with and without contrast for further evaluation    · VSS and cbc and bmp WNL  · Urine culture shows no growth  · Covid negative   · B12 593  · Appreciate neurology input   · Planning for inpatient MRI brain for further evaluation (scheduled Thursday afternoon)  · Daughter would like to consider need for transfer if findings of NPH  · Appreciate psychiatry input   · Concern for dementia with depression with hallucinations  · Change Remeron to Lexapro 10 mg daily and discontinue as needed Zyprexa as patient has not had any agitation or hallucinations  · Neuro checks q4  · Repeat thyroid panel   · PT/OT eval and treat, recommending home with home health services    Depression  [address] psychiatry input  Suspecting dementia with depression with hallucinations  · Change Remeron to Lexapro    Dementia with behavioral disturbance  Assessment & Plan  · Continue donepezil 10 mg    Hypothyroidism  Assessment & Plan  · TSH high 9 994, Free T3 low 1 72, Free T4 normal 0 78  · Subclinical thyroidism versus true hypothyroidism  · We will add Synthroid 25 mcg daily  · Recommend repeat TSH, free T3 and free T4 in 6 weeks    Cardiomyopathy [facility])  Assessment & Plan  Patient has cardiomyopathy of unclear etiology  · Continue coreg 3 125 mg twice a day, lisinopril 2 5 mg and aldactone 25 mg every other day  · No evidence of acute exacerbation    Emphysema lung (HCC)  Assessment & Plan  Noted on CXR  Continues to smoke  · Albuterol INH PRN     Hypertension  Assessment & Plan  · Continue coreg and lisinopril  · BP stable  · Continue to monitor    Hyperlipidemia  Assessment & Plan  · Continue statin    Coronary artery disease involving native coronary artery of native heart without angina pectoris  Assessment & Plan  Nonobstructive CAD seen on cardiac catheterization in 5/2022  · Continue coreg, statin    Tobacco abuse  Assessment & Plan  · Patient has smoked since she was a teenager  · Recently cut down to 3/4 pack from a pack per day  · Nicotine patch  · Encourage cessation      VTE Pharmacologic Prophylaxis: VTE Score: 3 Moderate Risk (Score 3-4) - Pharmacological DVT Prophylaxis Ordered: enoxaparin (Lovenox)  Patient Centered Rounds: I performed bedside rounds with nursing staff today  Discussions with Specialists or Other Care Team Provider: nursing, CM    Education and Discussions with Family / Patient: Updated  (daughter) via phone      Total Time Spent on Date of Encounter in care of patient: 25 minutes This time was spent on one or more of the following: performing physical exam; counseling and coordination of care; obtaining or reviewing history; documenting in the medical record; reviewing/ordering tests, medications or procedures; communicating with other healthcare professionals and discussing with patient's family/caregivers  Current Length of Stay: 2 day(s)  Current Patient Status: Inpatient   Certification Statement: The patient will continue to require additional inpatient hospital stay due to MRI brain today   Discharge Plan: Anticipate discharge tomorrow to home  Code Status: Level 3 - DNAR and DNI    Subjective:   Patient seen and examined at bedside  Offers no complaints  Per RN, she is cooperative with midline insertion  Eating and drinking well  Objective:     Vitals:   Temp (24hrs), Av 1 °F (36 7 °C), Min:97 9 °F (36 6 °C), Max:98 3 °F (36 8 °C)    Temp:  [97 9 °F (36 6 °C)-98 3 °F (36 8 °C)] 98 3 °F (36 8 °C)  HR:  [] 87  Resp:  [14-19] 18  BP: (105-147)/(55-82) 105/70  SpO2:  [92 %-97 %] 92 %  Body mass index is 18 33 kg/m²  Input and Output Summary (last 24 hours):   No intake or output data in the 24 hours ending 23 1342    Physical Exam:   Physical Exam  Vitals and nursing note reviewed  Constitutional:       General: She is not in acute distress  Appearance: She is ill-appearing (Deconditioned)  She is not toxic-appearing or diaphoretic  Comments: Pleasant female, forgetful, resting in bed on room air   HENT:      Head: Normocephalic  Mouth/Throat:      Mouth: Mucous membranes are moist    Eyes:      Conjunctiva/sclera: Conjunctivae normal    Cardiovascular:      Rate and Rhythm: Normal rate  Pulmonary:      Effort: Pulmonary effort is normal       Breath sounds: Normal breath sounds  No wheezing, rhonchi or rales  Abdominal:      General: Bowel sounds are normal  There is no distension  Palpations: Abdomen is soft  Tenderness: There is no abdominal tenderness  Musculoskeletal:         General: Normal range of motion  Cervical back: Normal range of motion  Right lower leg: No edema  Left lower leg: No edema  Skin:     General: Skin is warm and dry  Capillary Refill: Capillary refill takes less than 2 seconds  Neurological:      Mental Status: She is alert and oriented to person, place, and time  Mental status is at baseline  Motor: Weakness (Generalized) present  Psychiatric:         Mood and Affect: Mood normal          Speech: Speech normal          Behavior: Behavior normal  Behavior is cooperative  Cognition and Memory: Cognition is impaired  Memory is impaired  Judgment: Judgment is impulsive  Additional Data:     Labs:  Results from last 7 days   Lab Units 02/15/23  0523 02/14/23  0542 02/13/23  1441   WBC Thousand/uL 8 98   < > 8 27   HEMOGLOBIN g/dL 12 4   < > 13 8   HEMATOCRIT % 37 0   < > 41 6   PLATELETS Thousands/uL 216   < > 278   NEUTROS PCT %  --   --  64   LYMPHS PCT %  --   --  27   MONOS PCT %  --   --  6   EOS PCT %  --   --  2    < > = values in this interval not displayed  Results from last 7 days   Lab Units 02/16/23  0451 02/14/23  0542 02/13/23  1441   SODIUM mmol/L 138   < > 136   POTASSIUM mmol/L 4 3   < > 4 2   CHLORIDE mmol/L 107   < > 103   CO2 mmol/L 25   < > 29   BUN mg/dL 25   < > 19   CREATININE mg/dL 0 75   < > 0 83   ANION GAP mmol/L 6   < > 4   CALCIUM mg/dL 8 2*   < > 8 8   ALBUMIN g/dL  --   --  3 7   TOTAL BILIRUBIN mg/dL  --   --  0 19*   ALK PHOS U/L  --   --  57   ALT U/L  --   --  46   AST U/L  --   --  35   GLUCOSE RANDOM mg/dL 85   < > 101    < > = values in this interval not displayed                   Results from last 7 days   Lab Units 02/10/23  1542   LACTIC ACID mmol/L 0 5       Lines/Drains:  Invasive Devices     Peripheral Intravenous Line  Duration           Peripheral IV 02/13/23 Distal;Left;Upper;Ventral (anterior) Arm 2 days                      Imaging: Reviewed radiology reports from this admission including: chest xray and CT head    Recent Cultures (last 7 days):         Last 24 Hours Medication List:   Current Facility-Administered Medications   Medication Dose Route Frequency Provider Last Rate   • acetaminophen  650 mg Oral Q6H PRN BOBBY Lara     • ascorbic acid  500 mg Oral Daily DaiRACHEL BarrazaC     • carvedilol  3 125 mg Oral BID With Meals BOBBY Lara     • cholecalciferol  2,000 Units Oral Daily Daiva [place], PA-C     • donepezil  10 mg Oral HS Daiva [place] PA-C     • enoxaparin  40 mg Subcutaneous Daily Daiva ARTUR Brandt-C     • escitalopram  10 mg Oral Daily Barrera Beckham MD     • [START ON 2/17/2023] levothyroxine  25 mcg Oral Early Morning BOBBY Lara     • lisinopril  2 5 mg Oral Daily Daiva RACHEL BrandtC     • melatonin  3 mg Oral HS PRN RACHEL QuarlesC     • memantine  5 mg Oral Daily Barrera Beckham MD     • multivitamin stress formula  1 tablet Oral Daily Daiva Karly PA-C     • nicotine  1 patch Transdermal Daily Daiva ARTUR Brandt-C     • ondansetron  4 mg Intravenous Q6H PRN BOBBY Lara     • pravastatin  40 mg Oral Every Other Day Daiva Karly PA-C     • spironolactone  25 mg Oral Every Other Day Daiva Karly PA-C          Today, Patient Was Seen By: BOBBY Lara    **Please Note: This note may have been constructed using a voice recognition system  **

## 2023-02-16 NOTE — ASSESSMENT & PLAN NOTE
· TSH high 9 994, Free T3 low 1 72, Free T4 normal 0 78  · Subclinical thyroidism versus true hypothyroidism  · We will add Synthroid 25 mcg daily  · Recommend repeat TSH, free T3 and free T4 in 6 weeks

## 2023-02-16 NOTE — ASSESSMENT & PLAN NOTE
Appreciate psychiatry input  Suspecting dementia with depression with hallucinations  · Change Remeron to Lexapro

## 2023-02-16 NOTE — PHYSICAL THERAPY NOTE
PHYSICAL THERAPY     02/16/23 1517   Note Type   Note type Evaluation; Cancelled Session   Cancel Reasons Patient off floor/test  (MRI, will re-attempt at a later time)   Licensure   [place] License Number  Rakan Lemus PT [id number]

## 2023-02-16 NOTE — ASSESSMENT & PLAN NOTE
Patient presented to ED on 2/10 for altered mental status and hallucinations  She lives alone at home, questionable whether she was taking her medications as prescribed  Her granddaughter took her to her PCP a few days prior thinking she may have a UTI and was started on bactrim  She presents today with increased hallucinations at home  · Pt had MRI brain scheduled for Thursday, family spoke with Dr Nikunj Brandt who recommended they take her to ER for admission  · CT head: No acute intracranial abnormality  Unchanged moderate ventriculomegaly out of proportion to degree of cerebral atrophy  Question normal pressure hydrocephalus  Unchanged small area of nodularity extending from the sella into suprasellar region  Differential includes pituitary adenoma, Rathke's cleft cyst, or hypophysitis  Recommend MRI brain pituitary protocol with and without contrast for further evaluation    · VSS and cbc and bmp WNL  · Urine culture shows no growth  · Covid negative   · B12 593  · Appreciate neurology input   ·  MRI brain for further evaluation pending  · Daughter would like to consider need for transfer if findings of NPH  · Appreciate psychiatry input   · Concern for dementia with depression with hallucinations  · Change Remeron to Lexapro 10 mg daily and discontinue as needed Zyprexa as patient has not had any agitation or hallucinations  · Neuro checks q4  · Repeat thyroid panel   · PT/OT eval and treat, recommending home with home health services

## 2023-02-16 NOTE — PROGRESS NOTES
Patient examined spoke with the nurse patient is alert awake oriented to place and person she looks sluggish and tired she reports that she is trying to eat but she always been like this she cannot gain weight nurse reports that patient is doing all right patient has no previous of confusion patient is able to answer my questions and currently she denies hallucination  Patient reports that she missed her daughter and  who  and she feels sad about it  Currently patient is not hallucinating she is not agitated I reviewed her medications and spoke to the nurse  Patient could not tell me why she came here she said that'' my children brought me here to check me out'' when I asked her whether anything is bothering her she denied having any complaint  Patient is suffering from dementia with memory loss and depression she is on Aricept at home from the family physician but she is not under psychiatric care  I reviewed all her psychotropic medications  I will change her Remeron to Lexapro less sedating medications since patient has no problem with the sleeping I will continue Aricept and Namenda as ordered also I will discontinue Zyprexa as needed since patient is not agitated and no evidence of psychosis hallucination at this time  Patient will benefit from psychiatric follow-up after the discharge patient does have depression secondary to loss of her  and daughter and she is depressed about it  Medical evaluation and treatment is in progress  Patient offers no new complaints  Patient is not agitated patient denies feeling suicidal   Patient is not in distress  Therapy is done with good response  I will follow-up

## 2023-02-17 LAB
ANION GAP SERPL CALCULATED.3IONS-SCNC: 5 MMOL/L (ref 4–13)
BUN SERPL-MCNC: 33 MG/DL (ref 5–25)
CALCIUM SERPL-MCNC: 7.9 MG/DL (ref 8.4–10.2)
CHLORIDE SERPL-SCNC: 108 MMOL/L (ref 96–108)
CO2 SERPL-SCNC: 28 MMOL/L (ref 21–32)
CREAT SERPL-MCNC: 0.72 MG/DL (ref 0.6–1.3)
GFR SERPL CREATININE-BSD FRML MDRD: 80 ML/MIN/1.73SQ M
GLUCOSE SERPL-MCNC: 81 MG/DL (ref 65–140)
POTASSIUM SERPL-SCNC: 4.5 MMOL/L (ref 3.5–5.3)
SODIUM SERPL-SCNC: 141 MMOL/L (ref 135–147)

## 2023-02-17 RX ADMIN — ESCITALOPRAM OXALATE 10 MG: 10 TABLET, FILM COATED ORAL at 08:16

## 2023-02-17 RX ADMIN — CARVEDILOL 3.12 MG: 3.12 TABLET, FILM COATED ORAL at 17:24

## 2023-02-17 RX ADMIN — ENOXAPARIN SODIUM 40 MG: 40 INJECTION SUBCUTANEOUS at 08:17

## 2023-02-17 RX ADMIN — LISINOPRIL 2.5 MG: 2.5 TABLET ORAL at 08:16

## 2023-02-17 RX ADMIN — DONEPEZIL HYDROCHLORIDE 10 MG: 5 TABLET ORAL at 21:25

## 2023-02-17 RX ADMIN — CARVEDILOL 3.12 MG: 3.12 TABLET, FILM COATED ORAL at 08:16

## 2023-02-17 RX ADMIN — NICOTINE 1 PATCH: 14 PATCH, EXTENDED RELEASE TRANSDERMAL at 08:18

## 2023-02-17 RX ADMIN — Medication 2000 UNITS: at 08:17

## 2023-02-17 RX ADMIN — B-COMPLEX W/ C & FOLIC ACID TAB 1 TABLET: TAB at 08:16

## 2023-02-17 RX ADMIN — MEMANTINE 5 MG: 5 TABLET ORAL at 08:17

## 2023-02-17 RX ADMIN — Medication 500 MG: at 08:16

## 2023-02-17 RX ADMIN — LEVOTHYROXINE SODIUM 25 MCG: 25 TABLET ORAL at 07:05

## 2023-02-17 NOTE — OCCUPATIONAL THERAPY NOTE
Occupational Therapy Treatment Note       02/17/23 1205   Note Type   Note Type Treatment   Pain Assessment   Pain Assessment Tool 0-10   Pain Score No Pain   Restrictions/Precautions   Other Precautions Chair Alarm; Bed Alarm; Fall Risk;Pain;Cognitive   ADL   Grooming Assistance 5  Supervision/Setup   Grooming Deficit Wash/dry hands; Wash/dry face;Brushing hair  (Standing to sink unsupported)   UB Dressing Assistance 5  Supervision/Setup   LB Dressing Assistance 4  Minimal Assistance   LB Dressing Comments Don scrub pants; assist for dynamic standing balance when standing to pull up over hips   Toileting Assistance  5  Supervision/Setup   Toileting Deficit Clothing management up;Clothing management down;Perineal hygiene   Transfers   Sit to Stand 5  Supervision   Additional items Verbal cues   Stand to Sit 5  Supervision   Additional items Verbal cues   Additional Comments STS x several trials throughout session; [facility] for support   Functional Mobility   Functional Mobility 5  Supervision   Additional Comments Patient ambulated short household distance to/from bathroom with SPC; min verbal cues for safety awareness   Toilet Transfers   Toilet Transfer Type To and from   Toilet Transfer to Standard toilet   Toilet Transfer Technique Ambulating   Toilet Transfers Minimal assistance  (With [facility])   Toilet Transfers Comments Min verbal cues for safe transfer technique   Cognition   Overall Cognitive Status Impaired   Arousal/Participation Alert; Cooperative  [facility])   Attention Attends with cues to redirect   Orientation Level Oriented to person;Oriented to place;Oriented to time;Disoriented to situation   Following Commands Follows multistep commands with increased time or repetition   Activity Tolerance   Activity Tolerance Patient limited by fatigue; Other (Comment)  (Limited by cognition)   Assessment   Assessment Patient seen for OT treatment this AM  Patient pleasant and cooperative; forgetful at times; able to complete all ADLs and functional mobility with cane at a supervision to min assist level; The patient's raw score on the AM-PAC Daily Activity Inpatient Short Form is 20  A raw score of greater than or equal to 19 suggests the patient may benefit from discharge to home  Please refer to the recommendation of the Occupational Therapist for safe discharge planning   At end of session patient seated in chair with all needs met, chair alarm set; Continue OT per POC   Plan   OT Frequency 3-5x/wk   Recommendation   OT Discharge Recommendation Home with home health rehabilitation   AM-[facility] Daily Activity Inpatient   Lower Body Dressing 3   Bathing 3   Toileting 3   Upper Body Dressing 3   Grooming 4   Eating 4   Daily Activity Raw Score 20   Daily Activity Standardized Score (Calc for Raw Score >=11) 42 03   AM-PAC Applied Cognition Inpatient   Following a Speech/Presentation 2   Understanding Ordinary Conversation 3   Taking Medications 2   Remembering Where Things Are Placed or Put Away 3   Remembering List of 4-5 Errands 3   Taking Care of Complicated Tasks 2   Applied Cognition Raw Score 15   Applied Cognition Standardized Score 56 98   Licensure   [place] License Number  Julio César Mohamud, CLEMENCIAR/LACEY [id number]

## 2023-02-17 NOTE — PHYSICAL THERAPY NOTE
PT TREATMENT       02/17/23 1000   PT Last Visit   PT Visit Date 02/17/23   Note Type   Note type Evaluation   Pain Assessment   Pain Assessment Tool 0-10   Pain Score No Pain   Patient's Stated Pain Goal No pain   Restrictions/Precautions   Weight Bearing Precautions Per Order No   Other Precautions Bed Alarm; Chair Alarm; Fall Risk;Pain;Cognitive   Home Living   Type of [address] Two level;Stairs to enter with rails  (3 DEEP)   Bathroom Shower/Tub Walk-in shower   Bathroom Toilet Standard   Bathroom Equipment Grab bars in shower; Shower chair   Home Equipment Cane   Prior Function   Level of [place] Independent with ADLs; Independent with functional mobility; Needs assistance with IADLS   Lives With Alone   Receives Help From Family   IADLs Family/Friend/Other provides transportation; Family/Friend/Other provides meals; Family/Friend/Other provides medication management   Vocational Retired   Comments Pt reports IND PTA, walking with cane, requires assist with ADLs   General   Additional Pertinent History Pt is a 65year-old female who was admitted to the hospital on 2/13/23 due to altered mental status   Family/Caregiver Present No   Cognition   Orientation Level Oriented X4   Following Commands Follows multistep commands with increased time or repetition   Subjective   Subjective "I clogged the toilet"   RLE Assessment   RLE Assessment WFL   LLE Assessment   LLE Assessment WFL   Bed Mobility   Additional Comments Received ambulating to door, sitting in bedside chair   Transfers   Sit to Stand 5  Supervision   Additional items Verbal cues   Stand to Sit 5  Supervision   Additional items Verbal cues   Stand pivot 5  Supervision   Additional items Verbal cues   Ambulation/Elevation   Gait pattern Step through pattern   Gait Assistance 5  Supervision   Additional items Verbal cues   Assistive Device Straight cane   Distance 75 feet   Stair Management Assistance 5  Supervision   Additional items Verbal cues   Stair Management Technique One rail R;With cane;Reciprocal   Number of Stairs 8   Balance   Static Sitting Good   Dynamic Sitting Fair +   Static Standing Fair   Dynamic Standing [address],Floors 3,4, & 5 -   Activity Tolerance   Activity Tolerance Patient tolerated treatment well   Nurse Made Aware yes   Assessment   Prognosis Good   Problem List Decreased endurance;Decreased mobility; Impaired balance;Decreased safety awareness; Impaired judgement   Assessment Patient seen for Physical Therapy evaluation  Patient admitted with Encephalopathy  Comorbidities affecting patient's physical performance include: CAD, cardiac disease, dementia, depression and hypertension  Personal factors affecting patient at time of initial evaluation include: lives in 2 story house, ambulating with assistive device, stairs to enter home, inability to navigate community distances, decreased cognition, inability to perform IADLS  and inability to live alone  Prior to admission, patient was independent with functional mobility with cane, independent with ADLS, requiring assist for IADLS, living alone in 2 story home with 3 steps to enter and ambulating household distance  Please find objective findings from Physical Therapy assessment regarding body systems outlined above with impairments and limitations including weakness, decreased endurance, decreased activity tolerance, decreased functional mobility tolerance, decreased safety awareness, impaired judgement and fall risk  The Barthel Index was used as a functional outcome tool presenting with a score of Barthel Index Score: 65 today indicating moderate limitations of functional mobility and ADLS  Patient's clinical presentation is currently evolving as seen in patient's presentation of vital sign response, increased fall risk and decreased endurance   Pt would benefit from continued Physical Therapy treatment to address deficits as defined above and maximize level of functional mobility  As demonstrated by objective findings, the assigned level of complexity for this evaluation is moderate  The patient's [facility] Basic Mobility Inpatient Short Form Raw Score is 20  A Raw score of greater than 16 suggests the patient may benefit from discharge to home  Please also refer to the recommendation of the Physical Therapist for safe discharge planning  Goals   Patient Goals to go home   STG Expiration Date 02/24/23   Short Term Goal #1 Pt will perform all bed mobility/transfers IND ; pt will improve standing balance to Fair+ to decrease risk of falls   Short Term Goal #2 Pt will ambulate x 300 feet Mod I with cane ; pt will negotiate x 10 steps with supervision + rail   LTG Expiration Date 03/03/23   Long Term Goal #1 Pt will improve standing balance to Good to decrease risk of falls   Long Term Goal #2 Pt will ambulate x 500 feet Mod I with cane ; Pt will negotiate 10 steps Mod I with cane/rail   Plan   Treatment/Interventions Functional transfer training;LE strengthening/ROM; Therapeutic exercise; Endurance training;Gait training;Spoke to nursing   PT Frequency Other (Comment)  (5x/week)   Recommendation   PT Discharge Recommendation Home with home health rehabilitation   [facility] Basic Mobility Inpatient   Turning in Flat Bed Without Bedrails 4   Lying on Back to Sitting on Edge of Flat Bed Without Bedrails 4   Moving Bed to Chair 3   Standing Up From Chair Using Arms 3   Walk in Room 3   Climb 3-5 Stairs With Railing 3   Basic Mobility Inpatient Raw Score 20   Basic Mobility Standardized Score 43 99   Highest Level Of Mobility   JH-HLM Goal 6: Walk 10 steps or more   JH-HLM Achieved 7: Walk 25 feet or more   Barthel Index   Feeding 10   Bathing 0   Grooming Score 0   Dressing Score 5   Bladder Score 10   Bowels Score 10   Toilet Use Score 5   Transfers (Bed/Chair) Score 10   Mobility (Level Surface) Score 10   Stairs Score 5   Barthel Index Score 65   Additional Treatment Session   Start Time 5073 End Time 1000   Treatment Assessment S: "Yeah I can keep walking" O/A: Pt agreeable to PT session this AM  Pt able to ambulate additional 100 feet with supervision using Cane, but inconsistently using on floor  Pt able to negotiate stairs with supervsion with rail R kyaw + holding cane  Pt repositioned in bedside chair with all needs within reach, chair alarm donned  P: pt will benefit from skilled PT to address deficits to maximize IND for safe d/c   Equipment Use cane   End of Consult   Patient Position at End of Consult Bedside chair;Bed/Chair alarm activated; All needs within reach   [facility] Number  Re Prowers [id number]

## 2023-02-17 NOTE — CASE MANAGEMENT
Case Management Discharge Planning Note    Patient name Roni Neumann  Location [address] [place] [address] MRN [id number]  : 1945 Date 2023       Current Admission Date: 2023  Current Admission Diagnosis:Encephalopathy   Patient Active Problem List    Diagnosis Date Noted   • Hypothyroidism 2023   • Depression 02/15/2023   • Emphysema lung ([facility] ) 02/15/2023   • Cardiomyopathy ([facility] ) 2023   • Encephalopathy 2023   • Underweight 10/26/2022   • Syncope 10/25/2022   • Hyperlipidemia 10/25/2022   • Hypertension 10/25/2022   • Dementia with behavioral disturbance 10/25/2022   • Chronic combined systolic and diastolic congestive heart failure ([facility] ) 10/25/2022   • Anxiety 10/25/2022   • Elevated TSH 2022   • Memory difficulty 2022   • Heart failure, left, with LVEF 41-49% ([facility] ) 2022   • Coronary artery disease involving native coronary artery of native heart without angina pectoris 2022   • BMI less than 19,adult 2022   • S/P hip replacement, right 2022   • Sequelae of protein-calorie malnutrition ([facility] )  2022   • Subclinical hypothyroidism 2022   • Personal history of nicotine dependence  2022   • Age related osteoporosis 2022   • Tobacco abuse 2021      LOS (days): 3  Geometric Mean LOS (GMLOS) (days): 3 40  Days to GMLOS:0 5     OBJECTIVE:  Risk of Unplanned Readmission Score: 18 49         Current admission status: Inpatient   Preferred Pharmacy:   [facility] [address] KAMLA Gutierrez  [address] [address]  Phone: [phone] Fax: [phone]    [facility] ([facility],   [address] [facility]  [address] [place] [id number]  Phone: [phone] Fax: [phone]    Primary Care Provider: No primary care provider on file      Primary Insurance: MEDICARE  Secondary Insurance: AETNA    DISCHARGE DETAILS:    Discharge planning discussed with[de-identified] Valentine (dtr in law)  Freedom of Choice: Yes  Comments - Freedom of Choice: HHC -preference  CM contacted family/caregiver?: Yes  Were Treatment Team discharge recommendations reviewed with patient/caregiver?: Yes  Did patient/caregiver verbalize understanding of patient care needs?: Yes  Were patient/caregiver advised of the risks associated with not following Treatment Team discharge recommendations?: Yes    Contacts  Patient Contacts: Cas Hagen (dtr in law)  Relationship to Patient[de-identified] Family  Contact Method: Phone  Phone Number: [phone]  Reason/Outcome: Discharge Planning, Emergency Contact        Other Referral/Resources/Interventions Provided:  Referral Comments: CM called and spoke with dtr in law Valentine to discuss dcp and [facility] per therapy recs  Valentine voiced concerns about MRI results as she had not heard from anyone regarding the results and a decision would be made based on the MRI results as the concern was that patient may not be safe enough to return home and care for herself  CM informed Valentine that the physician will reach out to her with MRI results and CM will follow up regarding STR or HHC  Valentine agreeable  CM Selene Willams made aware of the same and CM reached out to [facility] for MRI results and if she could call the dtr in law and talk to her  Jock Kayser stated that the MRI was not read yet  CM followed up with Saw later in the afternoon and was made aware that lesions were found per MRI results and patient will require additional follow up  Patient not discharding today 2/17 per Saw  CM placed referral for [facility] via [address], awaiting response

## 2023-02-17 NOTE — PROGRESS NOTES
Patient examined spoke to the nurse neurology follow-up notes reviewed and noted  Patient is sitting in the chair in front of her food tray when I ask her is she going to eat her lunch she responded by saying that she is not hungry patient is encouraged to eat enough because she is malnourished and underweight she reports that she always have a poor appetite  When asked her about memory loss she is not aware of memory loss and when it started but she said that her children pointed out to her and they brought her to the hospital for evaluation  Patient seems better no longer confused and she denies hallucinations  She was able to communicate her feelings well she was able to tell me her age she knows that she is at the hospital   When I asked her what is bothering her she responded by saying'' family issue'' when I asked her to tell me what she is talking about then she reports that she has been giving the money to her daughter-in-law and she is not getting back also she reports that'' I needed money to pay my bills, I always paid my bills'' she is stressed worried about it and upset about it after the discussion I recommended that if she does not have enough money to pay her bills she should not give her money to anyone she seems to understand and agreed that she will not give her money to anyone anymore so she has enough to pay her bills also I reminded her that down the road she will need her money for herself to take care of her health she seems to understand the patient is suffering from dementia with depression andperiods of confusion she has a memory loss she was brought in after she was hallucinating  Patient was taken off the psychotropic medications and her Remeron was changed to the Lexapro she seems better with the lethargy she is not confused patient does have trouble in ambulation I will try to avoid the sedating medications    Currently patient is tolerating her medications well she looks depressed her affect is flat speech is slow and low in tone she is a poor historian but she was able to express her feelings well  Neurological evaluation is in progress  Patient is not psychotic patient is not agitated patient is not lethargic and no behavioral problem reported or noted  Patient may benefit from supervised living at least assisted living  Therapy is done with good response  I will follow-up

## 2023-02-17 NOTE — PROGRESS NOTES
Deni U  65   Progress Note Mireille Chapa 1945, 65 y o  female MRN: [id number]  Unit/Bed#: [address] [place] [address] Encounter: [id number]  Primary Care Provider: No primary care provider on file  Date and time admitted to hospital: 2/13/2023  1:12 PM    * Encephalopathy  Assessment & Plan  Patient presented to ED on 2/10 for altered mental status and hallucinations  She lives alone at home, questionable whether she was taking her medications as prescribed  Her granddaughter took her to her PCP a few days prior thinking she may have a UTI and was started on bactrim  She presents today with increased hallucinations at home  · Pt had MRI brain scheduled for Thursday, family spoke with Dr Denice Gomez who recommended they take her to ER for admission  · CT head: No acute intracranial abnormality  Unchanged moderate ventriculomegaly out of proportion to degree of cerebral atrophy  Question normal pressure hydrocephalus  Unchanged small area of nodularity extending from the sella into suprasellar region  Differential includes pituitary adenoma, Rathke's cleft cyst, or hypophysitis  Recommend MRI brain pituitary protocol with and without contrast for further evaluation    · VSS and cbc and bmp WNL  · Urine culture shows no growth  · Covid negative   · B12 593  · Appreciate neurology input   ·  MRI brain for further evaluation pending  · Daughter would like to consider need for transfer if findings of NPH  · Appreciate psychiatry input   · Concern for dementia with depression with hallucinations  · Change Remeron to Lexapro 10 mg daily and discontinue as needed Zyprexa as patient has not had any agitation or hallucinations  · Neuro checks q4  · Repeat thyroid panel   · PT/OT eval and treat, recommending home with home health services    Hypothyroidism  Assessment & Plan  · TSH high 9 994, Free T3 low 1 72, Free T4 normal 0 78  · Subclinical thyroidism versus true hypothyroidism  · We will add Synthroid 25 mcg daily  · Recommend repeat TSH, free T3 and free T4 in 6 weeks    Emphysema lung (HCC)  Assessment & Plan  Noted on CXR  Continues to smoke  · Albuterol INH PRN     Depression  Assessment & Plan  Appreciate psychiatry input  Suspecting dementia with depression with hallucinations  · Change Remeron to Lexapro    Cardiomyopathy [facility])  Assessment & Plan  Patient has cardiomyopathy of unclear etiology  · Continue coreg 3 125 mg twice a day, lisinopril 2 5 mg and aldactone 25 mg every other day  · No evidence of acute exacerbation    Dementia with behavioral disturbance  Assessment & Plan  · Continue donepezil 10 mg    Hypertension  Assessment & Plan  · Continue coreg and lisinopril  · BP stable  · Continue to monitor    Hyperlipidemia  Assessment & Plan  · Continue statin    Coronary artery disease involving native coronary artery of native heart without angina pectoris  Assessment & Plan  Nonobstructive CAD seen on cardiac catheterization in 5/2022  · Continue coreg, statin    Tobacco abuse  Assessment & Plan  · Patient has smoked since she was a teenager  · Recently cut down to 3/4 pack from a pack per day  · Nicotine patch  · Encourage cessation          VTE Pharmacologic Prophylaxis: VTE Score: 3 High Risk (Score >/= 5) - Pharmacological DVT Prophylaxis Ordered: enoxaparin (Lovenox)  Sequential Compression Devices Ordered  Patient Centered Rounds: I performed bedside rounds with nursing staff today  Discussions with Specialists or Other Care Team Provider: Yes    Education and Discussions with Family / Patient: Attempted to update  (daughter) via phone  Left voicemail       Total Time Spent on Date of Encounter in care of patient: 25 minutes This time was spent on one or more of the following: performing physical exam; counseling and coordination of care; obtaining or reviewing history; documenting in the medical record; reviewing/ordering tests, medications or procedures; communicating with other healthcare professionals and discussing with patient's family/caregivers  Current Length of Stay: 3 day(s)  Current Patient Status: Inpatient   Certification Statement: The patient will continue to require additional inpatient hospital stay due to MRI pending  Discharge Plan: Anticipate discharge in 24-48 hrs to discharge location to be determined pending rehab evaluations  Code Status: Level 3 - DNAR and DNI    Subjective:   Patient seen and examined at bedside  Offers no complaints    Objective:     Vitals:   Temp (24hrs), Av 6 °F (36 4 °C), Min:96 5 °F (35 8 °C), Max:98 2 °F (36 8 °C)    Temp:  [96 5 °F (35 8 °C)-98 2 °F (36 8 °C)] 96 5 °F (35 8 °C)  HR:  [61-87] 61  Resp:  [16-20] 19  BP: (117-156)/(58-78) 120/78  SpO2:  [91 %-98 %] 95 %  Body mass index is 18 33 kg/m²  Input and Output Summary (last 24 hours): Intake/Output Summary (Last 24 hours) at 2023 1658  Last data filed at 2023 2000  Gross per 24 hour   Intake 410 ml   Output 200 ml   Net 210 ml       Physical Exam:   Physical Exam  Vitals and nursing note reviewed  Constitutional:       General: She is not in acute distress  Appearance: She is ill-appearing  She is not toxic-appearing or diaphoretic  HENT:      Head: Normocephalic  Mouth/Throat:      Mouth: Mucous membranes are moist    Eyes:      Conjunctiva/sclera: Conjunctivae normal    Cardiovascular:      Rate and Rhythm: Normal rate  Pulmonary:      Effort: Pulmonary effort is normal       Breath sounds: Normal breath sounds  No wheezing, rhonchi or rales  Abdominal:      General: Bowel sounds are normal  There is no distension  Palpations: Abdomen is soft  Tenderness: There is no abdominal tenderness  Musculoskeletal:         General: Normal range of motion  Cervical back: Normal range of motion  Right lower leg: No edema  Left lower leg: No edema  Skin:     General: Skin is warm and dry  Capillary Refill: Capillary refill takes less than 2 seconds  Neurological:      Mental Status: She is alert and oriented to person, place  Mental status is at baseline  Motor: Weakness (Generalized) present  Psychiatric:         Mood and Affect: Mood normal          Speech: Speech normal          Behavior: Behavior normal  Behavior is cooperative  Cognition and Memory: Cognition is impaired  Memory is impaired  Additional Data:     Labs:  Results from last 7 days   Lab Units 02/15/23  0523 02/14/23  0542 02/13/23  1441   WBC Thousand/uL 8 98   < > 8 27   HEMOGLOBIN g/dL 12 4   < > 13 8   HEMATOCRIT % 37 0   < > 41 6   PLATELETS Thousands/uL 216   < > 278   NEUTROS PCT %  --   --  64   LYMPHS PCT %  --   --  27   MONOS PCT %  --   --  6   EOS PCT %  --   --  2    < > = values in this interval not displayed  Results from last 7 days   Lab Units 02/17/23  0710 02/14/23  0542 02/13/23  1441   SODIUM mmol/L 141   < > 136   POTASSIUM mmol/L 4 5   < > 4 2   CHLORIDE mmol/L 108   < > 103   CO2 mmol/L 28   < > 29   BUN mg/dL 33*   < > 19   CREATININE mg/dL 0 72   < > 0 83   ANION GAP mmol/L 5   < > 4   CALCIUM mg/dL 7 9*   < > 8 8   ALBUMIN g/dL  --   --  3 7   TOTAL BILIRUBIN mg/dL  --   --  0 19*   ALK PHOS U/L  --   --  57   ALT U/L  --   --  46   AST U/L  --   --  35   GLUCOSE RANDOM mg/dL 81   < > 101    < > = values in this interval not displayed                         Lines/Drains:  Invasive Devices     Peripheral Intravenous Line  Duration           Long-Dwell Peripheral IV (Midline) 45/49/17 Right Cephalic 1 day                      Imaging: Reviewed radiology reports from this admission including: MRI brain    Recent Cultures (last 7 days):         Last 24 Hours Medication List:   Current Facility-Administered Medications   Medication Dose Route Frequency Provider Last Rate   • acetaminophen  650 mg Oral Q6H PRN BOBBY Cosme     • ascorbic acid  500 mg Oral Daily Katiuska Norton PA-C     • carvedilol  3 125 mg Oral BID With Meals BOBBY Savage     • cholecalciferol  2,000 Units Oral Daily Katiuska Norton PA-C     • donepezil  10 mg Oral HS Katiuska Norton PA-C     • enoxaparin  40 mg Subcutaneous Daily Katiuska Norton PA-C     • escitalopram  10 mg Oral Daily Theodore Cummings MD     • levothyroxine  25 mcg Oral Early Morning BOBBY Savage     • lisinopril  2 5 mg Oral Daily Katiuska Norton PA-C     • melatonin  3 mg Oral HS PRN Katiuska Norton PA-C     • memantine  5 mg Oral Daily Theodore Cummings MD     • multivitamin stress formula  1 tablet Oral Daily Katiuska Norton PA-C     • nicotine  1 patch Transdermal Daily Katiuska Norton PA-C     • ondansetron  4 mg Intravenous Q6H PRN BOBBY Savage     • pravastatin  40 mg Oral Every Other Day Katiuska Norton PA-C     • spironolactone  25 mg Oral Every Other Day Katiuska Norton PA-C          Today, Patient Was Seen By: BOBBY Singh    **Please Note: This note may have been constructed using a voice recognition system  **

## 2023-02-17 NOTE — PLAN OF CARE
Problem: PHYSICAL THERAPY ADULT  Goal: Performs mobility at highest level of function for planned discharge setting  See evaluation for individualized goals  Description: Treatment/Interventions: Functional transfer training, LE strengthening/ROM, Therapeutic exercise, Endurance training, Gait training, Spoke to nursing          See flowsheet documentation for full assessment, interventions and recommendations  Outcome: Progressing  Note: Prognosis: Good  Problem List: Decreased endurance, Decreased mobility, Impaired balance, Decreased safety awareness, Impaired judgement  Assessment: Patient seen for Physical Therapy evaluation  Patient admitted with Encephalopathy  Comorbidities affecting patient's physical performance include: CAD, cardiac disease, dementia, depression and hypertension  Personal factors affecting patient at time of initial evaluation include: lives in 2 story house, ambulating with assistive device, stairs to enter home, inability to navigate community distances, decreased cognition, inability to perform IADLS  and inability to live alone  Prior to admission, patient was independent with functional mobility with cane, independent with ADLS, requiring assist for IADLS, living alone in 2 story home with 3 steps to enter and ambulating household distance  Please find objective findings from Physical Therapy assessment regarding body systems outlined above with impairments and limitations including weakness, decreased endurance, decreased activity tolerance, decreased functional mobility tolerance, decreased safety awareness, impaired judgement and fall risk  The Barthel Index was used as a functional outcome tool presenting with a score of Barthel Index Score: 65 today indicating moderate limitations of functional mobility and ADLS  Patient's clinical presentation is currently evolving as seen in patient's presentation of vital sign response, increased fall risk and decreased endurance  Pt would benefit from continued Physical Therapy treatment to address deficits as defined above and maximize level of functional mobility  As demonstrated by objective findings, the assigned level of complexity for this evaluation is moderate  The patient's AM-PAC Basic Mobility Inpatient Short Form Raw Score is 20  A Raw score of greater than 16 suggests the patient may benefit from discharge to home  Please also refer to the recommendation of the Physical Therapist for safe discharge planning  PT Discharge Recommendation: Home with home health rehabilitation    See flowsheet documentation for full assessment

## 2023-02-18 LAB
ANION GAP SERPL CALCULATED.3IONS-SCNC: 4 MMOL/L (ref 4–13)
BUN SERPL-MCNC: 23 MG/DL (ref 5–25)
CALCIUM SERPL-MCNC: 8.2 MG/DL (ref 8.4–10.2)
CHLORIDE SERPL-SCNC: 105 MMOL/L (ref 96–108)
CO2 SERPL-SCNC: 28 MMOL/L (ref 21–32)
CREAT SERPL-MCNC: 0.7 MG/DL (ref 0.6–1.3)
ERYTHROCYTE [DISTWIDTH] IN BLOOD BY AUTOMATED COUNT: 12.9 % (ref 11.6–15.1)
GFR SERPL CREATININE-BSD FRML MDRD: 83 ML/MIN/1.73SQ M
GLUCOSE SERPL-MCNC: 84 MG/DL (ref 65–140)
HCT VFR BLD AUTO: 35.2 % (ref 34.8–46.1)
HGB BLD-MCNC: 11.5 G/DL (ref 11.5–15.4)
MCH RBC QN AUTO: 34 PG (ref 26.8–34.3)
MCHC RBC AUTO-ENTMCNC: 32.7 G/DL (ref 31.4–37.4)
MCV RBC AUTO: 104 FL (ref 82–98)
PLATELET # BLD AUTO: 210 THOUSANDS/UL (ref 149–390)
PMV BLD AUTO: 9.2 FL (ref 8.9–12.7)
POTASSIUM SERPL-SCNC: 4.3 MMOL/L (ref 3.5–5.3)
RBC # BLD AUTO: 3.38 MILLION/UL (ref 3.81–5.12)
SODIUM SERPL-SCNC: 137 MMOL/L (ref 135–147)
WBC # BLD AUTO: 10.32 THOUSAND/UL (ref 4.31–10.16)

## 2023-02-18 RX ADMIN — ENOXAPARIN SODIUM 40 MG: 40 INJECTION SUBCUTANEOUS at 08:43

## 2023-02-18 RX ADMIN — CARVEDILOL 3.12 MG: 3.12 TABLET, FILM COATED ORAL at 08:43

## 2023-02-18 RX ADMIN — LEVOTHYROXINE SODIUM 25 MCG: 25 TABLET ORAL at 05:12

## 2023-02-18 RX ADMIN — MEMANTINE 5 MG: 5 TABLET ORAL at 08:43

## 2023-02-18 RX ADMIN — PRAVASTATIN SODIUM 40 MG: 40 TABLET ORAL at 08:43

## 2023-02-18 RX ADMIN — DONEPEZIL HYDROCHLORIDE 10 MG: 5 TABLET ORAL at 21:18

## 2023-02-18 RX ADMIN — ESCITALOPRAM OXALATE 10 MG: 10 TABLET, FILM COATED ORAL at 08:43

## 2023-02-18 RX ADMIN — Medication 500 MG: at 08:43

## 2023-02-18 RX ADMIN — B-COMPLEX W/ C & FOLIC ACID TAB 1 TABLET: TAB at 08:43

## 2023-02-18 RX ADMIN — LISINOPRIL 2.5 MG: 2.5 TABLET ORAL at 08:43

## 2023-02-18 RX ADMIN — NICOTINE 1 PATCH: 14 PATCH, EXTENDED RELEASE TRANSDERMAL at 08:43

## 2023-02-18 RX ADMIN — Medication 2000 UNITS: at 08:43

## 2023-02-18 RX ADMIN — SPIRONOLACTONE 25 MG: 25 TABLET, FILM COATED ORAL at 08:44

## 2023-02-18 RX ADMIN — CARVEDILOL 3.12 MG: 3.12 TABLET, FILM COATED ORAL at 16:46

## 2023-02-18 NOTE — PROGRESS NOTES
-- Patient:  -- MRN: [id number]  -- Aidin Request ID: [id number]  -- Level of care reserved: [facility]  -- Partner Reserved: [facility], [address] [place] [address] [phone]  -- Clinical needs requested:  -- Geography searched: 10 miles around [facility]  -- Start of Service:  -- Request sent: 11:58am EST on 2/18/2023 by Jayson Haq  -- Partner reserved: 5:33pm EST on 2/18/2023 by Jayson Haq  -- Choice list shared: 5:29pm EST on 2/18/2023 by Jayson Haq

## 2023-02-18 NOTE — PROGRESS NOTES
Jessica [address]  Progress Note Augusto Gerardo 1945, 65 y o  female MRN: [id number]  Unit/Bed#: [address] [place] [address] Encounter: [id number]  Primary Care Provider: No primary care provider on file  Date and time admitted to hospital: 2/13/2023  1:12 PM    * Encephalopathy  Assessment & Plan  Patient presented to ED on 2/10 for altered mental status and hallucinations  She lives alone at home, questionable whether she was taking her medications as prescribed  Her granddaughter took her to her PCP a few days prior thinking she may have a UTI and was started on bactrim  She presents today with increased hallucinations at home  · Pt had MRI brain scheduled for Thursday, family spoke with Dr Rosio Webber who recommended they take her to ER for admission  · CT head: No acute intracranial abnormality  Unchanged moderate ventriculomegaly out of proportion to degree of cerebral atrophy  Question normal pressure hydrocephalus  Unchanged small area of nodularity extending from the sella into suprasellar region  Differential includes pituitary adenoma, Rathke's cleft cyst, or hypophysitis    Recommend MRI brain pituitary revealed 2 pituitary lesions, consistent with microadenoma and pituitary cyst  · Urine culture shows no growth  · Covid negative   · B12 593  · Appreciate neurology input   ·  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  · Follow-up with Dr Rosio Webber outpatient  · Daughter would like to consider need for transfer if findings of NPH  · Appreciate psychiatry input   · Concern for dementia with depression with hallucinations  · Change Remeron to Lexapro 10 mg daily and discontinue as needed Zyprexa as patient has not had any agitation or hallucinations  · Neuro checks q4  · Repeat thyroid panel in 6 to 8 weeks  · PT/OT eval and treat, recommending home with home health services    Hypothyroidism  Assessment & Plan  · TSH high 9 994, Free T3 low 1 72, Free T4 normal 0 78  · Subclinical thyroidism versus true hypothyroidism  · We will add Synthroid 25 mcg daily  · Recommend repeat TSH, free T3 and free T4 in 6 weeks    Hypertension  Assessment & Plan  · Continue coreg and lisinopril  · BP stable  · Continue to monitor    Hyperlipidemia  Assessment & Plan  · Continue statin    Tobacco abuse  Assessment & Plan  · Patient has smoked since she was a teenager  · Recently cut down to 3/4 pack from a pack per day  · Nicotine patch  · Encourage cessation      VTE Pharmacologic Prophylaxis: VTE Score: 3 High Risk (Score >/= 5) - Pharmacological DVT Prophylaxis Ordered: enoxaparin (Lovenox)  Sequential Compression Devices Ordered      Patient Centered Rounds: I performed bedside rounds with nursing staff today  Discussions with Specialists or Other Care Team Provider: Yes     Education and Discussions with Family / Patient: Yes (Daughter in law)     Total Time Spent on Date of Encounter in care of patient: 25 minutes This time was spent on one or more of the following: performing physical exam; counseling and coordination of care; obtaining or reviewing history; documenting in the medical record; reviewing/ordering tests, medications or procedures; communicating with other healthcare professionals and discussing with patient's family/caregivers      Current Length of Stay: 4 day(s)  Current Patient Status: Inpatient   Certification Statement:  Clinical course   Discharge Plan: Anticipate discharge in 24-48 hrs to discharge location to be determined pending rehab evaluations  Code Status: Level 3 - DNAR and DNI    Subjective:   Patient seen and examined sitting up in  bed eating breakfast   Alert and oriented x3 with periods of forgetfulness    Denies chest pain, shortness of breath, lightheadedness, nausea or vomiting    Objective:     Vitals:   Temp (24hrs), Av 5 °F (36 4 °C), Min:96 5 °F (35 8 °C), Max:98 1 °F (36 7 °C)    Temp:  [96 5 °F (35 8 °C)-98 1 °F (36 7 °C)] 97 4 °F (36 3 °C)  HR:  [57-80] 61  Resp:  [16-18] 16  BP: (120-169)/(57-87) 124/57  SpO2:  [92 %-97 %] 94 %  Body mass index is 18 33 kg/m²  Input and Output Summary (last 24 hours):   No intake or output data in the 24 hours ending 02/18/23 1224    Physical Exam:   Physical Exam  Vitals and nursing note reviewed  Constitutional:       General: She is not in acute distress  Appearance: She is ill-appearing  She is not toxic-appearing or diaphoretic  HENT:      Head: Normocephalic  Mouth/Throat:      Mouth: Mucous membranes are moist    Eyes:      Conjunctiva/sclera: Conjunctivae normal    Cardiovascular:      Rate and Rhythm: Normal rate  Pulmonary:      Effort: Pulmonary effort is normal       Breath sounds: Normal breath sounds  No wheezing, rhonchi or rales  Abdominal:      General: Bowel sounds are normal  There is no distension  Palpations: Abdomen is soft  Tenderness: There is no abdominal tenderness  Musculoskeletal:         General: Normal range of motion  Cervical back: Normal range of motion  Right lower leg: No edema  Left lower leg: No edema  Skin:     General: Skin is warm and dry  Capillary Refill: Capillary refill takes less than 2 seconds  Neurological:      Mental Status: She is alert and oriented to person, place  Mental status is at baseline  Motor: Weakness (Generalized) present  Psychiatric:         Mood and Affect: Mood normal          Speech: Speech normal          Behavior: Behavior normal  Behavior is cooperative  Cognition and Memory: Cognition is impaired   Memory is impaired              Additional Data:     Labs:  Results from last 7 days   Lab Units 02/18/23  0512 02/14/23  0542 02/13/23  1441   WBC Thousand/uL 10 32*   < > 8 27   HEMOGLOBIN g/dL 11 5   < > 13 8   HEMATOCRIT % 35 2   < > 41 6   PLATELETS Thousands/uL 210   < > 278   NEUTROS PCT %  --   --  64   LYMPHS PCT %  --   --  27   MONOS PCT %  --   --  6 EOS PCT %  --   --  2    < > = values in this interval not displayed  Results from last 7 days   Lab Units 02/18/23  0512 02/14/23  0542 02/13/23  1441   SODIUM mmol/L 137   < > 136   POTASSIUM mmol/L 4 3   < > 4 2   CHLORIDE mmol/L 105   < > 103   CO2 mmol/L 28   < > 29   BUN mg/dL 23   < > 19   CREATININE mg/dL 0 70   < > 0 83   ANION GAP mmol/L 4   < > 4   CALCIUM mg/dL 8 2*   < > 8 8   ALBUMIN g/dL  --   --  3 7   TOTAL BILIRUBIN mg/dL  --   --  0 19*   ALK PHOS U/L  --   --  57   ALT U/L  --   --  46   AST U/L  --   --  35   GLUCOSE RANDOM mg/dL 84   < > 101    < > = values in this interval not displayed  Lines/Drains:  Invasive Devices     Peripheral Intravenous Line  Duration           Long-Dwell Peripheral IV (Midline) 28/56/27 Right Cephalic 2 days              Imaging: No pertinent imaging reviewed      Recent Cultures (last 7 days):         Last 24 Hours Medication List:   Current Facility-Administered Medications   Medication Dose Route Frequency Provider Last Rate   • acetaminophen  650 mg Oral Q6H PRN Arletta BOBBY Monsalve     • ascorbic acid  500 mg Oral Daily Alejandro Melton PA-C     • carvedilol  3 125 mg Oral BID With Meals Arabbya BOBBY Monsalve     • cholecalciferol  2,000 Units Oral Daily Alejandro Melton PA-C     • donepezil  10 mg Oral HS Alejandro Melton PA-C     • enoxaparin  40 mg Subcutaneous Daily Alejandro Melton PA-C     • escitalopram  10 mg Oral Daily Artie Lima MD     • levothyroxine  25 mcg Oral Early Morning Arletta BOBBY Monsalve     • lisinopril  2 5 mg Oral Daily Alejandro Melton PA-C     • melatonin  3 mg Oral HS PRN Alejandro Melton PA-C     • memantine  5 mg Oral Daily Artie Lima MD     • multivitamin stress formula  1 tablet Oral Daily Alejandro Melton PA-C     • nicotine  1 patch Transdermal Daily Alejandro Melton PA-C     • ondansetron  4 mg Intravenous Q6H PRN BOBBY Sibley     • pravastatin  40 mg Oral Every Other Day Fox iKm PA-C     • spironolactone  25 mg Oral Every Other Day Fox Kim PA-C          Today, Patient Was Seen By: BOBBY Washington    **Please Note: This note may have been constructed using a voice recognition system  **

## 2023-02-18 NOTE — PROGRESS NOTES
Patient examined spoke to the nurse met with her son and daughter at bedside and discussed about patient's mental condition also I educated the family about the dementia process and memory loss they appreciated all this information they also reported that mom is losing her memory and she is being forgetful patient is advised not to drive anymore but she is kind of resistive family agreed to keep an eye on her for her safety  Son also reports that what ever patient was saying about her daughter-in-law is not true  Daughter reported that brother's wife does not work so she is there available to help mom and she goes more often to help her but she is not taking away her money  It seems that family is very supportive to the patient  Patient is stable with confusion she is able to tell me her age date of birth current year she knows that she is at the hospital and no longer hallucinating she is suffering from dementia with depression no side effects of Lexapro Aricept and Namenda patient does not need as needed medication at this time since patient improved with the behavior  Patient is not lethargic she offers no new complaint  She is not in distress she was able to communicate her feelings and answer my questions  Patient will benefit from supervised living and psychiatric follow-up after the discharge  Therapy is done with good response  I will follow-up

## 2023-02-18 NOTE — CASE MANAGEMENT
Case Management Discharge Planning Note    Patient name Ramin Brown  Location [address] [place] 420-* MRN [id number]  : 1945 Date 2023       Current Admission Date: 2023  Current Admission Diagnosis:Encephalopathy   Patient Active Problem List    Diagnosis Date Noted   • Hypothyroidism 2023   • Depression 02/15/2023   • Emphysema lung ([facility] ) 02/15/2023   • Cardiomyopathy ([facility] ) 2023   • Encephalopathy 2023   • Underweight 10/26/2022   • Syncope 10/25/2022   • Hyperlipidemia 10/25/2022   • Hypertension 10/25/2022   • Dementia with behavioral disturbance 10/25/2022   • Chronic combined systolic and diastolic congestive heart failure ([facility] ) 10/25/2022   • Anxiety 10/25/2022   • Elevated TSH 2022   • Memory difficulty 2022   • Heart failure, left, with LVEF 41-49% ([facility] ) 2022   • Coronary artery disease involving native coronary artery of native heart without angina pectoris 2022   • BMI less than 19,adult 2022   • S/P hip replacement, right 2022   • Sequelae of protein-calorie malnutrition ([facility] )  2022   • Subclinical hypothyroidism 2022   • Personal history of nicotine dependence  2022   • Age related osteoporosis 2022   • Tobacco abuse 2021      LOS (days): 4  Geometric Mean LOS (GMLOS) (days): 3 40  Days to GMLOS:-0 6     OBJECTIVE:  Risk of Unplanned Readmission Score: 16 67     Current admission status: Inpatient   Preferred Pharmacy:   [facility] DR KAMLA Gutierrez  [address]  [place] [address] [address]  Phone: [phone] Fax: [phone]    [facility] ([address] [place] [address],   [address] [place] [address]  [address] [place] [id number]  Phone: [phone] Fax: [phone]    Primary Care Provider: No primary care provider on file      Primary Insurance: MEDICARE  Secondary Insurance: AETNA    DISCHARGE DETAILS:    Discharge planning discussed with[de-identified] Patient, son Yakov Dalton, daughter Shlomo Delgado) and daughter-in-law Willma May)  Freedom of Choice: Yes  Comments - Freedom of Choice: SW following to assist with DCP  At request of family SW made referrals for STR placement  Family's preferred facilities responded that due to no skilled need they could not accept for rehab  One facility indicated possible acceptance at their facility in [place]  SW shared information with patient, son, daughter (in room) and daughter-in-law (over phone)  Pt and family verbalized understanding of denial  Daughter-in-law declined [facility]  SW offered that if family needed time to arrange support at home another option would be to pay privately for a respite stay  Pt and family discussed option and requested arrangements be made for pt to go to [facility]  AMBROSIO spoke with Jenniffer Cantu from [facility] about pt's decision  Jess Cat provided daily rate to    AMBROSIO then called daughter-in-law with information  After discussing with family daughter-in-law called SW back and requested facility be reserved  Family aware that payment will need to be made on admission per [employer]  AMBROSIO notified Tamagotrudi[facility]io [facility] of family's request to proceed with plan  Per [employer] [facility] facility can definitely accept pt on Monday, possibly Sunday  SW will follow up with facility tomorrow to determine availability  CM contacted family/caregiver?: Yes  Were Treatment Team discharge recommendations reviewed with patient/caregiver?: Yes  Did patient/caregiver verbalize understanding of patient care needs?: Yes  Were patient/caregiver advised of the risks associated with not following Treatment Team discharge recommendations?: Yes    Contacts  Patient Contacts: Nathanael Bowman  Relationship to Patient[de-identified] Family  Contact Method:  In Person, Phone  Phone Number: [phone]  Reason/Outcome: Discharge [address]         Is the patient interested in [facility] at discharge?: No    DME Referral Provided  Referral made for DME?: No    Other Referral/Resources/Interventions Provided:  Interventions: SNF  Referral Comments: See above    Treatment Team Recommendation: Home with [facility]  Discharge Destination Plan[de-identified] SNF (private pay, until private duty arranged at home)  Transport at Discharge : Family    IMM Given (Date):: 02/18/23  IMM Given to[de-identified] Family (IMM reviewed with pt and family  Pt signed IMM and copy given to son  Pt said she had initial copy    Copy also placed in scan bin for chart )

## 2023-02-18 NOTE — ASSESSMENT & PLAN NOTE
Patient presented to ED on 2/10 for altered mental status and hallucinations  She lives alone at home, questionable whether she was taking her medications as prescribed  Her granddaughter took her to her PCP a few days prior thinking she may have a UTI and was started on bactrim  She presents today with increased hallucinations at home  · Pt had MRI brain scheduled for Thursday, family spoke with Dr Teresita Grant who recommended they take her to ER for admission  · CT head: No acute intracranial abnormality  Unchanged moderate ventriculomegaly out of proportion to degree of cerebral atrophy  Question normal pressure hydrocephalus  Unchanged small area of nodularity extending from the sella into suprasellar region  Differential includes pituitary adenoma, Rathke's cleft cyst, or hypophysitis    Recommend MRI brain pituitary revealed 2 pituitary lesions, consistent with microadenoma and pituitary cyst  · Urine culture shows no growth  · Covid negative   · B12 593  · Appreciate neurology input   ·  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  · Follow-up with Dr Teresita Grant outpatient  · Daughter would like to consider need for transfer if findings of NPH  · Appreciate psychiatry input   · Concern for dementia with depression with hallucinations  · Change Remeron to Lexapro 10 mg daily and discontinue as needed Zyprexa as patient has not had any agitation or hallucinations  · Neuro checks q4  · Repeat thyroid panel in 6 to 8 weeks  · PT/OT eval and treat, recommending home with home health services

## 2023-02-18 NOTE — CASE MANAGEMENT
Case Management Discharge Planning Note    Patient name Worcester List  Location [address] [place] 420-* MRN [id number]  : 1945 Date 2023       Current Admission Date: 2023  Current Admission Diagnosis:Encephalopathy   Patient Active Problem List    Diagnosis Date Noted   • Hypothyroidism 2023   • Depression 02/15/2023   • Emphysema lung ([facility] ) 02/15/2023   • Cardiomyopathy ([facility] ) 2023   • Encephalopathy 2023   • Underweight 10/26/2022   • Syncope 10/25/2022   • Hyperlipidemia 10/25/2022   • Hypertension 10/25/2022   • Dementia with behavioral disturbance 10/25/2022   • Chronic combined systolic and diastolic congestive heart failure ([facility] ) 10/25/2022   • Anxiety 10/25/2022   • Elevated TSH 2022   • Memory difficulty 2022   • Heart failure, left, with LVEF 41-49% ([facility] ) 2022   • Coronary artery disease involving native coronary artery of native heart without angina pectoris 2022   • BMI less than 19,adult 2022   • S/P hip replacement, right 2022   • Sequelae of protein-calorie malnutrition ([facility] )  2022   • Subclinical hypothyroidism 2022   • Personal history of nicotine dependence  2022   • Age related osteoporosis 2022   • Tobacco abuse 2021      LOS (days): 4  Geometric Mean LOS (GMLOS) (days): 3 40  Days to GMLOS:-0 4     OBJECTIVE:  Risk of Unplanned Readmission Score: 16 63     Current admission status: Inpatient   Preferred Pharmacy:   [facility] [address] KAMLA Gutierrez  [address]  [place] [address] [address]  Phone: [phone] Fax: [phone]    [facility] ([address] [address],   [address] [place] [address]  [address] [place] [address]  Phone: [phone] Fax: [phone]    Primary Care Provider: No primary care provider on file      Primary Insurance: MEDICARE  Secondary Insurance: AETNA    DISCHARGE DETAILS:    Discharge planning discussed with[de-identified] Jeana Mcdermott, daughter-in-law  Freedom of Choice: Yes  Comments - Freedom of Choice: SW following to assist with DCP  SW followed up with pt's daughter-in-law to discuss DCP  Per daughter-in-law family is interested in STR to make sure pt is strong enough to function at home when she returns home  Family is also in process of trying to arrange additional services to provide more care and support when pt does return home  Daughter-in-law's facility preference is [facility]  SW discussed need to make blanket referrals to determine availability and offered to focus on preference if possible  Daughter-in-law agreeable with referral process  SW offered to provide list of accepting facilities when available for family to review    CM contacted family/caregiver?: Yes  Were Treatment Team discharge recommendations reviewed with patient/caregiver?: Yes  Did patient/caregiver verbalize understanding of patient care needs?: Yes  Were patient/caregiver advised of the risks associated with not following Treatment Team discharge recommendations?: Yes    Contacts  Patient Contacts: Jeana Mcdermott (dtr in law)  Relationship to Patient[de-identified] Family  Contact Method: Phone  Phone Number: [phone]  Reason/Outcome: Discharge Planning, Continuity of Care    Other Referral/Resources/Interventions Provided:  Interventions: Short Term Rehab  Referral Comments: [facility] referrals have been made    Treatment Team Recommendation: Home with [facility]  Discharge Destination Plan[de-identified] Short Term Rehab (family request)  Transport at Discharge :  (pending)

## 2023-02-19 VITALS
HEIGHT: 61 IN | DIASTOLIC BLOOD PRESSURE: 63 MMHG | BODY MASS INDEX: 18.31 KG/M2 | WEIGHT: 97 LBS | TEMPERATURE: 97.8 F | HEART RATE: 68 BPM | SYSTOLIC BLOOD PRESSURE: 141 MMHG | OXYGEN SATURATION: 94 % | RESPIRATION RATE: 18 BRPM

## 2023-02-19 LAB
ERYTHROCYTE [DISTWIDTH] IN BLOOD BY AUTOMATED COUNT: 12.7 % (ref 11.6–15.1)
HCT VFR BLD AUTO: 34.6 % (ref 34.8–46.1)
HGB BLD-MCNC: 11.4 G/DL (ref 11.5–15.4)
MCH RBC QN AUTO: 34 PG (ref 26.8–34.3)
MCHC RBC AUTO-ENTMCNC: 32.9 G/DL (ref 31.4–37.4)
MCV RBC AUTO: 103 FL (ref 82–98)
PLATELET # BLD AUTO: 213 THOUSANDS/UL (ref 149–390)
PMV BLD AUTO: 9 FL (ref 8.9–12.7)
RBC # BLD AUTO: 3.35 MILLION/UL (ref 3.81–5.12)
WBC # BLD AUTO: 8.34 THOUSAND/UL (ref 4.31–10.16)

## 2023-02-19 RX ORDER — MEMANTINE HYDROCHLORIDE 5 MG/1
5 TABLET ORAL DAILY
Qty: 30 TABLET | Refills: 0
Start: 2023-02-20

## 2023-02-19 RX ORDER — ESCITALOPRAM OXALATE 10 MG/1
10 TABLET ORAL DAILY
Qty: 30 TABLET | Refills: 0
Start: 2023-02-20

## 2023-02-19 RX ORDER — LEVOTHYROXINE SODIUM 0.03 MG/1
25 TABLET ORAL
Qty: 30 TABLET | Refills: 0
Start: 2023-02-20

## 2023-02-19 RX ADMIN — Medication 2000 UNITS: at 08:16

## 2023-02-19 RX ADMIN — CARVEDILOL 3.12 MG: 3.12 TABLET, FILM COATED ORAL at 08:11

## 2023-02-19 RX ADMIN — MEMANTINE 5 MG: 5 TABLET ORAL at 08:11

## 2023-02-19 RX ADMIN — B-COMPLEX W/ C & FOLIC ACID TAB 1 TABLET: TAB at 08:10

## 2023-02-19 RX ADMIN — LEVOTHYROXINE SODIUM 25 MCG: 25 TABLET ORAL at 05:22

## 2023-02-19 RX ADMIN — Medication 500 MG: at 08:11

## 2023-02-19 RX ADMIN — ENOXAPARIN SODIUM 40 MG: 40 INJECTION SUBCUTANEOUS at 08:10

## 2023-02-19 RX ADMIN — ESCITALOPRAM OXALATE 10 MG: 10 TABLET, FILM COATED ORAL at 08:11

## 2023-02-19 RX ADMIN — NICOTINE 1 PATCH: 14 PATCH, EXTENDED RELEASE TRANSDERMAL at 08:12

## 2023-02-19 RX ADMIN — LISINOPRIL 2.5 MG: 2.5 TABLET ORAL at 08:11

## 2023-02-19 NOTE — PROGRESS NOTES
-- Patient:  -- MRN: [id number]  -- Aidin Request ID: [id number]  -- Level of care reserved: Johan Ulrich  -- Partner Reserved: Dee [facility],[address] Floor Skilled [facility], [address] [place] [address] [phone]  -- Clinical needs requested:  -- Geography searched: 10 miles around [id number]  -- Start of Service:  -- Request sent: 11:58am EST on 2/18/2023 by Dany Reed  -- Partner reserved: 9:59am EST on 2/19/2023 by Dany Reed  -- Choice list shared: 9:24am EST on 2/19/2023 by Dany Reed

## 2023-02-19 NOTE — CASE MANAGEMENT
Case Management Discharge Planning Note    Patient name Hiwot Andrea  Location [address] [facility] 420-* MRN [id number]  : 1945 Date 2023       Current Admission Date: 2023  Current Admission Diagnosis:Encephalopathy   Patient Active Problem List    Diagnosis Date Noted   • Hypothyroidism 2023   • Depression 02/15/2023   • Emphysema lung ([facility] ) 02/15/2023   • Cardiomyopathy ([facility] ) 2023   • Encephalopathy 2023   • Underweight 10/26/2022   • Syncope 10/25/2022   • Hyperlipidemia 10/25/2022   • Hypertension 10/25/2022   • Dementia with behavioral disturbance 10/25/2022   • Chronic combined systolic and diastolic congestive heart failure ([facility] ) 10/25/2022   • Anxiety 10/25/2022   • Elevated TSH 2022   • Memory difficulty 2022   • Heart failure, left, with LVEF 41-49% ([facility] ) 2022   • Coronary artery disease involving native coronary artery of native heart without angina pectoris 2022   • BMI less than 19,adult 2022   • S/P hip replacement, right 2022   • Sequelae of protein-calorie malnutrition ([facility] )  2022   • Subclinical hypothyroidism 2022   • Personal history of nicotine dependence  2022   • Age related osteoporosis 2022   • Tobacco abuse 2021      LOS (days): 5  Geometric Mean LOS (GMLOS) (days): 3 40  Days to GMLOS:-1 2     OBJECTIVE:  Risk of Unplanned Readmission Score: 16 84     Current admission status: Inpatient   Preferred Pharmacy:   [facility] DR KAMLA Gutierrez  [place] [address]  Phone: [phone] Fax: [phone]    [facility] ([address] [facility],   [address]  [address] [place], [place] [address]  Phone: [phone] Fax: [phone]    Primary Care Provider: No primary care provider on file      Primary Insurance: MEDICARE  Secondary Insurance: AETNA    DISCHARGE DETAILS:    Discharge planning discussed with[de-identified] Daughter-in-law, Naeem Cardona of Choice: Yes  Comments - Freedom of Choice: SW following to assist with DCP  Current plan is for pt to go to a skilled nursing facility to maximize function and return home with increased support at home  Initial facility choice was [facility] however message received this morning from admissions that facility is no longer willing to accept pt  SW opened referral up again in [facility] and followed up with other accepting facility, [facility]  SW spoke with [facility] admission liaison, Leopoldo Pinion  about case  Per [facility] can accept pt for skilled care at [facility]  SW placed call to pt's daughter-in-law to discuss all of the above  Daughter-in-law spoke with pt's son (her ) during call about plan change  SW offered to Verna Rabago of accepting [facility] for family to consider  Daughter-in-law said that was not necessary because family prefers placement at [facility]  Facility will be reserved in Aidin  Family aware that discharge is pending today  Family also requested transport to facility  CM contacted family/caregiver?: Yes  Were Treatment Team discharge recommendations reviewed with patient/caregiver?: Yes  Did patient/caregiver verbalize understanding of patient care needs?: Yes  Were patient/caregiver advised of the risks associated with not following Treatment Team discharge recommendations?: Yes    Contacts  Patient Contacts: Valentine Bowman  Relationship to Patient[de-identified] Family  Contact Method: Phone  Phone Number: [phone]  Reason/Outcome: Discharge Planning    Other Referral/Resources/Interventions Provided:  Interventions: Short Term Rehab, SNF  Referral Comments: STR placement at [facility] is planned  SW notified CRNP of plan  Per CRNP pt is stable for discharge      Treatment Team Recommendation: [facility]  Discharge Destination Plan[de-identified] Short Term Rehab  Transport at Discharge : \[facility]\"" [facility]ulance  Dispatcher Contacted: Yes  Number/Name of Dispatcher: Roundtrip  Transported by [employer] and Unit #):  (pending)  ETA of Transport (Date): 02/19/23  ETA of Transport (Time): 1300 (requested)        Accepting Facility Name, [address] : [place], [place]  Receiving Facility/Agency Phone Number: [phone]

## 2023-02-19 NOTE — ASSESSMENT & PLAN NOTE
Patient presented to ED on 2/10 for altered mental status and hallucinations  She lives alone at home, questionable whether she was taking her medications as prescribed  Her granddaughter took her to her PCP a few days prior thinking she may have a UTI and was started on bactrim  She presents today with increased hallucinations at home  · Pt had MRI brain scheduled for Thursday, family spoke with Dr Gregory Nicole who recommended they take her to ER for admission  · CT head: No acute intracranial abnormality  Unchanged moderate ventriculomegaly out of proportion to degree of cerebral atrophy  Question normal pressure hydrocephalus  Unchanged small area of nodularity extending from the sella into suprasellar region  Differential includes pituitary adenoma, Rathke's cleft cyst, or hypophysitis    Recommend MRI brain pituitary revealed 2 pituitary lesions, consistent with microadenoma and pituitary cyst  · Urine culture shows no growth  · Covid negative   · B12 593  · Appreciate neurology input   ·  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  · Follow-up with Dr Gregory Nicole outpatient  · Appreciate psychiatry input   · Concern for dementia with depression with hallucinations  · Change Remeron to Lexapro 10 mg daily and discontinue as needed Zyprexa as patient has not had any agitation or hallucinations  · Neuro checks q4  · Repeat thyroid panel in 6 to 8 weeks  · PT/OT eval and treat, recommending home with home health services

## 2023-02-19 NOTE — PROGRESS NOTES
Patient examined spoke to the nurse 's note reviewed and noted met patient's grandson and granddaughter at bedside  Patient is brighter looks better able to smile during the session and offers no new complaints she was able to communicate her feelings well she was able to answer all the simple questions she is no longer confused she knows that she is at the hospital she was able to tell me her age date of birth currently her current month she reports that she slept better and she is trying to eat more so she gained some weight and energy  Patient denies hallucination no longer confused  Patient is referred for outpatient neurology follow-up  No behavioral problem reported or noted  Medical evaluation and treatment is in progress  Patient is tolerating medications Lexapro and no need for other psychotropic medications since patient improved with the hallucination  Patient is suffering from dementia with depression and 's of confusion she was hallucinating when she came to the hospital   Patient is not in distress she offers no new complaints  Patient is not suicidal   I will continue her medications Lexapro and melatonin 3 mg at bedtime as needed  Patient will benefit from psychiatric follow-up to adjust her medications for dementia with depression  's notes reviewed and noted  Therapy is done with good response  I will follow-up

## 2023-02-19 NOTE — DISCHARGE SUMMARY
Jessica 128  Discharge- Phylicia Fay 1945, 65 y o  female MRN: [id number]  Unit/Bed#: [address] [place] [address] Encounter: [id number]  Primary Care Provider: No primary care provider on file  Date and time admitted to hospital: 2/13/2023  1:12 PM    * Encephalopathy  Assessment & Plan  Patient presented to ED on 2/10 for altered mental status and hallucinations  She lives alone at home, questionable whether she was taking her medications as prescribed  Her granddaughter took her to her PCP a few days prior thinking she may have a UTI and was started on bactrim  She presents today with increased hallucinations at home  · Pt had MRI brain scheduled for Thursday, family spoke with Dr Anibal Doty who recommended they take her to ER for admission  · CT head: No acute intracranial abnormality  Unchanged moderate ventriculomegaly out of proportion to degree of cerebral atrophy  Question normal pressure hydrocephalus  Unchanged small area of nodularity extending from the sella into suprasellar region  Differential includes pituitary adenoma, Rathke's cleft cyst, or hypophysitis    Recommend MRI brain pituitary revealed 2 pituitary lesions, consistent with microadenoma and pituitary cyst  · Urine culture shows no growth  · Covid negative   · B12 593  · Appreciate neurology input   ·  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  · Follow-up with Dr Anibal Doty outpatient  · Appreciate psychiatry input   · Concern for dementia with depression with hallucinations  · Change Remeron to Lexapro 10 mg daily and discontinue as needed Zyprexa as patient has not had any agitation or hallucinations  · Neuro checks q4  · Repeat thyroid panel in 6 to 8 weeks  · PT/OT eval and treat, recommending home with home health services    Hypothyroidism  Assessment & Plan  · TSH high 9 994, Free T3 low 1 72, Free T4 normal 0 78  · Subclinical thyroidism versus true hypothyroidism  · We will add Synthroid 25 mcg daily  · Recommend repeat TSH, free T3 and free T4 in 6 weeks    Emphysema lung (HCC)  Assessment & Plan  Noted on CXR  Continues to smoke  · Albuterol INH PRN     Depression  Assessment & Plan  Appreciate psychiatry input  Suspecting dementia with depression with hallucinations  · Change Remeron to Lexapro    Cardiomyopathy [facility])  Assessment & Plan  Patient has cardiomyopathy of unclear etiology  · Continue coreg 3 125 mg twice a day, lisinopril 2 5 mg and aldactone 25 mg every other day  · No evidence of acute exacerbation    Dementia with behavioral disturbance  Assessment & Plan  · Continue Aricept, Lexapro and Namenda    Hypertension  Assessment & Plan  · Continue coreg and lisinopril  · BP stable  · Continue to monitor    Hyperlipidemia  Assessment & Plan  · Continue statin    Coronary artery disease involving native coronary artery of native heart without angina pectoris  Assessment & Plan  Nonobstructive CAD seen on cardiac catheterization in 5/2022  · Continue coreg, statin    Tobacco abuse  Assessment & Plan  · Patient has smoked since she was a teenager  · Recently cut down to 3/4 pack from a pack per day  · Nicotine patch  · Encourage cessation        Medical Problems     Resolved Problems  Date Reviewed: 2/19/2023   None       Discharging Physician / Practitioner: BOBBY Mock  PCP: No primary care provider on file  Admission Date:   Admission Orders (From admission, onward)     Ordered        02/14/23 1844  Inpatient Admission  Once            02/13/23 1458  Place in Observation  Once                      Discharge Date: 02/19/23    Consultations During Hospital Stay:  · Neurology, psychiatric    Procedures Performed:   · None    Significant Findings / Test Results:   · Chest x-ray: Emphysema with no definite acute disease  · CAT scan head: Unchanged small area of nodularity extending from the sella into the suprasellar region    Recommended MRI brain  · MRI brain pituitary: 2 Pituitary lesions most consistent with microadenoma and pituitary cyst         Test Results Pending at Discharge (will require follow up): · None     Outpatient Tests Requested:  TSH, free T3 and free T4 in 6 weeks      Complications: None      Reason for Admission: Altered mental status, hallucinations    Hospital Course:   Maritza Salcido is a 65 y o  female patient who originally presented to the hospital on 2/13/2023 due to altered mental status and hallucinations  CAT scan of head demonstrated unchanged small area of nodularity extending from the sella into the suprasellar region with 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  MRI of the brain was recommended which revealed 2 pituitary lesions which is most consistent with microadenoma and pituitary cyst  But neuroquant is negative for neurodegeneration that would suggest underlying dementia  Psychiatry was consulted and diagnosed mild to moderate dementia with depression  Continue patient's psych medication, discontinued Remeron and switched to Lexapro  Patient's mentation has greatly improved, alert and oriented x3 and will be discharged to [facility] in stable condition  Please see above list of diagnoses and related plan for additional information  Condition at Discharge: stable    Discharge Day Visit / Exam:     Subjective: Patient seen and examined eating breakfast   Alert and oriented x3 able to verbalize needs and make feelings known  Offers no complaints      Vitals: Blood Pressure: 141/63 (02/19/23 0810)  Pulse: 68 (02/19/23 0810)  Temperature: 97 8 °F (36 6 °C) (02/19/23 0810)  Temp Source: Oral (02/18/23 2226)  Respirations: 18 (02/19/23 0810)  Height: 5' 1" (154 9 cm) (02/13/23 1621)  Weight - Scale: 44 kg (97 lb) (02/13/23 1309)  SpO2: 94 % (02/19/23 0810)     Exam:   Physical Exam  Constitutional:       Appearance: She is not ill-appearing  HENT:      Head: Normocephalic  Nose: No congestion     Eyes: General: No scleral icterus  Cardiovascular:      Rate and Rhythm: Normal rate  Heart sounds: Normal heart sounds  Pulmonary:      Breath sounds: Normal breath sounds  Abdominal:      General: Bowel sounds are normal       Palpations: Abdomen is soft  Musculoskeletal:         General: Normal range of motion  Cervical back: Normal range of motion  Skin:     General: Skin is dry  Neurological:      Mental Status: She is alert and oriented to person, place, and time  Psychiatric:         Behavior: Behavior normal          Thought Content: Thought content normal          Judgment: Judgment normal          Discussion with Family:Yes      Discharge instructions/Information to patient and family:   See after visit summary for information provided to patient and family  Provisions for Follow-Up Care:  See after visit summary for information related to follow-up care and any pertinent home health orders  Disposition:   Other: Frank Cheatham    Planned Readmission: No     Discharge Statement:  I spent 30 minutes discharging the patient  This time was spent on the day of discharge  I had direct contact with the patient on the day of discharge  Greater than 50% of the total time was spent examining patient, answering all patient questions, arranging and discussing plan of care with patient as well as directly providing post-discharge instructions  Additional time then spent on discharge activities  Discharge Medications:  See after visit summary for reconciled discharge medications provided to patient and/or family        **Please Note: This note may have been constructed using a voice recognition system**

## 2023-02-19 NOTE — [place] UNIVERSAL TRANSFER FORM
[place] UNIVERS[place] TRANSFER FORM  (ALL ITEMS MUST BE COMPLETED)    1  TRANSFER FROM: [address] [facility]      TRANSFER TO: [facility]  2  DATE OF TRANSFER: 2/19/2023                        TIME OF TRANSFER: 1300    3  PATIENT NAME: JEREMIAH Mac      YOB: 1945                             GENDER: female    4  LANGUAGE:   English    5  PHYSICIAN NAME:  Laurel Melendez MD                   PHONE: [phone]  CODE STATUS: Level 3 - DNAR and DNI        Out of Hospital DNR Attached: Yes    7  :                                      :  Extended Emergency Contact Information  Primary Emergency Contact: DUSTIN ALMAGUER  Address: [address] [place] [address], [address] [place] [address] [place] Phone: [phone]  Relation: Daughter In-Law  Secondary Emergency Contact: LEXI ALMAGUER  Address: [address] [place] [address] [place] , [address] [place] Phone: [phone]  Relation: Son  Preferred language: English   needed? No           Health Care Representative/Proxy:  No           Legal Guardian:  No             NAME OF:           HEALTH CARE REPRESENTATIVE/PROXY:                                         OR           LEGAL GUARDIAN, IF NOT :                                               PHONE:  (Day)           (Night)                        (Cell)    8  REASON FOR TRANSFER: Short term rehab            V/S: /63   Pulse 68   Temp 97 8 °F (36 6 °C)   Resp 18   Ht 5' 1" (1 549 m)   Wt 44 kg (97 lb)   SpO2 94%   BMI 18 33 kg/m²           PAIN: None    9  PRIMARY DIAGNOSIS: Encephalopathy      Secondary Diagnosis:         Pacemaker: No      Internal Defib: No          Mental Health Diagnosis (if Applicable):    10  RESTRAINTS: No     11  RESPIRATORY NEEDS: None    12  ISOLATION/PRECAUTION: None    13  ALLERGY: Patient has no known allergies      14  SENSORY:       Vision Glasses  Dentures upper and lower    15  SKIN CONDITION: No Wounds    16  DIET: Regular    17  IV ACCESS: None    18  PERSONAL ITEMS SENT WITH PATIENT: Glasses, Cane, Dentures Upper Full and Lower Full and Other clothig, coat, cane    19  ATTACHED DOCUMENTS: MUST ATTACH CURRENT MEDICATION INFORMATION Face Sheet, MAR, Medication Reconciliation, Labs, Code Status, Discharge Summary, PT Note, OT Note and HX/PE    20  AT RISK ALERTS:[place]anders        HARM TO: N/A    21  WEIGHT BEARING STATUS:         Left Leg: Full        Right Leg: Full    22  MENTAL STATUS:Alert, Oriented and Forgetful    23  FUNCTION:        Walk: With Help        Transfer: With Help        Toilet: With Help        Feed: Self    24  IMMUNIZATIONS/SCREENING:     Immunization History   Administered Date(s) Administered   • COVID-19 MODERNA VACC 0 25 ML IM BOOSTER 01/04/2022   • COVID-19 MODERNA VACC 0 5 ML IM 06/22/2021, 07/20/2021   • Tdap 04/09/2021       25  BOWEL: Continent    26  BLADDER: Continent    27   SENDING FACILITY CONTACT: [facility] Elsi Brunner                   Title: Rn         Unit: [address] [facility]        Phone: [phone]          [address] (if known):        Title:        Unit:         Phone:         FORM PREFILLED BY (if applicable)       Title:       Unit:        Phone:         Khloe Tuttle RN      Title: RN      Phone: [phone]

## 2023-02-20 ENCOUNTER — TELEPHONE (OUTPATIENT)
Dept: NEUROLOGY | Facility: CLINIC | Age: 78
End: 2023-02-20

## 2023-02-20 NOTE — TELEPHONE ENCOUNTER
HFU- 2/13-2/19/23- [facility]- Encephalopathy      Notes:  Neda Black will need follow up in 8 weeks with general attending  She will not require outpatient neurological testing    Please assist with scheduling HFU  Pt has an appointment scheduled 5/9/23 with Dr Alejandro Shah at 10:30 but needs to be rescheduled  Is there another appointment patient can be offered?  Thanks

## 2023-02-20 NOTE — TELEPHONE ENCOUNTER
Rolo Ratliff's daughter Hans Ventura  and Daughter in law Castillo Martinez have asked for a call back  They would like to know if the 4 hour test still needs to be done before her HFU appt and or if any other testing needed, they also have a lot of concerns and questions  Can you please lowe the daughter Hans Ventura at [phone] ot   Daughter in law Castillo Martinez at [phone]      Thank you,     Manju Green

## 2023-02-22 NOTE — TELEPHONE ENCOUNTER
See communication thread (2/19/23) between pt's family and provider  Per provider's recommendation:    Elle Fam MD  to Mary Balderrama      12:47 PM  There is referral for neurosurgery in her chart who can help answer question about NPH  It's proven only with trial of LP  We don't perform that at [facility]  Yes she should have the cognitive test (neuropsych) test done  It gives more information  Last read by Mary Balderrama at 6:32 PM on 2/21/2023

## 2023-02-24 ENCOUNTER — TELEPHONE (OUTPATIENT)
Dept: NEUROSURGERY | Facility: CLINIC | Age: 78
End: 2023-02-24

## 2023-02-24 NOTE — TELEPHONE ENCOUNTER
[facility] [place] to make them aware of patient's appt and that the PT gait assessment form needs to be filled out prior  They said to fax it to them at [phone] This was done  I will follow up with them closer to the appt on 3/9 if I have not received the form

## 2023-02-24 NOTE — TELEPHONE ENCOUNTER
----- Message from Adele Jaramillo RN sent at 2/22/2023  2:05 PM EST -----  Regarding: SENT PT FORM FOR GAIT ASSESSMENT    LM for therapy dept at [facility] to call me back to see if they can complete the PT gait assessment form with the patient prior to her appointment  Will attempt to reach out again tomorrow  Please follow up prior to patient's office visit on 3/9 with  to make sure we have received information

## 2023-03-01 ENCOUNTER — OFFICE VISIT (OUTPATIENT)
Dept: PSYCHIATRY | Facility: CLINIC | Age: 78
End: 2023-03-01

## 2023-03-01 DIAGNOSIS — F41.9 ANXIETY: ICD-10-CM

## 2023-03-01 DIAGNOSIS — R41.3 MEMORY DIFFICULTY: ICD-10-CM

## 2023-03-01 DIAGNOSIS — G31.84 MCI (MILD COGNITIVE IMPAIRMENT): Primary | ICD-10-CM

## 2023-03-01 DIAGNOSIS — F03.918 DEMENTIA WITH BEHAVIORAL DISTURBANCE: ICD-10-CM

## 2023-03-01 DIAGNOSIS — F32.A DEPRESSION: ICD-10-CM

## 2023-03-06 DIAGNOSIS — F32.89 OTHER DEPRESSION: ICD-10-CM

## 2023-03-06 DIAGNOSIS — R41.3 MEMORY DIFFICULTY: Primary | ICD-10-CM

## 2023-03-06 DIAGNOSIS — J43.8 OTHER EMPHYSEMA (HCC): ICD-10-CM

## 2023-03-06 DIAGNOSIS — G93.40 ENCEPHALOPATHY: ICD-10-CM

## 2023-03-07 NOTE — TELEPHONE ENCOUNTER
Spoke with Rashad at [facility] and she is having me fax the form directly to her at [phone]  This was done  She is going to have this completed with the patient prior to her appt with Dr Wendi Rousseau on 3/9

## 2023-03-08 NOTE — TELEPHONE ENCOUNTER
Attempted to reach out regarding form with no answer  Left detailed message requesting a call back today regarding form

## 2023-03-09 ENCOUNTER — OFFICE VISIT (OUTPATIENT)
Dept: NEUROSURGERY | Facility: CLINIC | Age: 78
End: 2023-03-09

## 2023-03-09 VITALS
HEART RATE: 76 BPM | RESPIRATION RATE: 18 BRPM | OXYGEN SATURATION: 98 % | DIASTOLIC BLOOD PRESSURE: 76 MMHG | HEIGHT: 61 IN | BODY MASS INDEX: 18.31 KG/M2 | TEMPERATURE: 97.9 F | WEIGHT: 97 LBS | SYSTOLIC BLOOD PRESSURE: 121 MMHG

## 2023-03-09 DIAGNOSIS — G93.89 CEREBRAL VENTRICULOMEGALY: ICD-10-CM

## 2023-03-09 NOTE — TELEPHONE ENCOUNTER
PT form was received mostly completed  This was okay per LH  I let her know I can schedule pre and post-LP PT outpatient at our facility if she does consider the patient for an LP  She was appreciative  She would still like baseline cognitive assessment completed with the [facility]

## 2023-03-09 NOTE — PROGRESS NOTES
Neurosurgery Office Note  Roberto Juinor 65 y o  female MRN: [id number]      Assessment/Plan        Problem List Items Addressed This Visit       Visit Diagnoses     Cerebral ventriculomegaly                Discussion:  Patient is a 74-year-old female with h/o smoking, anxiety/depression, hypothyroidism, CMP, HTN, CAD s/p cardiac catherization in May 2022, baseline mild cognitive dysfunction who was hospitalized 2/13/23-2/19/23 for AMS and visual hallucinations (unclear etiology), but these symptoms completely resolved and now she is near baseline  She had right hip surgery ~2 years ago and has had "walking problems" requiring cane since but no progressively worsening gait ataxia  And no history of urinary incontinence (just frequency in February when she was hospitalized)  On exam, she is intact with a slight limp favoring left leg while walking but no shuffling/magnetic gait  MRI on 2/16/23 showed ventriculomegaly (in setting of diffuse global cerebral atrophy and prominent sulci bilaterally) without significant transependymal edema, and incidental non-enhancing small cystic pituitary lesions without cavernous sinus involvement or compression of optic structures  No prior MRI for comparison  Ventricular size similar to [address] in 2021  I explained to Nancie Child and her family MRI findings and clinical criteria for normal pressure hydrocephalus, which is a clinical (not radiographic) diagnosis and I do not believe she meets the clinical criteria  In addition, recently starting Aricept and Namenda has helped her overall functional status significantly  Most importantly, I do not believe ventriculomegaly without significant transependymal edema warrants neurosurgical intervention  For the incidental pituitary cystic lesions, likely benign without concerning radiographic features, I do not recommend routine surveillance or follow-up especially given her advanced age      All questions and concerns were addressed during this visit  CHIEF COMPLAINT    No chief complaint on file  HISTORY    History of Present Illness     65y o  year old female     HPI    See Discussion    REVIEW OF SYSTEMS    Review of Systems   Constitutional: Negative  HENT: Negative  Eyes: Negative  Respiratory: Negative  Cardiovascular: Negative  H/o HTN/ CAD-Heart failure, left, with LVEF 41-49%   Gastrointestinal: Negative  Endocrine: Negative  H/o Thyroid Diasease   Genitourinary: Negative  Urinary incontinence   Musculoskeletal: Positive for gait problem (difficulty walking, uses cane for assistance  )  Skin: Negative  Allergic/Immunologic: Negative  Neurological:        NPH- BRAIN MRI PIT done 2/16/23, CTH done 2/10/23, Partial Labs in Feb/Mar 2023, ED on 2/10/23 for AMS/ Hallucinations   Hematological: Negative  Psychiatric/Behavioral: Positive for confusion and decreased concentration  C/o Confusion, forgetfulness slowly getting worse over the last 6months,    All other systems reviewed and are negative          Meds/Allergies     Current Outpatient Medications   Medication Sig Dispense Refill   • Calcium Ascorbate (VITAMIN C) 500 mg tablet Take 500 mg by mouth daily     • calcium carbonate (OS-JUD) 600 MG tablet Take 600 mg by mouth daily     • carvedilol (COREG) 3 125 mg tablet Take 1 tablet (3 125 mg total) by mouth 2 (two) times a day with meals 180 tablet 3   • cholecalciferol (VITAMIN D3) 1,000 units tablet Take 2 tablets (2,000 Units total) by mouth daily 60 tablet 0   • donepezil (ARICEPT) 10 mg tablet Take 1 tablet (10 mg total) by mouth daily at bedtime 90 tablet 1   • escitalopram (LEXAPRO) 10 mg tablet Take 1 tablet (10 mg total) by mouth daily Do not start before February 20, 2023  30 tablet 0   • ibandronate (BONIVA) 150 MG tablet Take 1 tablet (150 mg total) by mouth every 30 (thirty) days 3 tablet 0   • levothyroxine 25 mcg tablet Take 1 tablet (25 mcg total) by mouth daily in the early morning Do not start before February 20, 2023  30 tablet 0   • lisinopril (ZESTRIL) 2 5 mg tablet Take 1 tablet (2 5 mg total) by mouth daily 90 tablet 3   • MELATONIN PO Take by mouth daily at bedtime as needed     • memantine (NAMENDA) 5 mg tablet Take 1 tablet (5 mg total) by mouth daily Do not start before February 20, 2023  30 tablet 0   • multivitamin (THERAGRAN) TABS Take 1 tablet by mouth daily     • rosuvastatin (CRESTOR) 5 mg tablet Take 1 tablet (5 mg total) by mouth every other day 45 tablet 3   • spironolactone (ALDACTONE) 25 mg tablet Take 1 tablet (25 mg total) by mouth every other day 45 tablet 3     No current facility-administered medications for this visit  No Known Allergies    PAST HISTORY    Past Medical History:   Diagnosis Date   • Anxiety    • Dementia ([facility] [address] )    • Hyperlipidemia    • Hypertension    • Memory difficulty        Past Surgical History:   Procedure Laterality Date   • ARTHROPLASTY HIP TOTAL ANTERIOR Right 4/9/2021    Procedure: ARTHROPLASTY HIP TOTAL ANTERIOR;  Surgeon: Odalys Gayle DO;  Location: [address] [place] [address];  Service: Orthopedics   • CARDIAC CATHETERIZATION Left 5/27/2022    Procedure: Cardiac catheterization;  Surgeon: Mirta Das MD;  Location: [place] [address] CATH LAB; Service: Cardiology       Social History     Tobacco Use   • Smoking status: Every Day     Packs/day: 0 50     Years: 60 00     Pack years: 30 00     Types: Cigarettes   • Smokeless tobacco: Never   Vaping Use   • Vaping Use: Never used   Substance Use Topics   • Alcohol use: Never   • Drug use: Never       No family history on file  The following portions of the patient's history were reviewed in this encounter and updated as appropriate: Past medical, surgical, family, and social history, as well as medications, allergies, and review of systems          EXAM    Vitals:Blood pressure 121/76, pulse 76, temperature 97 9 °F (36 6 °C), temperature source Temporal, resp  rate 18, height 5' 1" (1 549 m), weight 44 kg (97 lb), SpO2 98 %  ,There is no height or weight on file to calculate BMI  Physical Exam  Constitutional:       Appearance: Normal appearance  HENT:      Head: Normocephalic and atraumatic  Eyes:      Extraocular Movements: Extraocular movements intact and EOM normal       Pupils: Pupils are equal, round, and reactive to light  Cardiovascular:      Rate and Rhythm: Normal rate and regular rhythm  Pulses: Normal pulses  Heart sounds: Normal heart sounds  Pulmonary:      Effort: Pulmonary effort is normal       Breath sounds: Normal breath sounds  Abdominal:      General: Abdomen is flat  Palpations: Abdomen is soft  Musculoskeletal:         General: Normal range of motion  Cervical back: Normal range of motion  Skin:     General: Skin is warm  Neurological:      General: No focal deficit present  Mental Status: She is alert and oriented to person, place, and time  Mental status is at baseline  Motor: Motor strength is normal    Psychiatric:         Mood and Affect: Mood normal          Speech: Speech normal          Behavior: Behavior normal          Neurologic Exam     Mental Status   Oriented to person, place, and time  Attention: normal    Speech: speech is normal   Level of consciousness: alert    Cranial Nerves     CN II   Visual fields full to confrontation  Visual acuity: normal  Right visual field deficit: none  Left visual field deficit: none     CN III, IV, VI   Pupils are equal, round, and reactive to light  Extraocular motions are normal    CN III: no CN III palsy  CN VI: no CN VI palsy  Nystagmus: none   Diplopia: none  Ophthalmoparesis: none  Upgaze: normal  Downgaze: normal  Conjugate gaze: present    CN V   Facial sensation intact  Right facial sensation deficit: none  Left facial sensation deficit: none    CN VII   Facial expression full, symmetric     Right facial weakness: none  Left facial weakness: none    CN VIII   CN VIII normal      CN IX, X   CN IX normal    CN X normal    Palate: symmetric    CN XI   CN XI normal    Right sternocleidomastoid strength: normal  Left sternocleidomastoid strength: normal  Right trapezius strength: normal  Left trapezius strength: normal    CN XII   CN XII normal    Tongue deviation: none    Motor Exam   Muscle bulk: normal  Overall muscle tone: normal  Right arm pronator drift: absent  Left arm pronator drift: absent    Strength   Strength 5/5 throughout  Sensory Exam   Light touch normal      Gait, Coordination, and Reflexes     Reflexes   Reflexes 2+ except as noted  Requires cane for limp since right hip surgery ~2 years ago, not spastic, shuffling, or magnetic (normal steppage)         MEDICAL DECISION MAKING    Imaging Studies:     CT head without contrast    Result Date: 2/10/2023  Narrative: CT BRAIN - WITHOUT CONTRAST INDICATION:   Delirium ams  COMPARISON:  CT head without contrast 10/24/2022, 4/9/2021 TECHNIQUE:  CT examination of the brain was performed  In addition to axial images, sagittal and coronal 2D reformatted images were created and submitted for interpretation  Radiation dose length product (DLP) for this visit:  842 mGy-cm   This examination, like all CT scans performed in the [facility], was performed utilizing techniques to minimize radiation dose exposure, including the use of iterative reconstruction and automated exposure control  IMAGE QUALITY:  Diagnostic  FINDINGS: PARENCHYMA: Decreased attenuation is noted in periventricular and subcortical white matter demonstrating an appearance that is statistically most likely to represent moderate microangiopathic change  Unchanged chronic lacunar infarcts in bilateral basal  ganglia  No CT signs of acute infarction  No intracranial mass, mass effect or midline shift  No acute parenchymal hemorrhage   Arterial calcifications of carotid siphons and to a lesser extent in bilateral intradural vertebral artery  Unchanged small area of nodularity extending from the sella into the suprasellar region  VENTRICLES AND EXTRA-AXIAL SPACES:  Unchanged moderate ventriculomegaly out of proportion to degree of cerebral atrophy  No acute intraventricular or extra-axial hemorrhage  VISUALIZED ORBITS: Normal visualized orbits  PARANASAL SINUSES: Normal visualized paranasal sinuses  CALVARIUM AND EXTRACRANIAL SOFT TISSUES:  Normal      Impression: No acute intracranial abnormality  Unchanged moderate ventriculomegaly out of proportion to degree of cerebral atrophy  Question normal pressure hydrocephalus  Unchanged small area of nodularity extending from the sella into suprasellar region  Differential includes pituitary adenoma, Rathke's cleft cyst, or hypophysitis  Recommend MRI brain pituitary protocol with and without contrast for further evaluation  The study was marked in [facility] for immediate notification  Workstation performed: [id number]     XR chest 1 view    Result Date: 2/11/2023  Narrative: CHEST INDICATION:   ams  COMPARISON:  CXR 11/10/2022, chest CT 8/13/2013  EXAM PERFORMED/VIEWS:  XR CHEST 1 VIEW FINDINGS: Cardiomediastinal silhouette appears unremarkable  Hyperinflated due to emphysema  Slightly increased interstitial markings in the lung bases may be due to mild fibrosis  No effusion or pneumothorax  Osseous structures appear within normal limits for patient age  Impression: Emphysema with no definite acute disease  Workstation performed: [id number]     MRI brain pituitary wo and w contrast    Result Date: 2/17/2023  Narrative: MRI BRAIN AND SELLA  WITH AND WITHOUT CONTRAST INDICATION:  altered mental status/abnl CTH COMPARISON: Head CT 2/10/2023 TECHNIQUE: Multiplanar, multisequence imaging of the brain and sella was performed before and after gadolinium administration   Sagittal 3D volumetric imaging processed by [address] creating General Morphology and Age Related Atrophy reports  Targeted images of the sella were performed requiring additional time at acquisition and interpretation of approximately 25% IV Contrast:  4 mL of Gadobutrol injection (SINGLE-DOSE) IMAGE QUALITY:  Diagnostic  FINDINGS: BRAIN PARENCHYMA:  There is no discrete mass, mass effect or midline shift  Brainstem and cerebellum demonstrate normal signal  There is no intracranial hemorrhage  Diffusion imaging is unremarkable  Nonspecific  foci of T2/FLAIR hyperintensities involving periventricular and subcortical white matter, most compatible with mild microangiopathic change  Chronic left parietal lobe microhemorrhage (series 8 image 69) Normal postcontrast imaging  QUANTITATIVE: Exam Quality: Adequate for volumetric analysis  3D Volumetric Data: Hippocampal Occupancy Score (HOC):                   0 5      Percentile for similar age:                                   1 Lower HOC scores are associated with progression of MCI to AD  Total hippocampal volume (cc):                           5 21   Percentile for similar age:                              23 Entorhinal cortex (cc)                                            3 68     Percentile for similar age:                                   13           Superior Lateral ventricular volume (cc):             112 24   Percentile for similar age:                             80 Inferior lateral ventricular volume (cc):                    4 79   Percentile for similar age:                                80 Quantitative conclusions:      Hippocampal Volume:                       Normal volume      Entorhinal Volume:                            Normal volume      Superior Lateral Ventricle Volume:     High Volume      Inferior Lateral Ventricle Volume:       High Volume Concordance between qualitative and quantitative hippocampal volume assessment: Concordant   Change in brain volumes: No previous volumetric study for comparison Mean hippocampal volume loss among normal elderly: 0 7% per year, (-0 3 to 1 7;  Kendra Barbour 2008; also Brody 2010)  VENTRICLES:  Normal for the patient's age  SELLA AND PITUITARY GLAND: There is a hypoenhancing lesion involving left paramedian posterosuperior pituitary gland and distal infundibulum measuring 0 6 x 0 5 x 0 5 cm (craniocaudal by AP by transverse)  The infundibulum is slightly deviated to the right  Optic chiasm and prechiasmatic optic nerves are spared  There is additional 0 5 cm T2 hyperintense/T1 hypointense nonenhancing lesion centered at mid posterior inferior pituitary gland (series 12 image 6)  There is no cavernous sinus involvement  ORBITS:  Normal  PARANASAL SINUSES:  Normal  VASCULATURE:  Evaluation of the major intracranial vasculature demonstrates appropriate flow voids  CALVARIUM AND SKULL BASE:  Normal  EXTRACRANIAL SOFT TISSUES:  Normal      Impression: 1  Two pituitary lesions including: A 0 6 cm hypoenhancing lesion involving left paramedian posterosuperior pituitary gland and distal infundibulum most consistent with microadenoma; and a 0 5 cm mid posteroinferior pituitary gland nonenhancing lesion most consistent with a pituitary cyst  2   Chronic microangiopathic change  3   NeuroQuant analysis was performed: Normal hippocampal volume and enlarged ventricular system: Findings do not support hippocampal degeneration  Possible expansion of ventricular system without medial temporal lobe focused ex-vacuo process  Given abnormal hippocampal occupancy score (HOC) may consider 6-12 months follow-up Neuroquant  MRI to evaluate interval change if clinical concern persists  Workstation performed: [id number]       I have personally reviewed pertinent films in [facility] AGUSTIN    Neurosurgeon

## 2023-03-14 ENCOUNTER — RA CDI HCC (OUTPATIENT)
Dept: OTHER | Facility: HOSPITAL | Age: 78
End: 2023-03-14

## 2023-03-14 ENCOUNTER — OFFICE VISIT (OUTPATIENT)
Dept: PSYCHIATRY | Facility: CLINIC | Age: 78
End: 2023-03-14

## 2023-03-14 ENCOUNTER — TELEPHONE (OUTPATIENT)
Dept: FAMILY MEDICINE CLINIC | Facility: CLINIC | Age: 78
End: 2023-03-14

## 2023-03-14 DIAGNOSIS — R41.0 CONFUSION: Primary | ICD-10-CM

## 2023-03-14 DIAGNOSIS — R41.3 MEMORY DIFFICULTY: ICD-10-CM

## 2023-03-14 DIAGNOSIS — F03.918 DEMENTIA WITH BEHAVIORAL DISTURBANCE: ICD-10-CM

## 2023-03-14 DIAGNOSIS — F32.A DEPRESSION: ICD-10-CM

## 2023-03-14 DIAGNOSIS — F41.9 ANXIETY: ICD-10-CM

## 2023-03-14 DIAGNOSIS — G31.84 MCI (MILD COGNITIVE IMPAIRMENT): Primary | ICD-10-CM

## 2023-03-14 NOTE — PSYCH
Neuropsychological Evaluation  [facility]  [address] [facility] [address]  P: [phone] F: [phone]     Feedback from Neuropsychological Evaluation     Mrs Bowman returned for a feedback appointment to review the findings and recommendations from a neuropsychological evaluation conducted on 03/01/2023  The patient was accompanied by her son and daughter who participated in the feedback appointment with patient permission  Findings from the evaluation were discussed and the patient expressed understanding  Recommendations were reviewed (see evaluation report from 03/01/2023 for full details)  The patient was given the opportunity to ask questions and expressed satisfaction with answers provided  Contact information for this provider was given to the patient and she was encouraged to contact the office with any further questions or concerns  The neuropsychological evaluation report was routed to the referring provider, Lucia Becker MD, for review and follow-up as needed  Libby Riddle PsyD  Clinical Neuropsychologist  [place] Licensed Psychologist  Lic  #[id number]     Tasks Conducted Total Time Spent Associated CPT Codes   Clinical Interview 63 min  47080 x 1 unit  96121 x 3 units   Report Writing 172 min  Neuropsychological Test Administration (PhD/PsyD) 61 min  39127 x 1 unit  96137 x 2 units   Scoring (PhD/PsyD) 34 min  Neuropsychological Test Administration [facility] ) 119 min  83398 x 1 unit  96139 x 5 units   Scoring (Tech ) 62 min  Chart Review 34 min  53689 x 1 unit  96133 x 1 units   Interpretation of Test Data 32 min  Feedback to Patient/Family Members 39 min

## 2023-03-14 NOTE — TELEPHONE ENCOUNTER
hanna Grijalva  Reviewed hosp records  If VNA not covered at home for OT/ST then may give referral for outpt OT/ST  Keep fu with psych and neuro  Keep appt      ----- Message from Miguel A Rivas sent at 3/14/2023  2:24 PM EDT -----  Regarding: FW: Aruna Dubon Orders from [facility]: [phone]    ----- Message -----  From: Chiqui rCoft  Sent: 3/14/2023   2:20 PM EDT  To: [address] [place] [address]  Clinical  Subject: Aruna Dubon Orders from [facility]            We will speak to you about this when we come in for her appointment  No referral was sent  Also, they would need a reason for Medicare to cover it

## 2023-03-14 NOTE — PSYCH
Testing was completed today with this provider and a trained/supervised technician  Formal neuropsychological report to follow

## 2023-03-14 NOTE — PROGRESS NOTES
I11 0  [facility]  coding opportunities          Chart Reviewed number of suggestions sent to Provider: 1     Patients Insurance     Medicare Insurance: [employer]
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
## 2023-03-19 PROBLEM — E03.8 SUBCLINICAL HYPOTHYROIDISM: Status: RESOLVED | Noted: 2022-07-05 | Resolved: 2023-03-19

## 2023-03-19 PROBLEM — E78.49 OTHER HYPERLIPIDEMIA: Status: ACTIVE | Noted: 2022-10-25

## 2023-03-19 PROBLEM — E03.8 OTHER SPECIFIED HYPOTHYROIDISM: Status: ACTIVE | Noted: 2023-02-16

## 2023-03-21 ENCOUNTER — TELEPHONE (OUTPATIENT)
Dept: NEUROLOGY | Facility: CLINIC | Age: 78
End: 2023-03-21

## 2023-03-21 ENCOUNTER — OFFICE VISIT (OUTPATIENT)
Dept: FAMILY MEDICINE CLINIC | Facility: CLINIC | Age: 78
End: 2023-03-21

## 2023-03-21 VITALS
WEIGHT: 99 LBS | HEART RATE: 60 BPM | BODY MASS INDEX: 18.69 KG/M2 | HEIGHT: 61 IN | TEMPERATURE: 97.7 F | DIASTOLIC BLOOD PRESSURE: 70 MMHG | OXYGEN SATURATION: 95 % | SYSTOLIC BLOOD PRESSURE: 122 MMHG | RESPIRATION RATE: 16 BRPM

## 2023-03-21 DIAGNOSIS — E03.9 HYPOTHYROIDISM: ICD-10-CM

## 2023-03-21 DIAGNOSIS — M81.0 AGE-RELATED OSTEOPOROSIS WITHOUT CURRENT PATHOLOGICAL FRACTURE: ICD-10-CM

## 2023-03-21 DIAGNOSIS — I50.1 HEART FAILURE, LEFT, WITH LVEF 41-49% (HCC): ICD-10-CM

## 2023-03-21 DIAGNOSIS — I10 PRIMARY HYPERTENSION: ICD-10-CM

## 2023-03-21 DIAGNOSIS — Z72.0 TOBACCO ABUSE: ICD-10-CM

## 2023-03-21 DIAGNOSIS — R32 URINARY INCONTINENCE, UNSPECIFIED TYPE: ICD-10-CM

## 2023-03-21 DIAGNOSIS — E03.8 OTHER SPECIFIED HYPOTHYROIDISM: Primary | ICD-10-CM

## 2023-03-21 DIAGNOSIS — F32.89 OTHER DEPRESSION: ICD-10-CM

## 2023-03-21 RX ORDER — ESCITALOPRAM OXALATE 10 MG/1
10 TABLET ORAL DAILY
Qty: 90 TABLET | Refills: 0 | Status: SHIPPED | OUTPATIENT
Start: 2023-03-21

## 2023-03-21 RX ORDER — IBANDRONATE SODIUM 150 MG/1
150 TABLET, FILM COATED ORAL
Qty: 1 TABLET | Refills: 5 | Status: SHIPPED | OUTPATIENT
Start: 2023-03-21

## 2023-03-21 RX ORDER — LEVOTHYROXINE SODIUM 0.03 MG/1
25 TABLET ORAL
Qty: 90 TABLET | Refills: 1 | Status: SHIPPED | OUTPATIENT
Start: 2023-03-21

## 2023-03-21 NOTE — PROGRESS NOTES
Assessment/Plan:    No problem-specific Assessment & Plan notes found for this encounter  tsh better on low dose LT4 but level done less than 4w from start so will recheck to avoid overtreatment, family aware of risks    Osteoporosis, continue boniva, no gi side effects, dosing reviewed    htn stable, continue meds per cardiology    Urine incontinence, functional but takes diuretics  Offered medication options but they decline due to anticholinergic side effects, options including myrbetriq advised  Try timed voids  Urology evaluation    Depression stable, she sees Dr Bianca Orozco who is temporarily aware, will send interm rx of lexapro as bridge    Discussed contacting neurology for possible seizure episodes  mychart message reviewed with pt  Pt seems agreeable to give up finances and bills and have family take over  MRI reviewed  If episodes are not neurologic, consider cardio f/u for holter    Pt agrees not to drive  Concerns of daughter were addressed in the room with pt jazmin ability to pay bills and take care of finances  tob risks aware     Diagnoses and all orders for this visit:    Other specified hypothyroidism    Age-related osteoporosis without current pathological fracture  -     ibandronate (BONIVA) 150 MG tablet; Take 1 tablet (150 mg total) by mouth every 30 (thirty) days    Primary hypertension    Urinary incontinence, unspecified type  -     Ambulatory referral to Urology; Future    Hypothyroidism  -     TSH, 3rd generation; Future  -     T4, free; Future  -     levothyroxine 25 mcg tablet; Take 1 tablet (25 mcg total) by mouth daily in the early morning    Other depression  -     escitalopram (LEXAPRO) 10 mg tablet; Take 1 tablet (10 mg total) by mouth daily    Heart failure, left, with LVEF 41-49% (HCC)    Tobacco abuse        Return in about 6 months (around 9/21/2023) for Recheck  Subjective:      Patient ID: Renzo Castaneda is a 65 y o  female      Chief Complaint   Patient presents with   • Follow-up     Rehob  Eugenio Atkins MA        HPI  Still smoking  No nausea or diarrhea  No tachycardia/palp/sweating symptoms with her fall episodes    No gerd on boniva    Several falls  No syncope  No dizziness    Having episodes as described and reviewed from blabfeed[employer] messages  No prior hx of seizures    Forgetful but not violent    Functional urine incontinence noted  No leak with cough/sneeze    The following portions of the patient's history were reviewed and updated as appropriate: allergies, current medications, past family history, past medical history, past social history, past surgical history and problem list     Review of Systems   Constitutional: Negative for chills and fever  Respiratory: Negative for shortness of breath            Current Outpatient Medications   Medication Sig Dispense Refill   • Calcium Ascorbate (VITAMIN C) 500 mg tablet Take 500 mg by mouth daily     • calcium carbonate (OS-JUD) 600 MG tablet Take 600 mg by mouth daily     • carvedilol (COREG) 3 125 mg tablet Take 1 tablet (3 125 mg total) by mouth 2 (two) times a day with meals 180 tablet 3   • cholecalciferol (VITAMIN D3) 1,000 units tablet Take 2 tablets (2,000 Units total) by mouth daily 60 tablet 0   • donepezil (ARICEPT) 10 mg tablet Take 1 tablet (10 mg total) by mouth daily at bedtime 90 tablet 1   • escitalopram (LEXAPRO) 10 mg tablet Take 1 tablet (10 mg total) by mouth daily 90 tablet 0   • ibandronate (BONIVA) 150 MG tablet Take 1 tablet (150 mg total) by mouth every 30 (thirty) days 1 tablet 5   • levothyroxine 25 mcg tablet Take 1 tablet (25 mcg total) by mouth daily in the early morning 90 tablet 1   • lisinopril (ZESTRIL) 2 5 mg tablet Take 1 tablet (2 5 mg total) by mouth daily 90 tablet 3   • memantine (NAMENDA) 5 mg tablet Take 1 tablet (5 mg total) by mouth daily Do not start before February 20, 2023  30 tablet 0   • multivitamin (THERAGRAN) TABS Take 1 tablet by mouth daily     • rosuvastatin (CRESTOR) 5 mg tablet Take 1 tablet (5 mg total) by mouth every other day 45 tablet 3   • spironolactone (ALDACTONE) 25 mg tablet Take 1 tablet (25 mg total) by mouth every other day 45 tablet 3   • MELATONIN PO Take by mouth daily at bedtime as needed (Patient not taking: Reported on 3/21/2023)       No current facility-administered medications for this visit  Objective:    /70   Pulse 60   Temp 97 7 °F (36 5 °C)   Resp 16   Ht 5' 1" (1 549 m)   Wt 44 9 kg (99 lb)   SpO2 95%   BMI 18 71 kg/m²        Physical Exam  Vitals and nursing note reviewed  Constitutional:       General: She is not in acute distress  Appearance: She is well-developed  She is not ill-appearing  HENT:      Head: Normocephalic  Right Ear: Tympanic membrane normal       Left Ear: Tympanic membrane normal    Eyes:      General: No scleral icterus  Conjunctiva/sclera: Conjunctivae normal    Cardiovascular:      Rate and Rhythm: Normal rate and regular rhythm  Heart sounds: No murmur heard  Pulmonary:      Effort: Pulmonary effort is normal  No respiratory distress  Breath sounds: No wheezing  Abdominal:      General: There is no distension  Palpations: Abdomen is soft  Tenderness: There is no abdominal tenderness  There is no right CVA tenderness or left CVA tenderness  Musculoskeletal:         General: No deformity  Cervical back: Neck supple  Right lower leg: No edema  Left lower leg: No edema  Skin:     General: Skin is warm and dry  Coloration: Skin is not jaundiced or pale  Neurological:      General: No focal deficit present  Mental Status: She is alert  Psychiatric:         Mood and Affect: Mood normal          Behavior: Behavior normal          Thought Content: Thought content normal        41 minutes spent with patient and with chart review and documentation completion             Suad Nielsen DO

## 2023-03-22 ENCOUNTER — TELEPHONE (OUTPATIENT)
Dept: NEUROLOGY | Facility: CLINIC | Age: 78
End: 2023-03-22

## 2023-03-22 NOTE — TELEPHONE ENCOUNTER
Patients daughter in law Valentine called and had a question in regards to a letter she is looking to have completed by Dr Evelyne Schirmer towards patients EEG test     Please call Valentine back at [phone]

## 2023-03-24 NOTE — TELEPHONE ENCOUNTER
Being addressed in [employer] [place] message  See encounter 2/19/2023 March 24, 2023  Silva Frederick MD  to [address] 1:44 PM    Lina,   Can you please create letter stating what daughter needs? Thanks a lot     Silva Frederick MD  to [id number] [facility] someone from my office will take care of this  This [address] message has not been read

## 2023-03-29 ENCOUNTER — HOSPITAL ENCOUNTER (OUTPATIENT)
Dept: NEUROLOGY | Facility: HOSPITAL | Age: 78
Discharge: HOME | End: 2023-03-29

## 2023-03-29 DIAGNOSIS — R41.0 CONFUSION: ICD-10-CM

## 2023-03-31 ENCOUNTER — OFFICE VISIT (OUTPATIENT)
Dept: PODIATRY | Facility: CLINIC | Age: 78
End: 2023-03-31

## 2023-03-31 VITALS
BODY MASS INDEX: 18.69 KG/M2 | RESPIRATION RATE: 17 BRPM | SYSTOLIC BLOOD PRESSURE: 122 MMHG | DIASTOLIC BLOOD PRESSURE: 70 MMHG | HEIGHT: 61 IN | WEIGHT: 99 LBS

## 2023-03-31 DIAGNOSIS — I70.209 PERIPHERAL ARTERIOSCLEROSIS (HCC): Primary | ICD-10-CM

## 2023-03-31 DIAGNOSIS — M79.672 PAIN IN BOTH FEET: ICD-10-CM

## 2023-03-31 DIAGNOSIS — L03.039 PARONYCHIA OF TOENAIL, UNSPECIFIED LATERALITY: ICD-10-CM

## 2023-03-31 DIAGNOSIS — B35.9 DERMATOPHYTOSIS: ICD-10-CM

## 2023-03-31 DIAGNOSIS — M79.671 PAIN IN BOTH FEET: ICD-10-CM

## 2023-03-31 DIAGNOSIS — B35.1 ONYCHOMYCOSIS: ICD-10-CM

## 2023-04-04 ENCOUNTER — OFFICE VISIT (OUTPATIENT)
Dept: NEUROLOGY | Facility: CLINIC | Age: 78
End: 2023-04-04

## 2023-04-04 ENCOUNTER — APPOINTMENT (OUTPATIENT)
Dept: LAB | Facility: HOSPITAL | Age: 78
End: 2023-04-04
Attending: FAMILY MEDICINE

## 2023-04-04 VITALS
OXYGEN SATURATION: 93 % | BODY MASS INDEX: 18.5 KG/M2 | SYSTOLIC BLOOD PRESSURE: 126 MMHG | HEIGHT: 61 IN | TEMPERATURE: 96.7 F | WEIGHT: 98 LBS | HEART RATE: 86 BPM | DIASTOLIC BLOOD PRESSURE: 75 MMHG

## 2023-04-04 DIAGNOSIS — D35.2 PITUITARY MICROADENOMA (HCC): ICD-10-CM

## 2023-04-04 DIAGNOSIS — F32.A DEPRESSION: ICD-10-CM

## 2023-04-04 DIAGNOSIS — R41.3 MEMORY IMPAIRMENT: Primary | ICD-10-CM

## 2023-04-04 DIAGNOSIS — E03.9 HYPOTHYROIDISM: ICD-10-CM

## 2023-04-04 LAB
T4 FREE SERPL-MCNC: 0.83 NG/DL (ref 0.76–1.46)
TSH SERPL DL<=0.05 MIU/L-ACNC: 3.77 UIU/ML (ref 0.45–4.5)

## 2023-04-04 NOTE — PROGRESS NOTES
Return NeuroOutpatient Note        Kayla Le  [id number]  65 y o   1945        Gait dysfunction with ataxia, Dementia (Hallui/), and  AMS with hallucinations        History obtained from:  Patient and daughters     HPI/Subjective:    Kayla Le is a 64 yo F with PMH of dementia presents as f/u  Patient was recently seen at [facility] on 2/16/23 for hallucinations, memory impairment, impulsive behavior  Her MRI brain neuroquant then was normal with no evidence of degeneration  Her EEG was normal  She was seen by psychiatry as well  She was placed on lexapro 10mg daily  We had started her on memantine as well  In dec of 2022, her MOCA score was 21/30  Family denies any further events/spells since hospitalization  Patient lives at her home with 24/7 care  She can get irritated but that has been present for few years  She was asked not to drive  There was concern for NPH  She was referred to neurosurgery and they did not feel she had clinical features to warrant csf shunt  No further intervention was necessary  She had neuropsych testing recently in which patient had poor functioning with multiple domains along with delusions and hallucinations  They didn't feel her psychiatric state was significant enough to cause her symptoms  Etiology wasn't clear but moderate dementia wasn't excluded  She may have gotten worse from mild depression  She will have repeat study in 6 months  Regardless of etiology, patient is doing better with aricept and namenda  Per family, she takes her own shower  She does her own personal hygiene  She had f/u pituitary MRI brain which had revealed 2 0 6 and 0 5cm lesions that were microadenoma  Past Medical History:   Diagnosis Date   • Anxiety    • Dementia ([facility] [address] )    • Hyperlipidemia    • Hypertension    • Memory difficulty      Social History     Socioeconomic History   • Marital status:       Spouse name: Not on file   • Number of children: Not on file   • Years of education: Not on file   • Highest education level: Not on file   Occupational History   • Not on file   Tobacco Use   • Smoking status: Every Day     Packs/day: 0 50     Years: 60 00     Pack years: 30 00     Types: Cigarettes   • Smokeless tobacco: Never   Vaping Use   • Vaping Use: Never used   Substance and Sexual Activity   • Alcohol use: Never   • Drug use: Never   • Sexual activity: Not Currently     Partners: Male   Other Topics Concern   • Not on file   Social History Narrative   • Not on file     Social Determinants of Health     Financial Resource Strain: Not on file   Food Insecurity: No Food Insecurity   • Worried About Running Out of Food in the Last Year: Never true   • Ran Out of Food in the Last Year: Never true   Transportation Needs: No Transportation Needs   • Lack of Transportation (Medical): No   • Lack of Transportation (Non-Medical): No   Physical Activity: Not on file   Stress: Not on file   Social Connections: Not on file   Intimate Partner Violence: Not on file   Housing Stability: Low Risk    • Unable to Pay for Housing in the Last Year: No   • Number of Places Lived in the Last Year: 1   • Unstable Housing in the Last Year: No     History reviewed  No pertinent family history    No Known Allergies  Current Outpatient Medications on File Prior to Visit   Medication Sig Dispense Refill   • Calcium Ascorbate (VITAMIN C) 500 mg tablet Take 500 mg by mouth daily     • calcium carbonate (OS-JUD) 600 MG tablet Take 600 mg by mouth daily     • carvedilol (COREG) 3 125 mg tablet Take 1 tablet (3 125 mg total) by mouth 2 (two) times a day with meals 180 tablet 3   • cholecalciferol (VITAMIN D3) 1,000 units tablet Take 2 tablets (2,000 Units total) by mouth daily 60 tablet 0   • donepezil (ARICEPT) 10 mg tablet Take 1 tablet (10 mg total) by mouth daily at bedtime 90 tablet 1   • escitalopram (LEXAPRO) 10 mg tablet Take 1 tablet (10 mg total) by mouth daily 90 tablet 0   • "ibandronate (BONIVA) 150 MG tablet Take 1 tablet (150 mg total) by mouth every 30 (thirty) days 1 tablet 5   • levothyroxine 25 mcg tablet Take 1 tablet (25 mcg total) by mouth daily in the early morning 90 tablet 1   • lisinopril (ZESTRIL) 2 5 mg tablet Take 1 tablet (2 5 mg total) by mouth daily 90 tablet 3   • memantine (NAMENDA) 5 mg tablet Take 1 tablet (5 mg total) by mouth daily Do not start before February 20, 2023  30 tablet 0   • multivitamin (THERAGRAN) TABS Take 1 tablet by mouth daily     • rosuvastatin (CRESTOR) 5 mg tablet Take 1 tablet (5 mg total) by mouth every other day 45 tablet 3   • spironolactone (ALDACTONE) 25 mg tablet Take 1 tablet (25 mg total) by mouth every other day 45 tablet 3   • MELATONIN PO Take by mouth daily at bedtime as needed (Patient not taking: Reported on 3/21/2023)       No current facility-administered medications on file prior to visit  Review of Systems   Refer to positive review of systems in HPI     Review of Systems    Constitutional- No fever  Eyes- No visual change  ENT- Hearing normal  CV- No chest pain  Resp- No Shortness of breath  GI- No diarrhea  - Bladder normal  MS- No Arthritis   Skin- No rash  Psych- No depression  Endo- No DM  Heme- No nodes    Vitals:    04/04/23 1603   BP: 126/75   BP Location: Right arm   Patient Position: Sitting   Cuff Size: Standard   Pulse: 86   Temp: (!) 96 7 °F (35 9 °C)   TempSrc: Tympanic   SpO2: 93%   Weight: 44 5 kg (98 lb)   Height: 5' 1\" (1 549 m)       PHYSICAL EXAM:  Appearance: No Acute Distress  Ophthalmoscopic: Disc Flat, Normal fundus  Mental status:  Orientation: Awake, Alert, and Orientedx3  Memory: Registation 2/3 Recall 0/3  Attention: normal  Knowledge: good  Language: No aphasia  Speech: No dysarthria  Cranial Nerves:  2 No Visual Defect on Confrontation, Pupils round, equal, reactive to light  3,4,6 Extraocular Movements Intact, no nystagmus  5 Facial Sensation Intact  7 No facial asymmetry  8 Intact " hearing  9,10 Palate symmetric, normal gag  11 Good shoulder shrug  12 Tongue Midline  Gait: Stable  Coordination: No ataxia with finger to nose testing, and heel to shin  Sensory: Intact, Symmetric to pinprick, light touch, vibration, and joint position  Muscle Tone: Normal              Muscle exam:  Arm Right Left Leg Right Left   Deltoid 5/5 5/5 Iliopsoas 5/5 5/5   Biceps 5/5 5/5 Quads 5/5 5/5   Triceps 5/5 5/5 Hamstrings 5/5 5/5   Wrist Extension 5/5 5/5 Ankle Dorsi Flexion 5/5 5/5   Wrist Flexion 5/5 5/5 Ankle Plantar Flexion 5/5 5/5   Interossei 5/5 5/5 Ankle Eversion 5/5 5/5   APB 5/5 5/5 Ankle Inversion 5/5 5/5       Reflexes   RJ BJ TJ KJ AJ Plantars Rene's   Right 2+ 2+ 2+ 2+ 2+ Downgoing Not present   Left 2+ 2+ 2+ 2+ 2+ Downgoing Not present     Personal review of  Labs:                  Diagnoses and all orders for this visit:      1  Memory impairment        2  Depression        3  Pituitary microadenoma [facility])            Patient is doing better compared to her hospitalization  Her memory impairment could be multifactorial    We will resume aricept and namenda  She is to have repeat neuropsych testing in 6 months  Discussed that if she were to have recurrent spells of disorientation, will consider admission to EMU                 Total time of encounter:  40 min  More than 50% of the time was used in counseling and/or coordination of care  Extent of counseling and/or coordination of care        MD [facility]  [address]  [address]  [phone]

## 2023-04-04 NOTE — PROGRESS NOTES
Assessment/Plan:  Pain upon ambulation secondary to peripheral artery disease   Mycosis of nail hallux nail demonstrate a paronychia bilateral   Xerosis of skin      Plan   Foot exam performed   Patient educated on condition   All nails debrided without pain or complication   Patient will moisturize daily      Patient remain on topical antifungal            Diagnoses and all orders for this visit:     Pain in both feet     Peripheral arteriosclerosis ([address] )     Onychomycosis     Paronychia of toenail, unspecified laterality     Xerosis of skin            Subjective:  Patient has pain in her feet and toes with ambulation   She has pain when she wears shoes   No history of trauma  No Known Allergies        Current Outpatient Medications:   •  ALPRAZolam (XANAX) 0 25 mg tablet, Take 0 25 mg by mouth, Disp: , Rfl:   •  Calcium Ascorbate (VITAMIN C) 500 mg tablet, Take 500 mg by mouth daily, Disp: , Rfl:   •  carvedilol (COREG) 3 125 mg tablet, Take 1 tablet (3 125 mg total) by mouth 2 (two) times a day with meals, Disp: 60 tablet, Rfl: 1  •  cholecalciferol (VITAMIN D3) 1,000 units tablet, Take 2 tablets (2,000 Units total) by mouth daily, Disp: 60 tablet, Rfl: 0  •  lisinopril (ZESTRIL) 2 5 mg tablet, Take 1 tablet (2 5 mg total) by mouth daily, Disp: 30 tablet, Rfl: 1  •  multivitamin (THERAGRAN) TABS, Take 1 tablet by mouth daily, Disp: , Rfl:   •  rosuvastatin (CRESTOR) 5 mg tablet, Take 1 tablet (5 mg total) by mouth every other day, Disp: 45 tablet, Rfl: 1  •  spironolactone (ALDACTONE) 25 mg tablet, Take 1 tablet (25 mg total) by mouth every other day, Disp: 15 tablet, Rfl: 1           Patient Active Problem List   Diagnosis   • COVID-19 virus infection   • Closed displaced fracture of right femoral neck (HCC)   • Tobacco abuse   • Tachycardia             Patient ID: Maryellen Bowman is a 65 y  o  female      HPI     The following portions of the patient's history were reviewed and updated as appropriate:      family history is not on file        reports that she has been smoking cigarettes  She has a 30 00 pack-year smoking history  She has never used smokeless tobacco  She reports that she does not drink alcohol and does not use drugs      Objective:  Patient's shoes and socks removed    Foot Exam     General  General Appearance: appears stated age and healthy   Orientation: alert and oriented to person, place, and time   Affect: appropriate         Right Foot/Ankle      Inspection and Palpation  Tenderness: metatarsals   Swelling: dorsum   Arch: pes planus  Claw Toes: fifth toe  Skin Exam: dry skin;      Neurovascular  Dorsalis pedis: 1+  Posterior tibial: 1+  Superficial peroneal nerve sensation: diminished  Deep peroneal nerve sensation: diminished        Left Foot/Ankle       Inspection and Palpation  Tenderness: metatarsals   Swelling: dorsum   Arch: pes planus  Claw toes: fifth toe  Skin Exam: callus and dry skin;      Neurovascular  Dorsalis pedis: 1+  Posterior tibial: 1+  Superficial peroneal nerve sensation: diminished  Deep peroneal nerve sensation: diminished           Physical Exam  Vitals and nursing note reviewed  Constitutional:       Appearance: Normal appearance  Cardiovascular:      Rate and Rhythm: Normal rate and regular rhythm       Pulses:           Dorsalis pedis pulses are 1+ on the right side and 1+ on the left side         Posterior tibial pulses are 1+ on the right side and 1+ on the left side       Comments: Q, 9 findings bilateral   Negative digital hair   Positive abnormal cooling   Positive significant telangiectasia development foot and ankle bilateral  Feet:      Right foot:      Skin integrity: Dry skin present       Left foot:      Skin integrity: Callus and dry skin present     Skin:     Capillary Refill: Capillary refill takes 2 to 3 seconds       Comments: Patient demonstrates xerosis of skin   All nails are mycotic   They demonstrate distal lysis  Toya Gaston is malodor   Hallux bilateral has mild paronychia without evidence of cellulitis    Patient also has dermatophytosis right greater than left  Neurological:      Mental Status: She is alert  Psychiatric:         Mood and Affect: Mood normal          BehaviorFredick Adithya         Thought Content:  Thought content normal          Judgment: Judgment normal

## 2023-04-06 ENCOUNTER — TELEPHONE (OUTPATIENT)
Dept: NEUROLOGY | Facility: CLINIC | Age: 78
End: 2023-04-06

## 2023-04-06 DIAGNOSIS — R41.3 MEMORY IMPAIRMENT: Primary | ICD-10-CM

## 2023-04-06 RX ORDER — MEMANTINE HYDROCHLORIDE 10 MG/1
10 TABLET ORAL DAILY
Qty: 30 TABLET | Refills: 5 | Status: SHIPPED | OUTPATIENT
Start: 2023-04-06

## 2023-04-06 NOTE — TELEPHONE ENCOUNTER
Called and advised pt of all of the below  She verbalized clear understanding of all instructions but she asked I call Manpreet Jones at   [phone]  Called and spoke w/ Manpreet Jones and advised of the below  She verbalized understanding  She is aware that script for the 10 mg was sent to [facility] DR KAMLA HEATH

## 2023-04-06 NOTE — TELEPHONE ENCOUNTER
Jayson Mullins- pt's daughter in-law left message requesting refill of pt's Namenda ,stated that she has been taken 5 mg daily  Called and spoke to Jayson Mullins reported that pt has been taking Namenda 5 mg po daily since her hospitalization in February and has been tolerating it well  Please advise if you want to continue with current dose or to be increase, and send new script to [address] wants to be called once script is send to pharmacy    CB# [phone] to leave detailed message

## 2023-05-19 ENCOUNTER — HOSPITAL ENCOUNTER (EMERGENCY)
Facility: HOSPITAL | Age: 78
Discharge: HOME | End: 2023-05-19
Attending: EMERGENCY MEDICINE

## 2023-05-19 VITALS
DIASTOLIC BLOOD PRESSURE: 64 MMHG | TEMPERATURE: 97.3 F | SYSTOLIC BLOOD PRESSURE: 147 MMHG | RESPIRATION RATE: 18 BRPM | HEART RATE: 56 BPM | OXYGEN SATURATION: 94 %

## 2023-05-19 DIAGNOSIS — F03.90 DEMENTIA (HCC): Primary | ICD-10-CM

## 2023-05-19 LAB
ANION GAP SERPL CALCULATED.3IONS-SCNC: 8 MMOL/L (ref 4–13)
BASOPHILS # BLD AUTO: 0.08 THOUSANDS/ÂΜL (ref 0–0.1)
BASOPHILS NFR BLD AUTO: 1 % (ref 0–1)
BILIRUB UR QL STRIP: NEGATIVE
BUN SERPL-MCNC: 20 MG/DL (ref 5–25)
CALCIUM SERPL-MCNC: 9 MG/DL (ref 8.4–10.2)
CHLORIDE SERPL-SCNC: 101 MMOL/L (ref 96–108)
CLARITY UR: CLEAR
CO2 SERPL-SCNC: 27 MMOL/L (ref 21–32)
COLOR UR: NORMAL
CREAT SERPL-MCNC: 0.81 MG/DL (ref 0.6–1.3)
EOSINOPHIL # BLD AUTO: 0.17 THOUSAND/ÂΜL (ref 0–0.61)
EOSINOPHIL NFR BLD AUTO: 2 % (ref 0–6)
ERYTHROCYTE [DISTWIDTH] IN BLOOD BY AUTOMATED COUNT: 12.2 % (ref 11.6–15.1)
GFR SERPL CREATININE-BSD FRML MDRD: 69 ML/MIN/1.73SQ M
GLUCOSE SERPL-MCNC: 79 MG/DL (ref 65–140)
GLUCOSE UR STRIP-MCNC: NEGATIVE MG/DL
HCT VFR BLD AUTO: 39.9 % (ref 34.8–46.1)
HGB BLD-MCNC: 13.3 G/DL (ref 11.5–15.4)
HGB UR QL STRIP.AUTO: NEGATIVE
IMM GRANULOCYTES # BLD AUTO: 0.03 THOUSAND/UL (ref 0–0.2)
IMM GRANULOCYTES NFR BLD AUTO: 0 % (ref 0–2)
KETONES UR STRIP-MCNC: NEGATIVE MG/DL
LEUKOCYTE ESTERASE UR QL STRIP: NEGATIVE
LYMPHOCYTES # BLD AUTO: 2.12 THOUSANDS/ÂΜL (ref 0.6–4.47)
LYMPHOCYTES NFR BLD AUTO: 24 % (ref 14–44)
MCH RBC QN AUTO: 35.1 PG (ref 26.8–34.3)
MCHC RBC AUTO-ENTMCNC: 33.3 G/DL (ref 31.4–37.4)
MCV RBC AUTO: 105 FL (ref 82–98)
MONOCYTES # BLD AUTO: 0.59 THOUSAND/ÂΜL (ref 0.17–1.22)
MONOCYTES NFR BLD AUTO: 7 % (ref 4–12)
NEUTROPHILS # BLD AUTO: 6.02 THOUSANDS/ÂΜL (ref 1.85–7.62)
NEUTS SEG NFR BLD AUTO: 66 % (ref 43–75)
NITRITE UR QL STRIP: NEGATIVE
NRBC BLD AUTO-RTO: 0 /100 WBCS
PH UR STRIP.AUTO: 6 [PH]
PLATELET # BLD AUTO: 271 THOUSANDS/UL (ref 149–390)
PMV BLD AUTO: 9 FL (ref 8.9–12.7)
POTASSIUM SERPL-SCNC: 4.5 MMOL/L (ref 3.5–5.3)
PROT UR STRIP-MCNC: NEGATIVE MG/DL
RBC # BLD AUTO: 3.79 MILLION/UL (ref 3.81–5.12)
SODIUM SERPL-SCNC: 136 MMOL/L (ref 135–147)
SP GR UR STRIP.AUTO: <=1.005 (ref 1–1.03)
UROBILINOGEN UR QL STRIP.AUTO: 0.2 E.U./DL
WBC # BLD AUTO: 9.01 THOUSAND/UL (ref 4.31–10.16)

## 2023-05-19 NOTE — ED NOTES
"Patient's ER Attending (Dr Kell Brown) came to speak with PES about this case  Dr Jose Phillips and the family wanted the patient admitted \"medically\" for worsening dementia with psychosis - Dr Kell Brown could not find a medical reason to admit her and the family became upset @ the prospect of a d/c  Dr Kell Brown felt the patient did not have the capacity for \"informed consent\" - therefore PES referred the case to CFS/Screening - spoke with Madan Vargas and faxed the chart about 17:10  PES updated the family on this process  PES reviewed the chart and located a Dr Jose Phillips consult for 2/23 - faxed to JENELLE/Milo @ 17:25 - called to inform Madan Vargas of the fax  17:55 - JENELLE/Nancy called to discuss this referral     18:10 - JENELLE/Milo arrived for the screening - PES escorting him and the patient's family to the family room for privacy  19:00 - Madan Vargas spoke with the 3 family members - they do not want the patient to go to far away - the patient can not stay in the [facility] as she no longer has capacity for informed consent and if committed in [place] - the family can not control where the patient is placed  Madan Vargas to speak with patient now  19:20 - PES/JENELLE-Milo had about a 20 minute conversation about disposition for the patient - all the in and outs of psychiatric admissions - they understand they can sign the patient into Asst living or Nursing Home but not for a psychiatric admission and they do not want the patient committed in [place] due to the likelihood of a placement to far away  The family will watch the patient 24 hours a day until they [place]reket Smith make arrangements for a Asst Living or NH placement    PES updated the ER staff about the plan and d/c   "

## 2023-05-19 NOTE — ED NOTES
Son pulled this nurse aside to tell me that pt has been starting to hallucinate again  Seeing people that arent there, so Dr wants her checked for medical reasons that would make her do this       Rinaldo Mcardle, SHARRON  05/19/23 4776

## 2023-05-19 NOTE — ED PROVIDER NOTES
History  Chief Complaint   Patient presents with   • Labs Only     Sent in for labwork by Dr Clari Darling  hx of dementia  Alteration in mental status, to check for uti and dehydration  Here with son     69-year-old female, presents with son for evaluation of increased confusion  Son states patient was recently diagnosed with dementia, has been more confused and combative over the past several days  No known fevers or illness, patient still eating and drinking  Patient currently awake and alert, denies any complaints  History provided by:  Patient   used: No        Prior to Admission Medications   Prescriptions Last Dose Informant Patient Reported? Taking?    Calcium Ascorbate (VITAMIN C) 500 mg tablet   Yes No   Sig: Take 500 mg by mouth daily   MELATONIN PO   Yes No   Sig: Take by mouth daily at bedtime as needed   Patient not taking: Reported on 3/21/2023   calcium carbonate (OS-JUD) 600 MG tablet   Yes No   Sig: Take 600 mg by mouth daily   carvedilol (COREG) 3 125 mg tablet   No No   Sig: Take 1 tablet (3 125 mg total) by mouth 2 (two) times a day with meals   cholecalciferol (VITAMIN D3) 1,000 units tablet   No No   Sig: Take 2 tablets (2,000 Units total) by mouth daily   donepezil (ARICEPT) 10 mg tablet   No No   Sig: Take 1 tablet (10 mg total) by mouth daily at bedtime   escitalopram (LEXAPRO) 10 mg tablet   No No   Sig: Take 1 tablet (10 mg total) by mouth daily   ibandronate (BONIVA) 150 MG tablet   No No   Sig: Take 1 tablet (150 mg total) by mouth every 30 (thirty) days   levothyroxine 25 mcg tablet   No No   Sig: Take 1 tablet (25 mcg total) by mouth daily in the early morning   lisinopril (ZESTRIL) 2 5 mg tablet   No No   Sig: Take 1 tablet (2 5 mg total) by mouth daily   memantine (NAMENDA) 10 mg tablet   No No   Sig: Take 1 tablet (10 mg total) by mouth daily   multivitamin (THERAGRAN) TABS   Yes No   Sig: Take 1 tablet by mouth daily   rosuvastatin (CRESTOR) 5 mg tablet   No No Sig: Take 1 tablet (5 mg total) by mouth every other day   spironolactone (ALDACTONE) 25 mg tablet   No No   Sig: Take 1 tablet (25 mg total) by mouth every other day      Facility-Administered Medications: None       Past Medical History:   Diagnosis Date   • Anxiety    • Dementia ([facility] [address] )    • Hyperlipidemia    • Hypertension    • Memory difficulty        Past Surgical History:   Procedure Laterality Date   • ARTHROPLASTY HIP TOTAL ANTERIOR Right 4/9/2021    Procedure: ARTHROPLASTY HIP TOTAL ANTERIOR;  Surgeon: Manoj Carver DO;  Location: [address] [place] [address];  Service: Orthopedics   • CARDIAC CATHETERIZATION Left 5/27/2022    Procedure: Cardiac catheterization;  Surgeon: Kayode Haro MD;  Location: [place] [address] CATH LAB; Service: Cardiology       History reviewed  No pertinent family history  I have reviewed and agree with the history as documented  E-Cigarette/Vaping   • E-Cigarette Use Never User      E-Cigarette/Vaping Substances     Social History     Tobacco Use   • Smoking status: Every Day     Packs/day: 0 50     Years: 60 00     Pack years: 30 00     Types: Cigarettes   • Smokeless tobacco: Never   Vaping Use   • Vaping Use: Never used   Substance Use Topics   • Alcohol use: Never   • Drug use: Never       Review of Systems   Constitutional: Negative  Negative for fever  Respiratory: Negative  Gastrointestinal: Negative  Negative for diarrhea and vomiting  Genitourinary: Negative  Physical Exam  Physical Exam  Vitals and nursing note reviewed  Constitutional:       General: She is not in acute distress  HENT:      Head: Normocephalic and atraumatic  Mouth/Throat:      Mouth: Mucous membranes are moist       Pharynx: Oropharynx is clear  Eyes:      Extraocular Movements: Extraocular movements intact  Pupils: Pupils are equal, round, and reactive to light  Cardiovascular:      Rate and Rhythm: Regular rhythm  Bradycardia present     Pulmonary:      Effort: Pulmonary effort is normal       Breath sounds: Normal breath sounds  Abdominal:      General: There is no distension  Palpations: Abdomen is soft  Tenderness: There is no abdominal tenderness  Musculoskeletal:         General: Normal range of motion  Skin:     General: Skin is warm and dry  Neurological:      General: No focal deficit present  Mental Status: She is alert  Motor: No weakness        Comments: Awake and alert, normal speech, mild confusion   Psychiatric:      Comments: Calm and cooperative         Vital Signs  ED Triage Vitals [05/19/23 1419]   Temperature Pulse Respirations Blood Pressure SpO2   (!) 97 3 °F (36 3 °C) 72 20 128/66 96 %      Temp Source Heart Rate Source Patient Position - Orthostatic VS BP Location FiO2 (%)   Tympanic Monitor Sitting Right arm --      Pain Score       No Pain           Vitals:    05/19/23 1419 05/19/23 1445 05/19/23 1615   BP: 128/66 147/64    Pulse: 72 56 57   Patient Position - Orthostatic VS: Sitting           Visual Acuity      ED Medications  Medications - No data to display    Diagnostic Studies  Results Reviewed     Procedure Component Value Units Date/Time    Basic metabolic panel [[id number]] Collected: 05/19/23 1517    Lab Status: Final result Specimen: Blood from Line, Venous Updated: 05/19/23 1644     Sodium 136 mmol/L      Potassium 4 5 mmol/L      Chloride 101 mmol/L      CO2 27 mmol/L      ANION GAP 8 mmol/L      BUN 20 mg/dL      Creatinine 0 81 mg/dL      Glucose 79 mg/dL      Calcium 9 0 mg/dL      eGFR 69 ml/min/1 73sq m     Narrative:      [place] guidelines for Chronic Kidney Disease (CKD):   •  Stage 1 with normal or high GFR (GFR > 90 mL/min/1 73 square meters)  •  Stage 2 Mild CKD (GFR = 60-89 mL/min/1 73 square meters)  •  Stage 3A Moderate CKD (GFR = 45-59 mL/min/1 73 square meters)  •  Stage 3B Moderate CKD (GFR = 30-44 mL/min/1 73 square meters)  •  Stage 4 Severe CKD (GFR = 15-29 mL/min/1 73 square meters)  •  Stage 5 End Stage CKD (GFR <15 mL/min/1 73 square meters)  Note: GFR calculation is accurate only with a steady state creatinine    UA w Reflex to Microscopic w Reflex to Culture [[id number]] Collected: 05/19/23 1605    Lab Status: Final result Specimen: Urine, Other Updated: 05/19/23 1611     Color, UA Light Yellow     Clarity, UA Clear     Specific Gravity, UA <=1 005     pH, UA 6 0     Leukocytes, UA Negative     Nitrite, UA Negative     Protein, UA Negative mg/dl      Glucose, UA Negative mg/dl      Ketones, UA Negative mg/dl      Urobilinogen, UA 0 2 E U /dl      Bilirubin, UA Negative     Occult Blood, UA Negative    CBC and differential [[id number]]  (Abnormal) Collected: 05/19/23 1517    Lab Status: Final result Specimen: Blood from Line, Venous Updated: 05/19/23 1527     WBC 9 01 Thousand/uL      RBC 3 79 Million/uL      Hemoglobin 13 3 g/dL      Hematocrit 39 9 %       fL      MCH 35 1 pg      MCHC 33 3 g/dL      RDW 12 2 %      MPV 9 0 fL      Platelets 316 Thousands/uL      nRBC 0 /100 WBCs      Neutrophils Relative 66 %      Immat GRANS % 0 %      Lymphocytes Relative 24 %      Monocytes Relative 7 %      Eosinophils Relative 2 %      Basophils Relative 1 %      Neutrophils Absolute 6 02 Thousands/µL      Immature Grans Absolute 0 03 Thousand/uL      Lymphocytes Absolute 2 12 Thousands/µL      Monocytes Absolute 0 59 Thousand/µL      Eosinophils Absolute 0 17 Thousand/µL      Basophils Absolute 0 08 Thousands/µL                  No orders to display              Procedures  ECG 12 Lead Documentation Only    Date/Time: 5/19/2023 3:24 PM  Performed by: Alexander Hernandez MD  Authorized by: Alexander Hernandez MD     ECG reviewed by me, the ED Provider: yes    Patient location:  ED  Interpretation:     Interpretation: normal    Rate:     ECG rate:  59    ECG rate assessment: bradycardic    Rhythm:     Rhythm: sinus bradycardia    Ectopy:     Ectopy: none    QRS:     QRS axis:  Normal    QRS intervals:  Normal  Conduction:     Conduction: normal    ST segments:     ST segments:  Normal             ED Course  ED Course as of 05/19/23 1708   Fri May 19, 2023   1706 Spoke with psychologist Dr Krystal Polanco  As the patient can be admitted medically, patient has no medical reason for acute mission to hospital   Lab results reviewed and discussed with patient and family, no acute abnormalities  80 Family concerned about patient being more confused and not acting right at home, has been calm and cooperative in ED with no indication for acute psychiatric admission  Discussed with ED crisis worker, will see if he can have patient evaluated in ED  Medical Decision Making  65-year-old female, presented with increased confusion and agitation  Differential diagnosis includes infection, dehydration, worsening dementia  Patient is awake and alert,, cooperative, denies any complaints  Son states patient has been more confused and at times combative over the last few days  Patient lives at home alone, son lives very close by and patient does have care that comes in during the day for her  Labs including urinalysis ordered, will continue to monitor in ED and reevaluate  Lab results reviewed and discussed with patient and family  No acute abnormalities, no signs of infection  Amount and/or Complexity of Data Reviewed  Independent Historian: caregiver  External Data Reviewed: notes  Details: Previous medical records reviewed, patient seen by neurology and started on medications for dementia, Aricept and Namenda  Labs: ordered  Decision-making details documented in ED Course  ECG/medicine tests: ordered and independent interpretation performed  Decision-making details documented in ED Course            Disposition  Final diagnoses:   Dementia ([address] )     Time reflects when diagnosis was documented in both MDM as applicable and the Disposition within this note Time User Action Codes Description Comment    5/19/2023  4:54 PM Supriya Mare Add [F03 [id number]] Dementia [facility])       ED Disposition     ED Disposition   Discharge    Condition   Stable    Date/Time   Fri May 19, 2023  4:54 PM    Comment   [facility] Severe General acute hospital discharge to home/self care  Follow-up Information     Follow up With Specialties Details Why [address] [facility], DO Family Medicine Schedule an appointment as soon as possible for a visit   [address] [place] [address]  [phone]            Patient's Medications   Discharge Prescriptions    No medications on file       No discharge procedures on file      PDMP Review     None          ED Provider  Electronically Signed by Historical Med      memantine (NAMENDA) 10 mg tablet Take 1 tablet (10 mg total) by mouth daily, Starting u 4/6/2023, Normal      multivitamin (THERAGRAN) TABS Take 1 tablet by mouth daily, Historical Med      rosuvastatin (CRESTOR) 5 mg tablet Take 1 tablet (5 mg total) by mouth every other day, Starting Mon 11/14/2022, Normal      spironolactone (ALDACTONE) 25 mg tablet Take 1 tablet (25 mg total) by mouth every other day, Starting Mon 11/14/2022, Normal             No discharge procedures on file      PDMP Review     None          ED Provider  Electronically Signed by           Marie Bridges MD  05/24/23 6735

## 2023-05-21 LAB
ATRIAL RATE: 59 BPM
P AXIS: 81 DEGREES
PR INTERVAL: 152 MS
QRS AXIS: 54 DEGREES
QRSD INTERVAL: 78 MS
QT INTERVAL: 444 MS
QTC INTERVAL: 439 MS
T WAVE AXIS: 84 DEGREES
VENTRICULAR RATE: 59 BPM

## 2023-06-06 ENCOUNTER — OFFICE VISIT (OUTPATIENT)
Dept: CARDIOLOGY CLINIC | Facility: CLINIC | Age: 78
End: 2023-06-06
Payer: MEDICARE

## 2023-06-06 VITALS
SYSTOLIC BLOOD PRESSURE: 120 MMHG | OXYGEN SATURATION: 98 % | HEART RATE: 76 BPM | BODY MASS INDEX: 19.07 KG/M2 | DIASTOLIC BLOOD PRESSURE: 68 MMHG | HEIGHT: 61 IN | WEIGHT: 101 LBS

## 2023-06-06 DIAGNOSIS — R00.0 TACHYCARDIA: ICD-10-CM

## 2023-06-06 DIAGNOSIS — E78.5 DYSLIPIDEMIA: ICD-10-CM

## 2023-06-06 DIAGNOSIS — R06.02 EXERTIONAL SHORTNESS OF BREATH: ICD-10-CM

## 2023-06-06 DIAGNOSIS — I25.10 CORONARY ARTERY DISEASE INVOLVING NATIVE CORONARY ARTERY OF NATIVE HEART WITHOUT ANGINA PECTORIS: ICD-10-CM

## 2023-06-06 DIAGNOSIS — I10 PRIMARY HYPERTENSION: ICD-10-CM

## 2023-06-06 DIAGNOSIS — I50.42 CHRONIC COMBINED SYSTOLIC AND DIASTOLIC CONGESTIVE HEART FAILURE (HCC): ICD-10-CM

## 2023-06-06 DIAGNOSIS — I42.9 CARDIOMYOPATHY, UNSPECIFIED TYPE (HCC): ICD-10-CM

## 2023-06-06 PROCEDURE — 99214 OFFICE O/P EST MOD 30 MIN: CPT | Performed by: INTERNAL MEDICINE

## 2023-06-06 RX ORDER — LISINOPRIL 5 MG/1
5 TABLET ORAL DAILY
Qty: 90 TABLET | Refills: 1 | Status: SHIPPED | OUTPATIENT
Start: 2023-06-06

## 2023-06-06 RX ORDER — OLANZAPINE 2.5 MG/1
2.5 TABLET ORAL
COMMUNITY
Start: 2023-05-31

## 2023-06-06 RX ORDER — MEMANTINE HYDROCHLORIDE 5 MG/1
5 TABLET ORAL EVERY MORNING
COMMUNITY
Start: 2023-05-31

## 2023-06-06 RX ORDER — FUROSEMIDE 20 MG/1
10 TABLET ORAL AS NEEDED
Qty: 10 TABLET | Refills: 0 | Status: SHIPPED | OUTPATIENT
Start: 2023-06-06

## 2023-06-06 NOTE — PROGRESS NOTES
Progress note - Cardiology Office   [facility]  Getachew Bowman 65 y o  female MRN: [id number]  : 1945  Unit/Bed#:  Encounter: [id number]      Assessment:     1  Chronic combined systolic and diastolic congestive heart failure ([address] )    2  Coronary artery disease involving native coronary artery of native heart without angina pectoris    3  Primary hypertension    4  Tachycardia    5  Dyslipidemia    6  Exertional shortness of breath    7  Cardiomyopathy, unspecified type [facility]        Discussion summary and Plan:    1  Exertional shortness of breath  Etiology of her exertional shortness of breath appears to be multifactorial   She has been a smoker for long period of time  She had a cardiomyopathy but she has to be to be euvolemic EF seems to have improved 40-45%  Cardiac catheterization shows nonobstructive disease  Will continue medical Rx  Jovita Cook Her shortness of breath has improved  Will adjust heart failure medications  2  Cardiomyopathy  She is diagnosed to have cardiomyopathy of unclear etiology  She has multiple risk factors  She will be scheduled for cardiac catheterization  Will start on heart failure medication initial dose will be very loaded Coreg 3 125 twice a day along with lisinopril 2 5 and Aldactone 25 mg every other day  He has been tolerating these medications well increase lisinopril to 5 mg daily  3  Continues tobacco abuse  Patient has been smoker when she was a teenager  She smokes about half pack a day does not want to quit  Advised to get lung cancer screening with primary care doctor  4  Dyslipidemia with high risk for cardiovascular event around 30%  Crestor 5 mg every other day and she is tolerating it very well she is tolerated very well    5  Coronary artery disease  She is found to have coronary artery disease which is moderate calcified arteries EF 40 45%    Catheterization finding reviewed with them catheterization was done in May 2022 no change in symptom  6  History of COVID-19 virus fraction patient has recovered but chronic shortness of breath may be related to other factors pain    Continue current Rx  Prescriptions renewed      Patient and patient's daughter in-law was advised and educated to call our office  immediately if  patient has any new symptoms of chest pain/shortness of breath, near-syncope, syncope, light headedness sustained palpitations  or any other cardiovascular symptoms before their scheduled follow-up appointment  Office #[phone]  Thank you for your consultation  If you have any question please call me at [phone]    Counseling :  A description of the counseling  Goals and Barriers  Patient's ability to self care: Yes  Medication side effect reviewed with patient in detail and all their questions answered to their satisfaction  Primary Care Physician : Adela Soares DO      HPI :     Lien Garsia is a 65y o  year old female who was referred by primary care doctor for exertional shortness of breath and abnormal EKG  Patient who has medical history significant for tobacco abuse for almost all her life since she has teenager, dyslipidemia, history of COVID-19 infection, 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 was noted to have abnormal EKG and some exertional shortness of breath  Patient now smokes about half pack a day she started smoking long time ago  Currently she is not on any significant cardiovascular medications though her cholesterol is acceptable her risk for cardiovascular event is noted to be high  She denies any chest pain with normal activities at she walks with the help of cane  No other cardiovascular issues at this time  She came with her daughter-in-law  Her  is also our patient  History of hip replacement surgery    History of tonsillectomy    04/20/2022  Above reviewed  Patient came for follow-up    She was initially seen for exertional shortness of breath  Patient who has medical history significant for tobacco abuse for almost all her life since she has teenager, dyslipidemia, history of DJD with hip replacement surgery, history of COVID-19 infection who was having exertional shortness of breath  Her cardiac workup shows she has cardiomyopathy with EF 35-40% with severe global hypokinesis  Her nuclear stress test was read as markedly abnormal with reducing EF  Cannot rule out small infarction versus ischemia in apical area  Patient came with her daughter  She has noted occasionally her legs swell up but not all the time  No swelling is present today  She still have exertional shortness of breath that she always attributed to her smoking  She is using her pressure dressing  No other issues at this time  06/16/2022  Above reviewed  Patient came for follow-up  She was initially seen for shortness of breath  She is not having that much shortness of breath  She does have history of tobacco abuse and she still smokes about half pack a day, dyslipidemia, hip replacement surgery who was noted to have cardiomyopathy with EF around 35 40%  She underwent cardiac catheterization which shows nonobstructive disease and her EF seems to have improved 45%  Currently she is taking Aldactone every other day along with lisinopril 2 5 daily, Crestor 5 mg and Coreg 3 125 twice a day  Heart rate and blood pressure seems to have improved  She is breathing is better she denies any chest pain she finds it hard to quit smoking and not likely to quit  No other cardiovascular issues at this time  12/22/2022  Above reviewed  Patient came for follow-up  She has medical history significant for tobacco abuse but still not able to quit smoking now cut back to half pack a day, dyslipidemia, hip replacement surgery who was noted to have cardiomyopathy with EF around 35 to 40%    She underwent cardiac aspiration which shows no obstructive coronary artery disease  And with medical therapy her EF seems to have improved to 45%  She is currently taking Toprol 2 5 mg daily, Aldactone 25 every the day, Coreg 3 125 twice a day, Crestor 5 mg every other day  Her last blood test in October 2022 has been acceptable hemoglobin is stable as her vitals are stable  And her heart rate is 63 bpm today  No other cardiovascular issues  6/6/2023  Above reviewed  Patient came for follow-up  She has a medical history significant for mild nonobstructive coronary artery disease, cardiomyopathy with EF 35 to 40%, history of tobacco abuse and not motivated to quit smoking at this time, dyslipidemia, hip replacement, memory loss who came for follow-up  She has a cardiac x-rays done few months back which shows nonobstructive disease she is managed with medical therapy  Currently she is taking Toprol-XL 25 mg daily Aldactone 25 every other day, Coreg 3 125 twice a day and Crestor 5 mg every other day  Blood test done May 2023 has been acceptable  There is no significant lower extremity swelling she does have some coloration change in her skin which is chronic  Review of Systems   Constitutional: Negative for activity change, chills, diaphoresis, fever and unexpected weight change  HENT: Negative for congestion  Eyes: Negative for discharge and redness  Respiratory: Negative for cough, chest tightness, shortness of breath and wheezing  Cardiovascular: Negative  Negative for chest pain, palpitations and leg swelling  Gastrointestinal: Negative for abdominal pain, diarrhea and nausea  Endocrine: Negative  Genitourinary: Negative for decreased urine volume and urgency  Musculoskeletal: Positive for arthralgias and back pain  Negative for gait problem  Skin: Negative for rash and wound  Allergic/Immunologic: Negative  Neurological: Negative for dizziness, seizures, syncope, weakness, light-headedness and headaches  Worsening memory loss   Hematological: Negative  Psychiatric/Behavioral: Negative for agitation and confusion  The patient is not nervous/anxious  Historical Information   Past Medical History:   Diagnosis Date   • Anxiety    • Dementia ([address] )    • Hyperlipidemia    • Hypertension    • Memory difficulty      Past Surgical History:   Procedure Laterality Date   • ARTHROPLASTY HIP TOTAL ANTERIOR Right 4/9/2021    Procedure: ARTHROPLASTY HIP TOTAL ANTERIOR;  Surgeon: Lorraine Valdez DO;  Location: [address] [place] [address];  Service: Orthopedics   • CARDIAC CATHETERIZATION Left 5/27/2022    Procedure: Cardiac catheterization;  Surgeon: Meena Kirby MD;  Location: [place] [address] CATH LAB; Service: Cardiology     Social History     Substance and Sexual Activity   Alcohol Use Never     Social History     Substance and Sexual Activity   Drug Use Never     Social History     Tobacco Use   Smoking Status Every Day   • Packs/day: 0 50   • Years: 60 00   • Total pack years: 30 00   • Types: Cigarettes   Smokeless Tobacco Never     Family History: No family history on file      Meds/Allergies     No Known Allergies    Current Outpatient Medications:   •  Calcium Ascorbate (VITAMIN C) 500 mg tablet, Take 500 mg by mouth daily, Disp: , Rfl:   •  calcium carbonate (OS-JUD) 600 MG tablet, Take 600 mg by mouth daily, Disp: , Rfl:   •  carvedilol (COREG) 3 125 mg tablet, Take 1 tablet (3 125 mg total) by mouth 2 (two) times a day with meals, Disp: 180 tablet, Rfl: 3  •  cholecalciferol (VITAMIN D3) 1,000 units tablet, Take 2 tablets (2,000 Units total) by mouth daily, Disp: 60 tablet, Rfl: 0  •  donepezil (ARICEPT) 10 mg tablet, Take 1 tablet (10 mg total) by mouth daily at bedtime, Disp: 90 tablet, Rfl: 1  •  escitalopram (LEXAPRO) 10 mg tablet, Take 1 tablet (10 mg total) by mouth daily, Disp: 90 tablet, Rfl: 0  •  furosemide (LASIX) 20 mg tablet, Take 0 5 tablets (10 mg total) by mouth if needed (For moderate to severe lower extremity "edema), Disp: 10 tablet, Rfl: 0  •  ibandronate (BONIVA) 150 MG tablet, Take 1 tablet (150 mg total) by mouth every 30 (thirty) days, Disp: 1 tablet, Rfl: 5  •  levothyroxine 25 mcg tablet, Take 1 tablet (25 mcg total) by mouth daily in the early morning, Disp: 90 tablet, Rfl: 1  •  lisinopril (ZESTRIL) 5 mg tablet, Take 1 tablet (5 mg total) by mouth daily, Disp: 90 tablet, Rfl: 1  •  memantine (NAMENDA) 5 mg tablet, Take 5 mg by mouth every morning, Disp: , Rfl:   •  multivitamin (THERAGRAN) TABS, Take 1 tablet by mouth daily, Disp: , Rfl:   •  OLANZapine (ZyPREXA) 2 5 mg tablet, Take 2 5 mg by mouth daily at bedtime, Disp: , Rfl:   •  rosuvastatin (CRESTOR) 5 mg tablet, Take 1 tablet (5 mg total) by mouth every other day, Disp: 45 tablet, Rfl: 3  •  spironolactone (ALDACTONE) 25 mg tablet, Take 1 tablet (25 mg total) by mouth every other day, Disp: 45 tablet, Rfl: 3  •  MELATONIN PO, Take by mouth daily at bedtime as needed (Patient not taking: Reported on 3/21/2023), Disp: , Rfl:     Vitals: Blood pressure 120/68, pulse 76, height 5' 1\" (1 549 m), weight 45 8 kg (101 lb), SpO2 98 %  ?  Body mass index is 19 08 kg/m²  Wt Readings from Last 3 Encounters:   06/06/23 45 8 kg (101 lb)   04/04/23 44 5 kg (98 lb)   03/31/23 44 9 kg (99 lb)     Vitals:    06/06/23 1555   Weight: 45 8 kg (101 lb)     BP Readings from Last 3 Encounters:   06/06/23 120/68   05/19/23 147/64   04/04/23 126/75         Physical Exam  Constitutional:       General: She is not in acute distress  Appearance: She is well-developed  She is not diaphoretic  Neck:      Thyroid: No thyromegaly  Vascular: No JVD  Trachea: No tracheal deviation  Cardiovascular:      Rate and Rhythm: Normal rate and regular rhythm  Heart sounds: S1 normal and S2 normal  Heart sounds not distant  Murmur heard  Systolic (ejection) murmur is present with a grade of 2/6  No friction rub  No gallop  No S3 or S4 sounds     Pulmonary:      " Effort: Pulmonary effort is normal  No respiratory distress  Breath sounds: No wheezing or rales  Comments: Bilateral air entry with rare rhonchi  Chest:      Chest wall: No tenderness  Abdominal:      General: Bowel sounds are normal  There is no distension  Palpations: Abdomen is soft  Tenderness: There is no abdominal tenderness  Musculoskeletal:         General: No deformity  Cervical back: Neck supple  Comments: No significant lower extremity edema  Some color change in venous insufficiency noted   Skin:     General: Skin is warm and dry  Coloration: Skin is not pale  Findings: No rash  Neurological:      Mental Status: She is alert and oriented to person, place, and time  Psychiatric:         Behavior: Behavior normal          Judgment: Judgment normal            Diagnostic Studies Review Cardio:    Echo Doppler done on 03/23/2022 shows patient has EF 35-40%  Global hypokinesis mild MR mild TR  Echodensity in right atrium most likely eustachian valve  Nuclear stress test done in March 2022 shows EF around 30%, fix partially reversible perfusion defect in apical area as  Catheterization done May 2022 shows her EF is 40-45%, nonobstructive mid LAD 50% stenosis, proximal LAD 40%, mid RCA 50% and ostial to proximal RCA has around 30%  She had a calcified arteries with mild-to-moderate nonobstructive disease medical therapy was recommended not likely to cause her cardiomyopathy  EKG:  Twelve-lead EKG 12/22/2022 shows normal sinus rhythm heart rate 63 bpm few PACs LVH by voltage  Imaging:  Chest X-Ray:   XR chest pa & lateral    Result Date: 8/6/2021  Impression Hyperinflation due to emphysema  Mild reticulation in the right base, likely mild fibrosis  Question right base bronchiectasis  Workstation performed: [id number]       CT-scan of the chest:     No CTA results available for this patient    Lab Review   Lab Results   Component Value Date "HCT 39 9 05/19/2023    HGB 13 3 05/19/2023     (H) 05/19/2023     05/19/2023    RDW 12 2 05/19/2023    WBC 9 01 05/19/2023     BMP:  Lab Results   Component Value Date    BUN 20 05/19/2023    CALCIUM 9 0 05/19/2023     05/19/2023    CO2 27 05/19/2023    CORRECTEDCA 8 8 10/24/2022    CREATININE 0 81 05/19/2023    EGFR 69 05/19/2023    GLUC 79 05/19/2023    GLUF 90 02/14/2023    K 4 5 05/19/2023    MG 2 1 02/16/2023    SODIUM 136 05/19/2023     Troponins:    LFT:  Lab Results   Component Value Date    ALB 3 7 02/13/2023    ALKPHOS 57 02/13/2023    ALT 46 02/13/2023    AST 35 02/13/2023    TP 7 4 02/13/2023      Lab Results   Component Value Date    YSQ1BTFHLSWO 3 770 04/04/2023     Lipid Profile:   Lab Results   Component Value Date    CHOLESTEROL 117 04/13/2022    HDL 51 04/13/2022    LDLCALC 56 04/13/2022    TRIG 50 04/13/2022     Lab Results   Component Value Date    CHOLESTEROL 117 04/13/2022    CHOLESTEROL 172 08/03/2021     Lab Results   Component Value Date    CKMB 1 8 04/12/2021    CKMBINDEX <1 0 04/12/2021    CKTOTAL 497 (H) 04/12/2021    TROPONINI <0 02 04/09/2021     Lab Results   Component Value Date    NTBNP 381 04/12/2021      The ASCVD Risk score (Nuha DK, et al , 2019) failed to calculate for the following reasons: The valid total cholesterol range is 130 to 320 mg/dL      Dr Jocelyn Meza MD [facility]      \"This note has been constructed using a voice recognition system  Therefore there may be syntax, spelling, and/or grammatical errors  Please call if you have any questions   \"  "

## 2023-06-07 ENCOUNTER — TELEPHONE (OUTPATIENT)
Dept: CARDIOLOGY CLINIC | Facility: CLINIC | Age: 78
End: 2023-06-07

## 2023-06-10 PROBLEM — F33.9 RECURRENT DEPRESSION (HCC): Status: ACTIVE | Noted: 2023-02-15

## 2023-06-10 PROBLEM — R79.89 ELEVATED TSH: Status: RESOLVED | Noted: 2022-07-05 | Resolved: 2023-06-10

## 2023-06-10 PROBLEM — E03.9 HYPOTHYROIDISM: Status: RESOLVED | Noted: 2023-03-21 | Resolved: 2023-06-10

## 2023-06-10 PROBLEM — I42.9 CARDIOMYOPATHY (HCC): Status: RESOLVED | Noted: 2023-02-13 | Resolved: 2023-06-10

## 2023-06-12 ENCOUNTER — OFFICE VISIT (OUTPATIENT)
Dept: FAMILY MEDICINE CLINIC | Facility: CLINIC | Age: 78
End: 2023-06-12
Payer: MEDICARE

## 2023-06-12 VITALS
RESPIRATION RATE: 18 BRPM | HEIGHT: 61 IN | WEIGHT: 101.25 LBS | SYSTOLIC BLOOD PRESSURE: 110 MMHG | HEART RATE: 60 BPM | BODY MASS INDEX: 19.12 KG/M2 | TEMPERATURE: 97.6 F | DIASTOLIC BLOOD PRESSURE: 62 MMHG

## 2023-06-12 DIAGNOSIS — E03.8 OTHER SPECIFIED HYPOTHYROIDISM: ICD-10-CM

## 2023-06-12 DIAGNOSIS — F03.918 DEMENTIA WITH BEHAVIORAL DISTURBANCE (HCC): ICD-10-CM

## 2023-06-12 DIAGNOSIS — Z72.0 TOBACCO ABUSE: ICD-10-CM

## 2023-06-12 DIAGNOSIS — I50.42 CHRONIC COMBINED SYSTOLIC AND DIASTOLIC CONGESTIVE HEART FAILURE (HCC): ICD-10-CM

## 2023-06-12 DIAGNOSIS — R32 URINARY INCONTINENCE, UNSPECIFIED TYPE: ICD-10-CM

## 2023-06-12 DIAGNOSIS — R41.3 MEMORY DIFFICULTY: ICD-10-CM

## 2023-06-12 DIAGNOSIS — Z99.89 AMBULATES WITH CANE: ICD-10-CM

## 2023-06-12 DIAGNOSIS — I50.1 HEART FAILURE, LEFT, WITH LVEF <=30% (HCC): ICD-10-CM

## 2023-06-12 DIAGNOSIS — F33.9 RECURRENT DEPRESSION (HCC): ICD-10-CM

## 2023-06-12 DIAGNOSIS — R41.0 CONFUSION: ICD-10-CM

## 2023-06-12 DIAGNOSIS — I42.8 OTHER CARDIOMYOPATHY (HCC): ICD-10-CM

## 2023-06-12 DIAGNOSIS — E78.49 OTHER HYPERLIPIDEMIA: ICD-10-CM

## 2023-06-12 DIAGNOSIS — M81.0 AGE-RELATED OSTEOPOROSIS WITHOUT CURRENT PATHOLOGICAL FRACTURE: Primary | ICD-10-CM

## 2023-06-12 PROBLEM — Z87.891 PERSONAL HISTORY OF NICOTINE DEPENDENCE: Status: RESOLVED | Noted: 2022-07-05 | Resolved: 2023-06-12

## 2023-06-12 PROCEDURE — 99215 OFFICE O/P EST HI 40 MIN: CPT | Performed by: FAMILY MEDICINE

## 2023-06-12 NOTE — PATIENT INSTRUCTIONS
I will continue to monitor and prescribe medications for thyroid and osteoporosis in the future if you wish

## 2023-06-12 NOTE — PROGRESS NOTES
Assessment/Plan:    No problem-specific Assessment & Plan notes found for this encounter  [employer] form completed  Cardiology/psychiatry f/u   House doctor if family agrees  q6m f/u routine and prn     Diagnoses and all orders for this visit:    Age-related osteoporosis without current pathological fracture    Recurrent depression ([facility] )    Other specified hypothyroidism    Memory difficulty    Chronic combined systolic and diastolic congestive heart failure ([facility] )    Other cardiomyopathy ([facility] )    Heart failure, left, with LVEF <=30% ([facility] )    Other hyperlipidemia    Ambulates with cane    Confusion    Dementia with behavioral disturbance ([facility] )    Tobacco abuse    Urinary incontinence, unspecified type              Return in about 6 months (around 12/12/2023) for Recheck  Subjective:      Patient ID: Catalina Rivers is a 65 y o  female      Chief Complaint   Patient presents with   • Pre-op Clearance   • clearance [facility] living     [facility]       HPI   Daughters present in room  Pt not aware of [facility] all meds  Has 24hr nurse in home currently  Pillboxes prepared  Eating ok, regular, no probs with eating or choking  Urine ok, uses depends as backup  bm ok, occas accident, depends    Walks with cane  Uses everywhere  Can ambulate on own in case of emergency    No food sensitivities  Regular diet    No hx of TB  No STI hx    No hx of physical violence    Psych is Dr Tony Ribeiro    No gerd on boniva, tolerates    I will cont to refill boniva, thyroid meds and monitor while at home but aware needs continued f/u with specialists and direct respective refill requests to them as does currently    The following portions of the patient's history were reviewed and updated as appropriate: allergies, current medications, past family history, past medical history, past social history, past surgical history and problem list     Review of Systems "  Gastrointestinal: Negative for abdominal pain, nausea and vomiting  Current Outpatient Medications   Medication Sig Dispense Refill   • Calcium Ascorbate (VITAMIN C) 500 mg tablet Take 500 mg by mouth daily     • calcium carbonate (OS-JUD) 600 MG tablet Take 600 mg by mouth daily     • carvedilol (COREG) 3 125 mg tablet Take 1 tablet (3 125 mg total) by mouth 2 (two) times a day with meals 180 tablet 3   • cholecalciferol (VITAMIN D3) 1,000 units tablet Take 2 tablets (2,000 Units total) by mouth daily 60 tablet 0   • donepezil (ARICEPT) 10 mg tablet Take 1 tablet (10 mg total) by mouth daily at bedtime 90 tablet 1   • escitalopram (LEXAPRO) 10 mg tablet Take 1 tablet (10 mg total) by mouth daily 90 tablet 0   • furosemide (LASIX) 20 mg tablet Take 0 5 tablets (10 mg total) by mouth if needed (For moderate to severe lower extremity edema) 10 tablet 0   • ibandronate (BONIVA) 150 MG tablet Take 1 tablet (150 mg total) by mouth every 30 (thirty) days 1 tablet 5   • levothyroxine 25 mcg tablet Take 1 tablet (25 mcg total) by mouth daily in the early morning 90 tablet 1   • lisinopril (ZESTRIL) 5 mg tablet Take 1 tablet (5 mg total) by mouth daily 90 tablet 1   • memantine (NAMENDA) 5 mg tablet Take 5 mg by mouth every morning     • multivitamin (THERAGRAN) TABS Take 1 tablet by mouth daily     • OLANZapine (ZyPREXA) 2 5 mg tablet Take 2 5 mg by mouth daily at bedtime     • rosuvastatin (CRESTOR) 5 mg tablet Take 1 tablet (5 mg total) by mouth every other day 45 tablet 3   • spironolactone (ALDACTONE) 25 mg tablet Take 1 tablet (25 mg total) by mouth every other day 45 tablet 3     No current facility-administered medications for this visit  Objective:    /62   Pulse 60   Temp 97 6 °F (36 4 °C) (Tympanic)   Resp 18   Ht 5' 1\" (1 549 m)   Wt 45 9 kg (101 lb 4 oz)   BMI 19 13 kg/m²        Physical Exam  Vitals and nursing note reviewed     Constitutional:       General: She is not in acute " distress  Appearance: She is well-developed  She is not ill-appearing  HENT:      Head: Normocephalic  Right Ear: Tympanic membrane and external ear normal       Left Ear: Tympanic membrane and external ear normal       Mouth/Throat:      Pharynx: No oropharyngeal exudate  Eyes:      General: No scleral icterus  Conjunctiva/sclera: Conjunctivae normal    Neck:      Vascular: No carotid bruit  Cardiovascular:      Rate and Rhythm: Normal rate and regular rhythm  Pulmonary:      Effort: Pulmonary effort is normal  No respiratory distress  Breath sounds: No wheezing or rales  Abdominal:      General: There is no distension  Palpations: Abdomen is soft  Tenderness: There is no abdominal tenderness  Musculoskeletal:         General: No deformity  Cervical back: Neck supple  No rigidity  Right lower leg: No edema  Left lower leg: No edema  Skin:     General: Skin is warm and dry  Coloration: Skin is not pale  Neurological:      Mental Status: She is alert  Gait: Gait abnormal       Comments: Uses cane   Psychiatric:         Mood and Affect: Mood normal          Behavior: Behavior normal          Thought Content: Thought content normal          41 minutes spent with patient and with chart review and documentation completion         Nikolai Jones DO

## 2023-06-15 DIAGNOSIS — Z72.0 TOBACCO ABUSE: Primary | ICD-10-CM

## 2023-06-15 RX ORDER — NICOTINE 21 MG/24HR
1 PATCH, TRANSDERMAL 24 HOURS TRANSDERMAL EVERY 24 HOURS
Qty: 42 PATCH | Refills: 0 | Status: SHIPPED | OUTPATIENT
Start: 2023-06-15 | End: 2023-07-27

## 2023-06-21 ENCOUNTER — TELEPHONE (OUTPATIENT)
Dept: FAMILY MEDICINE CLINIC | Facility: CLINIC | Age: 78
End: 2023-06-21

## 2023-06-21 NOTE — TELEPHONE ENCOUNTER
Nurse from [facility] called to report the patient will be admitted tomorrow  She will be treating with the visiting physicians moving forward  She will not be continuing care with Dr JOVEL  Please remove pcp       Samantha Garcia

## 2023-06-21 NOTE — TELEPHONE ENCOUNTER
Nurse supervisor , Trudy Stephens, from [facility] called  They patient will be admitted into a secured dementia unit, long term  The patient is a smoker and will need nicotine patches  An e-scribe was sent over yesterday but they need a written script which has to be faxed over to [phone]  Alisa Cedillo will also be treating with the visiting physicians moving forward and will not be continuing care with Dr Michelle Goodson  Attribution request being sent       Jose Auguste MA

## 2023-06-26 NOTE — TELEPHONE ENCOUNTER
06/26/23 4:45 PM     The office's request has been received, reviewed, and the patient chart updated  The PCP has successfully been removed with a patient attribution note  This message will now be completed      Thank you  Kristie Cornelius

## 2023-07-11 DIAGNOSIS — M81.0 AGE-RELATED OSTEOPOROSIS WITHOUT CURRENT PATHOLOGICAL FRACTURE: Primary | ICD-10-CM

## 2023-07-11 DIAGNOSIS — Z00.00 HEALTHCARE MAINTENANCE: ICD-10-CM

## 2023-07-13 RX ORDER — ASCORBIC ACID 500 MG
TABLET ORAL
Qty: 30 TABLET | Refills: 5 | Status: SHIPPED | OUTPATIENT
Start: 2023-07-13

## 2023-08-02 DIAGNOSIS — Z72.0 TOBACCO ABUSE: ICD-10-CM

## 2023-09-08 ENCOUNTER — APPOINTMENT (EMERGENCY)
Dept: CT IMAGING | Facility: HOSPITAL | Age: 78
End: 2023-09-08
Payer: MEDICARE

## 2023-09-08 ENCOUNTER — APPOINTMENT (EMERGENCY)
Dept: RADIOLOGY | Facility: HOSPITAL | Age: 78
End: 2023-09-08
Payer: MEDICARE

## 2023-09-08 ENCOUNTER — HOSPITAL ENCOUNTER (EMERGENCY)
Facility: HOSPITAL | Age: 78
Discharge: HOME | End: 2023-09-08
Attending: EMERGENCY MEDICINE
Payer: MEDICARE

## 2023-09-08 VITALS
SYSTOLIC BLOOD PRESSURE: 167 MMHG | OXYGEN SATURATION: 97 % | HEART RATE: 55 BPM | DIASTOLIC BLOOD PRESSURE: 72 MMHG | RESPIRATION RATE: 18 BRPM | TEMPERATURE: 97.4 F

## 2023-09-08 DIAGNOSIS — R03.0 ELEVATED BLOOD PRESSURE READING: ICD-10-CM

## 2023-09-08 DIAGNOSIS — E87.1 HYPONATREMIA: ICD-10-CM

## 2023-09-08 DIAGNOSIS — R56.9 SEIZURE-LIKE ACTIVITY (HCC): Primary | ICD-10-CM

## 2023-09-08 DIAGNOSIS — G93.9 BRAIN LESION: ICD-10-CM

## 2023-09-08 DIAGNOSIS — D53.9 MACROCYTIC ANEMIA: ICD-10-CM

## 2023-09-08 LAB
2HR DELTA HS TROPONIN: 1 NG/L
ALBUMIN SERPL BCP-MCNC: 3.6 G/DL (ref 3.5–5)
ALP SERPL-CCNC: 53 U/L (ref 34–104)
ALT SERPL W P-5'-P-CCNC: 15 U/L (ref 7–52)
ANION GAP SERPL CALCULATED.3IONS-SCNC: 6 MMOL/L
AST SERPL W P-5'-P-CCNC: 18 U/L (ref 13–39)
ATRIAL RATE: 58 BPM
BASOPHILS # BLD AUTO: 0.06 THOUSANDS/ÂΜL (ref 0–0.1)
BASOPHILS NFR BLD AUTO: 1 % (ref 0–1)
BILIRUB SERPL-MCNC: 0.26 MG/DL (ref 0.2–1)
BILIRUB UR QL STRIP: NEGATIVE
BUN SERPL-MCNC: 18 MG/DL (ref 5–25)
CALCIUM SERPL-MCNC: 9.4 MG/DL (ref 8.4–10.2)
CARDIAC TROPONIN I PNL SERPL HS: 2 NG/L
CARDIAC TROPONIN I PNL SERPL HS: 3 NG/L
CHLORIDE SERPL-SCNC: 94 MMOL/L (ref 96–108)
CLARITY UR: CLEAR
CO2 SERPL-SCNC: 30 MMOL/L (ref 21–32)
COLOR UR: NORMAL
CREAT SERPL-MCNC: 0.85 MG/DL (ref 0.6–1.3)
EOSINOPHIL # BLD AUTO: 0.17 THOUSAND/ÂΜL (ref 0–0.61)
EOSINOPHIL NFR BLD AUTO: 2 % (ref 0–6)
ERYTHROCYTE [DISTWIDTH] IN BLOOD BY AUTOMATED COUNT: 12.8 % (ref 11.6–15.1)
GFR SERPL CREATININE-BSD FRML MDRD: 65 ML/MIN/1.73SQ M
GLUCOSE SERPL-MCNC: 79 MG/DL (ref 65–140)
GLUCOSE SERPL-MCNC: 93 MG/DL (ref 65–140)
GLUCOSE UR STRIP-MCNC: NEGATIVE MG/DL
HCT VFR BLD AUTO: 30.7 % (ref 34.8–46.1)
HGB BLD-MCNC: 10.4 G/DL (ref 11.5–15.4)
HGB UR QL STRIP.AUTO: NEGATIVE
IMM GRANULOCYTES # BLD AUTO: 0.06 THOUSAND/UL (ref 0–0.2)
IMM GRANULOCYTES NFR BLD AUTO: 1 % (ref 0–2)
KETONES UR STRIP-MCNC: NEGATIVE MG/DL
LEUKOCYTE ESTERASE UR QL STRIP: NEGATIVE
LYMPHOCYTES # BLD AUTO: 1.49 THOUSANDS/ÂΜL (ref 0.6–4.47)
LYMPHOCYTES NFR BLD AUTO: 15 % (ref 14–44)
MCH RBC QN AUTO: 35.1 PG (ref 26.8–34.3)
MCHC RBC AUTO-ENTMCNC: 33.9 G/DL (ref 31.4–37.4)
MCV RBC AUTO: 104 FL (ref 82–98)
MONOCYTES # BLD AUTO: 0.8 THOUSAND/ÂΜL (ref 0.17–1.22)
MONOCYTES NFR BLD AUTO: 8 % (ref 4–12)
NEUTROPHILS # BLD AUTO: 7.29 THOUSANDS/ÂΜL (ref 1.85–7.62)
NEUTS SEG NFR BLD AUTO: 73 % (ref 43–75)
NITRITE UR QL STRIP: NEGATIVE
NRBC BLD AUTO-RTO: 0 /100 WBCS
P AXIS: 73 DEGREES
PH UR STRIP.AUTO: 6.5 [PH]
PLATELET # BLD AUTO: 265 THOUSANDS/UL (ref 149–390)
PMV BLD AUTO: 8.4 FL (ref 8.9–12.7)
POTASSIUM SERPL-SCNC: 4.3 MMOL/L (ref 3.5–5.3)
PR INTERVAL: 168 MS
PROT SERPL-MCNC: 6.5 G/DL (ref 6.4–8.4)
PROT UR STRIP-MCNC: NEGATIVE MG/DL
QRS AXIS: 22 DEGREES
QRSD INTERVAL: 88 MS
QT INTERVAL: 448 MS
QTC INTERVAL: 439 MS
RBC # BLD AUTO: 2.96 MILLION/UL (ref 3.81–5.12)
SODIUM SERPL-SCNC: 130 MMOL/L (ref 135–147)
SP GR UR STRIP.AUTO: 1.01 (ref 1–1.03)
T WAVE AXIS: 89 DEGREES
UROBILINOGEN UR STRIP-ACNC: <2 MG/DL
VENTRICULAR RATE: 58 BPM
WBC # BLD AUTO: 9.87 THOUSAND/UL (ref 4.31–10.16)

## 2023-09-08 PROCEDURE — G1004 CDSM NDSC: HCPCS

## 2023-09-08 PROCEDURE — 99285 EMERGENCY DEPT VISIT HI MDM: CPT | Performed by: EMERGENCY MEDICINE

## 2023-09-08 PROCEDURE — 99285 EMERGENCY DEPT VISIT HI MDM: CPT

## 2023-09-08 PROCEDURE — 82948 REAGENT STRIP/BLOOD GLUCOSE: CPT

## 2023-09-08 PROCEDURE — 80053 COMPREHEN METABOLIC PANEL: CPT | Performed by: EMERGENCY MEDICINE

## 2023-09-08 PROCEDURE — 93010 ELECTROCARDIOGRAM REPORT: CPT | Performed by: INTERNAL MEDICINE

## 2023-09-08 PROCEDURE — 85025 COMPLETE CBC W/AUTO DIFF WBC: CPT | Performed by: EMERGENCY MEDICINE

## 2023-09-08 PROCEDURE — 70450 CT HEAD/BRAIN W/O DYE: CPT

## 2023-09-08 PROCEDURE — 81003 URINALYSIS AUTO W/O SCOPE: CPT | Performed by: EMERGENCY MEDICINE

## 2023-09-08 PROCEDURE — 93005 ELECTROCARDIOGRAM TRACING: CPT

## 2023-09-08 PROCEDURE — 84484 ASSAY OF TROPONIN QUANT: CPT | Performed by: EMERGENCY MEDICINE

## 2023-09-08 PROCEDURE — 36415 COLL VENOUS BLD VENIPUNCTURE: CPT

## 2023-09-08 PROCEDURE — 71045 X-RAY EXAM CHEST 1 VIEW: CPT

## 2023-09-08 NOTE — ED NOTES
Pt ambulated to and from bathroom with steady gait and no complaints or acute distress.  Urine sample obtained     Cassidy Schwartz RN  09/08/23 [id number]

## 2023-09-08 NOTE — ED ATTENDING ATTESTATION
9/8/2023  I, Ze Marte MD, saw and evaluated the patient. I have discussed the patient with the resident/non-physician practitioner and agree with the resident's/non-physician practitioner's findings, Plan of Care, and MDM as documented in the resident's/non-physician practitioner's note, except where noted. All available labs and Radiology studies were reviewed. I was present for key portions of any procedure(s) performed by the resident/non-physician practitioner and I was immediately available to provide assistance. At this point I agree with the current assessment done in the Emergency Department. I have conducted an independent evaluation of this patient a history and physical is as follows:    History    Patient is a 69-year-old female, with a history significant for dementia, hypertension, hyperlipidemia, who presents to the ED today, via EMS from [facility], due to abnormal stiffening/movements. Shortly prior to arrival, patient was walking down the hallway when she started to lean over and was noted to stiffen per staff at the facility. When this ended, patient did not have any postictal period. There is no associated tongue biting or incontinence from that episode. Patient denies loss of consciousness and she is able to describe the event. She denies headache, weakness, numbness, back pain, chest pain, dyspnea, abdominal pain, dysphagia, vision change. Patient's daughter, present in room and providing collateral history, states that patient has been having intermittent episodes, very similar to this, since 2020. Per my review of the medical record, patient has been evaluated by neurology and there is no mention of seizure in her problem list.  She does, however, per a MRI from 2/16/23, have 2 pituitary lesions. She is also been worked up for ataxia/unsteady gait.   Patient also states she has been having overflow incontinence symptoms that she has discussed with her doctor about over the last few months. Patient is currently alert and oriented x4 and providing a clear and consistent history. Per staff at this facility, patient has also had a 1 month history of staring and drooling spells. Patient is without other concerns at this time. ROS  Patient denies: Fever; dysphagia; vision change; chest pain; dyspnea; abdominal pain; polyuria; dysuria; rash; weakness; numbness; difficulty walking; confusion; rash. Physical Exam    GENERAL APPEARANCE: NAD  NEURO: AAOx4/4. Cranial nerves 2-12 intact. 5/5 strength in all four extremities including finger extension against resistance. Sensation to light touch subjectively intact/equal in all four extremities and the face. Patient is speaking clearly in complete sentences. Patient is answering appropriately and able to follow commands. HEENT: PERRL, Moist mucous membranes, external ears normal, nose normal  Neck: No cervical adenopathy  CV: RRR. No murmurs, rubs, gallops  LUNGS: Clear to auscultation: No wheezes, stridor, rhonchi, rales  GI: Abdomen non-distended. Soft. Non-tender and without rebound or guarding   : Deferred at this time  MSK: No deformity. Skin: Warm and dry  Capillary refill: <2 seconds    Assessment/Plan  Staring, drooling, shaking/stiffening, incontinence, unsteady gait, dementia, cough  -Concern for electrolyte abnormality versus arrhythmia versus ICH versus mass lesion versus normal pressure hydrocephalus versus ACS. Patient at risk for pneumonia and UTI. Low clinical suspicion for seizure at this time based on history and physical exam.  -Will investigate with cardiac work-up, CT head, CXR, UA  -We will manage based on work-up. ED Course  ED Course as of 09/08/23 2137   [facility] Sep 08, 2023   1402 ECG per my independent interpretation: Normal rate, regular rhythm, no ectopy, normal axis, no ST elevations or depressions.   QRS and QTc within normal limits   1602 Delta 2hr hsTnI: 1  WNL   1655 MCV(!): 104  Given macrocytic anemia and negative guaiac, concern for nutritional deficiency and not acute blood loss anemia. Patient has a physician at her facility   1656 Sodium(!): 130  As patient has had these episodes even with normal sodium, symptoms unlikely secondary to hyponatremia.   Given unknown chronicity of this, patient will also need to follow-up with her staff physician   4930 Patient tolerating p.o. in the ED   1747 Leukocytes, UA: Negative  WNL   1747 Nitrite, UA: Negative  WNL         Critical Care Time  Procedures

## 2023-09-08 NOTE — ED PROVIDER NOTES
History  Chief Complaint   Patient presents with   • Seizure - Prior Hx Of     Pt brought in by ambulance from [facility] after witnessed 30 second tonic clonic seizure, -HS, GCS 15. Glucose was 68 en route, 95 upon arrival     65year old Female with PMH of HTN, HLD, and dementia was BIBEMS to the ED following a witnessed seizure at her senior living [address] Patient states that she went to stand up from the seat she was in and a seizure episode occurred. The total time was estimated to be about 30 seconds and she denies head strike or LOC. Patient and her daughter at bedside mention that this has been ongoing since around 2020 however, the episodes will go months without reoccurrence. ED provider spoke with [address] who mentioned that Mrs. Bowman has been having episodes of staring and drooling recently in the past month. She also mentioned that patient seems to have shaking movements. During this most recent episode, the nurse said that patient was walking when she noticed that she shifted toward the left and the nurse caught her and lowered her to the ground. Patient was "stiff as a board" for about 15-20 seconds before the episode resolved without any post ictal symptoms. Denies chest pain, SOB, cough, abdominal pain, n/v/d, fever, chills, dizziness, lightheadedness, HA, dysuria, hematuria, hematochezia, or melena. Prior to Admission Medications   Prescriptions Last Dose Informant Patient Reported? Taking?    Calcium Ascorbate (VITAMIN C) 500 mg tablet  Self Yes No   Sig: Take 500 mg by mouth daily   Calcium Carbonate 1500 (600 Ca) MG TABS   No No   Sig: TAKE 1 TABLET ORALLY DAILY (SUPPLEMENT)   Nicotine Step 1 21 MG/24HR TD 24 hr patch   No No   Sig: APPLY 1 PATCH TOPICALLY TO SKIN EVERY 24 HOURS (SMOKING CESSATION)   OLANZapine (ZyPREXA) 2.5 mg tablet  Self Yes No   Sig: Take 2.5 mg by mouth daily at bedtime   ascorbic acid (VITAMIN C) 500 mg tablet   No No Sig: TAKE 1 TABLET ORALLY DAILY (SUPPLEMENT)   calcium carbonate (OS-JUD) 600 MG tablet  Self Yes No   Sig: Take 600 mg by mouth daily   carvedilol (COREG) 3.125 mg tablet  Self No No   Sig: Take 1 tablet (3.125 mg total) by mouth 2 (two) times a day with meals   cholecalciferol (VITAMIN D3) 1,000 units tablet   No No   Sig: Take 2 tablets (2,000 Units total) by mouth daily   donepezil (ARICEPT) 10 mg tablet  Self No No   Sig: Take 1 tablet (10 mg total) by mouth daily at bedtime   escitalopram (LEXAPRO) 10 mg tablet  Self No No   Sig: Take 1 tablet (10 mg total) by mouth daily   furosemide (LASIX) 20 mg tablet   No No   Sig: Take 0.5 tablets (10 mg total) by mouth if needed (For moderate to severe lower extremity edema)   ibandronate (BONIVA) 150 MG tablet  Self No No   Sig: Take 1 tablet (150 mg total) by mouth every 30 (thirty) days   levothyroxine 25 mcg tablet  Self No No   Sig: Take 1 tablet (25 mcg total) by mouth daily in the early morning   lisinopril (ZESTRIL) 5 mg tablet   No No   Sig: Take 1 tablet (5 mg total) by mouth daily   memantine (NAMENDA) 5 mg tablet  Self Yes No   Sig: Take 5 mg by mouth every morning   multivitamin (THERAGRAN) TABS  Self Yes No   Sig: Take 1 tablet by mouth daily   rosuvastatin (CRESTOR) 5 mg tablet  Self No No   Sig: Take 1 tablet (5 mg total) by mouth every other day   spironolactone (ALDACTONE) 25 mg tablet  Self No No   Sig: Take 1 tablet (25 mg total) by mouth every other day      Facility-Administered Medications: None       Past Medical History:   Diagnosis Date   • Anxiety    • Dementia ([address])    • Hyperlipidemia    • Hypertension    • Memory difficulty        Past Surgical History:   Procedure Laterality Date   • ARTHROPLASTY HIP TOTAL ANTERIOR Right 4/9/2021    Procedure: ARTHROPLASTY HIP TOTAL ANTERIOR;  Surgeon: Marshal Pitt DO;  Location: [facility];  Service: Orthopedics   • CARDIAC CATHETERIZATION Left 5/27/2022    Procedure: Cardiac catheterization; Surgeon: Berry Norwood MD;  Location: [address] [place] [address] CATH LAB; Service: Cardiology       No family history on file. I have reviewed and agree with the history as documented. E-Cigarette/Vaping   • E-Cigarette Use Never User      E-Cigarette/Vaping Substances   • Nicotine No    • THC No    • CBD No    • Flavoring No    • Other No    • Unknown No      Social History     Tobacco Use   • Smoking status: Every Day     Packs/day: 0.50     Years: 60.00     Total pack years: 30.00     Types: Cigarettes   • Smokeless tobacco: Never   Vaping Use   • Vaping Use: Never used   Substance Use Topics   • Alcohol use: Never   • Drug use: Never        Review of Systems   Constitutional: Negative. Negative for chills and fever. HENT: Negative. Negative for congestion and sore throat. Eyes: Negative. Respiratory: Positive for cough. Negative for shortness of breath. Cardiovascular: Negative. Negative for chest pain. Gastrointestinal: Negative. Negative for abdominal pain, blood in stool, constipation, diarrhea, nausea and vomiting. Genitourinary: Negative. Negative for dysuria and hematuria. Musculoskeletal: Negative. Skin: Negative. Neurological: Positive for seizures. Negative for dizziness, light-headedness and headaches. Physical Exam  ED Triage Vitals [09/08/23 1225]   Temperature Pulse Respirations Blood Pressure SpO2   (!) 97.4 °F (36.3 °C) 56 18 152/56 97 %      Temp Source Heart Rate Source Patient Position - Orthostatic VS BP Location FiO2 (%)   Oral Monitor Sitting Right arm --      Pain Score       No Pain             Orthostatic Vital Signs  Vitals:    09/08/23 1225 09/08/23 1300   BP: 152/56 128/60   Pulse: 56 57   Patient Position - Orthostatic VS: Sitting Sitting       Physical Exam  Constitutional:       Appearance: Normal appearance. She is normal weight. HENT:      Head: Normocephalic and atraumatic.       Right Ear: External ear normal.      Left Ear: External ear normal. Nose: Nose normal.      Mouth/Throat:      Pharynx: Oropharynx is clear. Eyes:      Conjunctiva/sclera: Conjunctivae normal.   Cardiovascular:      Rate and Rhythm: Normal rate and regular rhythm. Pulses: Normal pulses. Heart sounds: Normal heart sounds. Comments: RRR with +S1 and S2, no murmurs appreciated on exam. Peripheral pulses intact. Pulmonary:      Effort: Pulmonary effort is normal.      Breath sounds: Normal breath sounds. Comments: CTABL with no abnormal lung sounds such as wheezes or rales appreciated on exam.     Abdominal:      General: Abdomen is flat. Bowel sounds are normal.      Palpations: Abdomen is soft. Comments: Soft, non tender, normo-active bowel sounds. Without rigidity, guarding, or distension. Musculoskeletal:         General: Normal range of motion. Cervical back: Normal range of motion. Skin:     General: Skin is warm and dry. Neurological:      General: No focal deficit present. Mental Status: She is alert and oriented to person, place, and time. Mental status is at baseline. Comments: CN II-XII intact. No focal neurologic deficits noted on exam.  5/5 strength in all extremities. Neurovascularly intact with normal sensation and motor function.            ED Medications  Medications - No data to display    Diagnostic Studies  Results Reviewed     Procedure Component Value Units Date/Time    HS Troponin I 2hr [[id number]]     Lab Status: No result Specimen: Blood     HS Troponin 0hr (reflex protocol) [[id number]]  (Normal) Collected: 09/08/23 1316    Lab Status: Final result Specimen: Blood from Arm, Left Updated: 09/08/23 1356     hs TnI 0hr 2 ng/L     Comprehensive metabolic panel [[id number]]  (Abnormal) Collected: 09/08/23 1316    Lab Status: Final result Specimen: Blood from Arm, Left Updated: 09/08/23 1350     Sodium 130 mmol/L      Potassium 4.3 mmol/L      Chloride 94 mmol/L      CO2 30 mmol/L      ANION GAP 6 mmol/L      BUN 18 mg/dL      Creatinine 0.85 mg/dL      Glucose 79 mg/dL      Calcium 9.4 mg/dL      AST 18 U/L      ALT 15 U/L      Alkaline Phosphatase 53 U/L      Total Protein 6.5 g/dL      Albumin 3.6 g/dL      Total Bilirubin 0.26 mg/dL      eGFR 65 ml/min/1.73sq m     Narrative:      [facility] guidelines for Chronic Kidney Disease (CKD):   •  Stage 1 with normal or high GFR (GFR > 90 mL/min/1.73 square meters)  •  Stage 2 Mild CKD (GFR = 60-89 mL/min/1.73 square meters)  •  Stage 3A Moderate CKD (GFR = 45-59 mL/min/1.73 square meters)  •  Stage 3B Moderate CKD (GFR = 30-44 mL/min/1.73 square meters)  •  Stage 4 Severe CKD (GFR = 15-29 mL/min/1.73 square meters)  •  Stage 5 End Stage CKD (GFR <15 mL/min/1.73 square meters)  Note: GFR calculation is accurate only with a steady state creatinine    CBC and differential [[id number]]  (Abnormal) Collected: 09/08/23 1316    Lab Status: Final result Specimen: Blood from Arm, Left Updated: 09/08/23 1335     WBC 9.87 Thousand/uL      RBC 2.96 Million/uL      Hemoglobin 10.4 g/dL      Hematocrit 30.7 %       fL      MCH 35.1 pg      MCHC 33.9 g/dL      RDW 12.8 %      MPV 8.4 fL      Platelets 727 Thousands/uL      nRBC 0 /100 WBCs      Neutrophils Relative 73 %      Immat GRANS % 1 %      Lymphocytes Relative 15 %      Monocytes Relative 8 %      Eosinophils Relative 2 %      Basophils Relative 1 %      Neutrophils Absolute 7.29 Thousands/µL      Immature Grans Absolute 0.06 Thousand/uL      Lymphocytes Absolute 1.49 Thousands/µL      Monocytes Absolute 0.80 Thousand/µL      Eosinophils Absolute 0.17 Thousand/µL      Basophils Absolute 0.06 Thousands/µL     Fingerstick Glucose (POCT) [[id number]]  (Normal) Collected: 09/08/23 1227    Lab Status: Final result Updated: 09/08/23 1227     POC Glucose 93 mg/dl                  CT head without contrast    (Results Pending)   XR chest 1 view portable    (Results Pending)         Procedures  ECG 12 Lead Documentation Only    Date/Time: 9/8/2023 2:09 PM    Performed by: Hina Mcclellan MD  Authorized by: Hina Mcclellan MD    ECG reviewed by me, the ED Provider: yes    Patient location:  ED  Previous ECG:     Previous ECG:  Compared to current    Similarity:  No change  Interpretation:     Interpretation: normal    Rate:     ECG rate assessment: normal    Rhythm:     Rhythm: sinus bradycardia            ED Course                             SBIRT 22yo+    Flowsheet Row Most Recent Value   Initial Alcohol Screen:  AUDIT-C     1. How often do you have a drink containing alcohol? 0 Filed at: 09/08/2023 1224   2. How many drinks containing alcohol do you have on a typical day you are drinking? 0 Filed at: 09/08/2023 1224   3b. FEMALE Any Age, or MALE 65+: How often do you have 4 or more drinks on one occassion? 0 Filed at: 09/08/2023 1224   Audit-C Score 0 Filed at: 09/08/2023 1224   KUNAL: How many times in the past year have you. .. Used an illegal drug or used a prescription medication for non-medical reasons? Never Filed at: 09/08/2023 1224                MDM      Disposition  Final diagnoses:   None     ED Disposition     None      Follow-up Information    None         Patient's Medications   Discharge Prescriptions    No medications on file     No discharge procedures on file. PDMP Review     None           ED Provider  Attending physically available and evaluated [address] I managed the patient along with the ED Attending.     Electronically Signed by containing alcohol do you have on a typical day you are drinking? 0 Filed at: 09/08/2023 1224   3b. FEMALE Any Age, or MALE 65+: How often do you have 4 or more drinks on one occassion? 0 Filed at: 09/08/2023 1224   Audit-C Score 0 Filed at: 09/08/2023 1224   KUNAL: How many times in the past year have you. .. Used an illegal drug or used a prescription medication for non-medical reasons? Never Filed at: 09/08/2023 1224                Medical Decision Making  76-year-old female with PMH of HTN, HLD, and dementia was BIBEMS from her [facility] community at [facility] where she was believed to have a witnessed seizure. ED provider spoke with [address] [place] [address] [employer] who is with the patient during the episode and denies head strike or loss of consciousness. Patient's labs and images were obtained and reviewed by the provider. Patient's labs showed mild decreases in sodium level at 130 along with suspected macrocytic anemia on CBC. Patient's EKG showed no acute concerns or changes and was noted to be sinus bradycardia. Patient's chest x-ray showed no acute cardiopulmonary disease and her CT head without contrast showed no acute intercranial abnormality however, a stable small nodule in the sellar/suprasellar region was noted along with moderate microvascular changes. ED provider performed a digital rectal exam with concerns of GI bleed which showed Hemoccult test negative. ED provider consulted neurology and spoke with Dr. Janine Miller and at this time neurology suspects a convulsive syncope and recommends outpatient EEG. Through shared decision making between the patient and the provider, patient was planned for discharge back to her senior [facility] community. Patient was advised to follow-up with her in-house PCP to further manage her blood work and possible nutritional deficiency leading to macrocytic anemia. Patient was also provided a ambulatory referral to to neurology for the EEG.   Patient was instructed to return to the ED if her symptoms worsen including but not limited to increased frequency of seizures, loss of consciousness, headaches, visual changes, chest pain, SOB, or altered mental status. Amount and/or Complexity of Data Reviewed  Labs: ordered. Radiology: ordered. Disposition  Final diagnoses:   Seizure-like activity ([address])   Hyponatremia   Macrocytic anemia   Elevated blood pressure reading   Brain lesion     Time reflects when diagnosis was documented in both MDM as applicable and the Disposition within this note     Time User Action Codes Description Comment    9/8/2023  4:36 PM Danne Dart Add [R56.9] Seizure-like activity ([address])     9/8/2023  4:36 PM Danne Dart Add [E87.1] Hyponatremia     9/8/2023  4:36 PM Buckner [place] [D64.9] Anemia     9/8/2023  5:30 PM Mozella Jonnathan [D64.9] Anemia     9/8/2023  5:30 PM Danne Dart Add [D53.9] Macrocytic anemia     9/8/2023  5:32 PM Danne Dart Add [R03.0] Elevated blood pressure reading     9/8/2023  5:32 PM Danne Dart Add [G31.9] Brain atrophy ([address])     9/8/2023  5:33 PM Danne Dart Add [G93.9] Brain lesion     9/8/2023  5:34 PM Esteban Robinsing A Remove [G31.9] Brain atrophy [facility])       ED Disposition     ED Disposition   Discharge    Condition   Stable    Date/Time   Fri Sep 8, 2023  4:36 PM    [address] discharge to home/self care.                Follow-up Information     Follow up With Specialties Details Why Contact Info Additional [address] [facility] Emergency Medicine Go to  As needed [address] [facility], [place], [place], [address] [facility] [place] Neurology Call in 3 days  [address] Chung 100[address] [facility] [place] ([place]), [address] [place], [place] [address], [facility] CF Medicine Call in 3 days  [address] [address]  [address] [facility], [facility], [facility]), [place], [address]          Discharge Medication List as of 9/8/2023  5:53 PM      CONTINUE these medications which have NOT CHANGED    Details   ascorbic acid (VITAMIN C) 500 mg tablet TAKE 1 TABLET ORALLY DAILY (SUPPLEMENT), Normal      Calcium Ascorbate (VITAMIN C) 500 mg tablet Take 500 mg by mouth daily, Historical Med      !! calcium carbonate (OS-JUD) 600 MG tablet Take 600 mg by mouth daily, Historical Med      !! Calcium Carbonate 1500 (600 Ca) MG TABS TAKE 1 TABLET ORALLY DAILY (SUPPLEMENT), Normal      carvedilol (COREG) 3.125 mg tablet Take 1 tablet (3.125 mg total) by mouth 2 (two) times a day with meals, Starting Mon 11/14/2022, Normal      cholecalciferol (VITAMIN D3) 1,000 units tablet Take 2 tablets (2,000 Units total) by mouth daily, Starting Thu 4/15/2021, Until Mon 6/12/2023, Normal      donepezil (ARICEPT) 10 mg tablet Take 1 tablet (10 mg total) by mouth daily at bedtime, Starting Tue 9/20/2022, Normal      escitalopram (LEXAPRO) 10 mg tablet Take 1 tablet (10 mg total) by mouth daily, Starting Tue 3/21/2023, Normal      furosemide (LASIX) 20 mg tablet Take 0.5 tablets (10 mg total) by mouth if needed (For moderate to severe lower extremity edema), Starting Tue 6/6/2023, Normal      ibandronate (BONIVA) 150 MG tablet Take 1 tablet (150 mg total) by mouth every 30 (thirty) days, Starting Tue 3/21/2023, Normal      levothyroxine 25 mcg tablet Take 1 tablet (25 mcg total) by mouth daily in the early morning, Starting Tue 3/21/2023, Normal      lisinopril (ZESTRIL) 5 mg tablet Take 1 tablet (5 mg total) by mouth daily, Starting Tue 6/6/2023, Normal      memantine (NAMENDA) 5 mg tablet Take 5 mg by mouth every morning, Starting Wed 5/31/2023, Historical Med      multivitamin (THERAGRAN) TABS Take 1 tablet by mouth daily, Historical Med      Nicotine Step 1 21 MG/24HR TD 24 hr patch APPLY 1 PATCH TOPICALLY TO SKIN EVERY 24 HOURS (SMOKING CESSATION), Normal      OLANZapine (ZyPREXA) 2.5 mg tablet Take 2.5 mg by mouth daily at bedtime, Starting Wed 5/31/2023, Historical Med      rosuvastatin (CRESTOR) 5 mg tablet Take 1 tablet (5 mg total) by mouth every other day, Starting Mon 11/14/2022, Normal      spironolactone (ALDACTONE) 25 mg tablet Take 1 tablet (25 mg total) by mouth every other day, Starting Mon 11/14/2022, Normal       !! - Potential duplicate medications found. Please discuss with provider. PDMP Review     None           ED Provider  Attending physically available and evaluated [address]  managed the patient along with the ED Attending.     Electronically Signed by         Jose Alberto Nguyen MD  09/13/23 2026

## 2023-09-08 NOTE — DISCHARGE INSTRUCTIONS
Encouraged to follow up with in house [facility] Physician to trend blood work. Possible nutritional deficiency causing macrocytic anemia. ]    CT BRAIN - WITHOUT CONTRAST     INDICATION:   Seizure, new-onset, no history of trauma  Seizure. COMPARISON: 2/10/2023. TECHNIQUE:  CT examination of the brain was performed. Multiplanar 2D reformatted images were created from the source data. Radiation dose length product (DLP) for this visit:  984 mGy-cm . This examination, like all CT scans performed in the [facility], was performed utilizing techniques to minimize radiation dose exposure, including the use of iterative   reconstruction and automated exposure control. IMAGE QUALITY:  Diagnostic. FINDINGS:     PARENCHYMA:  No intracranial mass, mass effect or midline shift. No CT signs of acute infarction. No acute parenchymal hemorrhage. Stable small nodule in the sellar/suprasellar region. Moderate chronic microvascular ischemic changes. VENTRICLES AND EXTRA-AXIAL SPACES: Mild to moderately dilated compatible with atrophy. VISUALIZED ORBITS: Normal visualized orbits. PARANASAL SINUSES: Normal visualized paranasal sinuses. CALVARIUM AND EXTRACRANIAL SOFT TISSUES:  Normal.     IMPRESSION:     No acute intracranial abnormality.  Stable findings as above

## 2023-09-19 ENCOUNTER — TELEPHONE (OUTPATIENT)
Dept: OTHER | Facility: OTHER | Age: 78
End: 2023-09-19

## 2023-09-19 NOTE — TELEPHONE ENCOUNTER
Rian called, to inform PCP that  home visit assessment was performed and pt's bp was below parameters, reading 109/65, and pulse was below parameters, reading 58.

## 2023-09-20 ENCOUNTER — TELEPHONE (OUTPATIENT)
Dept: NEUROLOGY | Facility: CLINIC | Age: 78
End: 2023-09-20

## 2023-09-20 NOTE — TELEPHONE ENCOUNTER
Attempted to call and got disconnected    Have to try to call again later    [address] [facility]

## 2023-09-20 NOTE — TELEPHONE ENCOUNTER
Please let them know that if her BP remains low, they should contact her cardiologist who manages her bp and heart failure meds

## 2023-09-22 NOTE — TELEPHONE ENCOUNTER
Left message on machine for Rian to call office back. Okay to give him message below from Dr Florian Machuca if he calls back.     Jazmine Alexis, ARCADION

## 2023-09-23 NOTE — TELEPHONE ENCOUNTER
Left message for Jim Clark to call the office back.  Ok to give message if he calls  back   [address] [employer]

## 2023-10-04 ENCOUNTER — OFFICE VISIT (OUTPATIENT)
Dept: NEUROLOGY | Facility: CLINIC | Age: 78
End: 2023-10-04
Payer: MEDICARE

## 2023-10-04 VITALS
SYSTOLIC BLOOD PRESSURE: 120 MMHG | TEMPERATURE: 95.6 F | HEIGHT: 61 IN | WEIGHT: 114 LBS | BODY MASS INDEX: 21.52 KG/M2 | HEART RATE: 70 BPM | DIASTOLIC BLOOD PRESSURE: 60 MMHG | OXYGEN SATURATION: 94 %

## 2023-10-04 DIAGNOSIS — R55 SYNCOPE AND COLLAPSE: ICD-10-CM

## 2023-10-04 DIAGNOSIS — F02.80 ALZHEIMER'S DEMENTIA (HCC): ICD-10-CM

## 2023-10-04 DIAGNOSIS — G30.9 ALZHEIMER'S DEMENTIA (HCC): ICD-10-CM

## 2023-10-04 DIAGNOSIS — R56.9 OBSERVED SEIZURE-LIKE ACTIVITY (HCC): Primary | ICD-10-CM

## 2023-10-04 DIAGNOSIS — R79.89 ELEVATED TSH: ICD-10-CM

## 2023-10-04 PROCEDURE — 99215 OFFICE O/P EST HI 40 MIN: CPT | Performed by: PSYCHIATRY & NEUROLOGY

## 2023-10-04 RX ORDER — RISPERIDONE 0.5 MG/1
TABLET ORAL
COMMUNITY
Start: 2023-09-21

## 2023-10-04 RX ORDER — FOLIC ACID 1 MG/1
TABLET ORAL
COMMUNITY
Start: 2023-09-21

## 2023-10-04 NOTE — PROGRESS NOTES
Return NeuroOutpatient Note        Clari Rousseau  [id number]  65 y.o.  1945       Memory impairment, Depression, and Pituitary microadenoma         History obtained from:  Patient and family     HPI/Subjective:    Clari Rousseau is a 66 yo F with PMH of dementia, depression presents as f/u. Patient was recently seen at [facility] on 2/16/23 for hallucinations, memory impairment, impulsive behavior. Her MRI brain neuroquant then was normal with no evidence of degeneration. Her EEG was normal. She was seen by psychiatry as well. She was placed on lexapro 10mg daily. We had started her on memantine as well. In dec of 2022, her MOCA score was 21/30. Family denies any further events/spells since hospitalization. Patient lives at her home with 24/7 care. She can get irritated but that has been present for few years. She was asked not to drive.      There was concern for NPH. She was referred to neurosurgery and they did not feel she had clinical features to warrant csf shunt. No further intervention was necessary. She had neuropsych testing recently in which patient had poor functioning with multiple domains along with delusions and hallucinations. They didn't feel her psychiatric state was significant enough to cause her symptoms. Etiology wasn't clear but moderate dementia wasn't excluded. She was doing well last visit. On sept 8th, 2023, last month, patient was outside talking to her family and had generalized tonic clonic event with brief confusion for <2 min. Can't say whether there was urinary incontinence. She was asked to have outpatient EEG but it doesn't seem to have ordered by ED. She was thought to have convulsive syncope. Her bp was in fact high with systolic around 174. She has appt with cardiology soon.      She had f/u pituitary MRI brain which had revealed 2 0.6 and 0.5cm lesions that were microadenoma. She lives at [facility]. There, she plays bingo, very cooperative. She helps staff. She does word searches. On note on 9/20/23, her TSH was 18.16 which has not been acknowledged by a PCP.        Past Medical History:   Diagnosis Date   • Anxiety    • Dementia ([address])    • Hyperlipidemia    • Hypertension    • Memory difficulty      Social History     Socioeconomic History   • Marital status:      Spouse name: Not on file   • Number of children: Not on file   • Years of education: Not on file   • Highest education level: Not on file   Occupational History   • Not on file   Tobacco Use   • Smoking status: Every Day     Packs/day: 0.50     Years: 60.00     Total pack years: 30.00     Types: Cigarettes   • Smokeless tobacco: Never   Vaping Use   • Vaping Use: Never used   Substance and Sexual Activity   • Alcohol use: Never   • Drug use: Never   • Sexual activity: Not Currently     Partners: Male   Other Topics Concern   • Not on file   Social History Narrative   • Not on file     Social Determinants of Health     Financial Resource Strain: Not on file   Food Insecurity: No Food Insecurity (2/14/2023)    Hunger Vital Sign    • Worried About Running Out of Food in the Last Year: Never true    • Ran Out of Food in the Last Year: Never true   Transportation Needs: No Transportation Needs (2/14/2023)    PRAPARE - Transportation    • Lack of Transportation (Medical): No    • Lack of Transportation (Non-Medical): No   Physical Activity: Not on file   Stress: Not on file   Social Connections: Not on file   Intimate Partner Violence: Not on file   Housing Stability: Low Risk  (2/14/2023)    Housing Stability Vital Sign    • Unable to Pay for Housing in the Last Year: No    • Number of Places Lived in the Last Year: 1    • Unstable Housing in the Last Year: No     No family history on file.   No Known Allergies  Current Outpatient Medications on File Prior to Visit   Medication Sig Dispense Refill   • ascorbic acid (VITAMIN C) 500 mg tablet TAKE 1 TABLET ORALLY DAILY (SUPPLEMENT) 30 tablet 5   • calcium carbonate (OS-JUD) 600 MG tablet Take 600 mg by mouth daily     • carvedilol (COREG) 3.125 mg tablet Take 1 tablet (3.125 mg total) by mouth 2 (two) times a day with meals 180 tablet 3   • cholecalciferol (VITAMIN D3) 1,000 units tablet Take 2 tablets (2,000 Units total) by mouth daily 60 tablet 0   • donepezil (ARICEPT) 10 mg tablet Take 1 tablet (10 mg total) by mouth daily at bedtime 90 tablet 1   • escitalopram (LEXAPRO) 10 mg tablet Take 1 tablet (10 mg total) by mouth daily 90 tablet 0   • folic acid (FOLVITE) 1 mg tablet      • furosemide (LASIX) 20 mg tablet Take 0.5 tablets (10 mg total) by mouth if needed (For moderate to severe lower extremity edema) 10 tablet 0   • ibandronate (BONIVA) 150 MG tablet Take 1 tablet (150 mg total) by mouth every 30 (thirty) days 1 tablet 5   • levothyroxine 25 mcg tablet Take 1 tablet (25 mcg total) by mouth daily in the early morning 90 tablet 1   • lisinopril (ZESTRIL) 5 mg tablet Take 1 tablet (5 mg total) by mouth daily 90 tablet 1   • magnesium hydroxide (MILK OF MAGNESIA) 400 mg/5 mL oral suspension Take by mouth daily as needed for constipation     • memantine (NAMENDA) 5 mg tablet Take 5 mg by mouth every morning     • multivitamin (THERAGRAN) TABS Take 1 tablet by mouth daily     • risperiDONE (RisperDAL) 0.5 mg tablet      • rosuvastatin (CRESTOR) 5 mg tablet Take 1 tablet (5 mg total) by mouth every other day 45 tablet 3   • spironolactone (ALDACTONE) 25 mg tablet Take 1 tablet (25 mg total) by mouth every other day 45 tablet 3   • Calcium Ascorbate (VITAMIN C) 500 mg tablet Take 500 mg by mouth daily (Patient not taking: Reported on 10/4/2023)     • Calcium Carbonate 1500 (600 Ca) MG TABS TAKE 1 TABLET ORALLY DAILY (SUPPLEMENT) (Patient not taking: Reported on 10/4/2023) 30 tablet 5   • Nicotine Step 1 21 MG/24HR TD 24 hr patch APPLY 1 PATCH TOPICALLY TO SKIN EVERY 24 HOURS (SMOKING CESSATION) (Patient not taking: Reported on 10/4/2023) 10 patch 0   • OLANZapine (ZyPREXA) 2.5 mg tablet Take 2.5 mg by mouth daily at bedtime (Patient not taking: Reported on 10/4/2023)       No current facility-administered medications on file prior to visit. Review of Systems   Refer to positive review of systems in HPI. Review of Systems    Constitutional- No fever  Eyes- No visual change  ENT- Hearing normal  CV- No chest pain  Resp- No Shortness of breath  GI- No diarrhea  - Bladder normal  MS- No Arthritis   Skin- No rash  Psych- No depression  Endo- No DM  Heme- No nodes    Vitals:    10/04/23 1559   BP: 120/60   BP Location: Left arm   Patient Position: Sitting   Cuff Size: Standard   Pulse: 70   Temp: (!) 95.6 °F (35.3 °C)   TempSrc: Tympanic   SpO2: 94%   Weight: 51.7 kg (114 lb)   Height: 5' 1" (1.549 m)       PHYSICAL EXAM:  Appearance: No Acute Distress  Ophthalmoscopic: Disc Flat, Normal fundus  Mental status:  Orientation: Awake, Alert, and Orientedx3  Memory: Registation 3/3 Recall 0.5/3  Attention: normal  Knowledge: fair   Language: No aphasia  Speech: No dysarthria  Cranial Nerves:  2 No Visual Defect on Confrontation, Pupils round, equal, reactive to light  3,4,6 Extraocular Movements Intact, no nystagmus  5 Facial Sensation Intact  7 No facial asymmetry  8 Intact hearing  9,10 Palate symmetric, normal gag  11 Good shoulder shrug  12 Tongue Midline  Gait: needs walker for stability   Coordination: mild ET R>L.  No ataxia with finger to nose testing, and heel to shin  Sensory: Intact, Symmetric to pinprick, light touch, vibration, and joint position  Muscle Tone: Normal              Muscle exam:  Arm Right Left Leg Right Left   Deltoid 5/5 5/5 Iliopsoas 5/5 5/5   Biceps 5/5 5/5 Quads 5/5 5/5   Triceps 5/5 5/5 Hamstrings 5/5 5/5   Wrist Extension 5/5 5/5 Ankle Dorsi Flexion 5/5 5/5   Wrist Flexion 5/5 5/5 Ankle Plantar Flexion 5/5 5/5   Interossei 5/5 5/5 Ankle Eversion 5/5 5/5   APB 5/5 5/5 Ankle Inversion 5/5 5/5       Reflexes   RJ BJ TJ KJ AJ Plantars Rene's   Right 2+ 2+ 2+ 2+  Downgoing Not present   Left 2+ 2+ 2+ 2+  Downgoing Not present     Personal review of  Labs:                  Diagnoses and all orders for this visit:      1. Observed seizure-like activity (HCC)  EEG Prolonged > 1 hour      2. Syncope and collapse  Tilt table      3. Alzheimer's dementia ([address])  MRI brain NeuroQuant wo and w contrast      4. Elevated TSH              Patient does have short term memory impairment. Will obtain EEG and tilt table to further evaluate for recent episode. Will obtain f/u MRI brain neuroquant study. She may resume aricept and namenda. She needs to have her levothyroxine adjusted. Will print lab to take to her nursing provider.              Total time of encounter:  40 min  More than 50% of the time was used in counseling and/or coordination of care  Extent of counseling and/or coordination of care        [facility]  [address]  [phone]

## 2023-10-17 ENCOUNTER — OFFICE VISIT (OUTPATIENT)
Dept: CARDIOLOGY CLINIC | Facility: CLINIC | Age: 78
End: 2023-10-17
Payer: MEDICARE

## 2023-10-17 VITALS
DIASTOLIC BLOOD PRESSURE: 50 MMHG | SYSTOLIC BLOOD PRESSURE: 80 MMHG | BODY MASS INDEX: 22.47 KG/M2 | OXYGEN SATURATION: 97 % | WEIGHT: 119 LBS | HEART RATE: 92 BPM | HEIGHT: 61 IN

## 2023-10-17 DIAGNOSIS — I25.10 CORONARY ARTERY DISEASE INVOLVING NATIVE CORONARY ARTERY OF NATIVE HEART WITHOUT ANGINA PECTORIS: Primary | ICD-10-CM

## 2023-10-17 DIAGNOSIS — I50.42 CHRONIC COMBINED SYSTOLIC AND DIASTOLIC CONGESTIVE HEART FAILURE (HCC): ICD-10-CM

## 2023-10-17 DIAGNOSIS — I10 PRIMARY HYPERTENSION: ICD-10-CM

## 2023-10-17 DIAGNOSIS — F03.918 DEMENTIA WITH BEHAVIORAL DISTURBANCE (HCC): ICD-10-CM

## 2023-10-17 PROCEDURE — 99213 OFFICE O/P EST LOW 20 MIN: CPT | Performed by: NURSE PRACTITIONER

## 2023-10-17 NOTE — LETTER
2023     Jesus Teddys, [address] [address]    Patient: Cora Burton   YOB: 1945   Date of Visit: 10/17/2023       Dear Dr. Jeana Guerra: Thank you for referring Eris Palma to me for evaluation. Below are my notes for this consultation. If you have questions, please do not hesitate to call me. I look forward to following your patient along with you. Sincerely,        BOBBY Gallego        CC: MD Nicki Feliciano, [address] [place] [address]  10/17/2023  5:05 PM  Incomplete    Progress Note - Cardiology Office  [facility]    Prabhjot Logan Stamets 77 y.o. female MRN: [id number]  : 1945  Encounter: [id number]      Assessment:     1. Coronary artery disease involving native coronary artery of native heart without angina pectoris    2. Primary hypertension    3. Chronic combined systolic and diastolic congestive heart failure ([address])    4. Dementia with behavioral disturbance ([address])        Discussion Summary and Plan:  Orthostatic hypotension: Patient noted with 40 mmHg drop in systolic blood pressure from sitting to standing position in office today     -   We will discontinue Coreg 3.125 mg twice daily    -   We will discontinue lisinopril 5 mg daily    -   I have asked family to purchase compression stockings for patient and have her wear them during the day and off at bedtime    -   With family that she should be ambulating with the use of a walker    -   I have asked [facility] to do orthostatic vital signs daily for 1 week and please fax results to our office at [phone], after 1 week with patient using compression stockings. -   We will follow-up in 1 month and patient may need pharmacological therapy    2. Coronary artery disease: Coronary artery disease seen on cardiac catheterization    -   Continue Crestor 5 mg every other day      3.   Chronic combined systolic and diastolic heart failure: 9680 TTE: EF visibly estimated at 35 to 40% with moderately reduced systolic function and severe global hypokinesis with grade 1 diastolic dysfunction.    -   Due to orthostatic hypotension Coreg and lisinopril is being discontinued    -   Encourage protein rich diet low in sodium    -   We will continue Aldactone 25 mg every other day    -   Continue Lasix 10 mg daily    -   Continue to monitor orthostatic vital signs as above    -   Compression stockings on during the waking hours    4. Dementia: Continue medication and supportive therapy      Patient / Yanna Wilder was advised and educated to call our office  immediately if  patient has any new symptoms of chest pain/shortness of breath, near-syncope, syncope, light headedness sustained palpitations  or any other cardiovascular symptoms before their scheduled follow-up appointment. Office number was provided #[phone]. Please call [phone] if any questions. Counseling :  A description of the counseling. Goals and Barriers. Patient's ability to self care: Yes  Medication side effect reviewed with patient in detail and all their questions answered to their satisfaction. HPI :     Yelena Rainey is a 77y.o. year old female who came for routine follow up. Family has been concerned as patient has been having "spells". They describe it as periods when she stands up and she appears as if she is having shaking of her extremities and will stare off to space. They usually make her sit down and after she sits down she comes back and is able to answer their questions. She was recently seen by Dr. Candice Lima from neurology with concern for possible seizure activity. 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 Patient also has some mild dementia.     On exam today orthostatic vital signs, patient sitting systolic blood pressure was 122/62 with a heart rate of 72, standing blood pressure dropped to 80/50 and heart rate elevated to 92. This is concerning for orthostasis/autonomic dysfunction. Family states she has been eating well and they are also concerned about her lower extremity edema. When the patient's question, she denies any chest discomfort or shortness of breath. She denies feeling lightheaded or dizzy or having any prodromal symptoms prior to her episodes when she stands up. Thankfully patient has not experienced any loss of consciousness or falls. Review of Systems   Constitutional: Negative. Negative for activity change and fatigue. HENT:  Negative for congestion, mouth sores and sneezing. Eyes: Negative. Negative for photophobia and visual disturbance. Respiratory:  Negative for chest tightness and shortness of breath. Cardiovascular:  Positive for leg swelling. Negative for chest pain and palpitations. Gastrointestinal: Negative. Negative for abdominal distention, constipation, diarrhea, nausea and vomiting. Endocrine: Negative. Negative for polydipsia, polyphagia and polyuria. Genitourinary: Negative. Negative for difficulty urinating. Musculoskeletal:  Positive for gait problem. Ambulates with walker   Skin: Negative. Neurological:  Positive for syncope (Near) and weakness. Negative for dizziness and light-headedness. Hematological: Negative. Psychiatric/Behavioral: Negative. Historical Information   Past Medical History:   Diagnosis Date   • Anxiety    • Dementia ([address])    • Hyperlipidemia    • Hypertension    • Memory difficulty      Past Surgical History:   Procedure Laterality Date   • ARTHROPLASTY HIP TOTAL ANTERIOR Right 4/9/2021    Procedure: ARTHROPLASTY HIP TOTAL ANTERIOR;  Surgeon: Teri Simms DO;  Location: [facility];  Service: Orthopedics   • CARDIAC CATHETERIZATION Left 5/27/2022    Procedure: Cardiac catheterization;  Surgeon: Sarah Dotson MD;  Location: [address] [place] [address] CATH LAB;   Service: Cardiology     Social History     Substance and Sexual Activity   Alcohol Use Never     Social History     Substance and Sexual Activity   Drug Use Never     Social History     Tobacco Use   Smoking Status Every Day   • Packs/day: 0.50   • Years: 60.00   • Total pack years: 30.00   • Types: Cigarettes   Smokeless Tobacco Never     Family History: History reviewed. No pertinent family history.     Meds/Allergies     No Known Allergies    Current Outpatient Medications:   •  ascorbic acid (VITAMIN C) 500 mg tablet, TAKE 1 TABLET ORALLY DAILY (SUPPLEMENT), Disp: 30 tablet, Rfl: 5  •  Calcium Ascorbate (VITAMIN C) 500 mg tablet, Take 500 mg by mouth daily (Patient not taking: Reported on 10/4/2023), Disp: , Rfl:   •  calcium carbonate (OS-JUD) 600 MG tablet, Take 600 mg by mouth daily, Disp: , Rfl:   •  Calcium Carbonate 1500 (600 Ca) MG TABS, TAKE 1 TABLET ORALLY DAILY (SUPPLEMENT) (Patient not taking: Reported on 10/4/2023), Disp: 30 tablet, Rfl: 5  •  carvedilol (COREG) 3.125 mg tablet, Take 1 tablet (3.125 mg total) by mouth 2 (two) times a day with meals, Disp: 180 tablet, Rfl: 3  •  cholecalciferol (VITAMIN D3) 1,000 units tablet, Take 2 tablets (2,000 Units total) by mouth daily, Disp: 60 tablet, Rfl: 0  •  donepezil (ARICEPT) 10 mg tablet, Take 1 tablet (10 mg total) by mouth daily at bedtime, Disp: 90 tablet, Rfl: 1  •  escitalopram (LEXAPRO) 10 mg tablet, Take 1 tablet (10 mg total) by mouth daily, Disp: 90 tablet, Rfl: 0  •  folic acid (FOLVITE) 1 mg tablet, , Disp: , Rfl:   •  furosemide (LASIX) 20 mg tablet, Take 0.5 tablets (10 mg total) by mouth if needed (For moderate to severe lower extremity edema), Disp: 10 tablet, Rfl: 0  •  ibandronate (BONIVA) 150 MG tablet, Take 1 tablet (150 mg total) by mouth every 30 (thirty) days, Disp: 1 tablet, Rfl: 5  •  levothyroxine 25 mcg tablet, Take 1 tablet (25 mcg total) by mouth daily in the early morning, Disp: 90 tablet, Rfl: 1  •  lisinopril (ZESTRIL) 5 mg tablet, Take 1 tablet (5 mg total) by mouth daily, Disp: 90 tablet, Rfl: 1  •  magnesium hydroxide (MILK OF MAGNESIA) 400 mg/5 mL oral suspension, Take by mouth daily as needed for constipation, Disp: , Rfl:   •  memantine (NAMENDA) 5 mg tablet, Take 5 mg by mouth every morning, Disp: , Rfl:   •  multivitamin (THERAGRAN) TABS, Take 1 tablet by mouth daily, Disp: , Rfl:   •  Nicotine Step 1 21 MG/24HR TD 24 hr patch, APPLY 1 PATCH TOPICALLY TO SKIN EVERY 24 HOURS (SMOKING CESSATION) (Patient not taking: Reported on 10/4/2023), Disp: 10 patch, Rfl: 0  •  OLANZapine (ZyPREXA) 2.5 mg tablet, Take 2.5 mg by mouth daily at bedtime (Patient not taking: Reported on 10/4/2023), Disp: , Rfl:   •  risperiDONE (RisperDAL) 0.5 mg tablet, , Disp: , Rfl:   •  rosuvastatin (CRESTOR) 5 mg tablet, Take 1 tablet (5 mg total) by mouth every other day, Disp: 45 tablet, Rfl: 3  •  spironolactone (ALDACTONE) 25 mg tablet, Take 1 tablet (25 mg total) by mouth every other day, Disp: 45 tablet, Rfl: 3    Vitals: Blood pressure (!) 80/50, pulse 92, height 5' 1" (1.549 m), weight 54 kg (119 lb), SpO2 97 %. Body mass index is 22.48 kg/m². Wt Readings from Last 3 Encounters:   10/17/23 54 kg (119 lb)   10/04/23 51.7 kg (114 lb)   06/12/23 45.9 kg (101 lb 4 oz)     Vitals:    10/17/23 1559   Weight: 54 kg (119 lb)     BP Readings from Last 3 Encounters:   10/17/23 (!) 80/50   10/04/23 120/60   09/08/23 167/72       Physical Exam:  Physical Exam  Vitals and nursing note reviewed. Constitutional:       General: She is not in acute distress. Appearance: Normal appearance. She is normal weight. HENT:      Right Ear: External ear normal.      Left Ear: External ear normal.   Eyes:      General: No scleral icterus. Right eye: No discharge. Left eye: No discharge. Cardiovascular:      Rate and Rhythm: Normal rate and regular rhythm. Pulses: Normal pulses. Heart sounds: Murmur heard.    Pulmonary:      Effort: Pulmonary effort is normal. No accessory muscle usage or respiratory distress. Breath sounds: Examination of the right-lower field reveals decreased breath sounds. Examination of the left-lower field reveals decreased breath sounds. Decreased breath sounds present. Abdominal:      General: Bowel sounds are normal. There is no distension. Palpations: Abdomen is soft. Musculoskeletal:      Right lower leg: Edema present. Left lower leg: Edema present. Comments: +2 lower extremity edema to mid calf bilaterally   Skin:     General: Skin is warm. Capillary Refill: Capillary refill takes less than 2 seconds. Neurological:      General: No focal deficit present. Mental Status: She is alert. Mental status is at baseline. Psychiatric:         Mood and Affect: Mood normal.               [address] [place] [address]        "This note was completed in part utilizing [email or web address] direct voice recognition software. Grammatical errors, random word insertion, spelling mistakes, and incomplete sentences may be an occasional consequence of the system secondary to software limitations, ambient noise and hardware issues. Please read the chart carefully and recognize, using context, where substitutions have occurred. If you have any questions or concerns about the context, text or information contained within the body of this dictation, please contact myself, the provider, for further clarification."    [employer], [address] [place] [address]  10/17/2023  4:13 PM  Incomplete    Progress Note - Cardiology Office  [facility]    Monica Sensing Stamets 77 y.o. female MRN: [id number]  : 1945  Encounter: [id number]      Assessment:     1. Coronary artery disease involving native coronary artery of native heart without angina pectoris    2. Primary hypertension    3. Chronic combined systolic and diastolic congestive heart failure ([address])    4.  Dementia with behavioral disturbance ([address]) Discussion Summary and Plan:        Patient / Elijah Sherwood was advised and educated to call our office  immediately if  patient has any new symptoms of chest pain/shortness of breath, near-syncope, syncope, light headedness sustained palpitations  or any other cardiovascular symptoms before their scheduled follow-up appointment. Office number was provided #[phone]. Please call [phone] if any questions. Counseling :  A description of the counseling. Goals and Barriers. Patient's ability to self care: Yes  Medication side effect reviewed with patient in detail and all their questions answered to their satisfaction. HPI :     Danny Stone is a 77y.o. year old female who came for follow up. Patient has    Review of Systems    Historical Information  Past Medical History:   Diagnosis Date   • Anxiety    • Dementia ([address])    • Hyperlipidemia    • Hypertension    • Memory difficulty      Past Surgical History:   Procedure Laterality Date   • ARTHROPLASTY HIP TOTAL ANTERIOR Right 4/9/2021    Procedure: ARTHROPLASTY HIP TOTAL ANTERIOR;  Surgeon: Delphine Tolentino DO;  Location: [facility];  Service: Orthopedics   • CARDIAC CATHETERIZATION Left 5/27/2022    Procedure: Cardiac catheterization;  Surgeon: Tatiana Mata MD;  Location: [address] [place] [address] CATH LAB; Service: Cardiology     Social History     Substance and Sexual Activity   Alcohol Use Never     Social History     Substance and Sexual Activity   Drug Use Never     Social History     Tobacco Use   Smoking Status Every Day   • Packs/day: 0.50   • Years: 60.00   • Total pack years: 30.00   • Types: Cigarettes   Smokeless Tobacco Never     Family History: History reviewed. No pertinent family history.     Meds/Allergies    No Known Allergies    Current Outpatient Medications:   •  ascorbic acid (VITAMIN C) 500 mg tablet, TAKE 1 TABLET ORALLY DAILY (SUPPLEMENT), Disp: 30 tablet, Rfl: 5  •  Calcium Ascorbate (VITAMIN C) 500 mg tablet, Take 500 mg by mouth daily (Patient not taking: Reported on 10/4/2023), Disp: , Rfl:   •  calcium carbonate (OS-JUD) 600 MG tablet, Take 600 mg by mouth daily, Disp: , Rfl:   •  Calcium Carbonate 1500 (600 Ca) MG TABS, TAKE 1 TABLET ORALLY DAILY (SUPPLEMENT) (Patient not taking: Reported on 10/4/2023), Disp: 30 tablet, Rfl: 5  •  carvedilol (COREG) 3.125 mg tablet, Take 1 tablet (3.125 mg total) by mouth 2 (two) times a day with meals, Disp: 180 tablet, Rfl: 3  •  cholecalciferol (VITAMIN D3) 1,000 units tablet, Take 2 tablets (2,000 Units total) by mouth daily, Disp: 60 tablet, Rfl: 0  •  donepezil (ARICEPT) 10 mg tablet, Take 1 tablet (10 mg total) by mouth daily at bedtime, Disp: 90 tablet, Rfl: 1  •  escitalopram (LEXAPRO) 10 mg tablet, Take 1 tablet (10 mg total) by mouth daily, Disp: 90 tablet, Rfl: 0  •  folic acid (FOLVITE) 1 mg tablet, , Disp: , Rfl:   •  furosemide (LASIX) 20 mg tablet, Take 0.5 tablets (10 mg total) by mouth if needed (For moderate to severe lower extremity edema), Disp: 10 tablet, Rfl: 0  •  ibandronate (BONIVA) 150 MG tablet, Take 1 tablet (150 mg total) by mouth every 30 (thirty) days, Disp: 1 tablet, Rfl: 5  •  levothyroxine 25 mcg tablet, Take 1 tablet (25 mcg total) by mouth daily in the early morning, Disp: 90 tablet, Rfl: 1  •  lisinopril (ZESTRIL) 5 mg tablet, Take 1 tablet (5 mg total) by mouth daily, Disp: 90 tablet, Rfl: 1  •  magnesium hydroxide (MILK OF MAGNESIA) 400 mg/5 mL oral suspension, Take by mouth daily as needed for constipation, Disp: , Rfl:   •  memantine (NAMENDA) 5 mg tablet, Take 5 mg by mouth every morning, Disp: , Rfl:   •  multivitamin (THERAGRAN) TABS, Take 1 tablet by mouth daily, Disp: , Rfl:   •  Nicotine Step 1 21 MG/24HR TD 24 hr patch, APPLY 1 PATCH TOPICALLY TO SKIN EVERY 24 HOURS (SMOKING CESSATION) (Patient not taking: Reported on 10/4/2023), Disp: 10 patch, Rfl: 0  •  OLANZapine (ZyPREXA) 2.5 mg tablet, Take 2.5 mg by mouth daily at bedtime (Patient not taking: Reported on 10/4/2023), Disp: , Rfl:   •  risperiDONE (RisperDAL) 0.5 mg tablet, , Disp: , Rfl:   •  rosuvastatin (CRESTOR) 5 mg tablet, Take 1 tablet (5 mg total) by mouth every other day, Disp: 45 tablet, Rfl: 3  •  spironolactone (ALDACTONE) 25 mg tablet, Take 1 tablet (25 mg total) by mouth every other day, Disp: 45 tablet, Rfl: 3    Vitals: Blood pressure (!) 80/50, pulse 92, height 5' 1" (1.549 m), weight 54 kg (119 lb), SpO2 97 %. Body mass index is 22.48 kg/m². Wt Readings from Last 3 Encounters:   10/17/23 54 kg (119 lb)   10/04/23 51.7 kg (114 lb)   23 45.9 kg (101 lb 4 oz)     Vitals:    10/17/23 1559   Weight: 54 kg (119 lb)     BP Readings from Last 3 Encounters:   10/17/23 (!) 80/50   10/04/23 120/60   23 167/72       Physical Exam:  Physical Exam        Diagnostic Studies Review Cardio:      [facility]  Cardiology        "This note was completed in part utilizing [employer] direct voice recognition software. Grammatical errors, random word insertion, spelling mistakes, and incomplete sentences may be an occasional consequence of the system secondary to software limitations, ambient noise and hardware issues. Please read the chart carefully and recognize, using context, where substitutions have occurred.   If you have any questions or concerns about the context, text or information contained within the body of this dictation, please contact myself, the provider, for further clarification."

## 2023-10-17 NOTE — PROGRESS NOTES
Progress Note - Cardiology Office  [facility]    Jadyn Rahman Stamets 77 y.o. female MRN: [id number]  : 1945  Encounter: [id number]      Assessment:     1. Coronary artery disease involving native coronary artery of native heart without angina pectoris    2. Primary hypertension    3. Chronic combined systolic and diastolic congestive heart failure ([address])    4. Dementia with behavioral disturbance ([address])        Discussion Summary and Plan:  Orthostatic hypotension: Patient noted with 40 mmHg drop in systolic blood pressure from sitting to standing position in office today     -   We will discontinue Coreg 3.125 mg twice daily    -   We will discontinue lisinopril 5 mg daily    -   I have asked family to purchase compression stockings for patient and have her wear them during the day and off at bedtime    -   With family that she should be ambulating with the use of a walker    -   I have asked [facility] to do orthostatic vital signs daily for 1 week and please fax results to our office at [phone], after 1 week with patient using compression stockings. -   We will follow-up in 1 month and patient may need pharmacological therapy    2. Coronary artery disease: Coronary artery disease seen on cardiac catheterization    -   Continue Crestor 5 mg every other day      3. Chronic combined systolic and diastolic heart failure:  TTE: EF visibly estimated at 35 to 40% with moderately reduced systolic function and severe global hypokinesis with grade 1 diastolic dysfunction.    -   Due to orthostatic hypotension Coreg and lisinopril is being discontinued    -   Encourage protein rich diet low in sodium    -   We will continue Aldactone 25 mg every other day    -   Continue Lasix 10 mg daily    -   Continue to monitor orthostatic vital signs as above    -   Compression stockings on during the waking hours    4.   Dementia: Continue medication and supportive therapy      Patient / Vergia Liban was advised and educated to call our office  immediately if  patient has any new symptoms of chest pain/shortness of breath, near-syncope, syncope, light headedness sustained palpitations  or any other cardiovascular symptoms before their scheduled follow-up appointment. Office number was provided #[phone]. Please call [phone] if any questions. Counseling :  A description of the counseling. Goals and Barriers. Patient's ability to self care: Yes  Medication side effect reviewed with patient in detail and all their questions answered to their satisfaction. HPI :     Agnieszka Robertson is a 77y.o. year old female who came for routine follow up. Family has been concerned as patient has been having "spells". They describe it as periods when she stands up and she appears as if she is having shaking of her extremities and will stare off to space. They usually make her sit down and after she sits down she comes back and is able to answer their questions. She was recently seen by Dr. Michelle Guzman from neurology with concern for possible seizure activity. 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 Patient also has some mild dementia. On exam today orthostatic vital signs, patient sitting systolic blood pressure was 122/62 with a heart rate of 72, standing blood pressure dropped to 80/50 and heart rate elevated to 92. This is concerning for orthostasis/autonomic dysfunction. Family states she has been eating well and they are also concerned about her lower extremity edema. When the patient's question, she denies any chest discomfort or shortness of breath. She denies feeling lightheaded or dizzy or having any prodromal symptoms prior to her episodes when she stands up. Thankfully patient has not experienced any loss of consciousness or falls.     Review of Systems   Constitutional: Negative. Negative for activity change and fatigue. HENT:  Negative for congestion, mouth sores and sneezing. Eyes: Negative. Negative for photophobia and visual disturbance. Respiratory:  Negative for chest tightness and shortness of breath. Cardiovascular:  Positive for leg swelling. Negative for chest pain and palpitations. Gastrointestinal: Negative. Negative for abdominal distention, constipation, diarrhea, nausea and vomiting. Endocrine: Negative. Negative for polydipsia, polyphagia and polyuria. Genitourinary: Negative. Negative for difficulty urinating. Musculoskeletal:  Positive for gait problem. Ambulates with walker   Skin: Negative. Neurological:  Positive for syncope (Near) and weakness. Negative for dizziness and light-headedness. Hematological: Negative. Psychiatric/Behavioral: Negative. Historical Information   Past Medical History:   Diagnosis Date    Anxiety     Dementia ([address] )     Hyperlipidemia     Hypertension     Memory difficulty      Past Surgical History:   Procedure Laterality Date    ARTHROPLASTY HIP TOTAL ANTERIOR Right 4/9/2021    Procedure: ARTHROPLASTY HIP TOTAL ANTERIOR;  Surgeon: Demetria Solo DO;  Location: [facility];  Service: Orthopedics    CARDIAC CATHETERIZATION Left 5/27/2022    Procedure: Cardiac catheterization;  Surgeon: Sylvie Kelley MD;  Location: [address] [place] [address] CATH LAB; Service: Cardiology     Social History     Substance and Sexual Activity   Alcohol Use Never     Social History     Substance and Sexual Activity   Drug Use Never     Social History     Tobacco Use   Smoking Status Every Day    Packs/day: 0.50    Years: 60.00    Total pack years: 30.00    Types: Cigarettes   Smokeless Tobacco Never     Family History: History reviewed. No pertinent family history.     Meds/Allergies     No Known Allergies    Current Outpatient Medications:     ascorbic acid (VITAMIN C) 500 mg tablet, TAKE 1 TABLET ORALLY DAILY (SUPPLEMENT), Disp: 30 tablet, Rfl: 5    Calcium Ascorbate (VITAMIN C) 500 mg tablet, Take 500 mg by mouth daily (Patient not taking: Reported on 10/4/2023), Disp: , Rfl:     calcium carbonate (OS-JUD) 600 MG tablet, Take 600 mg by mouth daily, Disp: , Rfl:     Calcium Carbonate 1500 (600 Ca) MG TABS, TAKE 1 TABLET ORALLY DAILY (SUPPLEMENT) (Patient not taking: Reported on 10/4/2023), Disp: 30 tablet, Rfl: 5    carvedilol (COREG) 3.125 mg tablet, Take 1 tablet (3.125 mg total) by mouth 2 (two) times a day with meals, Disp: 180 tablet, Rfl: 3    cholecalciferol (VITAMIN D3) 1,000 units tablet, Take 2 tablets (2,000 Units total) by mouth daily, Disp: 60 tablet, Rfl: 0    donepezil (ARICEPT) 10 mg tablet, Take 1 tablet (10 mg total) by mouth daily at bedtime, Disp: 90 tablet, Rfl: 1    escitalopram (LEXAPRO) 10 mg tablet, Take 1 tablet (10 mg total) by mouth daily, Disp: 90 tablet, Rfl: 0    folic acid (FOLVITE) 1 mg tablet, , Disp: , Rfl:     furosemide (LASIX) 20 mg tablet, Take 0.5 tablets (10 mg total) by mouth if needed (For moderate to severe lower extremity edema), Disp: 10 tablet, Rfl: 0    ibandronate (BONIVA) 150 MG tablet, Take 1 tablet (150 mg total) by mouth every 30 (thirty) days, Disp: 1 tablet, Rfl: 5    levothyroxine 25 mcg tablet, Take 1 tablet (25 mcg total) by mouth daily in the early morning, Disp: 90 tablet, Rfl: 1    lisinopril (ZESTRIL) 5 mg tablet, Take 1 tablet (5 mg total) by mouth daily, Disp: 90 tablet, Rfl: 1    magnesium hydroxide (MILK OF MAGNESIA) 400 mg/5 mL oral suspension, Take by mouth daily as needed for constipation, Disp: , Rfl:     memantine (NAMENDA) 5 mg tablet, Take 5 mg by mouth every morning, Disp: , Rfl:     multivitamin (THERAGRAN) TABS, Take 1 tablet by mouth daily, Disp: , Rfl:     Nicotine Step 1 21 MG/24HR TD 24 hr patch, APPLY 1 PATCH TOPICALLY TO SKIN EVERY 24 HOURS (SMOKING CESSATION) (Patient not taking: Reported on 10/4/2023), Disp: 10 patch, Rfl: 0    OLANZapine (ZyPREXA) 2.5 mg tablet, Take 2.5 mg by mouth daily at bedtime (Patient not taking: Reported on 10/4/2023), Disp: , Rfl:     risperiDONE (RisperDAL) 0.5 mg tablet, , Disp: , Rfl:     rosuvastatin (CRESTOR) 5 mg tablet, Take 1 tablet (5 mg total) by mouth every other day, Disp: 45 tablet, Rfl: 3    spironolactone (ALDACTONE) 25 mg tablet, Take 1 tablet (25 mg total) by mouth every other day, Disp: 45 tablet, Rfl: 3    Vitals: Blood pressure (!) 80/50, pulse 92, height 5' 1" (1.549 m), weight 54 kg (119 lb), SpO2 97 %. Body mass index is 22.48 kg/m². Wt Readings from Last 3 Encounters:   10/17/23 54 kg (119 lb)   10/04/23 51.7 kg (114 lb)   06/12/23 45.9 kg (101 lb 4 oz)     Vitals:    10/17/23 1559   Weight: 54 kg (119 lb)     BP Readings from Last 3 Encounters:   10/17/23 (!) 80/50   10/04/23 120/60   09/08/23 167/72       Physical Exam:  Physical Exam  Vitals and nursing note reviewed. Constitutional:       General: She is not in acute distress. Appearance: Normal appearance. She is normal weight. HENT:      Right Ear: External ear normal.      Left Ear: External ear normal.   Eyes:      General: No scleral icterus. Right eye: No discharge. Left eye: No discharge. Cardiovascular:      Rate and Rhythm: Normal rate and regular rhythm. Pulses: Normal pulses. Heart sounds: Murmur heard. Pulmonary:      Effort: Pulmonary effort is normal. No accessory muscle usage or respiratory distress. Breath sounds: Examination of the right-lower field reveals decreased breath sounds. Examination of the left-lower field reveals decreased breath sounds. Decreased breath sounds present. Abdominal:      General: Bowel sounds are normal. There is no distension. Palpations: Abdomen is soft. Musculoskeletal:      Right lower leg: Edema present. Left lower leg: Edema present. Comments: +2 lower extremity edema to mid calf bilaterally   Skin:     General: Skin is warm.       Capillary Refill: Capillary refill takes less than 2 seconds. Neurological:      General: No focal deficit present. Mental Status: She is alert. Mental status is at baseline. Psychiatric:         Mood and Affect: Mood normal.               [facility]        "This note was completed in part utilizing [employer] direct voice recognition software. Grammatical errors, random word insertion, spelling mistakes, and incomplete sentences may be an occasional consequence of the system secondary to software limitations, ambient noise and hardware issues. Please read the chart carefully and recognize, using context, where substitutions have occurred.   If you have any questions or concerns about the context, text or information contained within the body of this dictation, please contact myself, the provider, for further clarification."

## 2023-10-27 DIAGNOSIS — Z51.5 HOSPICE CARE: Primary | ICD-10-CM

## 2023-10-27 RX ORDER — MORPHINE SULFATE 100 MG/5ML
5 SOLUTION, CONCENTRATE ORAL
Qty: 30 ML | Refills: 0 | Status: SHIPPED | OUTPATIENT
Start: 2023-10-27

## 2023-10-27 RX ORDER — LORAZEPAM 2 MG/ML
0.5 CONCENTRATE ORAL EVERY 6 HOURS PRN
Qty: 30 ML | Refills: 0 | Status: SHIPPED | OUTPATIENT
Start: 2023-10-27

## 2023-11-07 DIAGNOSIS — Z51.5 HOSPICE CARE: ICD-10-CM

## 2023-11-07 RX ORDER — MORPHINE SULFATE 20 MG/ML
SOLUTION ORAL
Qty: 30 ML | Refills: 0 | Status: SHIPPED | OUTPATIENT
Start: 2023-11-07

## 2023-11-21 DIAGNOSIS — Z51.5 HOSPICE CARE: ICD-10-CM

## 2023-11-22 RX ORDER — LORAZEPAM 2 MG/ML
SOLUTION, CONCENTRATE ORAL
Qty: 30 ML | Refills: 5 | Status: SHIPPED | OUTPATIENT
Start: 2023-11-22

## 2023-11-23 DIAGNOSIS — F03.918 DEMENTIA WITH BEHAVIORAL DISTURBANCE (HCC): Primary | ICD-10-CM

## 2023-11-26 ENCOUNTER — APPOINTMENT (EMERGENCY)
Dept: CT IMAGING | Facility: HOSPITAL | Age: 78
End: 2023-11-26
Payer: MEDICARE

## 2023-11-26 ENCOUNTER — HOSPITAL ENCOUNTER (EMERGENCY)
Facility: HOSPITAL | Age: 78
Discharge: HOME | End: 2023-11-26
Attending: EMERGENCY MEDICINE | Admitting: EMERGENCY MEDICINE
Payer: MEDICARE

## 2023-11-26 VITALS
TEMPERATURE: 97.9 F | HEART RATE: 70 BPM | DIASTOLIC BLOOD PRESSURE: 62 MMHG | RESPIRATION RATE: 16 BRPM | OXYGEN SATURATION: 96 % | SYSTOLIC BLOOD PRESSURE: 132 MMHG | WEIGHT: 122.14 LBS | BODY MASS INDEX: 23.08 KG/M2

## 2023-11-26 DIAGNOSIS — S02.2XXA CLOSED FRACTURE OF NASAL BONE, INITIAL ENCOUNTER: ICD-10-CM

## 2023-11-26 DIAGNOSIS — S01.81XA LACERATION OF FOREHEAD, INITIAL ENCOUNTER: ICD-10-CM

## 2023-11-26 DIAGNOSIS — S02.0XXA CLOSED FRACTURE OF FRONTAL BONE, INITIAL ENCOUNTER (HCC): ICD-10-CM

## 2023-11-26 DIAGNOSIS — S02.85XA CLOSED FRACTURE OF LEFT ORBIT, INITIAL ENCOUNTER (HCC): ICD-10-CM
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
## 2023-11-26 PROCEDURE — 99284 EMERGENCY DEPT VISIT MOD MDM: CPT | Performed by: EMERGENCY MEDICINE

## 2023-11-26 PROCEDURE — 72125 CT NECK SPINE W/O DYE: CPT

## 2023-11-26 PROCEDURE — 70450 CT HEAD/BRAIN W/O DYE: CPT

## 2023-11-26 PROCEDURE — 99284 EMERGENCY DEPT VISIT MOD MDM: CPT

## 2023-11-26 PROCEDURE — 12013 RPR F/E/E/N/L/M 2.6-5.0 CM: CPT | Performed by: EMERGENCY MEDICINE

## 2023-11-26 PROCEDURE — 70480 CT ORBIT/EAR/FOSSA W/O DYE: CPT

## 2023-11-26 PROCEDURE — 93005 ELECTROCARDIOGRAM TRACING: CPT

## 2023-11-26 PROCEDURE — G1004 CDSM NDSC: HCPCS

## 2023-11-26 RX ORDER — LIDOCAINE HYDROCHLORIDE 10 MG/ML
INJECTION, SOLUTION EPIDURAL; INFILTRATION; INTRACAUDAL; PERINEURAL
Status: DISCONTINUED
Start: 2023-11-26 | End: 2023-11-26 | Stop reason: HOSPADM

## 2023-11-26 RX ORDER — AMOXICILLIN AND CLAVULANATE POTASSIUM 875; 125 MG/1; MG/1
1 TABLET, FILM COATED ORAL ONCE
Status: COMPLETED | OUTPATIENT
Start: 2023-11-26 | End: 2023-11-26

## 2023-11-26 RX ORDER — GINSENG 100 MG
1 CAPSULE ORAL ONCE
Status: COMPLETED | OUTPATIENT
Start: 2023-11-26 | End: 2023-11-26

## 2023-11-26 RX ORDER — ACETAMINOPHEN 650 MG/1
650 SUPPOSITORY RECTAL EVERY 4 HOURS PRN
COMMUNITY

## 2023-11-26 RX ORDER — AMOXICILLIN AND CLAVULANATE POTASSIUM 875; 125 MG/1; MG/1
1 TABLET, FILM COATED ORAL EVERY 12 HOURS
Qty: 14 TABLET | Refills: 0 | Status: SHIPPED | OUTPATIENT
Start: 2023-11-26 | End: 2023-12-03

## 2023-11-26 RX ORDER — LIDOCAINE HYDROCHLORIDE 10 MG/ML
10 INJECTION, SOLUTION EPIDURAL; INFILTRATION; INTRACAUDAL; PERINEURAL ONCE
Status: COMPLETED | OUTPATIENT
Start: 2023-11-26 | End: 2023-11-26

## 2023-11-26 RX ADMIN — LIDOCAINE HYDROCHLORIDE 10 ML: 10 INJECTION, SOLUTION EPIDURAL; INFILTRATION; INTRACAUDAL at 06:01

## 2023-11-26 RX ADMIN — BACITRACIN 1 SMALL APPLICATION: 500 OINTMENT TOPICAL at 06:01

## 2023-11-26 RX ADMIN — AMOXICILLIN AND CLAVULANATE POTASSIUM 1 TABLET: 875; 125 TABLET, FILM COATED ORAL at 07:31

## 2023-11-26 NOTE — ED NOTES
Round trip ride scheduled, awaiting pickup time confirmatio.        Parminder Dias RN  11/26/23 [id number]

## 2023-11-26 NOTE — ED PROVIDER NOTES
History  Chief Complaint   Patient presents with    Fall     Fall, negative thinners. Ecchymosis to L eye, lac to forehead. Per ems, hospice sent her in for suturing. HPI    Patient is a 78 yo F presenting after a fall with laceration. She is currently on hospice and has history of heart failure, dementia, severe orthostatic hypotension and is typically wheelchair bound. She lives at a nursing facility and reportedly fell out of her wheelchair in the bathroom, striking her head on the ground. She did not lose consciousness and was awake on arrival of staff, acting at her baseline. Denies any other injury or pain currently. She does not take any blood thinning medications. She denies any symptoms prior to the fall but is not able to provide much information as to why she fell. Prior to Admission Medications   Prescriptions Last Dose Informant Patient Reported? Taking?    LORazepam Intensol 2 MG/ML concentrated solution   No No   Sig: GIVE 0.25ML (0.5ML) ORALLY EVERY 6 HOURS AS NEEDED FOR ANXIETY (CONTROL) **HOSPICE*   Nicotine Step 1 21 MG/24HR TD 24 hr patch   No No   Sig: APPLY 1 PATCH TOPICALLY TO SKIN EVERY 24 HOURS (SMOKING CESSATION)   Patient not taking: Reported on 10/4/2023   OLANZapine (ZyPREXA) 2.5 mg tablet  Self Yes No   Sig: Take 2.5 mg by mouth daily at bedtime   Patient not taking: Reported on 10/4/2023   acetaminophen (TYLENOL) 650 mg suppository   Yes Yes   Sig: Insert 650 mg into the rectum every 4 (four) hours as needed for mild pain hospice   ascorbic acid (VITAMIN C) 500 mg tablet   No No   Sig: TAKE 1 TABLET ORALLY DAILY (SUPPLEMENT)   calcium carbonate (OS-JUD) 600 MG tablet  Self Yes No   Sig: Take 600 mg by mouth daily   donepezil (ARICEPT) 10 mg tablet  Self No Yes   Sig: Take 1 tablet (10 mg total) by mouth daily at bedtime   escitalopram (LEXAPRO) 10 mg tablet  Self No Yes   Sig: Take 1 tablet (10 mg total) by mouth daily   folic acid (FOLVITE) 1 mg tablet   Yes No   furosemide (LASIX) 20 mg tablet   No No   Sig: Take 0.5 tablets (10 mg total) by mouth if needed (For moderate to severe lower extremity edema)   hyoscyamine (LEVSIN/SL) 0.125 mg SL tablet   No Yes   Sig: DISSOLVE 1 TABLET SUBLINGUALLY EVERY 8 HOURS FOR ORAL SECRETIONS  **14DAY/HOSPICE*;DISSOLVE 1 TABLET SUBLINGUALLY EVERY 4 HOURS AS NEEDED FOR ORAL SECRETIONS  **14DAY/HOSPICE*   ibandronate (BONIVA) 150 MG tablet  Self No No   Sig: Take 1 tablet (150 mg total) by mouth every 30 (thirty) days   levothyroxine 25 mcg tablet  Self No No   Sig: Take 1 tablet (25 mcg total) by mouth daily in the early morning   Patient taking differently: Take 50 mcg by mouth daily in the early morning   magnesium hydroxide (MILK OF MAGNESIA) 400 mg/5 mL oral suspension   Yes No   Sig: Take by mouth daily as needed for constipation   memantine (NAMENDA) 5 mg tablet  Self Yes Yes   Sig: Take 5 mg by mouth every morning   morphine sulfate, concentrate, 100 mg/5mL concentrated solution   No No   Sig: GIVE 0.25ML (5MG) ORALLY EVERY 3 HOURS AS NEEDED FOR SEVERE PAIN (CII)  **HOSPICE* **MAX DAILY AMOUNT: 40MG*   multivitamin (THERAGRAN) TABS  Self Yes No   Sig: Take 1 tablet by mouth daily   risperiDONE (RisperDAL) 0.5 mg tablet   Yes Yes   Sig: Take 0.25 mg by mouth 2 (two) times a day   rosuvastatin (CRESTOR) 5 mg tablet  Self No No   Sig: Take 1 tablet (5 mg total) by mouth every other day   spironolactone (ALDACTONE) 25 mg tablet  Self No Yes   Sig: Take 1 tablet (25 mg total) by mouth every other day      Facility-Administered Medications: None       Past Medical History:   Diagnosis Date    Anxiety     Dementia ([address])     Hyperlipidemia     Hypertension     Memory difficulty        Past Surgical History:   Procedure Laterality Date    ARTHROPLASTY HIP TOTAL ANTERIOR Right 4/9/2021    Procedure: ARTHROPLASTY HIP TOTAL ANTERIOR;  Surgeon: George Hawthorne DO;  Location: [facility];  Service: Orthopedics    CARDIAC CATHETERIZATION Left 5/27/2022 Procedure: Cardiac catheterization;  Surgeon: Antwan Wilcox MD;  Location: [address] [place] [address] CATH LAB; Service: Cardiology       History reviewed. No pertinent family history. I have reviewed and agree with the history as documented. E-Cigarette/Vaping    E-Cigarette Use Never User      E-Cigarette/Vaping Substances    Nicotine No     THC No     CBD No     Flavoring No     Other No     Unknown No      Social History     Tobacco Use    Smoking status: Every Day     Packs/day: 0.50     Years: 60.00     Total pack years: 30.00     Types: Cigarettes    Smokeless tobacco: Never   Vaping Use    Vaping Use: Never used   Substance Use Topics    Alcohol use: Never    Drug use: Never        Review of Systems   Constitutional:  Negative for chills and fever. HENT:  Negative for ear pain and sore throat. Eyes:  Negative for pain and visual disturbance. Respiratory:  Negative for cough and shortness of breath. Cardiovascular:  Negative for chest pain and palpitations. Gastrointestinal:  Negative for abdominal pain and vomiting. Genitourinary:  Negative for dysuria and hematuria. Musculoskeletal:  Negative for arthralgias and back pain. Skin:  Positive for wound. Negative for color change and rash. bruising   Neurological:  Negative for seizures and syncope. All other systems reviewed and are negative.       Physical Exam  ED Triage Vitals   Temperature Pulse Respirations Blood Pressure SpO2   11/26/23 0307 11/26/23 0307 11/26/23 0307 11/26/23 0313 11/26/23 0307   97.9 °F (36.6 °C) 81 18 146/69 94 %      Temp Source Heart Rate Source Patient Position - Orthostatic VS BP Location FiO2 (%)   11/26/23 0307 11/26/23 0307 11/26/23 0430 11/26/23 0430 --   Oral Monitor Lying Left arm       Pain Score       11/26/23 0307       2             Orthostatic Vital Signs  Vitals:    11/26/23 0307 11/26/23 0313 11/26/23 0430 11/26/23 0612   BP:  146/69 139/61 132/78   Pulse: 81   72   Patient Position - Orthostatic VS: Lying Lying       Physical Exam  Vitals and nursing note reviewed. Constitutional:       General: She is not in acute distress. Appearance: She is well-developed. HENT:      Head: Normocephalic. Comments: Periorbital ecchymosis noted above right eye, laceration to forehead     Mouth/Throat:      Mouth: Mucous membranes are moist.   Eyes:      Extraocular Movements: Extraocular movements intact. Conjunctiva/sclera: Conjunctivae normal.      Pupils: Pupils are equal, round, and reactive to light. Cardiovascular:      Rate and Rhythm: Normal rate and regular rhythm. Heart sounds: No murmur heard. Pulmonary:      Effort: Pulmonary effort is normal. No respiratory distress. Breath sounds: Normal breath sounds. Abdominal:      Palpations: Abdomen is soft. Tenderness: There is no abdominal tenderness. Musculoskeletal:         General: No swelling. Cervical back: Normal range of motion and neck supple. No tenderness. Skin:     General: Skin is warm and dry. Findings: Bruising and lesion present. Comments: 3cm stellate appearing laceration on L forehead with minimal bleeding, no visible debris   Neurological:      General: No focal deficit present. Mental Status: She is alert. Mental status is at baseline. Sensory: No sensory deficit. Motor: No weakness. Psychiatric:         Mood and Affect: Mood normal.         ED Medications  Medications   amoxicillin-clavulanate (AUGMENTIN) 875-125 mg per tablet 1 tablet (has no administration in time range)   lidocaine (PF) (XYLOCAINE-MPF) 1 % injection 10 mL (10 mL Infiltration Given 11/26/23 0601)   bacitracin topical ointment 1 small application (1 small application Topical Given 11/26/23 0601)       Diagnostic Studies  Results Reviewed       None                   CT head without contrast   Final Result by Willow Dawn DO (11/26 2173)      Small left frontal scalp hematoma/laceration.  Overlying bandage is noted. There is a nondisplaced fracture of the left frontal bone which extends into the superior left orbit and the left frontal sinus. No intracranial hemorrhage is seen. No significant mass effect or midline shift. Prominence of the lateral ventricles and third ventricle which appears out of proportion to the volume loss, consider a component of normal pressure hydrocephalus. Other findings as above. CT CERVICAL SPINE - WITHOUT CONTRAST      INDICATION:   Head trauma, moderate-severe   Fall with headstrike, laceration on forehead, bruising around eyes. COMPARISON:  None. TECHNIQUE:  CT examination of the cervical spine was performed without intravenous contrast.  Contiguous axial images were obtained. Multiplanar 2D reformatted images were created from the source data. Radiation dose length product (DLP) for this visit:  909 mGy-cm (accession [id number]), 142 mGy-cm (accession [id number]), 251 mGy-cm (accession [id number]). This examination, like all CT scans performed in the [facility], was performed    utilizing techniques to minimize radiation dose exposure, including the use of iterative reconstruction and automated exposure control. IMAGE QUALITY:  Diagnostic. FINDINGS:      ALIGNMENT: Approximately 2 mm of anterolisthesis of C4 on C5, probably related to facet joint arthropathy at this level. Spinal alignment otherwise grossly maintained. VERTEBRAE: No acute fracture. DEGENERATIVE CHANGES: Intervertebral disc space narrowing at C5/C6 with anterior, marginal, and uncovertebral osteophytosis at this level. Multilevel facet joint arthropathy. PREVERTEBRAL AND PARASPINAL SOFT TISSUES: Hypoplastic thyroid gland; otherwise grossly unremarkable. THORACIC INLET: Moderate pulmonary emphysematous changes with associated biapical pleural/parenchymal scarring.          IMPRESSION:      Degenerative changes as described but no evidence of acute cervical spine injury. Pulmonary emphysematous changes and biapical pleural/parenchymal scarring. Other findings as above. CT ORBITS WITHOUT CONTRAST      INDICATION:   Head trauma, moderate-severe   Fall with headstrike, laceration on forehead, bruising around eyes. COMPARISON: None      TECHNIQUE: Axial CT imaging through the orbits was performed. In addition, sagittal and coronal reformatted images were submitted for interpretation. This examination was performed without intravenous contrast in the context of the critical nationwide    Omnipaque shortage. Radiation dose length product (DLP) for this visit:  909 mGy-cm (accession [id number]), 142 mGy-cm (accession [id number]), 251 mGy-cm (accession [id number]). This examination, like all CT scans performed in the [facility], was performed    utilizing techniques to minimize radiation dose exposure, including the use of iterative reconstruction and automated exposure control. IMAGE QUALITY: Diagnostic. FINDINGS:      ORBITS: Small amount of air in the superior left orbit. Intraorbital structures appear intact. No intraorbital mass or hemorrhage is seen. SINUSES: Opacification of several bilateral ethmoid air cells. Small amount of fluid in the left frontal sinus, possibly blood. Mild mucosal thickening in the bilateral sphenoid sinuses and mild mucosal thickening in the bilateral maxillary sinuses      SOFT TISSUES: Left perinasal, periorbital, and frontal soft tissue swelling. BONY STRUCTURES: Nondisplaced fracture of the left frontal bone which extends into the left frontal sinus and left superior orbit. Nondisplaced fracture of the nasal bone on the left. IMPRESSION:      Nondisplaced fracture of the left frontal bone which extends into the left frontal sinus and left superior orbit. Nondisplaced fracture of the nasal bone on the left.       Left perinasal, periorbital, and frontal soft tissue swelling. Other findings as above. I personally discussed this study with [facility] on 11/26/2023 6:17 AM.            Workstation performed: [id number]         CT spine cervical without contrast   Final Result by Lucas Guardado DO (11/26 1634)      Small left frontal scalp hematoma/laceration. Overlying bandage is noted. There is a nondisplaced fracture of the left frontal bone which extends into the superior left orbit and the left frontal sinus. No intracranial hemorrhage is seen. No significant mass effect or midline shift. Prominence of the lateral ventricles and third ventricle which appears out of proportion to the volume loss, consider a component of normal pressure hydrocephalus. Other findings as above. CT CERVICAL SPINE - WITHOUT CONTRAST      INDICATION:   Head trauma, moderate-severe   Fall with headstrike, laceration on forehead, bruising around eyes. COMPARISON:  None. TECHNIQUE:  CT examination of the cervical spine was performed without intravenous contrast.  Contiguous axial images were obtained. Multiplanar 2D reformatted images were created from the source data. Radiation dose length product (DLP) for this visit:  909 mGy-cm (accession [id number]), 142 mGy-cm (accession [id number]), 251 mGy-cm (accession [id number]). This examination, like all CT scans performed in the [facility], was performed    utilizing techniques to minimize radiation dose exposure, including the use of iterative reconstruction and automated exposure control. IMAGE QUALITY:  Diagnostic. FINDINGS:      ALIGNMENT: Approximately 2 mm of anterolisthesis of C4 on C5, probably related to facet joint arthropathy at this level. Spinal alignment otherwise grossly maintained. VERTEBRAE: No acute fracture.       DEGENERATIVE CHANGES: Intervertebral disc space narrowing at C5/C6 with anterior, marginal, and uncovertebral osteophytosis at this level. Multilevel facet joint arthropathy. PREVERTEBRAL AND PARASPINAL SOFT TISSUES: Hypoplastic thyroid gland; otherwise grossly unremarkable. THORACIC INLET: Moderate pulmonary emphysematous changes with associated biapical pleural/parenchymal scarring. IMPRESSION:      Degenerative changes as described but no evidence of acute cervical spine injury. Pulmonary emphysematous changes and biapical pleural/parenchymal scarring. Other findings as above. CT ORBITS WITHOUT CONTRAST      INDICATION:   Head trauma, moderate-severe   Fall with headstrike, laceration on forehead, bruising around eyes. COMPARISON: None      TECHNIQUE: Axial CT imaging through the orbits was performed. In addition, sagittal and coronal reformatted images were submitted for interpretation. This examination was performed without intravenous contrast in the context of the critical nationwide    Omnipaque shortage. Radiation dose length product (DLP) for this visit:  909 mGy-cm (accession [id number]), 142 mGy-cm (accession [id number]), 251 mGy-cm (accession [id number]). This examination, like all CT scans performed in the [facility], was performed    utilizing techniques to minimize radiation dose exposure, including the use of iterative reconstruction and automated exposure control. IMAGE QUALITY: Diagnostic. FINDINGS:      ORBITS: Small amount of air in the superior left orbit. Intraorbital structures appear intact. No intraorbital mass or hemorrhage is seen. SINUSES: Opacification of several bilateral ethmoid air cells. Small amount of fluid in the left frontal sinus, possibly blood. Mild mucosal thickening in the bilateral sphenoid sinuses and mild mucosal thickening in the bilateral maxillary sinuses      SOFT TISSUES: Left perinasal, periorbital, and frontal soft tissue swelling.       BONY STRUCTURES: Nondisplaced fracture of the left frontal bone which extends into the left frontal sinus and left superior orbit. Nondisplaced fracture of the nasal bone on the left. IMPRESSION:      Nondisplaced fracture of the left frontal bone which extends into the left frontal sinus and left superior orbit. Nondisplaced fracture of the nasal bone on the left. Left perinasal, periorbital, and frontal soft tissue swelling. Other findings as above. I personally discussed this study with [facility] on 11/26/2023 6:17 AM.            Workstation performed: [id number]         CT orbits/temporal bones/skull base wo contrast   Final Result by Carlos Rojas DO (11/26 0250)      Small left frontal scalp hematoma/laceration. Overlying bandage is noted. There is a nondisplaced fracture of the left frontal bone which extends into the superior left orbit and the left frontal sinus. No intracranial hemorrhage is seen. No significant mass effect or midline shift. Prominence of the lateral ventricles and third ventricle which appears out of proportion to the volume loss, consider a component of normal pressure hydrocephalus. Other findings as above. CT CERVICAL SPINE - WITHOUT CONTRAST      INDICATION:   Head trauma, moderate-severe   Fall with headstrike, laceration on forehead, bruising around eyes. COMPARISON:  None. TECHNIQUE:  CT examination of the cervical spine was performed without intravenous contrast.  Contiguous axial images were obtained. Multiplanar 2D reformatted images were created from the source data. Radiation dose length product (DLP) for this visit:  909 mGy-cm (accession [id number]), 142 mGy-cm (accession [id number]), 251 mGy-cm (accession [id number]).   This examination, like all CT scans performed in the [facility], was performed    utilizing techniques to minimize radiation dose exposure, including the use of iterative reconstruction and automated exposure control. IMAGE QUALITY:  Diagnostic. FINDINGS:      ALIGNMENT: Approximately 2 mm of anterolisthesis of C4 on C5, probably related to facet joint arthropathy at this level. Spinal alignment otherwise grossly maintained. VERTEBRAE: No acute fracture. DEGENERATIVE CHANGES: Intervertebral disc space narrowing at C5/C6 with anterior, marginal, and uncovertebral osteophytosis at this level. Multilevel facet joint arthropathy. PREVERTEBRAL AND PARASPINAL SOFT TISSUES: Hypoplastic thyroid gland; otherwise grossly unremarkable. THORACIC INLET: Moderate pulmonary emphysematous changes with associated biapical pleural/parenchymal scarring. IMPRESSION:      Degenerative changes as described but no evidence of acute cervical spine injury. Pulmonary emphysematous changes and biapical pleural/parenchymal scarring. Other findings as above. CT ORBITS WITHOUT CONTRAST      INDICATION:   Head trauma, moderate-severe   Fall with headstrike, laceration on forehead, bruising around eyes. COMPARISON: None      TECHNIQUE: Axial CT imaging through the orbits was performed. In addition, sagittal and coronal reformatted images were submitted for interpretation. This examination was performed without intravenous contrast in the context of the critical nationwide    Omnipaque shortage. Radiation dose length product (DLP) for this visit:  909 mGy-cm (accession [id number]), 142 mGy-cm (accession [id number]), 251 mGy-cm (accession [id number]). This examination, like all CT scans performed in the [facility], was performed    utilizing techniques to minimize radiation dose exposure, including the use of iterative reconstruction and automated exposure control. IMAGE QUALITY: Diagnostic. FINDINGS:      ORBITS: Small amount of air in the superior left orbit. Intraorbital structures appear intact. No intraorbital mass or hemorrhage is seen. SINUSES: Opacification of several bilateral ethmoid air cells. Small amount of fluid in the left frontal sinus, possibly blood. Mild mucosal thickening in the bilateral sphenoid sinuses and mild mucosal thickening in the bilateral maxillary sinuses      SOFT TISSUES: Left perinasal, periorbital, and frontal soft tissue swelling. BONY STRUCTURES: Nondisplaced fracture of the left frontal bone which extends into the left frontal sinus and left superior orbit. Nondisplaced fracture of the nasal bone on the left. IMPRESSION:      Nondisplaced fracture of the left frontal bone which extends into the left frontal sinus and left superior orbit. Nondisplaced fracture of the nasal bone on the left. Left perinasal, periorbital, and frontal soft tissue swelling. Other findings as above. I personally discussed this study with [facility] on 11/26/2023 6:17 AM.            Workstation performed: [id number]               Procedures  Universal Protocol:  Consent: Verbal consent obtained.   Risks and benefits: risks, benefits and alternatives were discussed  Consent given by: patient  Patient understanding: patient states understanding of the procedure being performed  Site marked: the operative site was marked  Required items: required blood products, implants, devices, and special equipment available  Patient identity confirmed: verbally with patient and arm band  Laceration repair    Date/Time: 11/26/2023 5:52 AM    Performed by: Matthieu Marrufo DO  Authorized by: Matthieu Marrufo DO  Body area: head/neck  Location details: forehead  Laceration length: 3 cm  Foreign bodies: no foreign bodies  Anesthesia: local infiltration    Anesthesia:  Local Anesthetic: lidocaine 1% without epinephrine  Anesthetic total: 3 mL    Sedation:  Patient sedated: no        Procedure Details:  Irrigation solution: saline  Irrigation method: syringe  Amount of cleaning: standard  Debridement: none  Skin closure: Ethilon  Number of sutures: 5  Technique: simple  Approximation: close  Approximation difficulty: simple  Dressing: antibiotic ointment and 4x4 sterile gauze  Patient tolerance: patient tolerated the procedure well with no immediate complications      ECG 12 Lead Documentation Only    Date/Time: 11/26/2023 6:44 AM    Performed by: Hue Monte DO  Authorized by: Hue Monte DO    Indications / Diagnosis:  Fall  ECG reviewed by me, the ED Provider: yes    Patient location:  ED  Previous ECG:     Previous ECG:  Compared to current    Similarity:  No change  Interpretation:     Interpretation: normal    Quality:     Tracing quality:  Limited by artifact  Rate:     ECG rate:  75    ECG rate assessment: normal    Rhythm:     Rhythm: sinus rhythm    Ectopy:     Ectopy: none    QRS:     QRS axis:  Normal  Conduction:     Conduction: normal    ST segments:     ST segments:  Normal  T waves:     T waves: normal          ED Course  ED Course as of 11/26/23 0717   [facility] Nov 26, 2023   0634 Dr. Varela Apt with radiology called regarding imaging - pt with skull fracture extending base of L orbit to sinus, nasal bone fracture. Spoke with family regarding this given pt hospice status, did not wish to pursue workup further or have evaluation by surgical service at this time. Plan for discharge home with antibiotic therapy                                        Medical Decision Making  76 yo F presenting after a fall with facial trauma. On initial evaluation, pt appeared to have periorbital ecchymosis and forehead laceration on left but otherwise no focal abnormality noted. Vital signs were within normal limits. Family is at bedside and given patient is on hospice options for testing were discussed. CT head and c-spine, orbits were ordered given acute trauma to the area but further lab workup was deferred at this time per family.  Laceration was cleaned and sutured as noted above and dressed with antibiotic ointment, bandage. CT imaging showed evidence of multiple facial fractures including frontal bone, orbit extending into the frontal sinus, and nasal bone. Family was informed of this and after discussion of risks, opted not to pursue further evaluation by [facility] or trauma service, did not wish to have patient admitted given hospice status. Ultimately she was discharged in stable condition with outpatient referral to these services as well as prescription for antibiotic treatment for 7 days given concern for facial fractures. Amount and/or Complexity of Data Reviewed  Independent Historian:      Details: Family - son and daughter in law  External Data Reviewed: notes. Labs: ordered. Decision-making details documented in ED Course. Radiology: ordered. ECG/medicine tests: ordered and independent interpretation performed. Decision-making details documented in ED Course. Risk  OTC drugs. Prescription drug management. Disposition  Final diagnoses:   Fall, initial encounter   Laceration of forehead, initial encounter   Closed fracture of frontal bone, initial encounter ([address])   Closed fracture of nasal bone, initial encounter   Closed fracture of left orbit, initial encounter [facility])     Time reflects when diagnosis was documented in both MDM as applicable and the Disposition within this note       Time User Action Codes Description Comment    11/26/2023  6:39 AM Orvil Bees Add Bella. FYDD] Fall, initial encounter     11/26/2023  6:39 AM Rohrbaugh, Vila Hodgkins Add [S01.81XA] Laceration of forehead, initial encounter     11/26/2023  7:07 AM Rohrbaugh, Vila Hodgkins Add [S02. 0XXA] Closed fracture of frontal bone, initial encounter ([address])     11/26/2023  7:08 AM Rohrbaugh, Vila Hodgkins Add [S02.85XA] Closed fracture of left orbit, initial encounter ([address])     11/26/2023  7:08 AM Arvid Lava, Vila Hodgkins Add [S02. 2XXA] Closed fracture of nasal bone, initial encounter     11/26/2023  7:08 AM Allegra Jevon Remove [S02.85XA] Closed fracture of left orbit, initial encounter ([address])     11/26/2023  7:09 AM Montse Owen Add [S02.85XB] Open fracture of left orbit, initial encounter ([address])     11/26/2023  7:09 AM Montse Owen Remove [S02.85XB] Open fracture of left orbit, initial encounter ([address])     11/26/2023  7:09 AM Montse Owen Add [S02.85XA] Closed fracture of orbit, initial encounter ([address])     11/26/2023  7:09 AM Montse Owen Remove [S02.85XA] Closed fracture of orbit, initial encounter ([address])     11/26/2023  7:09 AM Allegra Jevon Add [S02.85XA] Closed fracture of left orbit, initial encounter [facility])           ED Disposition       ED Disposition   Discharge    Condition   Stable    Date/Time   Sun Nov 26, 2023  6:33 AM    Comment   [address] discharge to home/self care. Follow-up Information       Follow up With Specialties Details Why Contact Info Additional Information    Brad Gutierrez DMD Oral Maxillofacial Surgery Schedule an appointment as soon as possible for a visit   [address] [place] [address] Neurosurgery Call  As needed [address] [phone] [facility] [address], [address] [phone]            Patient's Medications   Discharge Prescriptions    AMOXICILLIN-CLAVULANATE (AUGMENTIN) 875-125 MG PER TABLET    Take 1 tablet by mouth every 12 (twelve) hours for 7 days       Start Date: 11/26/2023End Date: 12/3/2023       Order Dose: 1 tablet       Quantity: 14 tablet    Refills: 0         PDMP Review         Value Time User    PDMP Reviewed  Yes 11/26/2023  3:05 Blaire Painter MD             ED Provider  Attending physically available and evaluated [address] I managed the patient along with the ED Attending.     Electronically Signed by           Montse Acevedo [place], [place]  11/26/23 6042

## 2023-11-26 NOTE — ED NOTES
1% lidocaine pulled and handed to ED resident. At bedside to suture.         Shakira Fernández RN  11/26/23 7250

## 2023-11-26 NOTE — ED ATTENDING ATTESTATION
11/26/2023  IKhanh MD, saw and evaluated the patient. I have discussed the patient with the resident/non-physician practitioner and agree with the resident's/non-physician practitioner's findings, Plan of Care, and MDM as documented in the resident's/non-physician practitioner's note, except where noted. All available labs and Radiology studies were reviewed. I was present for key portions of any procedure(s) performed by the resident/non-physician practitioner and I was immediately available to provide assistance. At this point I agree with the current assessment done in the Emergency Department. I have conducted an independent evaluation of this patient a history and physical is as follows:  Patient is a 77year old female who fell at nursing facility [facility]. Patient has dementia and cannot provide accurate hx. (+) struck head. (+) forehead laceration. Patient is on hospice. Last Tdap was in 2021 as per Epic. No N/V. No headache. Was last seen at [facility] in [place] on 10/17/23 for CAD. [facility] website checked on this patient and last Rx filled was on 4/1/22 for xanax for 30 day supply. (+) left forehead laceration noted. No active bleeding. No FB noted. (+) left periorbital ecchymosis. PERRL. Moist mucous membranes. Neck nontender. Lungs clear. Heart regular without murmur. Abdomen soft and nontender. Good bowel sounds. No edema. No rash noted. No gross focal deficits. DDx including but not limited to: intracranial injury, concussion, forehead laceration, orbital fx, facial contusion cervical injury; doubt intrathoracic injury, intraabdominal injury, extremity injury--fracture, dislocation, strain, sprain, contusion. Will check CTs and then suture laceration.      ED Course         Critical Care Time  Procedures

## 2023-11-26 NOTE — DISCHARGE INSTRUCTIONS
Sutures should stay in place for 7-10 days prior to removal, can be removed in ED, by urgent care, or by PCP. Please return if worsening symptoms develop and take antibiotics as prescribed for 7 days total treatment. There were multiple facial fractures seen on CT scan, treat pain as needed and we recommend follow up with neurosurgery and oral/maxillofacial surgery.

## 2023-11-26 NOTE — ED NOTES
Family left unit at this time, pt moved closer to nurses station for fall prevention.       Adrienne Jackman RN  11/26/23 3782

## 2023-11-27 LAB
ATRIAL RATE: 75 BPM
P AXIS: 68 DEGREES
PR INTERVAL: 122 MS
QRS AXIS: 96 DEGREES
QRSD INTERVAL: 88 MS
QT INTERVAL: 434 MS
QTC INTERVAL: 484 MS
T WAVE AXIS: 27 DEGREES
VENTRICULAR RATE: 75 BPM

## 2024-01-15 DIAGNOSIS — Z51.5 HOSPICE CARE: ICD-10-CM

## 2024-01-15 RX ORDER — MORPHINE SULFATE 20 MG/ML
5 SOLUTION ORAL
Qty: 30 ML | Refills: 0 | Status: SHIPPED | OUTPATIENT
Start: 2024-01-15 | End: 2024-01-25

## 2024-01-15 RX ORDER — LORAZEPAM 2 MG/ML
0.5 CONCENTRATE ORAL EVERY 6 HOURS PRN
Qty: 30 ML | Refills: 0 | Status: SHIPPED | OUTPATIENT
Start: 2024-01-15

## 2024-01-25 DIAGNOSIS — Z51.5 HOSPICE CARE: ICD-10-CM

## 2024-01-25 RX ORDER — MORPHINE SULFATE 20 MG/ML
5 SOLUTION ORAL
Qty: 30 ML | Refills: 0 | Status: SHIPPED | OUTPATIENT
Start: 2024-01-25

## 2024-01-30 ENCOUNTER — TELEPHONE (OUTPATIENT)
Dept: NEUROLOGY | Facility: CLINIC | Age: 79
End: 2024-01-30

## 2024-01-30 NOTE — TELEPHONE ENCOUNTER
Spoke to Sanyd who requested that the appointment be canceled due to the patient being in a memory care unit.      Appointment canceled

## 2024-02-19 ENCOUNTER — TELEPHONE (OUTPATIENT)
Dept: FAMILY MEDICINE CLINIC | Facility: CLINIC | Age: 79
End: 2024-02-19

## 2024-02-22 NOTE — TELEPHONE ENCOUNTER
Called and spoke with patient's daughter in law who stated patient is in [facility] and will no longer be coming here to see Dr. Aguila. Please remove pcp  Antonia Blanchard, CMA

## 2024-02-26 NOTE — TELEPHONE ENCOUNTER
02/26/24 4:27 PM        The office's request has been received, reviewed, and the patient chart updated. The PCP has successfully been removed with a patient attribution note. This message will now be completed.        Thank you  James Lynn

## 2024-04-19 DIAGNOSIS — Z51.5 HOSPICE CARE: Primary | ICD-10-CM

## 2024-04-19 RX ORDER — SENNOSIDES AND DOCUSATE SODIUM 8.6; 5 MG/1; MG/1
TABLET ORAL
Qty: 180 TABLET | Refills: 1 | Status: SHIPPED | OUTPATIENT
Start: 2024-04-19

## 2024-04-29 DIAGNOSIS — Z51.5 HOSPICE CARE: ICD-10-CM

## 2024-04-29 RX ORDER — LORAZEPAM 2 MG/ML
CONCENTRATE ORAL
Qty: 30 ML | Refills: 0 | Status: SHIPPED | OUTPATIENT
Start: 2024-04-29

## 2024-04-29 RX ORDER — MORPHINE SULFATE 20 MG/ML
SOLUTION ORAL
Qty: 30 ML | Refills: 0 | Status: SHIPPED | OUTPATIENT
Start: 2024-04-29 | End: 2024-05-01 | Stop reason: SDUPTHER

## 2024-05-01 DIAGNOSIS — Z51.5 HOSPICE CARE: ICD-10-CM

## 2024-05-01 RX ORDER — MORPHINE SULFATE 20 MG/ML
SOLUTION ORAL
Qty: 30 ML | Refills: 0 | Status: SHIPPED | OUTPATIENT
Start: 2024-05-01

## 2024-12-30 NOTE — ASSESSMENT & PLAN NOTE
Incidentally positive on screening in ER prior to OR   Patient is asymptomatic  O2 sats 89% in the ER on room air after dose of morphine in the setting of chronic tobacco abuse, will continue to monitor O2 requirements; otherwise will monitor off treatment   Multivitamin cocktail  Respiratory protocol  Check inflamamtory markers, daily cbc, cmp Physical Therapy Discharge    Visit Type: Daily Treatment Note- Discharge Summary  Visit: 13  Referring Provider: Pretty Jimenes MD  Medical Diagnosis (from order): M79.672 - Left foot pain     SUBJECTIVE                                                                                                               Patient reports that her foot pain does vary based on how much she is standing. With the holidays pain has increased. She acknowledges US helps in treatment but then she gets sore again. She has been using some heat/ice intermittently as well which does help.   Functional Change: Has been standing for extended periods of time, despite being sore.       OBJECTIVE                                                                                                                     Range of Motion (ROM)   (degrees unless noted; active unless noted; norms in ( ); negative=lacking to 0, positive=beyond 0)  Ankle:   - Dorsiflexion (20):       Left:  8     - Plantar Flexion (45-50):       Left:   WFL              Outcome/Assessments  Outcome Measures:   Lower Extremity Functional Scale: LEFS Calculated Total: 52 (0=extreme difficulty; 80=no difficulty) see flowsheet for additional documentation        Treatment    Ultrasound  - Location: left heel and plantar surface of foot  - Position: long sitting  - Duty Cycle: 50%  - Frequency: 1 Mhz  - Intensity (w/cm2): 1.0  - Duration: 8 minutes    Results: decreased pain  Reaction: no adverse reaction to treatment      Therapeutic Exercise  Standing gastrocnemeus 30 seconds x 3 with wedge  Standing soleus stretch 10 seconds x 3 with wedge  Standing heel raises 1 x 10- at counter  Single leg calf raises 2 x 5 each side     Education on continued exercises especially stretches. Encouraged increasing her use of ice/ soak bath especially with the recent increased standing with holiday time and family time.     Discussion that she had felt better and that with reducing her  prolonged standing and increasing use of home modalities pain will reduce to her knew baseline    Discussion that patient is still concerned there is more going on, does not want her injection offered from the physicians office but will contact their office to schedule for a follow up.           Skilled input: verbal instruction/cues, demonstration, as detailed above and tactile instruction/cues    Writer verbally educated and received verbal consent for hand placement, positioning of patient, and techniques to be performed today from patient for hand placement and palpation for techniques and modality application as described above and how they are pertinent to the patient's plan of care.  Home Exercise Program  Access Code: [email or web address]  URL: [email or web address]  Date: 11/11/2024  Prepared by: Brooklyn Macias    Exercises  - Seated Toe Towel Scrunches  - 1 x daily - 3 sets - 10 reps - 1 hold  - Seated Heel Raise  - 1 x daily - 3 sets - 10 reps - 1 hold  - Seated Toe Raise  - 1 x daily - 3 sets - 10 reps - 1 hold  - Gastroc Stretch on Wall  - 1 x daily - 1 sets - 3 reps - 30 hold  - Seated Plantar Fascia Stretch (Mirrored)  - 1 x daily - 3 reps - 10 seconds hold  - Toe Yoga - Alternating Great Toe and Lesser Toe Extension (Mirrored)  - 1 x daily - 1 sets - 10 reps  - Heel Raises with Counter Support  - 2 x daily - 1-2 sets - 10 reps  - Single Leg Heel Raise with Counter Support  - 1 x daily - 3 sets - 5 reps - 1 hold  - Standing Gastroc Stretch on Step with Counter Support  - 1 x daily - 1 sets - 3 reps - 30 hold  - Standing Soleus Stretch on Step  - 1 x daily - 3 reps - 10 seconds hold  - Standing Single Leg Stance with Counter Support  - 1 x daily - 3 reps - 10-20 seconds hold      ASSESSMENT                                                                                                            Gains in skilled therapy to date not as expected due to increased standing at the holidays  otherwise was doing better with improved range of motion and improved function with lower extremity functional scale increased from 26/80 to 52/80.     Patient attends therapy as recommended.  Patient reports performing HEP as prescribed.  Progress toward discharge/long term goals (see below for specific status updates): steady progress. Patient has not attained all goals and will be reaching out to her referring provider as she is concerned there could be more going on and would like to try to improve her pain with prolonged standing.   Education:   - Results of above outlined education: Verbalizes understanding and Demonstrates understanding    PLAN                                                                                                                           Discharge from skilled therapy with instructions/recommendations to: continue home exercise program, follow up with referring provider    Goals  Long Term Goals: to be met by end of plan of care  1. Take first 5 10 steps out of bed in the morning with 75% reduction in pain (3/10 on VAS) Status: partially met  Was better when she was not doing all the standing for the holidays varies from 2-6/10  2. Patient will ascend and descend one flight of stairs (12 steps or more) using reciprocal pattern, independently with rail(s) to complete home management and self care. Status: met Able to perform   3. Patient will report tolerating intermittent standing and walking for 60 minutes with patient reported manageable/tolerable difficulty for grocery shopping/lifestyle errands. Status: partially met  Able to stand for longer periods but pain is not manageable  4. Patient will be independent with progressed and modified home exercise program.  Status: met   5. Lower Extremity Functional Scale: Patient will score 40 or higher on The Lower Extremity Functional Scale to indicate a decreased level of difficulty with walking and moving around. (minimal clinically  important difference: 9 points change)      Therapy procedure time and total treatment time can be found documented on the Time Entry flowsheet

## (undated) DEVICE — INTENDED FOR TISSUE SEPARATION, AND OTHER PROCEDURES THAT REQUIRE A SHARP SURGICAL BLADE TO PUNCTURE OR CUT.: Brand: BARD-PARKER ® CARBON RIB-BACK BLADES

## (undated) DEVICE — CAPIT HIP MOP - ACTIS ONLY

## (undated) DEVICE — SUT STRATAFIX SPIRAL 1-0  CT-1 30 X 30CM SXPD2B403

## (undated) DEVICE — GLOVE SRG BIOGEL 8

## (undated) DEVICE — TUBE MINI KAMVAC SUCTION 7310 7 LATEX FREE

## (undated) DEVICE — SUT STRATAFIX SPIRAL 3-0 PGA/PCL 30 X 30 CM SXMD2B410

## (undated) DEVICE — ANTIBACTERIAL UNDYED BRAIDED (POLYGLACTIN 910), SYNTHETIC ABSORBABLE SUTURE: Brand: COATED VICRYL

## (undated) DEVICE — SUT VICRYL 0 CP-1 27 IN J267H

## (undated) DEVICE — ELECTRODE BLADE MOD  E-Z CLEAN 6.5IN -0014M

## (undated) DEVICE — DRAPE SHEET X-LG

## (undated) DEVICE — INSTRUMENT POUCH: Brand: CONVERTORS

## (undated) DEVICE — TRAY FOLEY 16FR URIMETER SURESTEP

## (undated) DEVICE — GUIDEWIRE .035 260CM 3MM J TIP

## (undated) DEVICE — PACK MAJOR ORTHO W/SPLITS PBDS

## (undated) DEVICE — LIGHT GLOVE GREEN

## (undated) DEVICE — SUT ETHIBOND 2 V-37 30 IN MX69G

## (undated) DEVICE — OSCILLATING TIP SAW CARTRIDGE: Brand: PRECISION FALCON

## (undated) DEVICE — ASTOUND SURGICAL GOWN, XXX LARGE, X-LONG: Brand: CONVERTORS

## (undated) DEVICE — SHOE COVER XTRA TRACTION UNIVERSAL

## (undated) DEVICE — TIBURON SPLIT SHEET: Brand: CONVERTORS

## (undated) DEVICE — CATH F4 INF JL 4 100CM: Brand: INFINITI

## (undated) DEVICE — HOOD: Brand: FLYTE, SURGICOOL

## (undated) DEVICE — PREP SURGICAL PURPREP 26ML

## (undated) DEVICE — ADHESIVE SKIN CLOSR DERMABOND PRINEO

## (undated) DEVICE — GLOVE INDICATOR PI UNDERGLOVE SZ 7.5 BLUE

## (undated) DEVICE — SKIN MARKER DUAL TIP WITH RULER CAP, FLEXIBLE RULER AND LABELS: Brand: DEVON

## (undated) DEVICE — REPEL CUT REST SURGICAL GLV LNRS LG: Brand: REPEL

## (undated) DEVICE — REPEL LITE CUT REST SURGICAL GLV LNRS X-LG: Brand: REPEL

## (undated) DEVICE — ARTHROSCOPY FLOOR MAT

## (undated) DEVICE — FOOT SWITCH DRAPE: Brand: UNBRANDED

## (undated) DEVICE — MANIFOLD KIT NETWORK - CCL

## (undated) DEVICE — CATH F4 INF JR 3.5 100CM: Brand: INFINITI

## (undated) DEVICE — POSITIONER HANA TABLE PACK

## (undated) DEVICE — VEST SURGEON DISPOSABLE

## (undated) DEVICE — HANDPIECE SET WITH HIGH FLOW TIP AND SUCTION TUBE: Brand: INTERPULSE

## (undated) DEVICE — DRESSING MEPILEX AG BORDER 4 X 8 IN

## (undated) DEVICE — TR BAND RADIAL ARTERY COMPRESSION DEVICE: Brand: TR BAND

## (undated) DEVICE — 3M™ STERI-DRAPE™ U-DRAPE 1015: Brand: STERI-DRAPE™

## (undated) DEVICE — BIPOLAR SEALER 23-113-1 AQM 2.3: Brand: AQUAMANTYS™

## (undated) DEVICE — BASIC DOUBLE BASIN 2-LF: Brand: MEDLINE INDUSTRIES, INC.

## (undated) DEVICE — CHLORAPREP HI-LITE 26ML ORANGE

## (undated) DEVICE — 3M™ IOBAN™ 2 ANTIMICROBIAL INCISE DRAPE 6650EZ: Brand: IOBAN™ 2

## (undated) DEVICE — GLOVE SRG BIOGEL 8.5

## (undated) DEVICE — GUIDEWIRE WHOLEY .035 145 CM FLOP STR TIP

## (undated) DEVICE — DRAPE C-ARM X-RAY

## (undated) DEVICE — GLIDESHEATH SLENDER STAINLESS STEEL KIT: Brand: GLIDESHEATH SLENDER

## (undated) DEVICE — PAD CAST 4 IN COTTON NON STERILE

## (undated) DEVICE — GLOVE INDICATOR PI UNDERGLOVE SZ 8.5 BLUE

## (undated) DEVICE — PACK CUSTOM C V DRAPE SCV11CDSLA